# Patient Record
Sex: FEMALE | Race: BLACK OR AFRICAN AMERICAN | Employment: FULL TIME | RURAL
[De-identification: names, ages, dates, MRNs, and addresses within clinical notes are randomized per-mention and may not be internally consistent; named-entity substitution may affect disease eponyms.]

---

## 2017-01-19 ENCOUNTER — OFFICE VISIT (OUTPATIENT)
Dept: INTERNAL MEDICINE CLINIC | Age: 62
End: 2017-01-19

## 2017-01-19 VITALS
OXYGEN SATURATION: 100 % | WEIGHT: 196 LBS | BODY MASS INDEX: 34.73 KG/M2 | SYSTOLIC BLOOD PRESSURE: 141 MMHG | DIASTOLIC BLOOD PRESSURE: 88 MMHG | HEART RATE: 103 BPM | RESPIRATION RATE: 16 BRPM | TEMPERATURE: 96 F | HEIGHT: 63 IN

## 2017-01-19 DIAGNOSIS — M62.838 MUSCLE SPASM: ICD-10-CM

## 2017-01-19 DIAGNOSIS — E11.65 TYPE 2 DIABETES MELLITUS WITH HYPERGLYCEMIA, WITH LONG-TERM CURRENT USE OF INSULIN (HCC): Primary | ICD-10-CM

## 2017-01-19 DIAGNOSIS — Z79.4 TYPE 2 DIABETES MELLITUS WITH HYPERGLYCEMIA, WITH LONG-TERM CURRENT USE OF INSULIN (HCC): Primary | ICD-10-CM

## 2017-01-19 DIAGNOSIS — I10 ESSENTIAL HYPERTENSION: ICD-10-CM

## 2017-01-19 DIAGNOSIS — S78.112S: Chronic | ICD-10-CM

## 2017-01-19 DIAGNOSIS — E78.00 HIGH CHOLESTEROL: ICD-10-CM

## 2017-01-19 RX ORDER — LISINOPRIL AND HYDROCHLOROTHIAZIDE 20; 25 MG/1; MG/1
TABLET ORAL
Qty: 90 TAB | Refills: 3 | Status: SHIPPED | OUTPATIENT
Start: 2017-01-19 | End: 2018-01-23 | Stop reason: SDUPTHER

## 2017-01-19 RX ORDER — NAPROXEN 500 MG/1
500 TABLET ORAL 2 TIMES DAILY WITH MEALS
Qty: 60 TAB | Refills: 2 | Status: SHIPPED | OUTPATIENT
Start: 2017-01-19 | End: 2017-03-23 | Stop reason: ALTCHOICE

## 2017-01-19 RX ORDER — INSULIN ASPART 100 [IU]/ML
42 INJECTION, SUSPENSION SUBCUTANEOUS
Qty: 5 PEN | Refills: 5 | Status: SHIPPED | OUTPATIENT
Start: 2017-01-19 | End: 2017-01-23 | Stop reason: SDUPTHER

## 2017-01-19 RX ORDER — FENOFIBRATE 48 MG/1
48 TABLET, COATED ORAL DAILY
Qty: 90 TAB | Refills: 3 | Status: SHIPPED | OUTPATIENT
Start: 2017-01-19 | End: 2017-03-23 | Stop reason: ALTCHOICE

## 2017-01-19 RX ORDER — CYCLOBENZAPRINE HCL 10 MG
10 TABLET ORAL
Qty: 120 TAB | Refills: 5 | Status: SHIPPED | OUTPATIENT
Start: 2017-01-19 | End: 2018-03-05 | Stop reason: ALTCHOICE

## 2017-01-19 RX ORDER — SIMVASTATIN 40 MG/1
TABLET, FILM COATED ORAL
Qty: 90 TAB | Refills: 3 | Status: SHIPPED | OUTPATIENT
Start: 2017-01-19 | End: 2017-03-23 | Stop reason: SINTOL

## 2017-01-19 NOTE — MR AVS SNAPSHOT
Visit Information Date & Time Provider Department Dept. Phone Encounter #  
 1/19/2017  7:30 AM Trista Edwards MD [address] [facility] [phone] [id number] Follow-up Instructions Return in about 3 months (around 4/19/2017) for routine follow up. Upcoming Health Maintenance Date Due Hepatitis C Screening 1955 MICROALBUMIN Q1 3/31/1966 Pneumococcal 19-64 Medium Risk (1 of 1 - PPSV23) 3/31/1975 ZOSTER VACCINE AGE 60> 3/31/2016 PAP AKA CERVICAL CYTOLOGY 5/31/2018* HEMOGLOBIN A1C Q6M 3/7/2017 EYE EXAM RETINAL OR DILATED Q1 5/24/2017 LIPID PANEL Q1 9/7/2017 FOOT EXAM Q1 1/19/2018 BREAST CANCER SCRN MAMMOGRAM 6/29/2018 COLONOSCOPY 10/12/2022 DTaP/Tdap/Td series (2 - Td) 6/17/2026 *Topic was postponed. The date shown is not the original due date. Allergies as of 1/19/2017  Review Complete On: 1/19/2017 By: Trista Edwards MD  
  
 Severity Noted Reaction Type Reactions Keflex [Cephalexin]  06/29/2016    Itching Lortab [Hydrocodone-acetaminophen]  03/30/2012    Itching Current Immunizations  Never Reviewed Name Date Influenza Vaccine (Quad) PF 9/7/2016 Pneumococcal Conjugate (PCV-13) 9/7/2016 Tdap 6/17/2016 Not reviewed this visit You Were Diagnosed With   
  
 Codes Comments Type 2 diabetes mellitus with hyperglycemia, with long-term current use of insulin (HCC)    -  Primary ICD-10-CM: E11.65, Z79.4 ICD-9-CM: 250.00, 790.29, V58.67 Essential hypertension     ICD-10-CM: I10 
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 9 Vitals BP Pulse Temp Resp Height(growth percentile) Weight(growth percentile)  141/88 (BP 1 Location: Left arm, BP Patient Position: Sitting) (!) 103 96 °F (35.6 °C) (Oral) 16 5' 3\" (1.6 m) 196 lb (88.9 kg) LMP SpO2 BMI OB Status Smoking Status 2003 100% 34.72 kg/m2 Postmenopausal Former Smoker BMI and BSA Data Body Mass Index Body Surface Area 34.72 kg/m 2 1.99 m 2 Preferred Pharmacy Pharmacy Name Phone [facility] [address] [place] - [address] [phone] Your Updated Medication List  
  
   
This list is accurate as of: 17  7:56 AM.  Always use your most recent med list.  
  
  
  
  
 aspirin 81 mg tablet Take 1 Tab by mouth daily. cyclobenzaprine 10 mg tablet Commonly known as:  FLEXERIL Take 1 Tab by mouth three (3) times daily as needed for Muscle Spasm(s). fenofibrate nanocrystallized 48 mg tablet Commonly known as:  [employer] Take 1 Tab by mouth daily. fiber Cap Take  by mouth.  
  
 gabapentin 300 mg capsule Commonly known as:  NEURONTIN Take 3 Caps by mouth every evening. insulin aspart protamine/insulin aspart 100 unit/mL (70-30) Inpn Commonly known as:  NOVOLOG MIX 70/30  
0.42 mL by SubCUTAneous route Before breakfast and dinner. Indications: type 2 diabetes mellitus Insulin Needles (Disposable) 29 gauge x 1/2\" Ndle 1 Each by Does Not Apply route daily. lisinopril-hydroCHLOROthiazide 20-25 mg per tablet Commonly known as:  PRINZIDE, ZESTORETIC  
TAKE ONE TABLET BY MOUTH EVERY DAY  
  
 naproxen 500 mg tablet Commonly known as:  NAPROSYN Take 1 Tab by mouth two (2) times daily (with meals). As needed  
  
 raNITIdine 150 mg tablet Commonly known as:  ZANTAC TAKE ONE TABLET BY MOUTH ONCE DAILY  
  
 simvastatin 40 mg tablet Commonly known as:  ZOCOR  
TAKE 1 TABLET BY MOUTH NIGHTLY Prescriptions Sent to Pharmacy Refills  
 insulin aspart protamine/insulin aspart (NOVOLOG MIX 70/30) 100 unit/mL (70-30) inpn 5 Si.42 mL by SubCUTAneous route Before breakfast and dinner. Indications: type 2 diabetes mellitus Class: Normal  
 Pharmacy: [place] [address] [address] Ph #: [phone] Route: SubCUTAneous  
 simvastatin (ZOCOR) 40 mg tablet 3 Sig: TAKE 1 TABLET BY MOUTH NIGHTLY Class: Normal  
 Pharmacy: [address] [place] [address] [place] [facility] Ph #: [phone]  
 lisinopril-hydroCHLOROthiazide (PRINZIDE, ZESTORETIC) 20-25 mg per tablet 3 Sig: TAKE ONE TABLET BY MOUTH EVERY DAY Class: Normal  
 Pharmacy: [address] [place] [address] [facility] Ph #: [phone]  
 cyclobenzaprine (FLEXERIL) 10 mg tablet 5 Sig: Take 1 Tab by mouth three (3) times daily as needed for Muscle Spasm(s). Class: Normal  
 Pharmacy: [place] [address] [address] Ph #: [phone] Route: Oral  
 fenofibrate nanocrystallized (TRICOR) 48 mg tablet 3 Sig: Take 1 Tab by mouth daily. Class: Normal  
 [address]acy[address] [facility] Ph #: [phone] Route: Oral  
 naproxen (NAPROSYN) 500 mg tablet 2 Sig: Take 1 Tab by mouth two (2) times daily (with meals). As needed Class: Normal  
 Pharmacy: [address] Ph #: [phone] Route: Oral  
  
We Performed the Following HEMOGLOBIN A1C WITH EAG [47299 CPT(R)] METABOLIC PANEL, BASIC [13237 CPT(R)] Follow-up Instructions Return in about 3 months (around 4/19/2017) for routine follow up. Introducing [facility]! Nilson Bettencourt introduces [employer] patient portal. Now you can access parts of your medical record, email your doctor's office, and request medication refills online. 1. In your internet browser, go to [email or web address] 2. Click on the First Time User? Click Here link in the Sign In box. You will see the New Member Sign Up page. 3. Enter your [employer] [facility] Access Code exactly as it appears below. You will not need to use this code after youve completed the sign-up process. If you do not sign up before the expiration date, you must request a new code. · [employer] Access Code: [email or web address] Expires: 4/19/2017  7:44 AM 
 
4. Enter the last four digits of your Social Security Number (xxxx) and Date of Birth (mm/dd/yyyy) as indicated and click Submit. You will be taken to the next sign-up page. 5. Create a [facility] [facility] ID. This will be your [employer] login ID and cannot be changed, so think of one that is secure and easy to remember. 6. Create a [employer] [facility] password. You can change your password at any time. 7. Enter your Password Reset Question and Answer. This can be used at a later time if you forget your password. 8. Enter your e-mail address. You will receive e-mail notification when new information is available in [address]. 9. Click Sign Up. You can now view and download portions of your medical record. 10. Click the Download Summary menu link to download a portable copy of your medical information. If you have questions, please visit the Frequently Asked Questions section of the [employer] website. Remember, [facility] [facility] is NOT to be used for urgent needs. For medical emergencies, dial 911. Now available from your iPhone and Android! Please provide this summary of care documentation to your next provider. If you have any questions after today's visit, please call [phone].

## 2017-01-19 NOTE — PROGRESS NOTES
Subjective:     Tony Ventura is a 62 y.o. female seen for follow-up of diabetes. She has had hypoglycemic attacks. .no  Blood sugar control has been OK as long as she is good  She has diabetes, hypertension and hyperlipidemia. Tony Ventura has the additional concern of joint pains in elbows hands and knees. Taking ibuprofen 2 x per week. Pain since esteban, no trauma. Prosthesis working OK, has a new one    Diabetic Review of Systems: no chest pain, dyspnea or TIA's, no numbness, tingling or pain in extremities, no hypoglycemia, some inc thirst and usu SOB no CP. Current Outpatient Prescriptions   Medication Sig Dispense Refill    raNITIdine (ZANTAC) 150 mg tablet TAKE ONE TABLET BY MOUTH ONCE DAILY 30 Tab 11    Insulin Needles, Disposable, 29 gauge x 1/2\" ndle 1 Each by Does Not Apply route daily. 100 Pen Needle 5    fenofibrate nanocrystallized (TRICOR) 48 mg tablet TAKE ONE TABLET BY MOUTH ONCE DAILY 30 Tab 5    insulin aspart protamine/insulin aspart (NOVOLOG MIX 70/30) 100 unit/mL (70-30) inpn 0.42 mL by SubCUTAneous route Before breakfast and dinner. Indications: TYPE 2 DIABETES MELLITUS 5 Pen 5    cyclobenzaprine (FLEXERIL) 10 mg tablet Take 1 Tab by mouth three (3) times daily as needed for Muscle Spasm(s). 60 Tab 5    gabapentin (NEURONTIN) 300 mg capsule Take 3 Caps by mouth every evening. 90 Cap 5    simvastatin (ZOCOR) 40 mg tablet TAKE 1 TABLET BY MOUTH NIGHTLY 90 Tab 1    lisinopril-hydrochlorothiazide (PRINZIDE, ZESTORETIC) 20-25 mg per tablet TAKE ONE TABLET BY MOUTH EVERY DAY 30 Tab 11    aspirin 81 mg tablet Take 1 Tab by mouth daily.  fiber Cap Take  by mouth. Allergies   Allergen Reactions    Keflex [Cephalexin] Itching    Lortab [Hydrocodone-Acetaminophen] Itching       Diet and Lifestyle: follows a diabetic diet regularly, nonsmoker.     Social History   Substance Use Topics    Smoking status: Former Smoker     Quit date: 3/30/2009    Smokeless tobacco: Not on file    Alcohol use No        Lab Results  Component Value Date/Time   WBC 5.3 04/06/2016 11:44 AM   HGB 13.3 04/06/2016 11:44 AM   HCT 40.1 04/06/2016 11:44 AM   PLATELET 811 34/98/3958 11:44 AM   MCV 77 04/06/2016 11:44 AM       Lab Results  Component Value Date/Time   Hemoglobin A1c 12.8 09/07/2016 11:41 AM   Hemoglobin A1c 13.1 04/06/2016 11:44 AM   Hemoglobin A1c 8.4 08/11/2014 01:27 PM   Glucose 140 09/07/2016 11:41 AM   Glucose  07/20/2016 10:00 AM   LDL, calculated Comment 09/07/2016 11:41 AM   Creatinine 1.23 09/07/2016 11:41 AM      Lab Results  Component Value Date/Time   Cholesterol, total 117 09/07/2016 11:41 AM   Cholesterol, Total 128 10/17/2013 07:45 AM   HDL Cholesterol 14 09/07/2016 11:41 AM   LDL, calculated Comment 09/07/2016 11:41 AM   Triglyceride 481 09/07/2016 11:41 AM       Lab Results  Component Value Date/Time   ALT 16 04/06/2016 11:44 AM   AST 13 04/06/2016 11:44 AM   Alk. phosphatase 66 04/06/2016 11:44 AM   Bilirubin, total <0.2 04/06/2016 11:44 AM       Lab Results  Component Value Date/Time   GFR est AA 55 09/07/2016 11:41 AM   GFR est non-AA 48 09/07/2016 11:41 AM   Creatinine 1.23 09/07/2016 11:41 AM   BUN 21 09/07/2016 11:41 AM   Sodium 141 09/07/2016 11:41 AM   Potassium 4.2 09/07/2016 11:41 AM   Chloride 98 09/07/2016 11:41 AM   CO2 21 09/07/2016 11:41 AM      Lab Results   Component Value Date/Time    Glucose 140 09/07/2016 11:41 AM    Glucose  07/20/2016 10:00 AM         Review of Systems  Pertinent items are noted in HPI. Objective:     Significant for the followingleft leg prosthesis    Visit Vitals    /88 (BP 1 Location: Left arm, BP Patient Position: Sitting)    Pulse (!) 103    Temp 96 °F (35.6 °C) (Oral)    Resp 16    Ht 5' 3\" (1.6 m)    Wt 196 lb (88.9 kg)    LMP 08/23/2003    SpO2 100%    BMI 34.72 kg/m2     Appearance: alert, well appearing, and in no distress.   Exam: heart sounds normal rate, regular rhythm, normal S1, S2, no murmurs, rubs, clicks or gallops, chest clear  Foot exam: Diabetic foot exam was performed. No obvious sores or red lesions. Sensation checked by monofilament exam which was normal.  Dorsalis pedis pulse waspresent. Lab review: orders written for new lab studies as appropriate; see orders. Assessment/Plan:     Follow-up diabetes control uncertain. Diabetic issues reviewed with her: all medications, side effects and compliance discussed carefully, foot care discussed and Podiatry visits discussed and glycohemoglobin and other lab monitoring discussed. ICD-10-CM ICD-9-CM    1. Type 2 diabetes mellitus with hyperglycemia, with long-term current use of insulin ([facility]) K16.12 837.68 METABOLIC PANEL, BASIC    R61.4 790.29 HEMOGLOBIN A1C WITH EAG     V58.67    2. Essential hypertension I10 401.9 lisinopril-hydroCHLOROthiazide (PRINZIDE, ZESTORETIC) 20-25 mg per tablet   3. Muscle spasm M62.838 728.85 cyclobenzaprine (FLEXERIL) 10 mg tablet   4. High cholesterol E78.00 272.0 fenofibrate nanocrystallized (TRICOR) 48 mg tablet   5. 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 Indications: type 2 diabetes mellitus     Dispense:  5 Pen     Refill:  5    simvastatin (ZOCOR) 40 mg tablet     Sig: TAKE 1 TABLET BY MOUTH NIGHTLY     Dispense:  90 Tab     Refill:  3    lisinopril-hydroCHLOROthiazide (PRINZIDE, ZESTORETIC) 20-25 mg per tablet     Sig: TAKE ONE TABLET BY MOUTH EVERY DAY     Dispense:  90 Tab     Refill:  3    cyclobenzaprine (FLEXERIL) 10 mg tablet     Sig: Take 1 Tab by mouth three (3) times daily as needed for Muscle Spasm(s).      Dispense:  120 Tab     Refill:  5    fenofibrate nanocrystallized (TRICOR) 48 mg tablet Sig: Take 1 Tab by mouth daily. Dispense:  90 Tab     Refill:  3    naproxen (NAPROSYN) 500 mg tablet     Sig: Take 1 Tab by mouth two (2) times daily (with meals). As needed     Dispense:  60 Tab     Refill:  2     Follow-up Disposition:  Return in about 3 months (around 4/19/2017) for routine follow up.

## 2017-01-20 LAB
BUN SERPL-MCNC: 29 MG/DL (ref 8–27)
BUN/CREAT SERPL: 22 (ref 11–26)
CALCIUM SERPL-MCNC: 9.5 MG/DL (ref 8.7–10.3)
CHLORIDE SERPL-SCNC: 95 MMOL/L (ref 96–106)
CO2 SERPL-SCNC: 23 MMOL/L (ref 18–29)
CREAT SERPL-MCNC: 1.34 MG/DL (ref 0.57–1)
EST. AVERAGE GLUCOSE BLD GHB EST-MCNC: 252 MG/DL
GLUCOSE SERPL-MCNC: 263 MG/DL (ref 65–99)
HBA1C MFR BLD: 10.4 % (ref 4.8–5.6)
INTERPRETATION: NORMAL
POTASSIUM SERPL-SCNC: 3.8 MMOL/L (ref 3.5–5.2)
SODIUM SERPL-SCNC: 137 MMOL/L (ref 134–144)

## 2017-01-23 ENCOUNTER — TELEPHONE (OUTPATIENT)
Dept: INTERNAL MEDICINE CLINIC | Age: 62
End: 2017-01-23

## 2017-01-23 RX ORDER — INSULIN ASPART 100 [IU]/ML
44 INJECTION, SUSPENSION SUBCUTANEOUS
Qty: 5 PEN | Refills: 5 | Status: SHIPPED | OUTPATIENT
Start: 2017-01-23 | End: 2017-03-23 | Stop reason: SDUPTHER

## 2017-03-23 ENCOUNTER — OFFICE VISIT (OUTPATIENT)
Dept: INTERNAL MEDICINE CLINIC | Age: 62
End: 2017-03-23

## 2017-03-23 VITALS
BODY MASS INDEX: 35.26 KG/M2 | TEMPERATURE: 96.3 F | HEART RATE: 110 BPM | WEIGHT: 199 LBS | DIASTOLIC BLOOD PRESSURE: 74 MMHG | OXYGEN SATURATION: 100 % | HEIGHT: 63 IN | RESPIRATION RATE: 20 BRPM | SYSTOLIC BLOOD PRESSURE: 133 MMHG

## 2017-03-23 DIAGNOSIS — M19.90 ARTHRITIS: ICD-10-CM

## 2017-03-23 DIAGNOSIS — E78.00 ELEVATED CHOLESTEROL: ICD-10-CM

## 2017-03-23 DIAGNOSIS — S78.112D: ICD-10-CM

## 2017-03-23 DIAGNOSIS — I10 ESSENTIAL HYPERTENSION: ICD-10-CM

## 2017-03-23 DIAGNOSIS — E11.65 TYPE 2 DIABETES MELLITUS WITH HYPERGLYCEMIA, WITH LONG-TERM CURRENT USE OF INSULIN (HCC): Primary | ICD-10-CM

## 2017-03-23 DIAGNOSIS — Z79.4 TYPE 2 DIABETES MELLITUS WITH HYPERGLYCEMIA, WITH LONG-TERM CURRENT USE OF INSULIN (HCC): Primary | ICD-10-CM

## 2017-03-23 LAB
BILIRUB UR QL STRIP: NEGATIVE
GLUCOSE UR-MCNC: NORMAL MG/DL
KETONES P FAST UR STRIP-MCNC: NEGATIVE MG/DL
PH UR STRIP: 6.5 [PH] (ref 4.6–8)
PROT UR QL STRIP: NEGATIVE MG/DL
SP GR UR STRIP: 1.01 (ref 1–1.03)
UA UROBILINOGEN AMB POC: NORMAL (ref 0.2–1)
URINALYSIS CLARITY POC: CLEAR
URINALYSIS COLOR POC: YELLOW
URINE BLOOD POC: NORMAL
URINE LEUKOCYTES POC: NORMAL
URINE NITRITES POC: NEGATIVE

## 2017-03-23 RX ORDER — NAPROXEN 500 MG/1
500 TABLET ORAL 2 TIMES DAILY WITH MEALS
Qty: 60 TAB | Refills: 2 | Status: SHIPPED | OUTPATIENT
Start: 2017-03-23 | End: 2017-05-04 | Stop reason: ALTCHOICE

## 2017-03-23 RX ORDER — METFORMIN HYDROCHLORIDE 500 MG/1
500 TABLET ORAL 2 TIMES DAILY WITH MEALS
Qty: 60 TAB | Refills: 5 | Status: SHIPPED | OUTPATIENT
Start: 2017-03-23 | End: 2017-09-01 | Stop reason: SDUPTHER

## 2017-03-23 RX ORDER — DEXTROMETHORPHAN HYDROBROMIDE, GUAIFENESIN 5; 100 MG/5ML; MG/5ML
650 LIQUID ORAL
COMMUNITY
End: 2018-02-18

## 2017-03-23 RX ORDER — INSULIN ASPART 100 [IU]/ML
40 INJECTION, SUSPENSION SUBCUTANEOUS
Qty: 5 PEN | Refills: 5 | Status: SHIPPED | OUTPATIENT
Start: 2017-03-23 | End: 2017-09-01 | Stop reason: SDUPTHER

## 2017-03-23 NOTE — LETTER
3/27/2017 8:31 AM 
 
Ms. Oni Cerda [address] [facility] [id number] Dear Oni Cerda: 
 
Please find your most recent results below. Resulted Orders AMB POC URINALYSIS DIP STICK AUTO W/O MICRO Result Value Ref Range Color (UA POC) Yellow Clarity (UA POC) Clear Glucose (UA POC) 1+ Negative Comment:  
   500 mg/dL Bilirubin (UA POC) Negative Negative Ketones (UA POC) Negative Negative Specific gravity (UA POC) 1.015 1.001 - 1.035 Blood (UA POC) Trace Negative Comment:  
   Intact pH (UA POC) 6.5 4.6 - 8.0 Protein (UA POC) Negative Negative mg/dL Urobilinogen (UA POC) 0.2 mg/dL 0.2 - 1 Nitrites (UA POC) Negative Negative Leukocyte esterase (UA POC) 1+ Negative Comment:  
   Small MICROALBUMIN, UR, RAND W/ MICROALBUMIN/CREA RATIO Result Value Ref Range Creatinine, urine 40.7 Not Estab. mg/dL Microalbumin, urine 26.6 Not Estab. ug/mL Microalb/Creat ratio (ug/mg creat.) 65.4 (H) 0.0 - 30.0 mg/g creat Narrative Performed at:  [address]  [id number] : Maria G Aceves MD, Phone:  [phone] RECOMMENDATIONS: 
Results of urine test to check the kidneys is normal/ stable. Please follow up as scheduled. Please call me if you have any questions: [phone] Sincerely, Blanca Benson MD

## 2017-03-23 NOTE — PROGRESS NOTES
Subjective:     Brian Gordon is a 62 y.o. female seen for follow-up of diabetes. She has had hypoglycemic attacks. .no but was staring out into space feeling weird checked BS once was 101, lowest, gets something to eat and feels better  Blood sugar control has been fair, per diary  She has diabetes, hypertension and hyperlipidemia. Brian Gordon has the additional concern of arthritis, both elbows and hands are affected. Not getting exercise saw celia, did w/u, may eventually need knee replacement      Diabetic Review of Systems: no numbness, tingling or pain in extremities, has noted excessive thirstiness and frequent urination, has SOB no CP. Current Outpatient Prescriptions   Medication Sig Dispense Refill    acetaminophen (TYLENOL ARTHRITIS PAIN) 650 mg CR tablet Take 650 mg by mouth every six (6) hours as needed for Pain.  psyllium (METAMUCIL) powd Take  by mouth.  insulin aspart protamine/insulin aspart (NOVOLOG MIX 70/30) 100 unit/mL (70-30) inpn 0.44 mL by SubCUTAneous route Before breakfast and dinner. Indications: type 2 diabetes mellitus 5 Pen 5    simvastatin (ZOCOR) 40 mg tablet TAKE 1 TABLET BY MOUTH NIGHTLY 90 Tab 3    lisinopril-hydroCHLOROthiazide (PRINZIDE, ZESTORETIC) 20-25 mg per tablet TAKE ONE TABLET BY MOUTH EVERY DAY 90 Tab 3    cyclobenzaprine (FLEXERIL) 10 mg tablet Take 1 Tab by mouth three (3) times daily as needed for Muscle Spasm(s). 120 Tab 5    raNITIdine (ZANTAC) 150 mg tablet TAKE ONE TABLET BY MOUTH ONCE DAILY 30 Tab 11    Insulin Needles, Disposable, 29 gauge x 1/2\" ndle 1 Each by Does Not Apply route daily. 100 Pen Needle 5    gabapentin (NEURONTIN) 300 mg capsule Take 3 Caps by mouth every evening. 90 Cap 5    aspirin 81 mg tablet Take 1 Tab by mouth daily.  fiber Cap Take  by mouth.          Allergies   Allergen Reactions    Keflex [Cephalexin] Itching    Lortab [Hydrocodone-Acetaminophen] Itching       Diet and Lifestyle: follows a diabetic diet regularly, nonsmoker. Social History   Substance Use Topics    Smoking status: Former Smoker     Quit date: 3/30/2009    Smokeless tobacco: Not on file    Alcohol use No        Lab Results  Component Value Date/Time   WBC 5.3 04/06/2016 11:44 AM   HGB 13.3 04/06/2016 11:44 AM   HCT 40.1 04/06/2016 11:44 AM   PLATELET 043 73/79/1313 11:44 AM   MCV 77 04/06/2016 11:44 AM       Lab Results  Component Value Date/Time   Hemoglobin A1c 10.4 01/19/2017 07:50 AM   Hemoglobin A1c 12.8 09/07/2016 11:41 AM   Hemoglobin A1c 13.1 04/06/2016 11:44 AM   Glucose 263 01/19/2017 07:50 AM   Glucose  07/20/2016 10:00 AM   Microalb/Creat ratio (ug/mg creat.) 65.4 03/23/2017 08:03 AM   LDL, calculated Comment 09/07/2016 11:41 AM   Creatinine 1.34 01/19/2017 07:50 AM      Lab Results  Component Value Date/Time   Cholesterol, total 117 09/07/2016 11:41 AM   HDL Cholesterol 14 09/07/2016 11:41 AM   LDL, calculated Comment 09/07/2016 11:41 AM   Triglyceride 481 09/07/2016 11:41 AM       Lab Results  Component Value Date/Time   ALT (SGPT) 16 04/06/2016 11:44 AM   AST (SGOT) 13 04/06/2016 11:44 AM   Alk. phosphatase 66 04/06/2016 11:44 AM   Bilirubin, total <0.2 04/06/2016 11:44 AM       Lab Results  Component Value Date/Time   GFR est AA 50 01/19/2017 07:50 AM   GFR est non-AA 43 01/19/2017 07:50 AM   Creatinine 1.34 01/19/2017 07:50 AM   BUN 29 01/19/2017 07:50 AM   Sodium 137 01/19/2017 07:50 AM   Potassium 3.8 01/19/2017 07:50 AM   Chloride 95 01/19/2017 07:50 AM   CO2 23 01/19/2017 07:50 AM      Lab Results   Component Value Date/Time    Glucose 263 01/19/2017 07:50 AM    Glucose  07/20/2016 10:00 AM         Review of Systems  Pertinent items are noted in HPI.     Objective:     Significant for the following overweight no acute distress    Visit Vitals    /74 (BP 1 Location: Left arm, BP Patient Position: Sitting)    Pulse (!) 110    Temp 96.3 °F (35.7 °C) (Oral)    Resp 20    Ht 5' 3\" (1.6 m)  Wt 199 lb (90.3 kg)    LMP 2003    SpO2 100%    BMI 35.25 kg/m2     Appearance: alert, well appearing, and in no distress. Exam: heart sounds normal rate, regular rhythm, normal S1, S2, no murmurs, rubs, clicks or gallops, chest clear  Foot exam: deferred  Ext no obvious redness or swelling    Lab review: orders written for new lab studies as appropriate; see orders. Assessment/Plan:     Follow-up diabetes stable. Diabetic issues reviewed with her: low cholesterol diet, weight control and daily exercise discussed and all medications, side effects and compliance discussed carefully. ICD-10-CM ICD-9-CM    1. Type 2 diabetes mellitus with hyperglycemia, with long-term current use of insulin ([facility]) E11.65 250.00 AMB POC URINALYSIS DIP STICK AUTO W/O MICRO    Z79.4 790.29 MICROALBUMIN, UR, RAND W/ MICROALBUMIN/CREA RATIO     V58.67    2. Elevated cholesterol E78.00 272.0    3. Arthritis M19.90 716.90 AMB SUPPLY ORDER   4. Essential hypertension I10 401.9    5. [facility] S78.112D V58.89      897.2      Orders Placed This Encounter    AMB SUPPLY ORDER     Arthritis hands elbows knee. [place]  [phone]    MICROALBUMIN, UR, RAND W/ MICROALBUMIN/CREA RATIO    AMB POC URINALYSIS DIP STICK AUTO W/O MICRO    acetaminophen (TYLENOL ARTHRITIS PAIN) 650 mg CR tablet     Sig: Take 650 mg by mouth every six (6) hours as needed for Pain.  psyllium (METAMUCIL) powd     Sig: Take  by mouth.  insulin aspart protamine/insulin aspart (NOVOLOG MIX 70/30) 100 unit/mL (70-30) inpn     Si Units by SubCUTAneous route Before breakfast and dinner. Indications: type 2 diabetes mellitus     Dispense:  5 Pen     Refill:  5    metFORMIN (GLUCOPHAGE) 500 mg tablet     Sig: Take 1 Tab by mouth two (2) times daily (with meals).      Dispense:  60 Tab     Refill:  5    naproxen (NAPROSYN) 500 mg tablet     Sig: Take 1 Tab by mouth two (2) times daily (with meals). As needed for pain     Dispense:  60 Tab     Refill:  2     Exercise to help with her arthritis. No reoccurrence of the wound from her leg prosthesis. Discussed possible side affects, precautions, and drug interactions and possible benefits of the medication(s). Diabetes slowly improving. Follow-up Disposition:  Return in about 6 weeks (around 5/4/2017) for routine follow up.

## 2017-03-23 NOTE — MR AVS SNAPSHOT
Visit Information Date & Time Provider Department Dept. Phone Encounter #  
 3/23/2017  7:30 AM Yung Plascencia  Amendradha Cowan [id number] Follow-up Instructions Return in about 6 weeks (around 5/4/2017) for routine follow up. Upcoming Health Maintenance Date Due Hepatitis C Screening 1955 MICROALBUMIN Q1 3/31/1966 Pneumococcal 19-64 Medium Risk (1 of 1 - PPSV23) 3/31/1975 ZOSTER VACCINE AGE 60> 3/31/2016 PAP AKA CERVICAL CYTOLOGY 5/31/2018* EYE EXAM RETINAL OR DILATED Q1 5/24/2017 HEMOGLOBIN A1C Q6M 7/19/2017 LIPID PANEL Q1 9/7/2017 FOOT EXAM Q1 1/19/2018 BREAST CANCER SCRN MAMMOGRAM 6/29/2018 COLONOSCOPY 10/12/2022 DTaP/Tdap/Td series (2 - Td) 6/17/2026 *Topic was postponed. The date shown is not the original due date. Allergies as of 3/23/2017  Review Complete On: 3/23/2017 By: Patrick Pruitt LPN Severity Noted Reaction Type Reactions Keflex [Cephalexin]  06/29/2016    Itching Lortab [Hydrocodone-acetaminophen]  03/30/2012    Itching Current Immunizations  Never Reviewed Name Date Influenza Vaccine (Quad) PF 9/7/2016 Pneumococcal Conjugate (PCV-13) 9/7/2016 Tdap 6/17/2016 Not reviewed this visit You Were Diagnosed With   
  
 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 
ICD-9-CM: 401.9 Vitals BP Pulse Temp Resp Height(growth percentile) Weight(growth percentile)  133/74 (BP 1 Location: Left arm, BP Patient Position: Sitting) (!) 110 96.3 °F (35.7 °C) (Oral) 20 5' 3\" (1.6 m) 199 lb (90.3 kg) LMP SpO2 BMI OB Status Smoking Status 08/23/2003 100% 35.25 kg/m2 Postmenopausal Former Smoker Vitals History BMI and BSA Data Body Mass Index Body Surface Area  
 35.25 kg/m 2 2 m 2 Preferred Pharmacy Pharmacy Name Phone [facility] [place] [address], [place] [address] [phone] Your Updated Medication List  
  
   
This list is accurate as of: 3/23/17  8:13 AM.  Always use your most recent med list.  
  
  
  
  
 aspirin 81 mg tablet Take 1 Tab by mouth daily. cyclobenzaprine 10 mg tablet Commonly known as:  FLEXERIL Take 1 Tab by mouth three (3) times daily as needed for Muscle Spasm(s). fiber Cap Take  by mouth.  
  
 gabapentin 300 mg capsule Commonly known as:  NEURONTIN Take 3 Caps by mouth every evening. insulin aspart protamine/insulin aspart 100 unit/mL (70-30) Inpn Commonly known as:  NOVOLOG MIX 70/30  
40 Units by SubCUTAneous route Before breakfast and dinner. Indications: type 2 diabetes mellitus Insulin Needles (Disposable) 29 gauge x 1/2\" Ndle 1 Each by Does Not Apply route daily. lisinopril-hydroCHLOROthiazide 20-25 mg per tablet Commonly known as:  PRINZIDE, ZESTORETIC  
TAKE ONE TABLET BY MOUTH EVERY DAY  
  
 metFORMIN 500 mg tablet Commonly known as:  GLUCOPHAGE Take 1 Tab by mouth two (2) times daily (with meals). naproxen 500 mg tablet Commonly known as:  NAPROSYN Take 1 Tab by mouth two (2) times daily (with meals). As needed for pain  
  
 psyllium Powd Commonly known as:  METAMUCIL Take  by mouth. raNITIdine 150 mg tablet Commonly known as:  ZANTAC TAKE ONE TABLET BY MOUTH ONCE DAILY  
  
 simvastatin 40 mg tablet Commonly known as:  ZOCOR  
TAKE 1 TABLET BY MOUTH NIGHTLY  
  
 TYLENOL ARTHRITIS PAIN 650 mg CR tablet Generic drug:  acetaminophen Take 650 mg by mouth every six (6) hours as needed for Pain. Prescriptions Sent to Pharmacy Refills  
 insulin aspart protamine/insulin aspart (NOVOLOG MIX 70/30) 100 unit/mL (70-30) inpn 5 Si Units by SubCUTAneous route Before breakfast and dinner. Indications: type 2 diabetes mellitus Class: Normal  
 Pharmacy: [address] [facility], [address] Ph #: [phone] Route: SubCUTAneous  
 metFORMIN (GLUCOPHAGE) 500 mg tablet 5 Sig: Take 1 Tab by mouth two (2) times daily (with meals). Class: Normal  
 Pharmacy: [address] [facility], [address] Ph #: [phone] Route: Oral  
 naproxen (NAPROSYN) 500 mg tablet 2 Sig: Take 1 Tab by mouth two (2) times daily (with meals). As needed for pain  
 Class: Normal  
 Pharmacy: [address] [facility], [address] Ph #: [phone] Route: Oral  
  
We Performed the Following AMB POC URINALYSIS DIP STICK AUTO W/O MICRO [52380 CPT(R)] AMB SUPPLY ORDER [[id number] Custom] Comments:  
 Arthritis hands elbows knee. [place]  064 [id number] MICROALBUMIN, UR, RAND W/ MICROALBUMIN/CREA RATIO [id number] CPT(R)] Follow-up Instructions Return in about 6 weeks (around 2017) for routine follow up. Introducing [facility] & HEALTH SERVICES! Chiquis Wakefield introduces [employer] patient portal. Now you can access parts of your medical record, email your doctor's office, and request medication refills online. 1. In your internet browser, go to [email or web address] 2. Click on the First Time User? Click Here link in the Sign In box. You will see the New Member Sign Up page. 3. Enter your [employer] Access Code exactly as it appears below. You will not need to use this code after youve completed the sign-up process. If you do not sign up before the expiration date, you must request a new code. · [employer] Access Code: [email or web address] Expires: 4/19/2017  8:44 AM 
 
4. Enter the last four digits of your Social Security Number (xxxx) and Date of Birth (mm/dd/yyyy) as indicated and click Submit. You will be taken to the next sign-up page. 5. Create a [employer] ID. This will be your [employer] login ID and cannot be changed, so think of one that is secure and easy to remember. 6. Create a [employer] password. You can change your password at any time. 7. Enter your Password Reset Question and Answer. This can be used at a later time if you forget your password. 8. Enter your e-mail address. You will receive e-mail notification when new information is available in [address] 9. Click Sign Up. You can now view and download portions of your medical record. 10. Click the Download Summary menu link to download a portable copy of your medical information. If you have questions, please visit the Frequently Asked Questions section of the [employer] website. Remember, [employer] is NOT to be used for urgent needs. For medical emergencies, dial 911. Now available from your iPhone and Android! Please provide this summary of care documentation to your next provider. Your primary care clinician is listed as Surekha Encinas. If you have any questions after today's visit, please call [phone].

## 2017-03-24 LAB
ALBUMIN/CREAT UR: 65.4 MG/G CREAT (ref 0–30)
CREAT UR-MCNC: 40.7 MG/DL
MICROALBUMIN UR-MCNC: 26.6 UG/ML

## 2017-04-26 ENCOUNTER — DOCUMENTATION ONLY (OUTPATIENT)
Dept: INTERNAL MEDICINE CLINIC | Age: 62
End: 2017-04-26

## 2017-05-04 ENCOUNTER — OFFICE VISIT (OUTPATIENT)
Dept: INTERNAL MEDICINE CLINIC | Age: 62
End: 2017-05-04

## 2017-05-04 VITALS
DIASTOLIC BLOOD PRESSURE: 68 MMHG | HEIGHT: 63 IN | WEIGHT: 201 LBS | RESPIRATION RATE: 20 BRPM | TEMPERATURE: 96.6 F | OXYGEN SATURATION: 100 % | SYSTOLIC BLOOD PRESSURE: 112 MMHG | BODY MASS INDEX: 35.61 KG/M2 | HEART RATE: 100 BPM

## 2017-05-04 DIAGNOSIS — Z79.4 TYPE 2 DIABETES MELLITUS WITH HYPERGLYCEMIA, WITH LONG-TERM CURRENT USE OF INSULIN (HCC): Primary | ICD-10-CM

## 2017-05-04 DIAGNOSIS — E08.21 DIABETES MELLITUS DUE TO UNDERLYING CONDITION WITH DIABETIC NEPHROPATHY, WITH LONG-TERM CURRENT USE OF INSULIN (HCC): ICD-10-CM
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
## 2017-05-04 DIAGNOSIS — Z79.4 DIABETES MELLITUS DUE TO UNDERLYING CONDITION WITH DIABETIC NEPHROPATHY, WITH LONG-TERM CURRENT USE OF INSULIN (HCC): ICD-10-CM

## 2017-05-04 DIAGNOSIS — S78.112S: Chronic | ICD-10-CM

## 2017-05-04 DIAGNOSIS — I10 ESSENTIAL HYPERTENSION: ICD-10-CM

## 2017-05-04 DIAGNOSIS — E11.65 TYPE 2 DIABETES MELLITUS WITH HYPERGLYCEMIA, WITH LONG-TERM CURRENT USE OF INSULIN (HCC): Primary | ICD-10-CM

## 2017-05-04 RX ORDER — GABAPENTIN 100 MG/1
100 CAPSULE ORAL 3 TIMES DAILY
Qty: 90 CAP | Refills: 11 | Status: SHIPPED | OUTPATIENT
Start: 2017-05-04 | End: 2017-09-01 | Stop reason: SDUPTHER

## 2017-05-04 RX ORDER — MELOXICAM 15 MG/1
15 TABLET ORAL DAILY
COMMUNITY
End: 2017-09-01 | Stop reason: ALTCHOICE

## 2017-05-04 NOTE — PROGRESS NOTES
Follow up for diabetes - fell at work 3 weeks ago - hurt knee  Travis Celestin LPN  7/0/8449  8:94 AM

## 2017-05-04 NOTE — MR AVS SNAPSHOT
Visit Information Date & Time Provider Department Dept. Phone Encounter #  
 5/4/2017  7:30 AM Orestes Sewell  Amendradha Cowan [id number] Follow-up Instructions Return in about 4 months (around 9/4/2017) for routine follow up. Upcoming Health Maintenance Date Due Hepatitis C Screening 1955 Pneumococcal 19-64 Medium Risk (1 of 1 - PPSV23) 3/31/1975 ZOSTER VACCINE AGE 60> 3/31/2016 EYE EXAM RETINAL OR DILATED Q1 5/24/2017 PAP AKA CERVICAL CYTOLOGY 5/31/2018* HEMOGLOBIN A1C Q6M 7/19/2017 INFLUENZA AGE 9 TO ADULT 8/1/2017 LIPID PANEL Q1 9/7/2017 FOOT EXAM Q1 1/19/2018 MICROALBUMIN Q1 3/23/2018 BREAST CANCER SCRN MAMMOGRAM 6/29/2018 COLONOSCOPY 10/12/2022 DTaP/Tdap/Td series (2 - Td) 6/17/2026 *Topic was postponed. The date shown is not the original due date. Allergies as of 5/4/2017  Review Complete On: 5/4/2017 By: Hope Montaño LPN Severity Noted Reaction Type Reactions Keflex [Cephalexin]  06/29/2016    Itching Lortab [Hydrocodone-acetaminophen]  03/30/2012    Itching Current Immunizations  Never Reviewed Name Date Influenza Vaccine (Quad) PF 9/7/2016 Pneumococcal Conjugate (PCV-13) 9/7/2016 Tdap 6/17/2016 Not reviewed this visit You Were Diagnosed With   
  
 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 Stephanie Passer ICD-9-CM: V58.89, E888.9 Essential hypertension     ICD-10-CM: I10 
ICD-9-CM: 401.9 Diabetes mellitus due to underlying condition with diabetic nephropathy, with long-term current use of insulin (HCC)     ICD-10-CM: E08.21, Z79.4 ICD-9-CM: 249.40, 583.81, V58.67   
  
 Vitals BP Pulse Temp Resp Height(growth percentile) Weight(growth percentile) 112/68 100 96.6 °F (35.9 °C) (Oral) 20 5' 3\" (1.6 m) 201 lb (91.2 kg) LMP SpO2 BMI OB Status Smoking Status 08/23/2003 100% 35.61 kg/m2 Postmenopausal Former Smoker Vitals History BMI and BSA Data Body Mass Index Body Surface Area  
 35.61 kg/m 2 2.01 m 2 Preferred Pharmacy Pharmacy Name Phone [facility] [address], [place] [address] [phone] Your Updated Medication List  
  
   
This list is accurate as of: 5/4/17  8:00 AM.  Always use your most recent med list.  
  
  
  
  
 aspirin 81 mg tablet Take 1 Tab by mouth daily. cyclobenzaprine 10 mg tablet Commonly known as:  FLEXERIL Take 1 Tab by mouth three (3) times daily as needed for Muscle Spasm(s). fiber Cap Take  by mouth.  
  
 gabapentin 100 mg capsule Commonly known as:  NEURONTIN Take 1 Cap by mouth three (3) times daily. insulin aspart protamine/insulin aspart 100 unit/mL (70-30) Inpn Commonly known as:  NOVOLOG MIX 70/30  
40 Units by SubCUTAneous route Before breakfast and dinner. Indications: type 2 diabetes mellitus Insulin Needles (Disposable) 29 gauge x 1/2\" Ndle 1 Each by Does Not Apply route daily. lisinopril-hydroCHLOROthiazide 20-25 mg per tablet Commonly known as:  PRINZIDE, ZESTORETIC  
TAKE ONE TABLET BY MOUTH EVERY DAY  
  
 meloxicam 15 mg tablet Commonly known as:  MOBIC Take 15 mg by mouth daily. metFORMIN 500 mg tablet Commonly known as:  GLUCOPHAGE Take 1 Tab by mouth two (2) times daily (with meals). psyllium Powd Commonly known as:  METAMUCIL Take  by mouth. raNITIdine 150 mg tablet Commonly known as:  ZANTAC TAKE ONE TABLET BY MOUTH ONCE DAILY  
  
 TYLENOL ARTHRITIS PAIN 650 mg CR tablet Generic drug:  acetaminophen Take 650 mg by mouth every six (6) hours as needed for Pain. Prescriptions Sent to Pharmacy Refills  
 gabapentin (NEURONTIN) 100 mg capsule 11 Sig: Take 1 Cap by mouth three (3) times daily. Class: Normal  
 Pharmacy: [facility] [address]  #: [phone] Route: Oral  
  
We Performed the Following HEMOGLOBIN A1C WITH EAG [07144 CPT(R)] METABOLIC PANEL, BASIC [22204 CPT(R)] Follow-up Instructions Return in about 4 months (around 9/4/2017) for routine follow up. Introducing [facility] & HEALTH SERVICES! Shreya Ramsey introduces [employer] patient portal. Now you can access parts of your medical record, email your doctor's office, and request medication refills online. 1. In your internet browser, go to [email or web address] 2. Click on the First Time User? Click Here link in the Sign In box. You will see the New Member Sign Up page. 3. Enter your [employer] Access Code exactly as it appears below. You will not need to use this code after youve completed the sign-up process. If you do not sign up before the expiration date, you must request a new code. · [employer] Access Code: [email or web address] Expires: 8/2/2017  7:33 AM 
 
4. Enter the last four digits of your Social Security Number (xxxx) and Date of Birth (mm/dd/yyyy) as indicated and click Submit. You will be taken to the next sign-up page. 5. Create a [employer] ID. This will be your [employer] login ID and cannot be changed, so think of one that is secure and easy to remember. 6. Create a [employer] password. You can change your password at any time. 7. Enter your Password Reset Question and Answer. This can be used at a later time if you forget your password. 8. Enter your e-mail address. You will receive e-mail notification when new information is available in [address] 9. Click Sign Up. You can now view and download portions of your medical record.  
10. Click the Download Summary menu link to download a portable copy of your medical information. If you have questions, please visit the Frequently Asked Questions section of the [employer] website. Remember, [employer] is NOT to be used for urgent needs. For medical emergencies, dial 911. Now available from your iPhone and Android! Please provide this summary of care documentation to your next provider. Your primary care clinician is listed as Patricia Lopez. If you have any questions after today's visit, please call [phone].

## 2017-05-04 NOTE — PROGRESS NOTES
Subjective:     Faith Ann is a 65 y.o. female seen for follow-up of diabetes. She has had hypoglycemic attacks. .no  Blood sugar control has been OK highesat BS low 200's. She has diabetes and hypertension. Faith Ann has the additional concern of Fall 4/21/2017, trying to kick a wedge under the door when she fell. Had to go to  and then saw Spe[facility]ler who drained her right knee. Feeling better. Occurred at work. Min c/o pain currently. NSAID changed      Diabetic Review of Systems: no polyuria or polydipsia, no unusual visual symptoms, no hypoglycemia, no CP. Some SOB  No lrft leg sores or ulcer uses it OK. Current Outpatient Prescriptions   Medication Sig Dispense Refill    meloxicam (MOBIC) 15 mg tablet Take 15 mg by mouth daily.  acetaminophen (TYLENOL ARTHRITIS PAIN) 650 mg CR tablet Take 650 mg by mouth every six (6) hours as needed for Pain.  psyllium (METAMUCIL) powd Take  by mouth.  insulin aspart protamine/insulin aspart (NOVOLOG MIX 70/30) 100 unit/mL (70-30) inpn 40 Units by SubCUTAneous route Before breakfast and dinner. Indications: type 2 diabetes mellitus 5 Pen 5    metFORMIN (GLUCOPHAGE) 500 mg tablet Take 1 Tab by mouth two (2) times daily (with meals). 60 Tab 5    naproxen (NAPROSYN) 500 mg tablet Take 1 Tab by mouth two (2) times daily (with meals). As needed for pain 60 Tab 2    lisinopril-hydroCHLOROthiazide (PRINZIDE, ZESTORETIC) 20-25 mg per tablet TAKE ONE TABLET BY MOUTH EVERY DAY 90 Tab 3    cyclobenzaprine (FLEXERIL) 10 mg tablet Take 1 Tab by mouth three (3) times daily as needed for Muscle Spasm(s). 120 Tab 5    raNITIdine (ZANTAC) 150 mg tablet TAKE ONE TABLET BY MOUTH ONCE DAILY 30 Tab 11    Insulin Needles, Disposable, 29 gauge x 1/2\" ndle 1 Each by Does Not Apply route daily. 100 Pen Needle 5    gabapentin (NEURONTIN) 300 mg capsule Take 3 Caps by mouth every evening. 90 Cap 5    aspirin 81 mg tablet Take 1 Tab by mouth daily.       fiber Cap Take  by mouth.  NOVOLOG MIX 70-30 FLEXPEN 100 unit/mL (70-30) inpn INJECT 35 UNITS SUBCUTANEOUSLY BEFORE BREAKFAST AND DINNER FOR TYPE 2 DIABETES 5 Adjustable Dose Pre-filled Pen Syringe 5     Allergies   Allergen Reactions    Keflex [Cephalexin] Itching    Lortab [Hydrocodone-Acetaminophen] Itching       Diet and Lifestyle: follows a diabetic diet regularly, nonsmoker. Social History   Substance Use Topics    Smoking status: Former Smoker     Quit date: 3/30/2009    Smokeless tobacco: Not on file    Alcohol use No        Lab Results  Component Value Date/Time   WBC 5.3 04/06/2016 11:44 AM   HGB 13.3 04/06/2016 11:44 AM   HCT 40.1 04/06/2016 11:44 AM   PLATELET 184 82/61/8995 11:44 AM   MCV 77 04/06/2016 11:44 AM       Lab Results  Component Value Date/Time   Hemoglobin A1c 10.4 01/19/2017 07:50 AM   Hemoglobin A1c 12.8 09/07/2016 11:41 AM   Hemoglobin A1c 13.1 04/06/2016 11:44 AM   Glucose 263 01/19/2017 07:50 AM   Glucose  07/20/2016 10:00 AM   Microalb/Creat ratio (ug/mg creat.) 65.4 03/23/2017 08:03 AM   LDL, calculated Comment 09/07/2016 11:41 AM   Creatinine 1.34 01/19/2017 07:50 AM      Lab Results  Component Value Date/Time   Cholesterol, total 117 09/07/2016 11:41 AM   HDL Cholesterol 14 09/07/2016 11:41 AM   LDL, calculated Comment 09/07/2016 11:41 AM   Triglyceride 481 09/07/2016 11:41 AM       Lab Results  Component Value Date/Time   ALT (SGPT) 16 04/06/2016 11:44 AM   AST (SGOT) 13 04/06/2016 11:44 AM   Alk.  phosphatase 66 04/06/2016 11:44 AM   Bilirubin, total <0.2 04/06/2016 11:44 AM       Lab Results  Component Value Date/Time   GFR est AA 50 01/19/2017 07:50 AM   GFR est non-AA 43 01/19/2017 07:50 AM   Creatinine 1.34 01/19/2017 07:50 AM   BUN 29 01/19/2017 07:50 AM   Sodium 137 01/19/2017 07:50 AM   Potassium 3.8 01/19/2017 07:50 AM   Chloride 95 01/19/2017 07:50 AM   CO2 23 01/19/2017 07:50 AM      Lab Results   Component Value Date/Time    Glucose 263 01/19/2017 07:50 AM    Glucose  07/20/2016 10:00 AM       Labs from today were reviewed  no, labs done previously were reviewed  yes, Labs done in ER were reviewed  yes, Additional labs are ordered  yes,      Review of Systems  Pertinent items are noted in HPI. Objective:     Significant for the followingOW NAD    Visit Vitals    /90 (BP 1 Location: Left arm, BP Patient Position: Sitting)    Pulse 100    Temp 96.6 °F (35.9 °C) (Oral)    Resp 20    Ht 5' 3\" (1.6 m)    Wt 201 lb (91.2 kg)    LMP 08/23/2003    SpO2 100%    BMI 35.61 kg/m2     Appearance: alert, well appearing, and in no distress. Exam: heart sounds normal rate, regular rhythm, normal S1, S2, no murmurs, rubs, clicks or gallops, chest clear  Foot exam: deferred    Lab review: orders written for new lab studies as appropriate; see orders. Assessment/Plan:     Follow-up diabetes stable, improved. Diabetic issues reviewed with her: all medications, side effects and compliance discussed carefully and glycohemoglobin and other lab monitoring discussed. ICD-10-CM ICD-9-CM    1. Type 2 diabetes mellitus with hyperglycemia, with long-term current use of insulin ([facility]) S82.95 026.86 METABOLIC PANEL, BASIC    F52.2 790.29 HEMOGLOBIN A1C WITH EAG     V58.67 HI HANDLG&/OR CONVEY OF SPEC FOR TR OFFICE TO LAB      HI COLLECTION VENOUS BLOOD,VENIPUNCTURE   2. 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 Fall, subsequent encounter W19. XXXD V58.89 HI HANDLG&/OR CONVEY OF SPEC FOR TR OFFICE TO LAB     E888.9 HI COLLECTION VENOUS BLOOD,VENIPUNCTURE   4. Essential hypertension I10 401.9 HI HANDLG&/OR CONVEY OF SPEC FOR TR OFFICE TO LAB      HI COLLECTION VENOUS BLOOD,VENIPUNCTURE   5.  Diabetes mellitus due to underlying condition with diabetic nephropathy, with long-term current use of insulin ([facility]) E08.21 249.40 HI HANDLG&/OR CONVEY OF SPEC FOR TR OFFICE TO LAB    Z79.4 583.81 FL COLLECTION VENOUS BLOOD,VENIPUNCTURE     V58.67      Orders Placed This Encounter    METABOLIC PANEL, BASIC    HEMOGLOBIN A1C WITH EAG    FL HANDLG&/OR CONVEY OF SPEC FOR TR OFFICE TO LAB    FL COLLECTION VENOUS BLOOD,VENIPUNCTURE    meloxicam (MOBIC) 15 mg tablet     Sig: Take 15 mg by mouth daily.  gabapentin (NEURONTIN) 100 mg capsule     Sig: Take 1 Cap by mouth three (3) times daily. Dispense:  90 Cap     Refill:  11     Follow-up Disposition:  Return in about 4 months (around 9/4/2017) for routine follow up.

## 2017-05-04 NOTE — LETTER
5/8/2017 8:21 AM 
 
Ms. Zhou Mcdonald [address] [place] [phone] Dear Zhou Mcdonald: 
 
Please find your most recent results below. Resulted Orders METABOLIC PANEL, BASIC Result Value Ref Range Glucose 100 (H) 65 - 99 mg/dL BUN 29 (H) 8 - 27 mg/dL Creatinine 1.06 (H) 0.57 - 1.00 mg/dL GFR est non-AA 57 (L) >59 mL/min/1.73 GFR est AA 65 >59 mL/min/1.73  
 BUN/Creatinine ratio 27 12 - 28 Sodium 140 134 - 144 mmol/L Potassium 4.5 3.5 - 5.2 mmol/L Chloride 101 96 - 106 mmol/L  
 CO2 21 18 - 29 mmol/L Calcium 9.6 8.7 - 10.3 mg/dL Narrative Performed at:  [address]  [id number] : Jyotsna Obrien MD, Phone:  [phone] HEMOGLOBIN A1C WITH EAG Result Value Ref Range Hemoglobin A1c 9.3 (H) 4.8 - 5.6 % Comment:  
            Pre-diabetes: 5.7 - 6.4 Diabetes: >6.4 Glycemic control for adults with diabetes: <7.0 Estimated average glucose 220 mg/dL Narrative Performed at:  [address]  [id number] : Jyotsna Obrien MD, Phone:  [phone] CKD REPORT Result Value Ref Range Interpretation Note Comment:  
   Supplement report is available. Narrative Performed at:  [address] [place] [address]  [id number] : Yoan Tanner PhD, Phone:  [phone] DIABETES PATIENT EDUCATION Result Value Ref Range PDF Image Not applicable Narrative Performed at:  [address] [place] [address]  [id number] : Yoan Tanner PhD, Phone:  [phone] RECOMMENDATIONS: 
Your most recent lab results show that your diabetes is not as good as it should be, but it is better, and it isnt too bad currently. For now no change in your medications. Please follow the diabetic diet carefully. Reduce starchy foods and sweet foods. We will need to recheck these levels in a few months. If your diabetes worsens we will need to increase your medications. Follow up as scheduled. Please call me if you have any questions: [phone] Sincerely, Kelley Mayer MD

## 2017-05-05 LAB
BUN SERPL-MCNC: 29 MG/DL (ref 8–27)
BUN/CREAT SERPL: 27 (ref 12–28)
CALCIUM SERPL-MCNC: 9.6 MG/DL (ref 8.7–10.3)
CHLORIDE SERPL-SCNC: 101 MMOL/L (ref 96–106)
CO2 SERPL-SCNC: 21 MMOL/L (ref 18–29)
CREAT SERPL-MCNC: 1.06 MG/DL (ref 0.57–1)
EST. AVERAGE GLUCOSE BLD GHB EST-MCNC: 220 MG/DL
GLUCOSE SERPL-MCNC: 100 MG/DL (ref 65–99)
HBA1C MFR BLD: 9.3 % (ref 4.8–5.6)
INTERPRETATION: NORMAL
Lab: NORMAL
POTASSIUM SERPL-SCNC: 4.5 MMOL/L (ref 3.5–5.2)
SODIUM SERPL-SCNC: 140 MMOL/L (ref 134–144)

## 2017-05-05 NOTE — PROGRESS NOTES
Diabetes is not as good as it should be, but it's better, and it isn't too bad currently. For now no change in your medications. Please follow the diabetic diet carefully. Reduce starchy foods and sweet foods. Will need to re-check this levels in a few months. If diabetes worsens will need to increase your medications.

## 2017-05-12 ENCOUNTER — TELEPHONE (OUTPATIENT)
Dept: INTERNAL MEDICINE CLINIC | Age: 62
End: 2017-05-12

## 2017-05-12 DIAGNOSIS — Z89.619 HISTORY OF LEG AMPUTATION (HCC): Primary | ICD-10-CM

## 2017-05-12 NOTE — TELEPHONE ENCOUNTER
Saira, from [facility] physical therapy, called in reference to needing new order for patient. She has an appt at 3:45pm today. Please fax orders to [phone].

## 2017-08-18 ENCOUNTER — TELEPHONE (OUTPATIENT)
Dept: INTERNAL MEDICINE CLINIC | Age: 62
End: 2017-08-18

## 2017-08-18 NOTE — TELEPHONE ENCOUNTER
Returned pt's call and since taking metformin, she is jittery, hot and sweaty, not feeling right.   Wants to know what to do

## 2017-08-18 NOTE — TELEPHONE ENCOUNTER
Pt called in reference to her taking 40 units in morning and 40 units in the evening. Her blood sugar for the past 2 weeks has been 70s and 80s  She has been feeling weird since she has been taking it with metformin. Please call her at [phone] ext [phone]. If after 5:30 pm she can be reached at [phone].

## 2017-08-28 RX ORDER — INSULIN ASPART 100 [IU]/ML
INJECTION, SUSPENSION SUBCUTANEOUS
Qty: 5 PEN | Refills: 5 | Status: SHIPPED | OUTPATIENT
Start: 2017-08-28 | End: 2018-03-19 | Stop reason: SDUPTHER

## 2017-08-28 NOTE — TELEPHONE ENCOUNTER
Last office visit:  5/4/17  Last filled:  3/23/17 #5 pens X 5 refills  No changes  followup visit in 4 days with Dr Ochoa Rising

## 2017-09-01 ENCOUNTER — OFFICE VISIT (OUTPATIENT)
Dept: INTERNAL MEDICINE CLINIC | Age: 62
End: 2017-09-01

## 2017-09-01 VITALS
WEIGHT: 198 LBS | RESPIRATION RATE: 18 BRPM | SYSTOLIC BLOOD PRESSURE: 139 MMHG | BODY MASS INDEX: 35.08 KG/M2 | TEMPERATURE: 95.9 F | HEIGHT: 63 IN | HEART RATE: 102 BPM | OXYGEN SATURATION: 99 % | DIASTOLIC BLOOD PRESSURE: 85 MMHG

## 2017-09-01 DIAGNOSIS — K21.9 GASTROESOPHAGEAL REFLUX DISEASE WITHOUT ESOPHAGITIS: ICD-10-CM

## 2017-09-01 DIAGNOSIS — M17.11 ARTHRITIS OF KNEE, RIGHT: ICD-10-CM

## 2017-09-01 DIAGNOSIS — Z13.220 SCREENING CHOLESTEROL LEVEL: ICD-10-CM

## 2017-09-01 DIAGNOSIS — I10 ESSENTIAL HYPERTENSION: ICD-10-CM

## 2017-09-01 DIAGNOSIS — Z79.4 TYPE 2 DIABETES MELLITUS WITH HYPERGLYCEMIA, WITH LONG-TERM CURRENT USE OF INSULIN (HCC): ICD-10-CM

## 2017-09-01 DIAGNOSIS — E08.21 DIABETES MELLITUS DUE TO UNDERLYING CONDITION WITH DIABETIC NEPHROPATHY, WITH LONG-TERM CURRENT USE OF INSULIN (HCC): Primary | ICD-10-CM

## 2017-09-01 DIAGNOSIS — S78.112S: Chronic | ICD-10-CM

## 2017-09-01 DIAGNOSIS — Z23 ENCOUNTER FOR IMMUNIZATION: ICD-10-CM

## 2017-09-01 DIAGNOSIS — E11.65 TYPE 2 DIABETES MELLITUS WITH HYPERGLYCEMIA, WITH LONG-TERM CURRENT USE OF INSULIN (HCC): ICD-10-CM

## 2017-09-01 DIAGNOSIS — Z79.4 DIABETES MELLITUS DUE TO UNDERLYING CONDITION WITH DIABETIC NEPHROPATHY, WITH LONG-TERM CURRENT USE OF INSULIN (HCC): Primary | ICD-10-CM

## 2017-09-01 RX ORDER — GABAPENTIN 100 MG/1
100 CAPSULE ORAL 3 TIMES DAILY
Qty: 270 CAP | Refills: 3 | Status: SHIPPED | OUTPATIENT
Start: 2017-09-01 | End: 2018-07-11 | Stop reason: SDUPTHER

## 2017-09-01 RX ORDER — INSULIN ASPART 100 [IU]/ML
20 INJECTION, SUSPENSION SUBCUTANEOUS
Qty: 5 PEN | Refills: 5 | Status: SHIPPED | OUTPATIENT
Start: 2017-09-01 | End: 2017-11-01

## 2017-09-01 RX ORDER — RANITIDINE 150 MG/1
150 TABLET, FILM COATED ORAL 2 TIMES DAILY
Qty: 180 TAB | Refills: 3 | Status: SHIPPED | OUTPATIENT
Start: 2017-09-01 | End: 2018-10-20 | Stop reason: SDUPTHER

## 2017-09-01 RX ORDER — METFORMIN HYDROCHLORIDE 500 MG/1
500 TABLET ORAL 2 TIMES DAILY WITH MEALS
Qty: 180 TAB | Refills: 3 | Status: SHIPPED | OUTPATIENT
Start: 2017-09-01 | End: 2018-01-23 | Stop reason: SDUPTHER

## 2017-09-01 NOTE — LETTER
9/5/2017 2:58 PM 
 
Ms. Oswaldo Nguyen [address] [facility] [id number] Dear Oswaldo Nguyen: 
 
Please find your most recent results below. Resulted Orders HEMOGLOBIN A1C WITH EAG Result Value Ref Range Hemoglobin A1c 7.6 (H) 4.8 - 5.6 % Comment:  
            Pre-diabetes: 5.7 - 6.4 Diabetes: >6.4 Glycemic control for adults with diabetes: <7.0 Estimated average glucose 171 mg/dL Narrative Performed at:  [address]  [id number] : Kacey Ford MD, Phone:  [phone] LIPID PANEL Result Value Ref Range Cholesterol, total 126 100 - 199 mg/dL Triglyceride 394 (H) 0 - 149 mg/dL HDL Cholesterol 24 (L) >39 mg/dL VLDL, calculated 79 (H) 5 - 40 mg/dL LDL, calculated 23 0 - 99 mg/dL Narrative Performed at:  [address]  [id number] : Kacey Ford MD, Phone:  [phone] METABOLIC PANEL, BASIC Result Value Ref Range Glucose 162 (H) 65 - 99 mg/dL BUN 18 8 - 27 mg/dL Creatinine 0.93 0.57 - 1.00 mg/dL GFR est non-AA 67 >59 mL/min/1.73 GFR est AA 77 >59 mL/min/1.73  
 BUN/Creatinine ratio 19 12 - 28 Sodium 142 134 - 144 mmol/L Potassium 4.0 3.5 - 5.2 mmol/L Chloride 99 96 - 106 mmol/L  
 CO2 25 18 - 29 mmol/L Calcium 9.7 8.7 - 10.3 mg/dL Narrative Performed at:  [address]  [id number] : Kacey Ford MD, Phone:  [phone] CVD REPORT Result Value Ref Range INTERPRETATION Note Comment:  
   Supplement report is available. Narrative Performed at:  [address] [place] [address]  [id number] : Maya Haque PhD, Phone:  [phone] DIABETES PATIENT EDUCATION Result Value Ref Range PDF Image Not applicable Narrative Performed at:  [address] [place] [address]  [id number] : Lon Bansal PhD, Phone:  [phone] RECOMMENDATIONS: 
The results of your most recent labs show j carlos/ stable results. Your diabetes is doing well. Keep up the good work! Call me for any problems especially if the sugars go too low, where you feel weak or shaky. Follow up as scheduled. Please call me if you have any questions: [phone] Sincerely, Todd Pacheco MD

## 2017-09-01 NOTE — PROGRESS NOTES
Chief Complaint   Patient presents with    Diabetes     follow up     I have reviewed the patient's medical history in detail and updated the computerized patient record. Health Maintenance reviewed. 1. Have you been to the ER, urgent care clinic since your last visit? Hospitalized since your last visit?no    2. Have you seen or consulted any other health care providers outside of the [facility] since your last visit? Include any pap smears or colon screening. no    Do you have an [address] in place in the event that you have a healthcare crisis that could impact your decision making as it pertains to your health?no    Would you like information about the [address] [place], [place] [address]way given. no    Charted TDAP on this pt but did not give.

## 2017-09-01 NOTE — MR AVS SNAPSHOT
Visit Information Date & Time Provider Department Dept. Phone Encounter #  
 9/1/2017  8:00 AM Cesar Marie MD [facility] [phone] [id number] Follow-up Instructions Return in about 4 months (around 1/1/2018) for routine follow up. Upcoming Health Maintenance Date Due Hepatitis C Screening 1955 Pneumococcal 19-64 Medium Risk (1 of 1 - PPSV23) 3/31/1975 ZOSTER VACCINE AGE 60> 1/31/2016 EYE EXAM RETINAL OR DILATED Q1 5/24/2017 INFLUENZA AGE 9 TO ADULT 8/1/2017 LIPID PANEL Q1 9/7/2017 PAP AKA CERVICAL CYTOLOGY 5/31/2018* HEMOGLOBIN A1C Q6M 11/4/2017 FOOT EXAM Q1 1/19/2018 MICROALBUMIN Q1 3/23/2018 BREAST CANCER SCRN MAMMOGRAM 6/30/2019 COLONOSCOPY 10/12/2022 DTaP/Tdap/Td series (2 - Td) 6/17/2026 *Topic was postponed. The date shown is not the original due date. Allergies as of 9/1/2017  Review Complete On: 6/30/2017 By: Rosi Moritz Severity Noted Reaction Type Reactions Keflex [Cephalexin]  06/29/2016    Itching Lortab [Hydrocodone-acetaminophen]  03/30/2012    Itching Current Immunizations  Never Reviewed Name Date Influenza Vaccine (Quad) PF  Incomplete, 9/7/2016 Pneumococcal Conjugate (PCV-13) 9/7/2016 Tdap 6/17/2016 Not reviewed this visit You Were Diagnosed With   
  
 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 
ICD-9-CM: 401.9 Gastroesophageal reflux disease without esophagitis     ICD-10-CM: K21.9 ICD-9-CM: 530.81 Gastroesophageal reflux disease, esophagitis presence not specified     ICD-10-CM: K21.9 ICD-9-CM: 530.81 Arthritis of knee, right     ICD-10-CM: M17.11 ICD-9-CM: 716.96 Screening cholesterol level     ICD-10-CM: E32.379 ICD-9-CM: V77.91 Encounter for immunization     ICD-10-CM: I24 ICD-9-CM: V03.89 Vitals BP Pulse Temp Resp Height(growth percentile) Weight(growth percentile) 139/85 (BP 1 Location: Left arm, BP Patient Position: Sitting) (!) 102 95.9 °F (35.5 °C) (Oral) 18 5' 3\" (1.6 m) 198 lb (89.8 kg) LMP SpO2 BMI OB Status Smoking Status 08/23/2003 99% 35.07 kg/m2 Postmenopausal Former Smoker Vitals History BMI and BSA Data Body Mass Index Body Surface Area 35.07 kg/m 2 2 m 2 Preferred Pharmacy Pharmacy Name Phone [facility] [place] [facility] [address] [phone] Your Updated Medication List  
  
   
This list is accurate as of: 9/1/17  8:59 AM.  Always use your most recent med list.  
  
  
  
  
 aspirin 81 mg tablet Take 1 Tab by mouth daily. cyclobenzaprine 10 mg tablet Commonly known as:  FLEXERIL Take 1 Tab by mouth three (3) times daily as needed for Muscle Spasm(s). fiber Cap Take  by mouth.  
  
 gabapentin 100 mg capsule Commonly known as:  NEURONTIN Take 1 Cap by mouth three (3) times daily. Insulin Needles (Disposable) 29 gauge x 1/2\" Ndle 1 Each by Does Not Apply route daily. lisinopril-hydroCHLOROthiazide 20-25 mg per tablet Commonly known as:  PRINZIDE, ZESTORETIC  
TAKE ONE TABLET BY MOUTH EVERY DAY  
  
 metFORMIN 500 mg tablet Commonly known as:  GLUCOPHAGE Take 1 Tab by mouth two (2) times daily (with meals). * NovoLOG Mix 70-30 FlexPen 100 unit/mL (70-30) Inpn Generic drug:  insulin aspart protamine/insulin aspart INJECT 35 UNITS SUBCUTANEOUSLY BEFORE BREAKFAST AND DINNER FOR TYPE 2 DIABETES  
  
 * insulin aspart protamine/insulin aspart 100 unit/mL (70-30) Inpn Commonly known as:  NOVOLOG MIX 70/30  
20 Units by SubCUTAneous route Before breakfast and dinner. Indications: type 2 diabetes mellitus  
  
 psyllium Powd Commonly known as:  METAMUCIL Take  by mouth. raNITIdine 150 mg tablet Commonly known as:  ZANTAC Take 1 Tab by mouth two (2) times a day. TYLENOL ARTHRITIS PAIN 650 mg CR tablet Generic drug:  acetaminophen Take 650 mg by mouth every six (6) hours as needed for Pain. * Notice: This list has 2 medication(s) that are the same as other medications prescribed for you. Read the directions carefully, and ask your doctor or other care provider to review them with you. Prescriptions Sent to Pharmacy Refills  
 metFORMIN (GLUCOPHAGE) 500 mg tablet 3 Sig: Take 1 Tab by mouth two (2) times daily (with meals). Class: Normal  
 Pharmacy: [address] [place] [address]. [address] [facility], [address] [address] [address] Ph #: [phone] Route: Oral  
 raNITIdine (ZANTAC) 150 mg tablet 3 Sig: Take 1 Tab by mouth two (2) times a day. Class: Normal  
 Pharmacy: [address] [place] [address]. [address] [facility], [address] [address] [address] Ph #: [phone] Route: Oral  
 gabapentin (NEURONTIN) 100 mg capsule 3 Sig: Take 1 Cap by mouth three (3) times daily. Class: Normal  
 Pharmacy: [address] [place] [address]. [address] [facility], [address] [address] [address] Ph #: [phone] Route: Oral  
 insulin aspart protamine/insulin aspart (NOVOLOG MIX 70/30) 100 unit/mL (70-30) inpn 5 Si Units by SubCUTAneous route Before breakfast and dinner. Indications: type 2 diabetes mellitus Class: Normal  
 Pharmacy: [address] [place] [address]. [address] [facility], [address] [facility] [address] Ph #: [phone] Route: SubCUTAneous We Performed the Following HEMOGLOBIN A1C WITH EAG [94574 CPT(R)]  INFLUENZA VIRUS VAC QUAD,SPLIT,PRESV FREE SYRINGE 3/> YRS  [id number] CPT(R)] LIPID PANEL [26086 CPT(R)] METABOLIC PANEL, BASIC [14527 CPT(R)] REFERRAL TO OPTOMETRY [id number] Custom] Comments:  
 DM eye REFERRAL TO PODIATRY [REF90 Custom] Comments:  
 Please evaluate patient for DM and overgrown nails. Follow-up Instructions Return in about 4 months (around 1/1/2018) for routine follow up. Referral Information Referral ID Referred By Referred To  
  
 [id number] LEOLACesar, [address] [address]  Phone: [phone] Fax: [phone] Visits Status Start Date End Date 1 New Request 9/1/17 9/1/18 If your referral has a status of pending review or denied, additional information will be sent to support the outcome of this decision. Referral ID Referred By Referred To  
 [id number] Maya Carpio April S [address] [facility], [address]  Phone: [phone] Fax: [phone] Visits Status Start Date End Date 1 New Request 9/1/17 9/1/18 If your referral has a status of pending review or denied, additional information will be sent to support the outcome of this decision. Introducing \[facility]\"" & HEALTH SERVICES! Christi Nair introduces Anyone [facility] patient portal. Now you can access parts of your medical record, email your doctor's office, and request medication refills online. 1. In your internet browser, go to [email or web address] 2. Click on the First Time User? Click Here link in the Sign In box. You will see the New Member Sign Up page. 3. Enter your [employer] [facility] Access Code exactly as it appears below. You will not need to use this code after youve completed the sign-up process. If you do not sign up before the expiration date, you must request a new code. · [facility] Access Code: [email or web address] Expires: 11/30/2017  7:52 AM 
 
4.  Enter the last four digits of your Social Security Number (xxxx) and Date of Birth (mm/dd/yyyy) as indicated and click Submit. You will be taken to the next sign-up page. 5. Create a [employer] ID. This will be your [employer] login ID and cannot be changed, so think of one that is secure and easy to remember. 6. Create a [employer] password. You can change your password at any time. 7. Enter your Password Reset Question and Answer. This can be used at a later time if you forget your password. 8. Enter your e-mail address. You will receive e-mail notification when new information is available in [address] 9. Click Sign Up. You can now view and download portions of your medical record. 10. Click the Download Summary menu link to download a portable copy of your medical information. If you have questions, please visit the Frequently Asked Questions section of the [employer] website. Remember, [employer] is NOT to be used for urgent needs. For medical emergencies, dial 911. Now available from your iPhone and Android! Please provide this summary of care documentation to your next provider. Your primary care clinician is listed as Brian Roman. If you have any questions after today's visit, please call [phone].

## 2017-09-01 NOTE — PROGRESS NOTES
Subjective:     Brenda Elliott is a 65 y.o. female seen for follow-up of diabetes. She has had hypoglycemic attacks. .yes  Blood sugar control has been low 72, felt bad she called and we dec her insulin to 30u bid. BS since low 100's to low 200's no further hypos  She has diabetes, hypertension and hyperlipidemia. Brenda Elliott has the additional concern of sone N/T hands and arms not too severe, sees Gia for right knee arthritis  Says needs knee replacement but she is holding off for now. Having more HB lately  No vomiting blood or tarry black stools. Diabetic Review of Systems: no polyuria or polydipsia, no chest pain, dyspnea or TIA's, no medication side effects noted, has dysesthesias in the feet. Allergies   Allergen Reactions    Keflex [Cephalexin] Itching    Lortab [Hydrocodone-Acetaminophen] Itching       Diet and Lifestyle: generally follows a low fat low cholesterol diet, nonsmoker. Current Outpatient Prescriptions   Medication Sig Dispense Refill    NOVOLOG MIX 70-30 FLEXPEN 100 unit/mL (70-30) inpn INJECT 35 UNITS SUBCUTANEOUSLY BEFORE BREAKFAST AND DINNER FOR TYPE 2 DIABETES 5 Pen 5    gabapentin (NEURONTIN) 100 mg capsule Take 1 Cap by mouth three (3) times daily. 90 Cap 11    acetaminophen (TYLENOL ARTHRITIS PAIN) 650 mg CR tablet Take 650 mg by mouth every six (6) hours as needed for Pain.  psyllium (METAMUCIL) powd Take  by mouth.  insulin aspart protamine/insulin aspart (NOVOLOG MIX 70/30) 100 unit/mL (70-30) inpn 40 Units by SubCUTAneous route Before breakfast and dinner. Indications: type 2 diabetes mellitus 5 Pen 5    metFORMIN (GLUCOPHAGE) 500 mg tablet Take 1 Tab by mouth two (2) times daily (with meals). 60 Tab 5    lisinopril-hydroCHLOROthiazide (PRINZIDE, ZESTORETIC) 20-25 mg per tablet TAKE ONE TABLET BY MOUTH EVERY DAY 90 Tab 3    cyclobenzaprine (FLEXERIL) 10 mg tablet Take 1 Tab by mouth three (3) times daily as needed for Muscle Spasm(s). 120 Tab 5    raNITIdine (ZANTAC) 150 mg tablet TAKE ONE TABLET BY MOUTH ONCE DAILY 30 Tab 11    Insulin Needles, Disposable, 29 gauge x 1/2\" ndle 1 Each by Does Not Apply route daily. 100 Pen Needle 5    aspirin 81 mg tablet Take 1 Tab by mouth daily.  fiber Cap Take  by mouth. Allergies   Allergen Reactions    Keflex [Cephalexin] Itching    Lortab [Hydrocodone-Acetaminophen] Itching     Social History   Substance Use Topics    Smoking status: Former Smoker     Quit date: 3/30/2009    Smokeless tobacco: Never Used    Alcohol use No        Lab Results  Component Value Date/Time   WBC 5.3 04/06/2016 11:44 AM   HGB 13.3 04/06/2016 11:44 AM   HCT 40.1 04/06/2016 11:44 AM   PLATELET 814 83/39/3628 11:44 AM   MCV 77 04/06/2016 11:44 AM     Lab Results  Component Value Date/Time   Hemoglobin A1c 9.3 05/04/2017 07:51 AM   Hemoglobin A1c 10.4 01/19/2017 07:50 AM   Hemoglobin A1c 12.8 09/07/2016 11:41 AM   Glucose 100 05/04/2017 07:51 AM   Glucose  07/20/2016 10:00 AM   Microalb/Creat ratio (ug/mg creat.) 65.4 03/23/2017 08:03 AM   LDL, calculated Comment 09/07/2016 11:41 AM   Creatinine 1.06 05/04/2017 07:51 AM      Lab Results  Component Value Date/Time   Cholesterol, total 117 09/07/2016 11:41 AM   HDL Cholesterol 14 09/07/2016 11:41 AM   LDL, calculated Comment 09/07/2016 11:41 AM   Triglyceride 481 09/07/2016 11:41 AM   Lab Results  Component Value Date/Time   ALT (SGPT) 16 04/06/2016 11:44 AM   AST (SGOT) 13 04/06/2016 11:44 AM   Alk.  phosphatase 66 04/06/2016 11:44 AM   Bilirubin, total <0.2 04/06/2016 11:44 AM   Albumin 4.2 04/06/2016 11:44 AM   Protein, total 6.9 04/06/2016 11:44 AM   PLATELET 794 94/94/4218 11:44 AM       Lab Results  Component Value Date/Time   GFR est non-AA 57 05/04/2017 07:51 AM   GFR est AA 65 05/04/2017 07:51 AM   Creatinine 1.06 05/04/2017 07:51 AM   BUN 29 05/04/2017 07:51 AM   Sodium 140 05/04/2017 07:51 AM   Potassium 4.5 05/04/2017 07:51 AM   Chloride 101 05/04/2017 07:51 AM   CO2 21 05/04/2017 07:51 AM     Lab Results   Component Value Date/Time    Glucose 100 05/04/2017 07:51 AM    Glucose  07/20/2016 10:00 AM                         Review of Systems  Pertinent items are noted in HPI. Objective:     Significant for the following left AKA prosthesis  Visit Vitals    /85 (BP 1 Location: Left arm, BP Patient Position: Sitting)    Pulse (!) 102    Temp 95.9 °F (35.5 °C) (Oral)    Resp 18    Ht 5' 3\" (1.6 m)    Wt 198 lb (89.8 kg)    LMP 08/23/2003    SpO2 99%    BMI 35.07 kg/m2     Appearance: alert, well appearing, and in no distress. Exam: heart sounds normal rate, regular rhythm, normal S1, S2, no murmurs, rubs, clicks or gallops, chest clear  Foot exam: Diabetic foot exam was performed. No obvious sores or red lesions. Sensation checked by monofilament exam which was normal.  Dorsalis pedis pulse waspresent. Left prosthesis    Lab review: orders written for new lab studies as appropriate; see orders. Assessment/Plan:     Follow-up diabetes stable, improved. Diabetic issues reviewed with her: all medications, side effects and compliance discussed carefully, foot care discussed and Podiatry visits discussed, annual eye examinations at Ophthalmology discussed and glycohemoglobin and other lab monitoring discussed. ICD-10-CM ICD-9-CM    1. Diabetes mellitus due to underlying condition with diabetic nephropathy, with long-term current use of insulin ([facility]) E08.21 249.40 REFERRAL TO OPTOMETRY    Z79.4 583.81 REFERRAL TO PODIATRY     V58.67 HEMOGLOBIN A1C WITH EAG      METABOLIC PANEL, BASIC      SC HANDLG&/OR CONVEY OF SPEC FOR TR OFFICE TO LAB      SC COLLECTION VENOUS BLOOD,VENIPUNCTURE      SC IMMUNIZ ADMIN,1 SINGLE/COMB VAC/TOXOID      CANCELED: AMB POC HEMOGLOBIN A1C      CANCELED: TETANUS, DIPHTHERIA TOXOIDS AND ACELLULAR PERTUSSIS VACCINE (TDAP), IN INDIVIDS. >=7, IM   2.  Type 2 diabetes mellitus with hyperglycemia, with long-term current use of insulin ([facility]) E11.65 250.00 IA HANDLG&/OR CONVEY OF SPEC FOR TR OFFICE TO LAB    Z79.4 790.29 IA COLLECTION VENOUS BLOOD,VENIPUNCTURE     V58.67 IA IMMUNIZ ADMIN,1 SINGLE/COMB VAC/TOXOID      CANCELED: TETANUS, DIPHTHERIA TOXOIDS AND ACELLULAR PERTUSSIS VACCINE (TDAP), IN INDIVIDS. >=7, IM   3. 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 >=7, IM   4. Essential hypertension I10 401.9 IA HANDLG&/OR CONVEY OF SPEC FOR TR OFFICE TO LAB      IA COLLECTION VENOUS BLOOD,VENIPUNCTURE      IA IMMUNIZ ADMIN,1 SINGLE/COMB VAC/TOXOID      CANCELED: TETANUS, DIPHTHERIA TOXOIDS AND ACELLULAR PERTUSSIS VACCINE (TDAP), IN INDIVIDS. >=7, IM   5. Gastroesophageal reflux disease without esophagitis K21.9 530.81 raNITIdine (ZANTAC) 150 mg tablet      IA HANDLG&/OR CONVEY OF SPEC FOR TR OFFICE TO LAB      IA COLLECTION VENOUS BLOOD,VENIPUNCTURE      IA IMMUNIZ ADMIN,1 SINGLE/COMB VAC/TOXOID      CANCELED: TETANUS, DIPHTHERIA TOXOIDS AND ACELLULAR PERTUSSIS VACCINE (TDAP), IN INDIVIDS. >=7, IM   6. Arthritis of knee, right M17.11 716.96 IA HANDLG&/OR CONVEY OF SPEC FOR TR OFFICE TO LAB      IA COLLECTION VENOUS BLOOD,VENIPUNCTURE      IA IMMUNIZ ADMIN,1 SINGLE/COMB VAC/TOXOID      CANCELED: TETANUS, DIPHTHERIA TOXOIDS AND ACELLULAR PERTUSSIS VACCINE (TDAP), IN INDIVIDS. >=7, IM   7. Screening cholesterol level Z13.220 V77.91 LIPID PANEL      IA HANDLG&/OR CONVEY OF SPEC FOR TR OFFICE TO LAB      IA COLLECTION VENOUS BLOOD,VENIPUNCTURE      IA IMMUNIZ ADMIN,1 SINGLE/COMB VAC/TOXOID      CANCELED: TETANUS, DIPHTHERIA TOXOIDS AND ACELLULAR PERTUSSIS VACCINE (TDAP), IN INDIVIDS. >=7, IM   8.  Encounter for immunization Z23 V03.89 INFLUENZA VIRUS VAC QUAD,SPLIT,PRESV FREE SYRINGE 3/> YRS IM      DC HANDLG&/OR CONVEY OF SPEC FOR TR OFFICE TO LAB      DC COLLECTION VENOUS BLOOD,VENIPUNCTURE      DC IMMUNIZ ADMIN,1 SINGLE/COMB VAC/TOXOID      CANCELED: PNEUMOCOCCAL POLYSACCHARIDE VACCINE, 23-VALENT, ADULT OR IMMUNOSUPPRESSED PT DOSE,      CANCELED: TETANUS, DIPHTHERIA TOXOIDS AND ACELLULAR PERTUSSIS VACCINE (TDAP), IN INDIVIDS. >=7, IM       Orders Placed This Encounter    Influenza virus vaccine (QUADRIVALENT PRES FREE SYRINGE) IM (08827)    HEMOGLOBIN A1C WITH EAG    LIPID PANEL    METABOLIC PANEL, BASIC    CVD REPORT    DIABETES PATIENT EDUCATION    REFERRAL TO OPTOMETRY     Referral Priority:   Routine     Referral Type:   Consultation     Referral Reason:   Specialty Services Required     Referred to Provider:   Conor Arana OD    REFERRAL TO PODIATRY     Referral Priority:   Routine     Referral Type:   Consultation     Referral Reason:   Specialty Services Required     Referred to Provider:   Christiane Dunn DPM    DC HANDLG&/OR CONVEY OF SPEC FOR TR OFFICE TO LAB    DC COLLECTION VENOUS BLOOD,VENIPUNCTURE    DC IMMUNIZ ADMIN,1 SINGLE/COMB VAC/TOXOID    metFORMIN (GLUCOPHAGE) 500 mg tablet     Sig: Take 1 Tab by mouth two (2) times daily (with meals). Dispense:  180 Tab     Refill:  3    raNITIdine (ZANTAC) 150 mg tablet     Sig: Take 1 Tab by mouth two (2) times a day. Dispense:  180 Tab     Refill:  3    gabapentin (NEURONTIN) 100 mg capsule     Sig: Take 1 Cap by mouth three (3) times daily. Dispense:  270 Cap     Refill:  3    insulin aspart protamine/insulin aspart (NOVOLOG MIX 70/30) 100 unit/mL (70-30) inpn     Si Units by SubCUTAneous route Before breakfast and dinner. Indications: type 2 diabetes mellitus     Dispense:  5 Pen     Refill:  5       Chronic Conditions Addressed Today     1.  Type 2 diabetes mellitus with hyperglycemia (HCC)     Relevant Medications     metFORMIN (GLUCOPHAGE) 500 mg tablet     insulin aspart protamine/insulin aspart (NOVOLOG MIX 70/30) 100 unit/mL (70-30) inpn     Other Relevant Orders     MO HANDLG&/OR CONVEY OF SPEC FOR TR OFFICE TO LAB     MO COLLECTION VENOUS BLOOD,VENIPUNCTURE     MO IMMUNIZ ADMIN,1 SINGLE/COMB VAC/TOXOID    2. 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 Essential hypertension     Relevant Orders     MO HANDLG&/OR CONVEY OF SPEC FOR TR OFFICE TO LAB     MO COLLECTION VENOUS BLOOD,VENIPUNCTURE     MO IMMUNIZ ADMIN,1 SINGLE/COMB VAC/TOXOID    4.  Diabetes mellitus due to underlying condition with diabetic nephropathy, with long-term current use of insulin (HCC) - Primary     Relevant Medications     metFORMIN (GLUCOPHAGE) 500 mg tablet     insulin aspart protamine/insulin aspart (NOVOLOG MIX 70/30) 100 unit/mL (70-30) inpn     Other Relevant Orders     REFERRAL TO OPTOMETRY     REFERRAL TO PODIATRY     HEMOGLOBIN A1C WITH EAG (Completed)     METABOLIC PANEL, BASIC (Completed)     MO HANDLG&/OR CONVEY OF SPEC FOR TR OFFICE TO LAB     MO COLLECTION VENOUS BLOOD,VENIPUNCTURE     MO IMMUNIZ ADMIN,1 SINGLE/COMB VAC/TOXOID      Acute Diagnoses Addressed Today     Gastroesophageal reflux disease without esophagitis            Relevant Medications        raNITIdine (ZANTAC) 150 mg tablet        Other Relevant Orders        MO HANDLG&/OR CONVEY OF SPEC FOR TR OFFICE TO LAB        MO COLLECTION VENOUS BLOOD,VENIPUNCTURE        MO IMMUNIZ ADMIN,1 SINGLE/COMB VAC/TOXOID    Arthritis of knee, right            Relevant Orders        MO HANDLG&/OR CONVEY OF SPEC FOR TR OFFICE TO LAB        MO COLLECTION VENOUS BLOOD,VENIPUNCTURE        MO IMMUNIZ ADMIN,1 SINGLE/COMB VAC/TOXOID    Screening cholesterol level            Relevant Orders        LIPID PANEL (Completed)        MO HANDLG&/OR CONVEY OF SPEC FOR TR OFFICE TO LAB AR COLLECTION VENOUS BLOOD,VENIPUNCTURE        AR IMMUNIZ ADMIN,1 SINGLE/COMB VAC/TOXOID    Encounter for immunization            Relevant Orders        INFLUENZA VIRUS VAC QUAD,SPLIT,PRESV FREE SYRINGE 3/> YRS IM (Completed)        AR HANDLG&/OR CONVEY OF SPEC FOR TR OFFICE TO LAB        AR COLLECTION VENOUS BLOOD,VENIPUNCTURE        AR IMMUNIZ ADMIN,1 SINGLE/COMB VAC/TOXOID          Follow-up Disposition:  Return in about 4 months (around 1/1/2018) for routine follow up.

## 2017-09-02 LAB
BUN SERPL-MCNC: 18 MG/DL (ref 8–27)
BUN/CREAT SERPL: 19 (ref 12–28)
CALCIUM SERPL-MCNC: 9.7 MG/DL (ref 8.7–10.3)
CHLORIDE SERPL-SCNC: 99 MMOL/L (ref 96–106)
CHOLEST SERPL-MCNC: 126 MG/DL (ref 100–199)
CO2 SERPL-SCNC: 25 MMOL/L (ref 18–29)
CREAT SERPL-MCNC: 0.93 MG/DL (ref 0.57–1)
EST. AVERAGE GLUCOSE BLD GHB EST-MCNC: 171 MG/DL
GLUCOSE SERPL-MCNC: 162 MG/DL (ref 65–99)
HBA1C MFR BLD: 7.6 % (ref 4.8–5.6)
HDLC SERPL-MCNC: 24 MG/DL
INTERPRETATION, 910389: NORMAL
LDLC SERPL CALC-MCNC: 23 MG/DL (ref 0–99)
Lab: NORMAL
POTASSIUM SERPL-SCNC: 4 MMOL/L (ref 3.5–5.2)
SODIUM SERPL-SCNC: 142 MMOL/L (ref 134–144)
TRIGL SERPL-MCNC: 394 MG/DL (ref 0–149)
VLDLC SERPL CALC-MCNC: 79 MG/DL (ref 5–40)

## 2017-09-03 NOTE — PROGRESS NOTES
Send normal/stable results letter. Your diabetes is doing well, Keep up the good work! Call me for any problems especially if the sugars go to low, where you feel weak or shaky.

## 2017-11-01 PROBLEM — N95.0 POST-MENOPAUSAL BLEEDING: Status: ACTIVE | Noted: 2017-11-01

## 2017-11-17 ENCOUNTER — TELEPHONE (OUTPATIENT)
Dept: INTERNAL MEDICINE CLINIC | Age: 62
End: 2017-11-17

## 2017-11-17 NOTE — TELEPHONE ENCOUNTER
Returned call and pt bought Turmeric Curcumin from a [employer] for her knee pain. After taking two pills and resting for one hour her knee pain stopped. She wanted to know if this med would mess with her other reg meds.

## 2017-11-17 NOTE — TELEPHONE ENCOUNTER
Pt called stated that she got some medication from the health store to assist with pain and that she wanted to know if it was ok to take with her other medications

## 2017-11-17 NOTE — TELEPHONE ENCOUNTER
Dr. Donald Hawley stated this was a spice and should be alright taking with her reg meds when she has the acute pain with knee

## 2017-11-25 RX ORDER — NAPROXEN 500 MG/1
TABLET ORAL
Qty: 60 TAB | Refills: 2 | Status: SHIPPED | OUTPATIENT
Start: 2017-11-25 | End: 2018-01-23 | Stop reason: ALTCHOICE

## 2018-01-23 ENCOUNTER — OFFICE VISIT (OUTPATIENT)
Dept: INTERNAL MEDICINE CLINIC | Age: 63
End: 2018-01-23

## 2018-01-23 VITALS
SYSTOLIC BLOOD PRESSURE: 133 MMHG | HEIGHT: 63 IN | WEIGHT: 190 LBS | BODY MASS INDEX: 33.66 KG/M2 | TEMPERATURE: 97.6 F | DIASTOLIC BLOOD PRESSURE: 79 MMHG | RESPIRATION RATE: 16 BRPM | OXYGEN SATURATION: 99 % | HEART RATE: 113 BPM

## 2018-01-23 DIAGNOSIS — M17.11 ARTHRITIS OF RIGHT KNEE: ICD-10-CM

## 2018-01-23 DIAGNOSIS — S78.112S TRAUMATIC AMPUTATION OF LEFT LOWER EXTREMITY ABOVE KNEE, SEQUELA: Chronic | ICD-10-CM

## 2018-01-23 DIAGNOSIS — E11.65 TYPE 2 DIABETES MELLITUS WITH HYPERGLYCEMIA, WITHOUT LONG-TERM CURRENT USE OF INSULIN (HCC): ICD-10-CM

## 2018-01-23 DIAGNOSIS — I10 ESSENTIAL HYPERTENSION: ICD-10-CM

## 2018-01-23 DIAGNOSIS — Z23 ENCOUNTER FOR IMMUNIZATION: ICD-10-CM

## 2018-01-23 DIAGNOSIS — Z01.818 PRE-OP EVALUATION: Primary | ICD-10-CM

## 2018-01-23 RX ORDER — SIMVASTATIN 40 MG/1
40 TABLET, FILM COATED ORAL DAILY
Qty: 90 TAB | Refills: 3 | Status: SHIPPED | OUTPATIENT
Start: 2018-01-23 | End: 2018-10-22 | Stop reason: DRUGHIGH

## 2018-01-23 RX ORDER — METFORMIN HYDROCHLORIDE 500 MG/1
500 TABLET ORAL 2 TIMES DAILY WITH MEALS
Qty: 180 TAB | Refills: 3 | Status: SHIPPED | OUTPATIENT
Start: 2018-01-23 | End: 2018-03-05 | Stop reason: SDUPTHER

## 2018-01-23 RX ORDER — LISINOPRIL AND HYDROCHLOROTHIAZIDE 20; 25 MG/1; MG/1
TABLET ORAL
Qty: 90 TAB | Refills: 3 | Status: SHIPPED | OUTPATIENT
Start: 2018-01-23 | End: 2018-03-05 | Stop reason: SDUPTHER

## 2018-01-23 NOTE — PROGRESS NOTES
Chief Complaint   Patient presents with    Pre-op Exam     R/knee replacement in February     I have reviewed the patient's medical history in detail and updated the computerized patient record. Health Maintenance reviewed. 1. Have you been to the ER, urgent care clinic since your last visit? Hospitalized since your last visit? yes    2. Have you seen or consulted any other health care providers outside of the [address] [place] [address] [place] since your last visit? Include any pap smears or colon screening.  So. [facility]    Encouraged pt to discuss pt's wishes with spouse/partner/family and bring them in the next appt to follow thru with the Advanced Directive    Fall Risk Assessment, last 12 mths 1/23/2018   Able to walk? Yes   Fall in past 12 months? No   Fall with injury? -   Number of falls in past 12 months -   Fall Risk Score -       PHQ over the last two weeks 1/23/2018   Little interest or pleasure in doing things Several days   Feeling down, depressed or hopeless Several days   Total Score PHQ 2 2       Abuse Screening Questionnaire 1/23/2018   Do you ever feel afraid of your partner? N   Are you in a relationship with someone who physically or mentally threatens you? N   Is it safe for you to go home?  Y       ADL Assessment 1/23/2018   Feeding yourself No Help Needed   Getting from bed to chair No Help Needed   Getting dressed No Help Needed   Bathing or showering No Help Needed   Walk across the room (includes cane/walker) No Help Needed   Using the telphone No Help Needed   Taking your medications No Help Needed   Preparing meals No Help Needed   Managing money (expenses/bills) No Help Needed   Moderately strenuous housework (laundry) No Help Needed   Shopping for personal items (toiletries/medicines) No Help Needed   Shopping for groceries No Help Needed   Driving No Help Needed   Climbing a flight of stairs Help Needed   Getting to places beyond walking distances No Help Needed

## 2018-01-23 NOTE — LETTER
2/26/2018 1:52 PM 
 
Ms. Citlaly Aguilar [address] [facility] [phone] Dear Citlaly Aguilar: 
 
Diabetes is not as good as it was, but it isn't too bad currently. For now no change in your medications. Please follow the diabetic diet carefully. Reduce starchy foods and sweet foods. Will need to re-check this levels in a few months. If diabetes worsens will need to increase your medications Please find your most recent results below. Resulted Orders CBC W/O DIFF Result Value Ref Range WBC 7.1 3.4 - 10.8 x10E3/uL  
 RBC 5.10 3.77 - 5.28 x10E6/uL HGB 12.7 11.1 - 15.9 g/dL HCT 38.6 34.0 - 46.6 % MCV 76 (L) 79 - 97 fL  
 MCH 24.9 (L) 26.6 - 33.0 pg  
 MCHC 32.9 31.5 - 35.7 g/dL  
 RDW 17.7 (H) 12.3 - 15.4 % PLATELET 621 407 - 117 x10E3/uL Narrative Performed at:  [address]  [id number] : Shannan Jarquin MD, Phone:  [phone] METABOLIC PANEL, COMPREHENSIVE Result Value Ref Range Glucose 200 (H) 65 - 99 mg/dL Comment:  
   Sample is lipemic. This may cause spurious increases in TBili, DBili, 
AST, ALT, and UIBC (if ordered). Clinical correlation indicated. Specimen received in contact with cells. No visible hemolysis 
present. However GLUC may be decreased and K increased. Clinical 
correlation indicated. BUN 23 8 - 27 mg/dL Creatinine 0.90 0.57 - 1.00 mg/dL GFR est non-AA 69 >59 mL/min/1.73 GFR est AA 80 >59 mL/min/1.73  
 BUN/Creatinine ratio 26 12 - 28 Sodium 142 134 - 144 mmol/L Potassium 3.8 3.5 - 5.2 mmol/L Comment:  
   Specimen received in contact with cells. No visible hemolysis 
present. However GLUC may be decreased and K increased. Clinical 
correlation indicated. Sample is lipemic. This may cause spurious increases in TBili, DBili, 
AST, ALT, and UIBC (if ordered). Clinical correlation indicated.  
  
 Chloride 98 96 - 106 mmol/L  
 CO2 23 18 - 29 mmol/L  
 Calcium 9.6 8.7 - 10.3 mg/dL Protein, total 6.5 6.0 - 8.5 g/dL Albumin 4.2 3.6 - 4.8 g/dL GLOBULIN, TOTAL 2.3 1.5 - 4.5 g/dL A-G Ratio 1.8 1.2 - 2.2 Bilirubin, total <0.2 0.0 - 1.2 mg/dL Alk. phosphatase 77 39 - 117 IU/L  
 AST (SGOT) 11 0 - 40 IU/L Comment:  
   The specimen was lipemic. The lipemia was cleared by 
ultracentrifugation before testing. However HDL, direct LDL, 
cholesterol and triglyceride (if ordered) were performed prior to 
ultracentrifugation. ALT (SGPT) 11 0 - 32 IU/L Narrative Performed at:  [address]  [id number] : James Rodriguez MD, Phone:  [phone] HEMOGLOBIN A1C WITH EAG Result Value Ref Range Hemoglobin A1c 8.1 (H) 4.8 - 5.6 % Comment:  
            Pre-diabetes: 5.7 - 6.4 Diabetes: >6.4 Glycemic control for adults with diabetes: <7.0 Estimated average glucose 186 mg/dL Narrative Performed at:  [address]  [id number] : James Rodriguez MD, Phone:  [phone] Please call me if you have any questions: [phone] Sincerely, Layton Reynoso MD

## 2018-01-23 NOTE — MR AVS SNAPSHOT
[address] [place] [address]. [phone] [facility] 
[phone] Patient: Kayla Becker MRN: [id number] VKD:[id number][birth year]44[id number] Visit Information Date & Time Provider Department Dept. Phone Encounter #  
 1/23/2018  3:30 PM Poonam Devi  Judy Cowan [id number] Upcoming Health Maintenance Date Due Hepatitis C Screening 1955 Pneumococcal 19-64 Medium Risk (1 of 1 - PPSV23) 3/31/1975 ZOSTER VACCINE AGE 60> 1/31/2016 PAP AKA CERVICAL CYTOLOGY 5/31/2018* HEMOGLOBIN A1C Q6M 3/1/2018 MICROALBUMIN Q1 3/23/2018 LIPID PANEL Q1 9/1/2018 FOOT EXAM Q1 9/27/2018 EYE EXAM RETINAL OR DILATED Q1 10/17/2018 BREAST CANCER SCRN MAMMOGRAM 6/30/2019 COLONOSCOPY 10/12/2022 DTaP/Tdap/Td series (2 - Td) 6/17/2026 *Topic was postponed. The date shown is not the original due date. Allergies as of 1/23/2018  Review Complete On: 1/23/2018 By: Poonam Devi MD  
  
 Severity Noted Reaction Type Reactions Keflex [Cephalexin]  06/29/2016    Itching Lortab [Hydrocodone-acetaminophen]  03/30/2012    Itching Current Immunizations  Never Reviewed Name Date Influenza Vaccine (Quad) PF 9/1/2017, 9/7/2016 Pneumococcal Conjugate (PCV-13) 9/7/2016 Pneumococcal Polysaccharide (PPSV-23)  Incomplete Tdap 6/17/2016 Not reviewed this visit You Were Diagnosed With   
  
 Codes Comments Pre-op evaluation    -  Primary ICD-10-CM: O22.586 ICD-9-CM: V72.84 Encounter for immunization     ICD-10-CM: H54 ICD-9-CM: V03.89 Essential hypertension     ICD-10-CM: I10 
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 9 Vitals BP Pulse Temp Resp Height(growth percentile) Weight(growth percentile) 133/79 (BP 1 Location: Left arm, BP Patient Position: Sitting) (!) 113 97.6 °F (36.4 °C) (Oral) 16 5' 3\" (1.6 m) 190 lb (86.2 kg) LMP SpO2 BMI OB Status Smoking Status 08/23/2003 99% 33.66 kg/m2 Postmenopausal Former Smoker Vitals History BMI and BSA Data Body Mass Index Body Surface Area  
 33.66 kg/m 2 1.96 m 2 Preferred Pharmacy Pharmacy Name Phone [address] [phone] Your Updated Medication List  
  
   
This list is accurate as of: 1/23/18  3:55 PM.  Always use your most recent med list.  
  
  
  
  
 cyclobenzaprine 10 mg tablet Commonly known as:  FLEXERIL Take 1 Tab by mouth three (3) times daily as needed for Muscle Spasm(s). gabapentin 100 mg capsule Commonly known as:  NEURONTIN Take 1 Cap by mouth three (3) times daily. Insulin Needles (Disposable) 29 gauge x 1/2\" Ndle 1 Each by Does Not Apply route daily. lisinopril-hydroCHLOROthiazide 20-25 mg per tablet Commonly known as:  PRINZIDE, ZESTORETIC  
TAKE ONE TABLET BY MOUTH EVERY DAY  
  
 metFORMIN 500 mg tablet Commonly known as:  GLUCOPHAGE Take 1 Tab by mouth two (2) times daily (with meals). NovoLOG Mix 70-30 FlexPen 100 unit/mL (70-30) Inpn Generic drug:  insulin aspart protamine/insulin aspart INJECT 35 UNITS SUBCUTANEOUSLY BEFORE BREAKFAST AND DINNER FOR TYPE 2 DIABETES  
  
 raNITIdine 150 mg tablet Commonly known as:  ZANTAC Take 1 Tab by mouth two (2) times a day. simvastatin 40 mg tablet Commonly known as:  ZOCOR Take 1 Tab by mouth daily. tobramycin 0.3 % ophthalmic solution Commonly known as:  Gina Cap Administer 0.3 Drops to both eyes daily. TYLENOL ARTHRITIS PAIN 650 mg Krystyna Wynn Generic drug:  acetaminophen Take 650 mg by mouth every six (6) hours as needed for Pain. Prescriptions Sent to Pharmacy Refills  
 lisinopril-hydroCHLOROthiazide (PRINZIDE, ZESTORETIC) 20-25 mg per tablet 3 Sig: TAKE ONE TABLET BY MOUTH EVERY DAY Class: Normal  
 Pharmacy: [address] [facility] Ph #: [phone]  
 metFORMIN (GLUCOPHAGE) 500 mg tablet 3 Sig: Take 1 Tab by mouth two (2) times daily (with meals). Class: Normal  
 Pharmacy: [address], [address] Ph #: [phone] Route: Oral  
 simvastatin (ZOCOR) 40 mg tablet 3 Sig: Take 1 Tab by mouth daily. Class: Normal  
 Pharmacy: [address], [address] Ph #: [phone] Route: Oral  
  
We Performed the Following CBC W/O DIFF [43579 CPT(R)] HEMOGLOBIN A1C WITH EAG [51396 CPT(R)] METABOLIC PANEL, COMPREHENSIVE [36190 CPT(R)] PNEUMOCOCCAL POLYSACCHARIDE VACCINE, 23-VALENT, ADULT OR IMMUNOSUPPRESSED PT DOSE, [94333 CPT(R)] To-Do List   
 01/29/2018 8:00 AM  
  Appointment with [address] [place] [address] EXAM RM 3 at [address] [place] [address] ([phone])  
  
 01/29/2018 9:30 AM  
  Appointment with [address] [place] [address] CLASSROOM at [address] [place] [address] ([phone]) Introducing [facility]! [facility] introduces [employer] patient portal. Now you can access parts of your medical record, email your doctor's office, and request medication refills online. 1. In your internet browser, go to [email or web address] 2. Click on the First Time User? Click Here link in the Sign In box. You will see the New Member Sign Up page. 3. Enter your [employer] Access Code exactly as it appears below. You will not need to use this code after youve completed the sign-up process. If you do not sign up before the expiration date, you must request a new code.  
 
· [employer] Access Code: [email or web address] 
 Expires: 3/15/2018  7:49 AM 
 
4. Enter the last four digits of your Social Security Number (xxxx) and Date of Birth (mm/dd/yyyy) as indicated and click Submit. You will be taken to the next sign-up page. 5. Create a [employer] ID. This will be your [employer] login ID and cannot be changed, so think of one that is secure and easy to remember. 6. Create a [employer] password. You can change your password at any time. 7. Enter your Password Reset Question and Answer. This can be used at a later time if you forget your password. 8. Enter your e-mail address. You will receive e-mail notification when new information is available in [address] 9. Click Sign Up. You can now view and download portions of your medical record. 10. Click the Download Summary menu link to download a portable copy of your medical information. If you have questions, please visit the Frequently Asked Questions section of the [employer] website. Remember, [employer] is NOT to be used for urgent needs. For medical emergencies, dial 911. Now available from your iPhone and Android! Please provide this summary of care documentation to your next provider. Your primary care clinician is listed as Jose Eduardo. If you have any questions after today's visit, please call [phone].
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
## 2018-01-23 NOTE — PATIENT INSTRUCTIONS
Pneumococcal Polysaccharide Vaccine: What You Need to Know  Why get vaccinated? Vaccination can protect older adults (and some children and younger adults) from pneumococcal disease. Pneumococcal disease is caused by bacteria that can spread from person to person through close contact. It can cause ear infections, and it can also lead to more serious infections of the:  · Lungs (pneumonia),  · Blood (bacteremia), and  · Covering of the brain and spinal cord (meningitis). Meningitis can cause deafness and brain damage, and it can be fatal.  Anyone can get pneumococcal disease, but children under 3years of age, people with certain medical conditions, adults over 72years of age, and cigarette smokers are at the highest risk. About 18,000 older adults die each year from pneumococcal disease in the [place]. Treatment of pneumococcal infections with penicillin and other drugs used to be more effective. But some strains of the disease have become resistant to these drugs. This makes prevention of the disease, through vaccination, even more important. Pneumococcal polysaccharide vaccine (PPSV23)  Pneumococcal polysaccharide vaccine (PPSV23) protects against 23 types of pneumococcal bacteria. It will not prevent all pneumococcal disease. PPSV23 is recommended for:  · All adults 72years of age and older,  · Anyone 2 through 59years of age with certain long-term health problems,  · Anyone 2 through 59years of age with a weakened immune system,  · Adults 23 through 59years of age who smoke cigarettes or have asthma. Most people need only one dose of PPSV. A second dose is recommended for certain high-risk groups. People 72 and older should get a dose even if they have gotten one or more doses of the vaccine before they turned 65. Your healthcare provider can give you more information about these recommendations. Most healthy adults develop protection within 2 to 3 weeks of getting the shot.   Some people should not get this vaccine  · Anyone who has had a life-threatening allergic reaction to PPSV should not get another dose. · Anyone who has a severe allergy to any component of PPSV should not receive it. Tell your provider if you have any severe allergies. · Anyone who is moderately or severely ill when the shot is scheduled may be asked to wait until they recover before getting the vaccine. Someone with a mild illness can usually be vaccinated. · Children less than 3years of age should not receive this vaccine. · There is no evidence that PPSV is harmful to either a pregnant woman or to her fetus. However, as a precaution, women who need the vaccine should be vaccinated before becoming pregnant, if possible. Risks of a vaccine reaction  With any medicine, including vaccines, there is a chance of side effects. These are usually mild and go away on their own, but serious reactions are also possible. About half of people who get PPSV have mild side effects, such as redness or pain where the shot is given, which go away within about two days. Less than 1 out of 100 people develop a fever, muscle aches, or more severe local reactions. Problems that could happen after any vaccine:  · People sometimes faint after a medical procedure, including vaccination. Sitting or lying down for about 15 minutes can help prevent fainting, and injuries caused by a fall. Tell your doctor if you feel dizzy, or have vision changes or ringing in the ears. · Some people get severe pain in the shoulder and have difficulty moving the arm where a shot was given. This happens very rarely. · Any medication can cause a severe allergic reaction. Such reactions from a vaccine are very rare, estimated at about 1 in a million doses, and would happen within a few minutes to a few hours after the vaccination. As with any medicine, there is a very remote chance of a vaccine causing a serious injury or death.   The safety of vaccines is always being monitored. For more information, visit: www.cdc.gov/vaccinesafety/  What if there is a serious reaction? What should I look for? Look for anything that concerns you, such as signs of a severe allergic reaction, very high fever, or unusual behavior. Signs of a severe allergic reaction can include hives, swelling of the face and throat, difficulty breathing, a fast heartbeat, dizziness, and weakness. These would usually start a few minutes to a few hours after the vaccination. What should I do? If you think it is a severe allergic reaction or other emergency that can't wait, call 9-1-1 or get to the nearest hospital. Otherwise, call your doctor. Afterward, the reaction should be reported to the Vaccine Adverse Event Reporting System (VAERS). Your doctor might file this report, or you can do it yourself through the VAERS web site at www.vaers. [employer].gov, or by calling 3-635.789.1185. VAERS does not give medical advice. How can I learn more? · Ask your doctor. He or she can give you the vaccine package insert or suggest other sources of information. · Call your local or state health department. · Contact the Centers for Disease Control and Prevention (CDC):  ¨ Call 7-194.306.7378 (1-800-CDC-INFO) or  ¨ Visit CDC's website at www.cdc.gov/vaccines  Vaccine Information Statement  PPSV Vaccine  (04/24/2015)  Department of Health and Human Services  Centers for Disease Control and Prevention  Many Vaccine Information Statements are available in Russian and other languages. See www.immunize.org/vis. Hojas de información Sobre Vacunas están disponibles en español y en muchos otros idiomas. Visite Daysi.si. Care instructions adapted under license by [employer] (which disclaims liability or warranty for this information).  If you have questions about a medical condition or this instruction, always ask your healthcare professional. Rey Isabel any warranty or liability for your use of this information.

## 2018-01-24 LAB
ALBUMIN SERPL-MCNC: 4.2 G/DL (ref 3.6–4.8)
ALBUMIN/GLOB SERPL: 1.8 {RATIO} (ref 1.2–2.2)
ALP SERPL-CCNC: 77 IU/L (ref 39–117)
ALT SERPL-CCNC: 11 IU/L (ref 0–32)
AST SERPL-CCNC: 11 IU/L (ref 0–40)
BILIRUB SERPL-MCNC: <0.2 MG/DL (ref 0–1.2)
BUN SERPL-MCNC: 23 MG/DL (ref 8–27)
BUN/CREAT SERPL: 26 (ref 12–28)
CALCIUM SERPL-MCNC: 9.6 MG/DL (ref 8.7–10.3)
CHLORIDE SERPL-SCNC: 98 MMOL/L (ref 96–106)
CO2 SERPL-SCNC: 23 MMOL/L (ref 18–29)
CREAT SERPL-MCNC: 0.9 MG/DL (ref 0.57–1)
ERYTHROCYTE [DISTWIDTH] IN BLOOD BY AUTOMATED COUNT: 17.7 % (ref 12.3–15.4)
EST. AVERAGE GLUCOSE BLD GHB EST-MCNC: 186 MG/DL
GLOBULIN SER CALC-MCNC: 2.3 G/DL (ref 1.5–4.5)
GLUCOSE SERPL-MCNC: 200 MG/DL (ref 65–99)
HBA1C MFR BLD: 8.1 % (ref 4.8–5.6)
HCT VFR BLD AUTO: 38.6 % (ref 34–46.6)
HGB BLD-MCNC: 12.7 G/DL (ref 11.1–15.9)
MCH RBC QN AUTO: 24.9 PG (ref 26.6–33)
MCHC RBC AUTO-ENTMCNC: 32.9 G/DL (ref 31.5–35.7)
MCV RBC AUTO: 76 FL (ref 79–97)
PLATELET # BLD AUTO: 304 X10E3/UL (ref 150–379)
POTASSIUM SERPL-SCNC: 3.8 MMOL/L (ref 3.5–5.2)
PROT SERPL-MCNC: 6.5 G/DL (ref 6–8.5)
RBC # BLD AUTO: 5.1 X10E6/UL (ref 3.77–5.28)
SODIUM SERPL-SCNC: 142 MMOL/L (ref 134–144)
WBC # BLD AUTO: 7.1 X10E3/UL (ref 3.4–10.8)

## 2018-01-29 ENCOUNTER — HOSPITAL ENCOUNTER (OUTPATIENT)
Dept: PREADMISSION TESTING | Age: 63
Discharge: HOME | End: 2018-01-29
Payer: COMMERCIAL

## 2018-01-29 VITALS
BODY MASS INDEX: 32.25 KG/M2 | WEIGHT: 182 LBS | TEMPERATURE: 98.2 F | SYSTOLIC BLOOD PRESSURE: 118 MMHG | HEART RATE: 114 BPM | DIASTOLIC BLOOD PRESSURE: 81 MMHG | HEIGHT: 63 IN

## 2018-01-29 DIAGNOSIS — Z79.4 DIABETES MELLITUS DUE TO UNDERLYING CONDITION WITH DIABETIC NEPHROPATHY, WITH LONG-TERM CURRENT USE OF INSULIN (HCC): Primary | ICD-10-CM

## 2018-01-29 DIAGNOSIS — E11.65 TYPE 2 DIABETES MELLITUS WITH HYPERGLYCEMIA, WITHOUT LONG-TERM CURRENT USE OF INSULIN (HCC): ICD-10-CM

## 2018-01-29 DIAGNOSIS — E08.21 DIABETES MELLITUS DUE TO UNDERLYING CONDITION WITH DIABETIC NEPHROPATHY, WITH LONG-TERM CURRENT USE OF INSULIN (HCC): Primary | ICD-10-CM

## 2018-01-29 LAB
ABO + RH BLD: NORMAL
APPEARANCE UR: ABNORMAL
ATRIAL RATE: 101 BPM
BACTERIA URNS QL MICRO: NEGATIVE /HPF
BILIRUB UR QL: NEGATIVE
BLOOD GROUP ANTIBODIES SERPL: NORMAL
CALCULATED P AXIS, ECG09: 50 DEGREES
CALCULATED R AXIS, ECG10: -20 DEGREES
CALCULATED T AXIS, ECG11: 21 DEGREES
COLOR UR: ABNORMAL
DIAGNOSIS, 93000: NORMAL
EPITH CASTS URNS QL MICRO: ABNORMAL /LPF
EST. AVERAGE GLUCOSE BLD GHB EST-MCNC: 186 MG/DL
GLUCOSE UR STRIP.AUTO-MCNC: NEGATIVE MG/DL
HBA1C MFR BLD: 8.1 % (ref 4.2–6.3)
HGB UR QL STRIP: NEGATIVE
HYALINE CASTS URNS QL MICRO: ABNORMAL /LPF (ref 0–5)
INR PPP: 1 (ref 0.9–1.1)
KETONES UR QL STRIP.AUTO: NEGATIVE MG/DL
LEUKOCYTE ESTERASE UR QL STRIP.AUTO: ABNORMAL
NITRITE UR QL STRIP.AUTO: NEGATIVE
P-R INTERVAL, ECG05: 156 MS
PH UR STRIP: 6 [PH] (ref 5–8)
PROT UR STRIP-MCNC: NEGATIVE MG/DL
PROTHROMBIN TIME: 10.7 SEC (ref 9–11.1)
Q-T INTERVAL, ECG07: 348 MS
QRS DURATION, ECG06: 96 MS
QTC CALCULATION (BEZET), ECG08: 451 MS
RBC #/AREA URNS HPF: ABNORMAL /HPF (ref 0–5)
SP GR UR REFRACTOMETRY: 1.02 (ref 1–1.03)
SPECIMEN EXP DATE BLD: NORMAL
UA: UC IF INDICATED,UAUC: ABNORMAL
UROBILINOGEN UR QL STRIP.AUTO: 0.2 EU/DL (ref 0.2–1)
VENTRICULAR RATE, ECG03: 101 BPM
WBC URNS QL MICRO: ABNORMAL /HPF (ref 0–4)

## 2018-01-29 PROCEDURE — 86900 BLOOD TYPING SEROLOGIC ABO: CPT | Performed by: ORTHOPAEDIC SURGERY

## 2018-01-29 PROCEDURE — 93005 ELECTROCARDIOGRAM TRACING: CPT

## 2018-01-29 PROCEDURE — 83036 HEMOGLOBIN GLYCOSYLATED A1C: CPT | Performed by: ORTHOPAEDIC SURGERY

## 2018-01-29 PROCEDURE — 85610 PROTHROMBIN TIME: CPT | Performed by: ORTHOPAEDIC SURGERY

## 2018-01-29 PROCEDURE — 81001 URINALYSIS AUTO W/SCOPE: CPT | Performed by: ORTHOPAEDIC SURGERY

## 2018-01-29 PROCEDURE — 87086 URINE CULTURE/COLONY COUNT: CPT | Performed by: ORTHOPAEDIC SURGERY

## 2018-01-29 RX ORDER — NAPROXEN 500 MG/1
TABLET ORAL
COMMUNITY
Start: 2017-11-25 | End: 2018-02-18

## 2018-01-29 NOTE — PROGRESS NOTES
Diabetes is not as good as it was, but it isn't too bad currently. For now no change in your medications. Please follow the diabetic diet carefully. Reduce starchy foods and sweet foods. Will need to re-check this levels in a few months. If diabetes worsens will need to increase your medications.

## 2018-01-30 LAB
BACTERIA SPEC CULT: NORMAL
CC UR VC: NORMAL
SERVICE CMNT-IMP: NORMAL
SERVICE CMNT-IMP: NORMAL

## 2018-01-30 NOTE — ADVANCED PRACTICE NURSE
Results faxed and called to  's office and message left on Gricelda Jordan and  order [facility] for [facility]. Hemoglobin A1C 8.1, and abnormal UA, sent for c/s.

## 2018-02-07 ENCOUNTER — OFFICE VISIT (OUTPATIENT)
Dept: INTERNAL MEDICINE CLINIC | Age: 63
End: 2018-02-07

## 2018-02-07 VITALS
SYSTOLIC BLOOD PRESSURE: 137 MMHG | RESPIRATION RATE: 16 BRPM | BODY MASS INDEX: 33.49 KG/M2 | TEMPERATURE: 96.1 F | HEART RATE: 121 BPM | OXYGEN SATURATION: 98 % | DIASTOLIC BLOOD PRESSURE: 90 MMHG | WEIGHT: 189 LBS | HEIGHT: 63 IN

## 2018-02-07 DIAGNOSIS — E11.65 TYPE 2 DIABETES MELLITUS WITH HYPERGLYCEMIA, WITHOUT LONG-TERM CURRENT USE OF INSULIN (HCC): ICD-10-CM

## 2018-02-07 DIAGNOSIS — M17.11 ARTHRITIS OF KNEE, RIGHT: ICD-10-CM

## 2018-02-07 DIAGNOSIS — M10.00 ACUTE IDIOPATHIC GOUT, UNSPECIFIED SITE: Primary | ICD-10-CM

## 2018-02-07 RX ORDER — OXYCODONE AND ACETAMINOPHEN 5; 325 MG/1; MG/1
1 TABLET ORAL
Qty: 10 TAB | Refills: 0 | Status: SHIPPED | OUTPATIENT
Start: 2018-02-07 | End: 2018-02-18

## 2018-02-07 RX ORDER — COLCHICINE 0.6 MG/1
0.6 TABLET ORAL DAILY
Qty: 30 TAB | Refills: 1 | Status: SHIPPED | OUTPATIENT
Start: 2018-02-07 | End: 2018-05-15 | Stop reason: SDUPTHER

## 2018-02-07 NOTE — PROGRESS NOTES
I have reviewed the patient's medical history in detail and updated the computerized patient record. Health Maintenance reviewed. 1. Have you been to the ER, urgent care clinic since your last visit? Hospitalized since your last visit?no    2. Have you seen or consulted any other health care providers outside of the [address] [place] [address] [place] since your last visit? Include any pap smears or colon screening. No    Encouraged pt to discuss pt's wishes with spouse/partner/family and bring them in the next appt to follow thru with the Advanced Directive    Fall Risk Assessment, last 12 mths 2/7/2018   Able to walk? Yes   Fall in past 12 months? Yes   Fall with injury? No   Number of falls in past 12 months 8 or more   Fall Risk Score 8       PHQ over the last two weeks 2/7/2018   Little interest or pleasure in doing things Several days   Feeling down, depressed or hopeless Several days   Total Score PHQ 2 2       Abuse Screening Questionnaire 2/7/2018   Do you ever feel afraid of your partner? N   Are you in a relationship with someone who physically or mentally threatens you? N   Is it safe for you to go home?  Y       ADL Assessment 2/7/2018   Feeding yourself No Help Needed   Getting from bed to chair No Help Needed   Getting dressed No Help Needed   Bathing or showering No Help Needed   Walk across the room (includes cane/walker) No Help Needed   Using the telphone No Help Needed   Taking your medications No Help Needed   Preparing meals No Help Needed   Managing money (expenses/bills) No Help Needed   Moderately strenuous housework (laundry) No Help Needed   Shopping for personal items (toiletries/medicines) No Help Needed   Shopping for groceries No Help Needed   Driving No Help Needed   Climbing a flight of stairs No Help Needed   Getting to places beyond walking distances No Help Needed     [facility]  OFFICE PROCEDURE PROGRESS NOTE        Chart reviewed for the following:   Celestine Yates MANNIE Oreilly, have reviewed the History, Physical and updated the Allergic reactions for [employer]     TIME OUT performed immediately prior to start of procedure:   Cari Laws LPN, have performed the following reviews on [employer] prior to the start of the procedure:            * Patient was identified by name and date of birth   * Agreement on procedure being performed was verified  * Risks and Benefits explained to the patient by Dr. Lauryn Palmer  * Procedure site verified and marked as necessary  * Patient was positioned for comfort  * Consent was signed and verified     Time: 3:30PM      Date of procedure: 2/7/2018    Procedure performed by:  Josefa Weldon MD    Provider assisted by: Piter Engel LPN    Patient assisted by: Piter Engel LPN    How tolerated by patient: Well    Post Procedural Pain Scale: 5/10    Comments: None  Dr. Lauryn Palmer did the procedure

## 2018-02-07 NOTE — PATIENT INSTRUCTIONS
Purine-Restricted Diet: Care Instructions  Your Care Instructions    Purines are substances that are found in some foods. Your body turns purines into uric acid. High levels of uric acid can cause gout, which is a form of arthritis that causes pain and inflammation in joints. You may be able to help control the amount of uric acid in your body by limiting high-purine foods in your diet. Follow-up care is a key part of your treatment and safety. Be sure to make and go to all appointments, and call your doctor if you are having problems. It's also a good idea to know your test results and keep a list of the medicines you take. How can you care for yourself at home? · Plan your meals and snacks around foods that are low in purines and are safe for you to eat. These foods include:  ¨ Green vegetables and tomatoes. ¨ Fruits. ¨ Whole-grain breads, rice, and cereals. ¨ Eggs, peanut butter, and nuts. ¨ Low-fat milk, cheese, and other milk products. ¨ Popcorn. ¨ Gelatin desserts, chocolate, cocoa, and cakes and sweets, in small amounts. · You can eat certain foods that are medium-high in purines, but eat them only once in a while. These foods include:  ¨ Legumes, such as dried beans and dried peas. You can have 1 cup cooked legumes each day. ¨ Asparagus, cauliflower, spinach, mushrooms, and green peas. ¨ Fish and seafood (other than very high-purine seafood). ¨ Oatmeal, wheat bran, and wheat germ. · Limit very high-purine foods, including:  ¨ Organ meats, such as liver, kidneys, sweetbreads, and brains. ¨ Meats, including gauillon, beef, pork, and lamb. ¨ Game meats and any other meats in large amounts. ¨ Anchovies, sardines, herring, mackerel, and scallops. ¨ Gravy. ¨ Beer. Where can you learn more? Go to http://xena-franky.info/. Enter F448 in the search box to learn more about \"Purine-Restricted Diet: Care Instructions. \"  Current as of:  May 12, 2017  Content Version: 11.4  © 5785-2940 Healthwise[employer] Inc[employer]. Care instructions adapted under license by [employer] (which disclaims liability or warranty for this information). If you have questions about a medical condition or this instruction, always ask your healthcare professional. [place] [address] any warranty or liability for your use of this information.

## 2018-02-07 NOTE — MR AVS SNAPSHOT
Pat Sharma 
 
 
 [address] [place] [address]. .o[address] [place] [address] [place] [address] 
[phone] Patient: Keren Bettencourt MRN: [id number] BRL:[id number] Visit Information Date & Time Provider Department Dept. Phone Encounter #  
 2/7/2018  3:00 PM Cuca Mckeon  Judy Cowan [id number] Follow-up Instructions Return in about 4 weeks (around 3/7/2018) for routine follow up. Upcoming Health Maintenance Date Due Hepatitis C Screening 1955 ZOSTER VACCINE AGE 60> 1/31/2016 PAP AKA CERVICAL CYTOLOGY 5/31/2018* MICROALBUMIN Q1 3/23/2018 HEMOGLOBIN A1C Q6M 7/29/2018 LIPID PANEL Q1 9/1/2018 FOOT EXAM Q1 9/27/2018 EYE EXAM RETINAL OR DILATED Q1 10/17/2018 BREAST CANCER SCRN MAMMOGRAM 6/30/2019 COLONOSCOPY 10/12/2022 DTaP/Tdap/Td series (2 - Td) 6/17/2026 *Topic was postponed. The date shown is not the original due date. Allergies as of 2/7/2018  Review Complete On: 2/7/2018 By: Cuca Mckeon MD  
  
 Severity Noted Reaction Type Reactions Keflex [Cephalexin]  06/29/2016    Itching Lortab [Hydrocodone-acetaminophen]  03/30/2012    Itching Current Immunizations  Reviewed on 2/7/2018 Name Date Influenza Vaccine (Quad) PF 9/1/2017, 9/7/2016 Pneumococcal Conjugate (PCV-13) 9/7/2016 Pneumococcal Polysaccharide (PPSV-23) 1/23/2018 Tdap 6/17/2016 Reviewed by Cuca Mckeon MD on 2/7/2018 at  3:09 PM  
You Were Diagnosed With   
  
 Codes Comments Acute idiopathic gout, unspecified site    -  Primary ICD-10-CM: M10.00 ICD-9-CM: 274.01 Type 2 diabetes mellitus with hyperglycemia, without long-term current use of insulin (HCC)     ICD-10-CM: E11.65 ICD-9-CM: 250.00, 790.29 Arthritis of knee, right     ICD-10-CM: M17.11 ICD-9-CM: 716.96 Vitals BP Pulse Temp Resp Height(growth percentile) Weight(growth percentile) 137/90 (BP 1 Location: Left arm, BP Patient Position: Sitting) (!) 121 96.1 °F (35.6 °C) (Oral) 16 5' 3\" (1.6 m) 189 lb (85.7 kg) LMP SpO2 BMI OB Status Smoking Status 08/23/2003 98% 33.48 kg/m2 Postmenopausal Former Smoker BMI and BSA Data Body Mass Index Body Surface Area  
 33.48 kg/m 2 1.95 m 2 Preferred Pharmacy Pharmacy Name Phone [facility] [phone] Your Updated Medication List  
  
   
This list is accurate as of: 2/7/18  3:31 PM.  Always use your most recent med list.  
  
  
  
  
 colchicine 0.6 mg tablet Commonly known as:  COLCRYS Take 1 Tab by mouth daily. cyclobenzaprine 10 mg tablet Commonly known as:  FLEXERIL Take 1 Tab by mouth three (3) times daily as needed for Muscle Spasm(s). gabapentin 100 mg capsule Commonly known as:  NEURONTIN Take 1 Cap by mouth three (3) times daily. lisinopril-hydroCHLOROthiazide 20-25 mg per tablet Commonly known as:  PRINZIDE, ZESTORETIC  
TAKE ONE TABLET BY MOUTH EVERY DAY  
  
 metFORMIN 500 mg tablet Commonly known as:  GLUCOPHAGE Take 1 Tab by mouth two (2) times daily (with meals). naproxen 500 mg tablet Commonly known as:  NAPROSYN NovoLOG Mix 70-30 FlexPen 100 unit/mL (70-30) Inpn Generic drug:  insulin aspart protamine/insulin aspart INJECT 35 UNITS SUBCUTANEOUSLY BEFORE BREAKFAST AND DINNER FOR TYPE 2 DIABETES  
  
 oxyCODONE-acetaminophen 5-325 mg per tablet Commonly known as:  PERCOCET Take 1 Tab by mouth every six (6) hours as needed for Pain. Max Daily Amount: 4 Tabs. raNITIdine 150 mg tablet Commonly known as:  ZANTAC Take 1 Tab by mouth two (2) times a day. simvastatin 40 mg tablet Commonly known as:  ZOCOR Take 1 Tab by mouth daily. TYLENOL ARTHRITIS PAIN 650 mg [facility] [employer] Generic drug:  acetaminophen Take 650 mg by mouth every six (6) hours as needed for Pain. Prescriptions Printed Refills  
 oxyCODONE-acetaminophen (PERCOCET) 5-325 mg per tablet 0 Sig: Take 1 Tab by mouth every six (6) hours as needed for Pain. Max Daily Amount: 4 Tabs. Class: Print Route: Oral  
  
Prescriptions Sent to Pharmacy Refills  
 colchicine (COLCRYS) 0.6 mg tablet 1 Sig: Take 1 Tab by mouth daily. Class: Normal  
 Pharmacy: [facility] [address] [place] [address] [address] Ph #: [phone] Route: Oral  
  
We Performed the Following DRAIN/INJECT SMALL JOINT/BURSA [id number] CPT(R)] Follow-up Instructions Return in about 4 weeks (around 3/7/2018) for routine follow up. To-Do List   
 02/09/2018 2:00 PM  
  Appointment with Chelle Kohil RD at [address] [place] [address] ([phone]) Patient Instructions Purine-Restricted Diet: Care Instructions Your Care Instructions Purines are substances that are found in some foods. Your body turns purines into uric acid. High levels of uric acid can cause gout, which is a form of arthritis that causes pain and inflammation in joints. You may be able to help control the amount of uric acid in your body by limiting high-purine foods in your diet. Follow-up care is a key part of your treatment and safety. Be sure to make and go to all appointments, and call your doctor if you are having problems. It's also a good idea to know your test results and keep a list of the medicines you take. How can you care for yourself at home? · Plan your meals and snacks around foods that are low in purines and are safe for you to eat. These foods include: ¨ Green vegetables and tomatoes. ¨ Fruits. ¨ Whole-grain breads, rice, and cereals. ¨ Eggs, peanut butter, and nuts. ¨ Low-fat milk, cheese, and other milk products. ¨ Popcorn. ¨ Gelatin desserts, chocolate, cocoa, and cakes and sweets, in small amounts. · You can eat certain foods that are medium-high in purines, but eat them only once in a while. These foods include: ¨ Legumes, such as dried beans and dried peas. You can have 1 cup cooked legumes each day. ¨ Asparagus, cauliflower, spinach, mushrooms, and green peas. ¨ Fish and seafood (other than very high-purine seafood). ¨ Oatmeal, wheat bran, and wheat germ. · Limit very high-purine foods, including: ¨ Organ meats, such as liver, kidneys, sweetbreads, and brains. ¨ Meats, including aguillon, beef, pork, and lamb. ¨ Game meats and any other meats in large amounts. ¨ Anchovies, sardines, herring, mackerel, and scallops. ¨ Gravy. ¨ Beer. Where can you learn more? Go to http://xena-franky.info/. Enter F448 in the search box to learn more about \"Purine-Restricted Diet: Care Instructions. \" Current as of: May 12, 2017 Content Version: 11.4 © 2649-3009 [employer]. Care instructions adapted under license by [employer] (which disclaims liability or warranty for this information). If you have questions about a medical condition or this instruction, always ask your healthcare professional. [address] any warranty or liability for your use of this information. Introducing hospitals & HEALTH SERVICES! [facility] Horton introduces [employer] patient portal. Now you can access parts of your medical record, email your doctor's office, and request medication refills online. 1. In your internet browser, go to [email or web address] 2. Click on the First Time User? Click Here link in the Sign In box. You will see the New Member Sign Up page. 3. Enter your [employer] Access Code exactly as it appears below. You will not need to use this code after youve completed the sign-up process. If you do not sign up before the expiration date, you must request a new code. · [employer] Access Code: [email or web address] Expires: 3/15/2018  7:49 AM 
 
 4. Enter the last four digits of your Social Security Number (xxxx) and Date of Birth (mm/dd/yyyy) as indicated and click Submit. You will be taken to the next sign-up page. 5. Create a Genoom ID. This will be your Genoom login ID and cannot be changed, so think of one that is secure and easy to remember. 6. Create a Genoom password. You can change your password at any time. 7. Enter your Password Reset Question and Answer. This can be used at a later time if you forget your password. 8. Enter your e-mail address. You will receive e-mail notification when new information is available in [address] 9. Click Sign Up. You can now view and download portions of your medical record. 10. Click the Download Summary menu link to download a portable copy of your medical information. If you have questions, please visit the Frequently Asked Questions section of the Genoom website. Remember, Genoom is NOT to be used for urgent needs. For medical emergencies, dial 911. Now available from your iPhone and Android! Please provide this summary of care documentation to your next provider. Your primary care clinician is listed as Earnie Goldberg. If you have any questions after today's visit, please call [phone].

## 2018-02-09 ENCOUNTER — HOSPITAL ENCOUNTER (OUTPATIENT)
Dept: DIABETES SERVICES | Age: 63
Discharge: HOME | End: 2018-02-09
Payer: COMMERCIAL

## 2018-02-09 ENCOUNTER — TELEPHONE (OUTPATIENT)
Dept: INTERNAL MEDICINE CLINIC | Age: 63
End: 2018-02-09

## 2018-02-09 PROCEDURE — G0108 DIAB MANAGE TRN  PER INDIV: HCPCS | Performed by: DIETITIAN, REGISTERED

## 2018-02-09 RX ORDER — ACETAMINOPHEN 500 MG
1000 TABLET ORAL ONCE
Status: CANCELLED | OUTPATIENT
Start: 2018-02-09 | End: 2018-02-09

## 2018-02-09 RX ORDER — PREGABALIN 75 MG/1
75 CAPSULE ORAL ONCE
Status: CANCELLED | OUTPATIENT
Start: 2018-02-09 | End: 2018-02-09

## 2018-02-09 RX ORDER — CELECOXIB 200 MG/1
200 CAPSULE ORAL ONCE
Status: CANCELLED | OUTPATIENT
Start: 2018-02-09 | End: 2018-02-09

## 2018-02-09 RX ORDER — DEXAMETHASONE SODIUM PHOSPHATE 10 MG/ML
4 INJECTION INTRAMUSCULAR; INTRAVENOUS ONCE
Status: CANCELLED | OUTPATIENT
Start: 2018-02-09 | End: 2018-02-10

## 2018-02-09 NOTE — TELEPHONE ENCOUNTER
----- Message from Atul Noriega sent at 2/8/2018  3:05 PM EST -----  Regarding: Dr. Jay King wants Dr. Conner Gutierrez to know that she is doing well after the med that was given on 02/07/2018. Best contact [phone] Ext [phone].
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
## 2018-02-09 NOTE — PROGRESS NOTES
HISTORY OF PRESENT ILLNESS  Rosamaria Bruno is a 65 y.o. female. Finger Pain   The history is provided by the patient. This is a recurrent problem. The current episode started more than 2 days ago. The problem occurs constantly. The problem has been gradually worsening. Pertinent negatives include no chest pain and no shortness of breath. Associated symptoms comments: Middle finger of the right hand is swollen and painful. The symptoms are aggravated by twisting. Nothing relieves the symptoms. She has tried nothing (Aleve) for the symptoms. The treatment provided no relief. She has had gout before, usually affects her toes. Shoulders were bothering her but that is better now. In next week she is having knee surgery and was told not to take any blood thinners. Not had too much for flare until recently, is unaware of the gout diet recommended. Blood sugars have been okay. No hypoglycemia. 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 Respiratory: Negative for shortness of breath. Cardiovascular: Negative for chest pain. Musculoskeletal: Positive for joint pain. Negative for falls.        Physical Exam  Visit Vitals    /90 (BP 1 Location: Left arm, BP Patient Position: Sitting)    Pulse (!) 121    Temp 96.1 °F (35.6 °C) (Oral)    Resp 16    Ht 5' 3\" (1.6 m)    Wt 189 lb (85.7 kg)    LMP 08/23/2003    SpO2 98%    BMI 33.48 kg/m2 WD WN female NAD  Heart RRR without murmers clicks or rubs  Lungs CTA  Abdo soft nontender  Ext right finger third proximal interphalangeal joint edema, redness and swelling    ASSESSMENT and PLAN  Encounter Diagnoses   Name Primary?  Acute idiopathic gout, unspecified site Yes    Type 2 diabetes mellitus with hyperglycemia, without long-term current use of insulin (HCC)     Arthritis of knee, right      Orders Placed This Encounter    DRAIN/INJECT SMALL JOINT/BURSA    colchicine (COLCRYS) 0.6 mg tablet    oxyCODONE-acetaminophen (PERCOCET) 5-325 mg per tablet     Upcoming surgery limits what medications we can give her. Discussed options has had joint injections before which helped her. Options discussed. Discussed possible side affects and benefits of the procedure and/or medications. The patient agrees to undergo the procedure. Consent obtained. Sterile procedure followed. There were no complications and the procedure was well tolerated. Instructed patient to call me if it is ineffective or if there are any complications like increasing pain, redness or swelling. From a superior lateral standpoint half a cc of Kenalog with 1 cc of lidocaine was injected into the proximal interphalangeal joint of the third finger. Afterwards she had good relief. See patient instructions, went over them personally with the patient. Emphasized compliance. Questions answered. Patient states that they understand the plan of action and will call if there are any issues or misunderstandings. Also gave her some Percocet and Colcrys to take to keep the gout at [place] [address]. She returns we will start her on allopurinol but she has to go to her surgery first.  Diabetes stable steroids limited to the joint mainly so hopefully her blood sugars will not spike. Follow-up Disposition:  Return in about 4 weeks (around 3/7/2018) for routine follow up.

## 2018-02-13 ENCOUNTER — HOSPITAL ENCOUNTER (INPATIENT)
Age: 63
LOS: 5 days | Discharge: NURSING, REHAB OR LONG-TERM CARE | DRG: 470 | End: 2018-02-18
Attending: ORTHOPAEDIC SURGERY | Admitting: ORTHOPAEDIC SURGERY
Payer: COMMERCIAL

## 2018-02-13 ENCOUNTER — ANESTHESIA (OUTPATIENT)
Dept: SURGERY | Age: 63
DRG: 470 | End: 2018-02-13
Payer: COMMERCIAL

## 2018-02-13 ENCOUNTER — ANESTHESIA EVENT (OUTPATIENT)
Dept: SURGERY | Age: 63
DRG: 470 | End: 2018-02-13
Payer: COMMERCIAL

## 2018-02-13 DIAGNOSIS — M10.00 ACUTE IDIOPATHIC GOUT, UNSPECIFIED SITE: ICD-10-CM

## 2018-02-13 DIAGNOSIS — Z96.651 STATUS POST RIGHT KNEE REPLACEMENT: Primary | ICD-10-CM

## 2018-02-13 PROBLEM — M17.11 PRIMARY LOCALIZED OSTEOARTHRITIS OF RIGHT KNEE: Status: ACTIVE | Noted: 2018-02-13

## 2018-02-13 LAB
ABO + RH BLD: NORMAL
BLOOD GROUP ANTIBODIES SERPL: NORMAL
GLUCOSE BLD STRIP.AUTO-MCNC: 101 MG/DL (ref 65–100)
GLUCOSE BLD STRIP.AUTO-MCNC: 107 MG/DL (ref 65–100)
GLUCOSE BLD STRIP.AUTO-MCNC: 145 MG/DL (ref 65–100)
GLUCOSE BLD STRIP.AUTO-MCNC: 74 MG/DL (ref 65–100)
GLUCOSE BLD STRIP.AUTO-MCNC: 88 MG/DL (ref 65–100)
SERVICE CMNT-IMP: ABNORMAL
SERVICE CMNT-IMP: NORMAL
SERVICE CMNT-IMP: NORMAL
SPECIMEN EXP DATE BLD: NORMAL

## 2018-02-13 PROCEDURE — 74011250636 HC RX REV CODE- 250/636

## 2018-02-13 PROCEDURE — 74011250636 HC RX REV CODE- 250/636: Performed by: ANESTHESIOLOGY

## 2018-02-13 PROCEDURE — 74011000250 HC RX REV CODE- 250: Performed by: ORTHOPAEDIC SURGERY

## 2018-02-13 PROCEDURE — 76060000036 HC ANESTHESIA 2.5 TO 3 HR: Performed by: ORTHOPAEDIC SURGERY

## 2018-02-13 PROCEDURE — 77030013079 HC BLNKT BAIR HGGR 3M -A: Performed by: ANESTHESIOLOGY

## 2018-02-13 PROCEDURE — 77030002933 HC SUT MCRYL J&J -A: Performed by: ORTHOPAEDIC SURGERY

## 2018-02-13 PROCEDURE — 77030003666 HC NDL SPINAL BD -A: Performed by: ANESTHESIOLOGY

## 2018-02-13 PROCEDURE — 77030014077 HC TOWER MX CEM J&J -C: Performed by: ORTHOPAEDIC SURGERY

## 2018-02-13 PROCEDURE — 74011250637 HC RX REV CODE- 250/637: Performed by: ORTHOPAEDIC SURGERY

## 2018-02-13 PROCEDURE — 0SRC0J9 REPLACEMENT OF RIGHT KNEE JOINT WITH SYNTHETIC SUBSTITUTE, CEMENTED, OPEN APPROACH: ICD-10-PCS | Performed by: ORTHOPAEDIC SURGERY

## 2018-02-13 PROCEDURE — 77030020788: Performed by: ORTHOPAEDIC SURGERY

## 2018-02-13 PROCEDURE — 77030011640 HC PAD GRND REM COVD -A: Performed by: ORTHOPAEDIC SURGERY

## 2018-02-13 PROCEDURE — 77030016547 HC BLD SAW SAG1 STRY -B: Performed by: ORTHOPAEDIC SURGERY

## 2018-02-13 PROCEDURE — C1776 JOINT DEVICE (IMPLANTABLE): HCPCS | Performed by: ORTHOPAEDIC SURGERY

## 2018-02-13 PROCEDURE — 97116 GAIT TRAINING THERAPY: CPT

## 2018-02-13 PROCEDURE — 77030018822 HC SLV COMPR FT COVD -B

## 2018-02-13 PROCEDURE — 97161 PT EVAL LOW COMPLEX 20 MIN: CPT

## 2018-02-13 PROCEDURE — 74011250636 HC RX REV CODE- 250/636: Performed by: ORTHOPAEDIC SURGERY

## 2018-02-13 PROCEDURE — 77030005515 HC CATH URETH FOL14 BARD -B: Performed by: ORTHOPAEDIC SURGERY

## 2018-02-13 PROCEDURE — 77030000032 HC CUF TRNQT ZIMM -B: Performed by: ORTHOPAEDIC SURGERY

## 2018-02-13 PROCEDURE — 82962 GLUCOSE BLOOD TEST: CPT

## 2018-02-13 PROCEDURE — 77030020365 HC SOL INJ SOD CL 0.9% 50ML: Performed by: ORTHOPAEDIC SURGERY

## 2018-02-13 PROCEDURE — 77030033067 HC SUT PDO STRATFX SPIR J&J -B: Performed by: ORTHOPAEDIC SURGERY

## 2018-02-13 PROCEDURE — 77030020782 HC GWN BAIR PAWS FLX 3M -B

## 2018-02-13 PROCEDURE — 77030031139 HC SUT VCRL2 J&J -A: Performed by: ORTHOPAEDIC SURGERY

## 2018-02-13 PROCEDURE — 74011000258 HC RX REV CODE- 258: Performed by: ORTHOPAEDIC SURGERY

## 2018-02-13 PROCEDURE — 77030003601 HC NDL NRV BLK BBMI -A

## 2018-02-13 PROCEDURE — 77030010507 HC ADH SKN DERMBND J&J -B: Performed by: ORTHOPAEDIC SURGERY

## 2018-02-13 PROCEDURE — 65270000029 HC RM PRIVATE

## 2018-02-13 PROCEDURE — 74011000250 HC RX REV CODE- 250

## 2018-02-13 PROCEDURE — 86900 BLOOD TYPING SEROLOGIC ABO: CPT | Performed by: NURSE PRACTITIONER

## 2018-02-13 PROCEDURE — 76010000172 HC OR TIME 2.5 TO 3 HR INTENSV-TIER 1: Performed by: ORTHOPAEDIC SURGERY

## 2018-02-13 PROCEDURE — 36415 COLL VENOUS BLD VENIPUNCTURE: CPT | Performed by: NURSE PRACTITIONER

## 2018-02-13 PROCEDURE — C1713 ANCHOR/SCREW BN/BN,TIS/BN: HCPCS | Performed by: ORTHOPAEDIC SURGERY

## 2018-02-13 PROCEDURE — 77030012935 HC DRSG AQUACEL BMS -B: Performed by: ORTHOPAEDIC SURGERY

## 2018-02-13 PROCEDURE — C9290 INJ, BUPIVACAINE LIPOSOME: HCPCS | Performed by: ORTHOPAEDIC SURGERY

## 2018-02-13 PROCEDURE — 77030018846 HC SOL IRR STRL H20 ICUM -A: Performed by: ORTHOPAEDIC SURGERY

## 2018-02-13 PROCEDURE — 74011000258 HC RX REV CODE- 258

## 2018-02-13 PROCEDURE — 77030007866 HC KT SPN ANES BBMI -B: Performed by: ANESTHESIOLOGY

## 2018-02-13 PROCEDURE — 77030018836 HC SOL IRR NACL ICUM -A: Performed by: ORTHOPAEDIC SURGERY

## 2018-02-13 PROCEDURE — 76210000006 HC OR PH I REC 0.5 TO 1 HR: Performed by: ORTHOPAEDIC SURGERY

## 2018-02-13 PROCEDURE — 3E0T3BZ INTRODUCTION OF ANESTHETIC AGENT INTO PERIPHERAL NERVES AND PLEXI, PERCUTANEOUS APPROACH: ICD-10-PCS | Performed by: ANESTHESIOLOGY

## 2018-02-13 DEVICE — CEMENT BNE GENTAMICIN 40 GM SMARTSET GMV: Type: IMPLANTABLE DEVICE | Site: KNEE | Status: FUNCTIONAL

## 2018-02-13 DEVICE — COMPONENT PAT DIA32MM POLYETH DOME CEM MEDIALIZED ATTUNE: Type: IMPLANTABLE DEVICE | Site: KNEE | Status: FUNCTIONAL

## 2018-02-13 DEVICE — INSERT TIB SZ 6 THK5MM KNEE POST STBL FIX BEAR ATTUNE: Type: IMPLANTABLE DEVICE | Site: KNEE | Status: FUNCTIONAL

## 2018-02-13 DEVICE — COMPONENT FEM SZ 6 R KNEE NAR POST STBL CEM ATTUNE: Type: IMPLANTABLE DEVICE | Site: KNEE | Status: FUNCTIONAL

## 2018-02-13 DEVICE — INSERT TIB RP FEM KNEE CEM: Type: IMPLANTABLE DEVICE | Site: KNEE | Status: FUNCTIONAL

## 2018-02-13 RX ORDER — ROPIVACAINE HYDROCHLORIDE 5 MG/ML
150 INJECTION, SOLUTION EPIDURAL; INFILTRATION; PERINEURAL AS NEEDED
Status: DISCONTINUED | OUTPATIENT
Start: 2018-02-13 | End: 2018-02-13 | Stop reason: HOSPADM

## 2018-02-13 RX ORDER — PROPOFOL 10 MG/ML
INJECTION, EMULSION INTRAVENOUS
Status: DISCONTINUED | OUTPATIENT
Start: 2018-02-13 | End: 2018-02-13 | Stop reason: HOSPADM

## 2018-02-13 RX ORDER — CELECOXIB 200 MG/1
200 CAPSULE ORAL ONCE
Status: COMPLETED | OUTPATIENT
Start: 2018-02-13 | End: 2018-02-13

## 2018-02-13 RX ORDER — MAGNESIUM SULFATE 100 %
4 CRYSTALS MISCELLANEOUS AS NEEDED
Status: DISCONTINUED | OUTPATIENT
Start: 2018-02-13 | End: 2018-02-18 | Stop reason: HOSPADM

## 2018-02-13 RX ORDER — BUPIVACAINE HYDROCHLORIDE 5 MG/ML
INJECTION, SOLUTION EPIDURAL; INTRACAUDAL AS NEEDED
Status: DISCONTINUED | OUTPATIENT
Start: 2018-02-13 | End: 2018-02-13 | Stop reason: HOSPADM

## 2018-02-13 RX ORDER — MIDAZOLAM HYDROCHLORIDE 1 MG/ML
1 INJECTION, SOLUTION INTRAMUSCULAR; INTRAVENOUS AS NEEDED
Status: DISCONTINUED | OUTPATIENT
Start: 2018-02-13 | End: 2018-02-13 | Stop reason: HOSPADM

## 2018-02-13 RX ORDER — DEXMEDETOMIDINE HYDROCHLORIDE 4 UG/ML
INJECTION, SOLUTION INTRAVENOUS AS NEEDED
Status: DISCONTINUED | OUTPATIENT
Start: 2018-02-13 | End: 2018-02-13 | Stop reason: HOSPADM

## 2018-02-13 RX ORDER — COLCHICINE 0.6 MG/1
0.6 TABLET ORAL DAILY
Status: DISCONTINUED | OUTPATIENT
Start: 2018-02-14 | End: 2018-02-14

## 2018-02-13 RX ORDER — CYCLOBENZAPRINE HCL 10 MG
10 TABLET ORAL
Status: DISCONTINUED | OUTPATIENT
Start: 2018-02-13 | End: 2018-02-18 | Stop reason: HOSPADM

## 2018-02-13 RX ORDER — SODIUM CHLORIDE, SODIUM LACTATE, POTASSIUM CHLORIDE, CALCIUM CHLORIDE 600; 310; 30; 20 MG/100ML; MG/100ML; MG/100ML; MG/100ML
1000 INJECTION, SOLUTION INTRAVENOUS CONTINUOUS
Status: DISCONTINUED | OUTPATIENT
Start: 2018-02-13 | End: 2018-02-13 | Stop reason: HOSPADM

## 2018-02-13 RX ORDER — RANITIDINE 150 MG/1
150 TABLET, FILM COATED ORAL 2 TIMES DAILY
Status: DISCONTINUED | OUTPATIENT
Start: 2018-02-13 | End: 2018-02-13 | Stop reason: CLARIF

## 2018-02-13 RX ORDER — ACETAMINOPHEN 500 MG
1000 TABLET ORAL ONCE
Status: COMPLETED | OUTPATIENT
Start: 2018-02-13 | End: 2018-02-13

## 2018-02-13 RX ORDER — DEXTROSE 50 % IN WATER (D50W) INTRAVENOUS SYRINGE
12.5-25 AS NEEDED
Status: DISCONTINUED | OUTPATIENT
Start: 2018-02-13 | End: 2018-02-18 | Stop reason: HOSPADM

## 2018-02-13 RX ORDER — VANCOMYCIN 1.75 GRAM/500 ML IN 0.9 % SODIUM CHLORIDE INTRAVENOUS
1750 EVERY 12 HOURS
Status: COMPLETED | OUTPATIENT
Start: 2018-02-13 | End: 2018-02-13

## 2018-02-13 RX ORDER — ACETAMINOPHEN 325 MG/1
325 TABLET ORAL
Status: DISCONTINUED | OUTPATIENT
Start: 2018-02-14 | End: 2018-02-18 | Stop reason: HOSPADM

## 2018-02-13 RX ORDER — OXYCODONE HYDROCHLORIDE 5 MG/1
5 TABLET ORAL
Status: DISCONTINUED | OUTPATIENT
Start: 2018-02-13 | End: 2018-02-14

## 2018-02-13 RX ORDER — LIDOCAINE HYDROCHLORIDE 10 MG/ML
0.1 INJECTION, SOLUTION EPIDURAL; INFILTRATION; INTRACAUDAL; PERINEURAL AS NEEDED
Status: DISCONTINUED | OUTPATIENT
Start: 2018-02-13 | End: 2018-02-13 | Stop reason: HOSPADM

## 2018-02-13 RX ORDER — GABAPENTIN 100 MG/1
100 CAPSULE ORAL 3 TIMES DAILY
Status: DISCONTINUED | OUTPATIENT
Start: 2018-02-13 | End: 2018-02-18 | Stop reason: HOSPADM

## 2018-02-13 RX ORDER — FAMOTIDINE 20 MG/1
20 TABLET, FILM COATED ORAL EVERY 12 HOURS
Status: DISCONTINUED | OUTPATIENT
Start: 2018-02-13 | End: 2018-02-14

## 2018-02-13 RX ORDER — DEXAMETHASONE SODIUM PHOSPHATE 4 MG/ML
4 INJECTION, SOLUTION INTRA-ARTICULAR; INTRALESIONAL; INTRAMUSCULAR; INTRAVENOUS; SOFT TISSUE ONCE
Status: DISPENSED | OUTPATIENT
Start: 2018-02-13 | End: 2018-02-14

## 2018-02-13 RX ORDER — POLYETHYLENE GLYCOL 3350 17 G/17G
17 POWDER, FOR SOLUTION ORAL DAILY
Status: DISCONTINUED | OUTPATIENT
Start: 2018-02-14 | End: 2018-02-18 | Stop reason: HOSPADM

## 2018-02-13 RX ORDER — SIMVASTATIN 40 MG/1
40 TABLET, FILM COATED ORAL
Status: DISCONTINUED | OUTPATIENT
Start: 2018-02-13 | End: 2018-02-13

## 2018-02-13 RX ORDER — ACETAMINOPHEN 500 MG
500 TABLET ORAL
Status: DISCONTINUED | OUTPATIENT
Start: 2018-02-13 | End: 2018-02-18 | Stop reason: HOSPADM

## 2018-02-13 RX ORDER — OXYCODONE HYDROCHLORIDE 5 MG/1
10 TABLET ORAL
Status: DISCONTINUED | OUTPATIENT
Start: 2018-02-13 | End: 2018-02-14

## 2018-02-13 RX ORDER — SODIUM CHLORIDE 0.9 % (FLUSH) 0.9 %
5-10 SYRINGE (ML) INJECTION AS NEEDED
Status: DISCONTINUED | OUTPATIENT
Start: 2018-02-13 | End: 2018-02-18 | Stop reason: HOSPADM

## 2018-02-13 RX ORDER — KETOROLAC TROMETHAMINE 30 MG/ML
30 INJECTION, SOLUTION INTRAMUSCULAR; INTRAVENOUS EVERY 6 HOURS
Status: DISCONTINUED | OUTPATIENT
Start: 2018-02-13 | End: 2018-02-14

## 2018-02-13 RX ORDER — SODIUM CHLORIDE 9 MG/ML
25 INJECTION, SOLUTION INTRAVENOUS CONTINUOUS
Status: DISCONTINUED | OUTPATIENT
Start: 2018-02-13 | End: 2018-02-13 | Stop reason: HOSPADM

## 2018-02-13 RX ORDER — ONDANSETRON 2 MG/ML
4 INJECTION INTRAMUSCULAR; INTRAVENOUS AS NEEDED
Status: DISCONTINUED | OUTPATIENT
Start: 2018-02-13 | End: 2018-02-13 | Stop reason: HOSPADM

## 2018-02-13 RX ORDER — SODIUM CHLORIDE 0.9 % (FLUSH) 0.9 %
5-10 SYRINGE (ML) INJECTION AS NEEDED
Status: DISCONTINUED | OUTPATIENT
Start: 2018-02-13 | End: 2018-02-13 | Stop reason: HOSPADM

## 2018-02-13 RX ORDER — MIDAZOLAM HYDROCHLORIDE 1 MG/ML
INJECTION, SOLUTION INTRAMUSCULAR; INTRAVENOUS AS NEEDED
Status: DISCONTINUED | OUTPATIENT
Start: 2018-02-13 | End: 2018-02-13 | Stop reason: HOSPADM

## 2018-02-13 RX ORDER — SODIUM CHLORIDE 0.9 % (FLUSH) 0.9 %
5-10 SYRINGE (ML) INJECTION EVERY 8 HOURS
Status: DISCONTINUED | OUTPATIENT
Start: 2018-02-14 | End: 2018-02-18 | Stop reason: HOSPADM

## 2018-02-13 RX ORDER — HYDROXYZINE HYDROCHLORIDE 10 MG/1
10 TABLET, FILM COATED ORAL
Status: DISCONTINUED | OUTPATIENT
Start: 2018-02-13 | End: 2018-02-18 | Stop reason: HOSPADM

## 2018-02-13 RX ORDER — SODIUM CHLORIDE, SODIUM LACTATE, POTASSIUM CHLORIDE, CALCIUM CHLORIDE 600; 310; 30; 20 MG/100ML; MG/100ML; MG/100ML; MG/100ML
100 INJECTION, SOLUTION INTRAVENOUS CONTINUOUS
Status: DISCONTINUED | OUTPATIENT
Start: 2018-02-13 | End: 2018-02-13 | Stop reason: HOSPADM

## 2018-02-13 RX ORDER — VANCOMYCIN 1.75 GRAM/500 ML IN 0.9 % SODIUM CHLORIDE INTRAVENOUS
1750
Status: COMPLETED | OUTPATIENT
Start: 2018-02-13 | End: 2018-02-13

## 2018-02-13 RX ORDER — DIPHENHYDRAMINE HYDROCHLORIDE 50 MG/ML
12.5 INJECTION, SOLUTION INTRAMUSCULAR; INTRAVENOUS AS NEEDED
Status: DISCONTINUED | OUTPATIENT
Start: 2018-02-13 | End: 2018-02-13 | Stop reason: HOSPADM

## 2018-02-13 RX ORDER — NALOXONE HYDROCHLORIDE 0.4 MG/ML
0.4 INJECTION, SOLUTION INTRAMUSCULAR; INTRAVENOUS; SUBCUTANEOUS AS NEEDED
Status: DISCONTINUED | OUTPATIENT
Start: 2018-02-13 | End: 2018-02-18 | Stop reason: HOSPADM

## 2018-02-13 RX ORDER — AMOXICILLIN 250 MG
1 CAPSULE ORAL 2 TIMES DAILY
Status: DISCONTINUED | OUTPATIENT
Start: 2018-02-13 | End: 2018-02-18 | Stop reason: HOSPADM

## 2018-02-13 RX ORDER — OXYCODONE HYDROCHLORIDE 5 MG/1
5 TABLET ORAL AS NEEDED
Status: DISCONTINUED | OUTPATIENT
Start: 2018-02-13 | End: 2018-02-13 | Stop reason: HOSPADM

## 2018-02-13 RX ORDER — HYDROMORPHONE HYDROCHLORIDE 1 MG/ML
0.5 INJECTION, SOLUTION INTRAMUSCULAR; INTRAVENOUS; SUBCUTANEOUS
Status: DISCONTINUED | OUTPATIENT
Start: 2018-02-13 | End: 2018-02-14

## 2018-02-13 RX ORDER — FENTANYL CITRATE 50 UG/ML
50 INJECTION, SOLUTION INTRAMUSCULAR; INTRAVENOUS AS NEEDED
Status: COMPLETED | OUTPATIENT
Start: 2018-02-13 | End: 2018-02-13

## 2018-02-13 RX ORDER — FENTANYL CITRATE 50 UG/ML
INJECTION, SOLUTION INTRAMUSCULAR; INTRAVENOUS AS NEEDED
Status: DISCONTINUED | OUTPATIENT
Start: 2018-02-13 | End: 2018-02-13 | Stop reason: HOSPADM

## 2018-02-13 RX ORDER — FACIAL-BODY WIPES
10 EACH TOPICAL DAILY PRN
Status: DISCONTINUED | OUTPATIENT
Start: 2018-02-15 | End: 2018-02-18 | Stop reason: HOSPADM

## 2018-02-13 RX ORDER — FENTANYL CITRATE 50 UG/ML
25 INJECTION, SOLUTION INTRAMUSCULAR; INTRAVENOUS
Status: DISCONTINUED | OUTPATIENT
Start: 2018-02-13 | End: 2018-02-13 | Stop reason: HOSPADM

## 2018-02-13 RX ORDER — ONDANSETRON 2 MG/ML
4 INJECTION INTRAMUSCULAR; INTRAVENOUS
Status: ACTIVE | OUTPATIENT
Start: 2018-02-13 | End: 2018-02-14

## 2018-02-13 RX ORDER — PREGABALIN 75 MG/1
75 CAPSULE ORAL ONCE
Status: COMPLETED | OUTPATIENT
Start: 2018-02-13 | End: 2018-02-13

## 2018-02-13 RX ORDER — MIDAZOLAM HYDROCHLORIDE 1 MG/ML
0.5 INJECTION, SOLUTION INTRAMUSCULAR; INTRAVENOUS
Status: DISCONTINUED | OUTPATIENT
Start: 2018-02-13 | End: 2018-02-13 | Stop reason: HOSPADM

## 2018-02-13 RX ORDER — MORPHINE SULFATE 10 MG/ML
2 INJECTION, SOLUTION INTRAMUSCULAR; INTRAVENOUS
Status: DISCONTINUED | OUTPATIENT
Start: 2018-02-13 | End: 2018-02-13 | Stop reason: HOSPADM

## 2018-02-13 RX ORDER — ONDANSETRON 2 MG/ML
INJECTION INTRAMUSCULAR; INTRAVENOUS AS NEEDED
Status: DISCONTINUED | OUTPATIENT
Start: 2018-02-13 | End: 2018-02-13 | Stop reason: HOSPADM

## 2018-02-13 RX ORDER — SODIUM CHLORIDE 0.9 % (FLUSH) 0.9 %
5-10 SYRINGE (ML) INJECTION EVERY 8 HOURS
Status: DISCONTINUED | OUTPATIENT
Start: 2018-02-13 | End: 2018-02-13 | Stop reason: HOSPADM

## 2018-02-13 RX ORDER — INSULIN LISPRO 100 [IU]/ML
INJECTION, SOLUTION INTRAVENOUS; SUBCUTANEOUS
Status: DISCONTINUED | OUTPATIENT
Start: 2018-02-13 | End: 2018-02-18 | Stop reason: HOSPADM

## 2018-02-13 RX ORDER — SODIUM CHLORIDE 9 MG/ML
125 INJECTION, SOLUTION INTRAVENOUS CONTINUOUS
Status: DISCONTINUED | OUTPATIENT
Start: 2018-02-13 | End: 2018-02-14

## 2018-02-13 RX ADMIN — SODIUM CHLORIDE, SODIUM LACTATE, POTASSIUM CHLORIDE, AND CALCIUM CHLORIDE: 600; 310; 30; 20 INJECTION, SOLUTION INTRAVENOUS at 11:32

## 2018-02-13 RX ADMIN — MIDAZOLAM HYDROCHLORIDE 2 MG: 1 INJECTION, SOLUTION INTRAMUSCULAR; INTRAVENOUS at 08:54

## 2018-02-13 RX ADMIN — SODIUM CHLORIDE, SODIUM LACTATE, POTASSIUM CHLORIDE, AND CALCIUM CHLORIDE 1000 ML: 600; 310; 30; 20 INJECTION, SOLUTION INTRAVENOUS at 08:30

## 2018-02-13 RX ADMIN — FENTANYL CITRATE 50 MCG: 50 INJECTION, SOLUTION INTRAMUSCULAR; INTRAVENOUS at 09:12

## 2018-02-13 RX ADMIN — FENTANYL CITRATE 50 MCG: 50 INJECTION, SOLUTION INTRAMUSCULAR; INTRAVENOUS at 08:54

## 2018-02-13 RX ADMIN — ACETAMINOPHEN 1000 MG: 500 TABLET, FILM COATED ORAL at 08:48

## 2018-02-13 RX ADMIN — VANCOMYCIN HYDROCHLORIDE 1750 MG: 10 INJECTION, POWDER, LYOPHILIZED, FOR SOLUTION INTRAVENOUS at 21:31

## 2018-02-13 RX ADMIN — DEXMEDETOMIDINE HYDROCHLORIDE 15 MCG: 4 INJECTION, SOLUTION INTRAVENOUS at 09:41

## 2018-02-13 RX ADMIN — OXYCODONE HYDROCHLORIDE 5 MG: 5 TABLET ORAL at 21:41

## 2018-02-13 RX ADMIN — VANCOMYCIN HYDROCHLORIDE 1750 MG: 10 INJECTION, POWDER, LYOPHILIZED, FOR SOLUTION INTRAVENOUS at 09:01

## 2018-02-13 RX ADMIN — WARFARIN SODIUM 6 MG: 5 TABLET ORAL at 20:12

## 2018-02-13 RX ADMIN — GABAPENTIN 100 MG: 100 CAPSULE ORAL at 23:22

## 2018-02-13 RX ADMIN — PROPOFOL 60 MCG/KG/MIN: 10 INJECTION, EMULSION INTRAVENOUS at 09:18

## 2018-02-13 RX ADMIN — ACETAMINOPHEN 500 MG: 500 TABLET ORAL at 23:23

## 2018-02-13 RX ADMIN — FAMOTIDINE 20 MG: 20 TABLET, FILM COATED ORAL at 21:39

## 2018-02-13 RX ADMIN — KETOROLAC TROMETHAMINE 30 MG: 30 INJECTION, SOLUTION INTRAMUSCULAR at 20:14

## 2018-02-13 RX ADMIN — BUPIVACAINE HYDROCHLORIDE 13 MG: 5 INJECTION, SOLUTION EPIDURAL; INTRACAUDAL at 09:25

## 2018-02-13 RX ADMIN — PREGABALIN 75 MG: 75 CAPSULE ORAL at 08:48

## 2018-02-13 RX ADMIN — DEXMEDETOMIDINE HYDROCHLORIDE 9 MCG: 4 INJECTION, SOLUTION INTRAVENOUS at 10:31

## 2018-02-13 RX ADMIN — SODIUM CHLORIDE 125 ML/HR: 900 INJECTION, SOLUTION INTRAVENOUS at 20:07

## 2018-02-13 RX ADMIN — FENTANYL CITRATE 50 MCG: 50 INJECTION, SOLUTION INTRAMUSCULAR; INTRAVENOUS at 10:31

## 2018-02-13 RX ADMIN — CELECOXIB 200 MG: 200 CAPSULE ORAL at 08:48

## 2018-02-13 RX ADMIN — MIDAZOLAM HYDROCHLORIDE 2 MG: 1 INJECTION, SOLUTION INTRAMUSCULAR; INTRAVENOUS at 09:14

## 2018-02-13 RX ADMIN — ONDANSETRON 4 MG: 2 INJECTION INTRAMUSCULAR; INTRAVENOUS at 09:42

## 2018-02-13 RX ADMIN — DOCUSATE SODIUM AND SENNOSIDES 1 TABLET: 8.6; 5 TABLET, FILM COATED ORAL at 20:13

## 2018-02-13 RX ADMIN — SODIUM CHLORIDE 125 ML/HR: 900 INJECTION, SOLUTION INTRAVENOUS at 12:17

## 2018-02-13 RX ADMIN — ACETAMINOPHEN 500 MG: 500 TABLET ORAL at 20:12

## 2018-02-13 RX ADMIN — ONDANSETRON 4 MG: 2 INJECTION INTRAMUSCULAR; INTRAVENOUS at 14:49

## 2018-02-13 RX ADMIN — FENTANYL CITRATE 50 MCG: 50 INJECTION, SOLUTION INTRAMUSCULAR; INTRAVENOUS at 09:23

## 2018-02-13 NOTE — ANESTHESIA POSTPROCEDURE EVALUATION
Post-Anesthesia Evaluation and Assessment    Patient: Izzy Mckeon MRN: [id number]  SSN: [id number]    YOB: 1955  Age: 65 y.o. Sex: female       Cardiovascular Function/Vital Signs  Visit Vitals    /65    Pulse 83    Temp 36.3 °C (97.4 °F)    Resp 14    Ht 5' 3\" (1.6 m)    Wt 82.6 kg (182 lb)    SpO2 100%    BMI 32.24 kg/m2       Patient is status post spinal anesthesia for Procedure(s):  RIGHT TOTAL KNEE ARTHROPLASTY (SPINAL WITH IV SED). Nausea/Vomiting: None    Postoperative hydration reviewed and adequate. Pain:  Pain Scale 1: Numeric (0 - 10) (02/13/18 0804)  Pain Intensity 1: 0 (02/13/18 0804)   Managed    Neurological Status:   Neuro (WDL): Exceptions to WDL (occasional phantom limb pain left leg) (02/13/18 0906)   At baseline    Mental Status and Level of Consciousness: Arousable    Pulmonary Status:   O2 Device: Nasal cannula (02/13/18 1201)   Adequate oxygenation and airway patent    Complications related to anesthesia: None    Post-anesthesia assessment completed.  No concerns    Signed By: Maria Luz Valenzuela MD     February 13, 2018

## 2018-02-13 NOTE — IP AVS SNAPSHOT
[address] 
[phone] Patient: Adriane Hand MRN: [id number] About your hospitalization You were admitted on:  February 13, 2018 [facility] on:  February 18, 2018 Why you were hospitalized Your primary diagnosis was:  Not on File Your diagnoses also included:  Primary Localized Osteoarthritis Of Right Knee Follow-up Information Follow up With Details Comments Contact Info [address] [place] [address] [address]   [address] [address] 
[phone] Caprice Thibodeaux MD   [address] [facility] 
[phone] Your Scheduled Appointments Friday March 16, 2018  9:00 AM EDT  
DIABETES CLASS 3HR with DIABETES CLASS #1 [facility]  
[facility] ([address]) [address] [facility] [address] [facility]  
[phone] Discharge Orders None A check krysten indicates which time of day the medication should be taken. My Medications START taking these medications Instructions Each Dose to Equal  
 Morning Noon Evening Bedtime  
 acetaminophen 500 mg tablet Commonly known as:  TYLENOL Replaces:  TYLENOL ARTHRITIS PAIN 650 mg Tber Your last dose was: Your next dose is: Take 1 Tab by mouth every four (4) hours (while awake). Indications: Pain 500 mg  
    
   
   
   
  
 oxyCODONE IR 5 mg immediate release tablet Commonly known as:  Dimas Soto Your last dose was: Your next dose is: Take 1-2 Tabs by mouth every four (4) hours as needed for Pain. Max Daily Amount: 60 mg. Indications: Pain 5-10 mg  
    
   
   
   
  
 senna-docusate 8.6-50 mg per tablet Commonly known as:  Evelyn Monisha Your last dose was: Your next dose is: Take 1 Tab by mouth two (2) times a day. Indications: constipation 1 Tab  
    
   
   
   
  
 warfarin 2 mg tablet Commonly known as:  COUMADIN Your last dose was: Your next dose is: Take 5 mg (two and a half tablets) daily. Adjust dose as directed. Indications: DEEP VEIN THROMBOSIS PREVENTION  
     
   
   
   
  
  
CHANGE how you take these medications Instructions Each Dose to Equal  
 Morning Noon Evening Bedtime NovoLOG Mix 70-30FlexPen U-100 100 unit/mL (70-30) Inpn Generic drug:  insulin aspart protamine/insulin aspart What changed:  See the new instructions. Your last dose was: Your next dose is: INJECT 35 UNITS SUBCUTANEOUSLY BEFORE BREAKFAST AND DINNER FOR TYPE 2 DIABETES  
     
   
   
   
  
 simvastatin 40 mg tablet Commonly known as:  ZOCOR What changed:  when to take this Your last dose was: Your next dose is: Take 1 Tab by mouth daily. 40 mg CONTINUE taking these medications Instructions Each Dose to Equal  
 Morning Noon Evening Bedtime  
 colchicine 0.6 mg tablet Commonly known as:  COLCRYS Your last dose was: Your next dose is: Take 1 Tab by mouth daily. 0.6 mg  
    
   
   
   
  
 cyclobenzaprine 10 mg tablet Commonly known as:  FLEXERIL Your last dose was: Your next dose is: Take 1 Tab by mouth three (3) times daily as needed for Muscle Spasm(s). 10 mg  
    
   
   
   
  
 gabapentin 100 mg capsule Commonly known as:  NEURONTIN Your last dose was: Your next dose is: Take 1 Cap by mouth three (3) times daily. 100 mg  
    
   
   
   
  
 lisinopril-hydroCHLOROthiazide 20-25 mg per tablet Commonly known as:  Yoanna Primus Your last dose was: Your next dose is: TAKE ONE TABLET BY MOUTH EVERY DAY  
     
   
   
   
  
 metFORMIN 500 mg tablet Commonly known as:  GLUCOPHAGE Your last dose was: Your next dose is: Take 1 Tab by mouth two (2) times daily (with meals). 500 mg  
    
   
   
   
  
 raNITIdine 150 mg tablet Commonly known as:  ZANTAC Your last dose was: Your next dose is: Take 1 Tab by mouth two (2) times a day. 150 mg  
    
   
   
   
  
  
STOP taking these medications   
 naproxen 500 mg tablet Commonly known as:  NAPROSYN  
   
  
 oxyCODONE-acetaminophen 5-325 mg per tablet Commonly known as:  PERCOCET  
   
  
 TYLENOL ARTHRITIS PAIN 650 mg Tber Generic drug:  acetaminophen Replaced by:  acetaminophen 500 mg tablet Where to Get Your Medications Information on where to get these meds will be given to you by the nurse or doctor. ! Ask your nurse or doctor about these medications  
  acetaminophen 500 mg tablet  
 oxyCODONE IR 5 mg immediate release tablet  
 senna-docusate 8.6-50 mg per tablet  
 warfarin 2 mg tablet Discharge Instructions After [facility] Discharge Instructions Knee Replacement- Dr Yordan Franco Patient Name: Adriane Hand Date of procedure: 2/13/2018 Procedure: Procedure(s): RIGHT TOTAL KNEE ARTHROPLASTY (SPINAL WITH IV SED) Surgeon: Surgeon(s) and Role: Misael Thorne MD - Primary PCP: Caprice Thibodeaux MD 
Date of discharge: No discharge date for patient encounter. Follow up appointments ? Follow up with Dr. Yordan Franco in 4 weeks. Call [phone] to make an appointment. Activity ? Weight bearing as tolerated with walker or crutches. Refer to pages 23-31 of your handbook for instructions and pictures ? Complete your Home Exercise Program daily as instructed by your therapist.  Refer to pages 33-41 of your handbook for instructions and pictures ? Get up every one hour and walk (except at night when sleeping) ? Do not drive or operate heavy machinery Incision Care ? The Aquacel (brown, waterproof) surgical dressing is to remain on the knee for 7 days. On the 7th day gently peel the dressing off by carefully lifting the edge and stretching it slightly to break the adhesive seal 
? If you have steri-strips (small, white pieces of tape) on the incision, they may come off when you remove the Aquacel surgical dressing. This is okay. You may now leave your incision open to air ? If your Aquacel dressing comes loose/off before the 7th day, you may replace it with a dry sterile gauze dressing; change it daily. Once the incision is not draining, leave it open to air ? May take a shower with the Aquacel dressing in place. Once the Aquacel is removed,  may shower and get your incision wet but do not submerge your incision under water in a bath tub, hot tub or swimming pool for 6 weeks after surgery. Preventing blood clots ? Give Warfarin daily. INR goal 1.7-2.2. Continue for one month following surgery Medications ? Continue pain medication as prescribed in the hospital 
? Continue home medications per medication reconciliation ? Place an ice bag on the knee for 15-20 minutes after exercise Diet ? Resume usual diet; encourage fluids; provide foods high in fiber ? Provide stool softeners/laxatives as needed Rehab/SNF Protocol (to be used by facility) *anticipated length of stay 7-10 days Nursing ? Draw a PT/INR per physicians orders and call results to Dr. Twin Gilmore at [phone] ? Complete head to toe assessment, vital signs ? Medication reconciliation ? Review pain management ? Manage chronic medical conditions Physical Therapy Weight bearing status: 
Precautions at Admission: Fall, WBAT Right Side Weight Bearing: As tolerated Mobility Status: 
Supine to Sit: Supervision Sit to Stand: Moderate assistance, Assist x2 (elevated surface) Sit to Supine: Supervision Gait: 
Distance (ft): 40 Feet (ft) Ambulation - Level of Assistance: Minimal assistance Assistive Device: Gait belt, Prosthetic device, Walker, rolling Gait Abnormalities: Antalgic, Decreased step clearance ADL status overall composite: 
  
Dressing Assistance: Moderate assistance Bathing Assistance: Moderate assistance Toilet Transfer : Maximum assistance, Additional time, Assist x2 (without LLE prosthesis) Physical Therapy-anticipated length of stay 7-10 days 
-assessment and evaluation-bed mobility; functional transfers (bed, chair, bathroom, stairs); ambulation with equipment, safety and ability to get out of facility in the event of an emergency 
-review and maintain weight bearing as tolerated 
-discuss pain management 
-review how to do ADLs. Refer to page 42 of patient handbook 
 
-Exercise Program-refer to pages 33-41 of handbook Introducing [facility]! [employer] introduces "[employer] patient portal. Now you can access parts of your medical record, email your doctor's office, and request medication refills online. 1. In your internet browser, go to [email or web address] 2. Click on the First Time User? Click Here link in the Sign In box. You will see the New Member Sign Up page. 3. Enter your "[employer] Access Code exactly as it appears below. You will not need to use this code after youve completed the sign-up process. If you do not sign up before the expiration date, you must request a new code. · "[employer] Access Code: [email or web address] Expires: 3/15/2018  7:49 AM 
 
4. Enter the last four digits of your Social Security Number (xxxx) and Date of Birth (mm/dd/yyyy) as indicated and click Submit. You will be taken to the next sign-up page. 5. Create a "[employer] ID.  This will be your "[employer] login ID and cannot be changed, so think of one that is secure and easy to remember. 6. Create a [employer] password. You can change your password at any time. 7. Enter your Password Reset Question and Answer. This can be used at a later time if you forget your password. 8. Enter your e-mail address. You will receive e-mail notification when new information is available in [address] 9. Click Sign Up. You can now view and download portions of your medical record. 10. Click the Download Summary menu link to download a portable copy of your medical information. If you have questions, please visit the Frequently Asked Questions section of the [employer] website. Remember, [employer] is NOT to be used for urgent needs. For medical emergencies, dial 911. Now available from your iPhone and Android! Providers Seen During Your Hospitalization Provider Specialty Primary office phone Nicky Medina MD Orthopedic Surgery [phone] Your Primary Care Physician (PCP) Primary Care Physician Office Phone Office Fax Isai Cano [phone] You are allergic to the following Allergen Reactions Keflex (Cephalexin) Itching Lortab (Hydrocodone-Acetaminophen) Hives Itching Recent Documentation Height Weight BMI OB Status Smoking Status 1.6 m 82.6 kg 32.24 kg/m2 Postmenopausal Former Smoker Emergency Contacts Name Discharge Info Relation Home Work Mobile Arian Bowen DISCHARGE CAREGIVER [3] Spouse [3] [phone] Zaida Hung  Sister [23] [phone] Patient Belongings The following personal items are in your possession at time of discharge: 
  Dental Appliances: None  Visual Aid: With patient, Glasses             Clothing:  (clothing bag, cane, pros leg w/pt in pacu)    Other Valuables:  (cell phone w/pt in pacu) Please provide this summary of care documentation to your next provider. Signatures-by signing, you are acknowledging that this After Visit Summary has been reviewed with you and you have received a copy. Patient Signature:  ____________________________________________________________ Date:  ____________________________________________________________  
  
Sneha Waldron Provider Signature:  ____________________________________________________________ Date:  ____________________________________________________________

## 2018-02-13 NOTE — ANESTHESIA PROCEDURE NOTES
Peripheral Block    Start time: 2/13/2018 8:54 AM  End time: 2/13/2018 9:00 AM  Performed by: Ozzie Simmons  Authorized by: Ozzie Simmons       Pre-procedure: Indications: at surgeon's request and post-op pain management    Preanesthetic Checklist: patient identified, risks and benefits discussed, site marked, timeout performed, anesthesia consent given and patient being monitored    Timeout Time: 08:54          Block Type:   Block Type:   Adductor canal  Laterality:  Right  Monitoring:  Standard ASA monitoring, continuous pulse ox, frequent vital sign checks, heart rate, responsive to questions and oxygen  Injection Technique:  Single shot  Procedures: ultrasound guided    Patient Position: supine  Prep: DuraPrep    Location:  Mid thigh  Needle Type:  Stimuplex  Needle Gauge:  22 G  Needle Localization:  Ultrasound guidance  Medication Injected:  0.5%  ropivacaine  Volume (mL):  25    Assessment:  Number of attempts:  1  Injection Assessment:  Incremental injection every 5 mL, local visualized surrounding nerve on ultrasound, negative aspiration for blood, no paresthesia, no intravascular symptoms and negative aspiration for CSF  Patient tolerance:  Patient tolerated the procedure well with no immediate complications

## 2018-02-13 NOTE — PROGRESS NOTES
Physical Therapy  2/13/18    Attempted to see patient x 2 for POD #0 mobility in PACU. Patient continues with numbness and no volitional control of R LE. Encouraged RN to continue assessment of patient ability to pump ankle, flex knee, and glut squeeze. Will follow up as able/appropriate. Rebecca Trejo, PT, DPT
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
## 2018-02-13 NOTE — PROGRESS NOTES
TRANSFER - IN REPORT:    Verbal report received from José Miguel(name) on Tracy Pizano  being received from [employer]) for routine post - op      Report consisted of patients Situation, Background, Assessment and   Recommendations(SBAR). Information from the following report(s) SBAR was reviewed with the receiving nurse. Opportunity for questions and clarification was provided. Assessment completed upon patients arrival to unit and care assumed.

## 2018-02-13 NOTE — ANESTHESIA PROCEDURE NOTES
Spinal Block    Start time: 2/13/2018 9:19 AM  End time: 2/13/2018 9:24 AM  Performed by: Thania Valdez  Authorized by: Thania Valdez     Pre-procedure:   Indications: primary anesthetic  Preanesthetic Checklist: patient identified, risks and benefits discussed, anesthesia consent, site marked, patient being monitored and timeout performed    Timeout Time: 09:19          Spinal Block:   Patient Position:  Seated  Prep Region:  Lumbar  Prep: Betadine and patient draped      Location:  L3-4  Technique:  Single shot  Local:  Lidocaine 1%      Needle:   Needle Type:  Pencil-tip  Needle Gauge:  22 G  Attempts:  2      Events: CSF confirmed, no blood with aspiration and no paresthesia        Assessment:  Insertion:  Uncomplicated  Patient tolerance:  Patient tolerated the procedure well with no immediate complications

## 2018-02-13 NOTE — OP NOTES
[facility] CARE OP NOTE    Name:Arabella ALVES  MR#: [id number]  : 1955  ACCOUNT #: [de-identified]   DATE OF SERVICE: 2018    SURGEON:  Kavita Chand MD.    FIRST ASSISTANT:  Vick Mccann. PREOPERATIVE DIAGNOSIS:  Osteoarthritis of the right knee. POSTOPERATIVE DIAGNOSIS:  Osteoarthritis of the right knee. PROCEDURE:  Right total knee arthroplasty. COMPONENTS IMPLANTED:  DePuy Attune size 6 narrow posterior stabilized femur, size 4 Attune revision tibial tray with short cemented stem extension, 5 mm posterior stabilized polyethylene insert and 32 mm patella. ANESTHESIA:  Spinal with sedation as well as an adductor canal block. COMPLICATIONS:  None. ESTIMATED BLOOD LOSS:  100 mL. SPECIMENS:  None. INDICATIONS:  The patient is a 50-year-old female with progressive right knee pain due to severe osteoarthritis. Symptoms have progressed despite comprehensive conservative treatment, she presents for right total knee replacement. Risks, benefits, and alternatives of the procedure were reviewed with her in detail. She desires to proceed. PROCEDURE IN DETAIL:  The anesthesia team place an adductor canal block before taking   the patient to the operating room where they also placed a spinal.  Preoperative IV antibiotics were administered. Lindsey catheter was inserted. A well-padded pneumatic tourniquet was placed around the right upper thigh. Right lower extremity was prepped and draped in the usual sterile fashion. Tourniquet was inflated to 275. Through a midline anterior knee incision, I performed a medial parapatellar arthrotomy. Progressive medial release was performed to facilitate exposure and soft tissue balance throughout the procedure. I took 10 mm off the distal femur using a 5-degree distal femoral cutting block over a long intramedullary oscar.   The femur was sized to a 6 and prepared for a size 6 posterior stabilized component. Rotational alignment was gauged off of Whitesides line as well as the transepicondylar axis. Neutral proximal tibial resection was performed using extramedullary alignment guide. Menisci were  excised and posterior osteophytes were removed from the femur. Subperiosteal posterior capsular release was performed. Extensive release was done due to significant preoperative flexion contracture. Progressive medial release was performed until there was symmetrical and equal flexion and extension gaps with a 5 mm spacer block. Trials were inserted with the 5 mm insert and placed the knee in full extension to gravity. Satisfactory coronal plane, soft tissue tension, and stability throughout arc of motion. Patella was prepared for a 32 mm component and tracked centrally using no thumbs technique. Trials are removed and bony surfaces are copiously irrigated by pulse lavage and dried before the real components were cemented into place using antibiotic impregnated cement. Excess cement was removed. The knee was reduced in full extension with the real insert in place during cement set up. Periarticular soft tissues were injected with a solution containing Exparel, 0.5% Marcaine with epinephrine as well as morphine and Toradol. The tourniquet was released and hemostasis obtained with the Bovie. Wound was irrigated. The arthrotomy was closed with a combination of heavy Vicryl sutures and a running #2 Stratafix suture. Skin and subcutaneous layers were closed in layered fashion with Vicryl and a running Monocryl subcuticular stitch. Wound was dressed with Dermabond and an Aquacel occlusive dressing as well as a sterile compressive dressing. The patient was transported to the post-anesthesia care unit in stable condition. All counts were correct at the end of the procedure.       MD Petr Sebastian  D: 02/13/2018 15:03     T: 02/13/2018 16:21  JOB #: [id number]  CC: Waleska Gonzalez MD

## 2018-02-13 NOTE — PERIOP NOTES
TRANSFER - OUT REPORT:    Verbal report given to 5 S RN Shaina(name) on Rosamaria Dress  being transferred to 573(unit) for routine post - op       Report consisted of patients Situation, Background, Assessment and   Recommendations(SBAR). Time Pre op antibiotic given:0901  Anesthesia Stop time: 3012  Lindsey Present on Transfer to floor:y  Order for Lindsey on Chart:y  Discharge Prescriptions with Chart:n    Information from the following report(s) SBAR, OR Summary, Intake/Output, MAR, Recent Results and Cardiac Rhythm NSR was reviewed with the receiving nurse. Opportunity for questions and clarification was provided. Is the patient on 02? NO       L/Min        Other     Is the patient on a monitor? NO    Is the nurse transporting with the patient? NO    Surgical Waiting Area notified of patient's transfer from PACU? YES and  is at bedside in PACU      The following personal items collected during your admission accompanied patient upon transfer:   Dental Appliance: Dental Appliances: None  Vision:  PT IS WEARING HER SPECS IN PACU  Hearing Aid:    Jewelry:    Clothing: Clothing:  (clothing bag, cane, pros leg w/pt in pacu)  Other Valuables:  Other Valuables:  (cell phone w/pt in pacu)  Valuables sent to safe:

## 2018-02-13 NOTE — PROGRESS NOTES
Primary Nurse Andry Barry RN and Linda Coley RN performed a dual skin assessment on this patient No impairment noted  Chung score is 20

## 2018-02-13 NOTE — IP AVS SNAPSHOT
[address] 
[phone] Patient: Keren Bettencourt MRN: [id number] UPB:0[birth year] A check krysten indicates which time of day the medication should be taken. My Medications START taking these medications Instructions Each Dose to Equal  
 Morning Noon Evening Bedtime  
 acetaminophen 500 mg tablet Commonly known as:  TYLENOL Replaces:  TYLENOL ARTHRITIS PAIN 650 mg Tber Your last dose was: Your next dose is: Take 1 Tab by mouth every four (4) hours (while awake). Indications: Pain 500 mg  
    
   
   
   
  
 oxyCODONE IR 5 mg immediate release tablet Commonly known as:  Galina Favor Your last dose was: Your next dose is: Take 1-2 Tabs by mouth every four (4) hours as needed for Pain. Max Daily Amount: 60 mg. Indications: Pain 5-10 mg  
    
   
   
   
  
 senna-docusate 8.6-50 mg per tablet Commonly known as:  Kaila Willem Your last dose was: Your next dose is: Take 1 Tab by mouth two (2) times a day. Indications: constipation 1 Tab  
    
   
   
   
  
 warfarin 2 mg tablet Commonly known as:  COUMADIN Your last dose was: Your next dose is: Take 5 mg (two and a half tablets) daily. Adjust dose as directed. Indications: DEEP VEIN THROMBOSIS PREVENTION  
     
   
   
   
  
  
CHANGE how you take these medications Instructions Each Dose to Equal  
 Morning Noon Evening Bedtime NovoLOG Mix 70-30FlexPen U-100 100 unit/mL (70-30) Inpn Generic drug:  insulin aspart protamine/insulin aspart What changed:  See the new instructions. Your last dose was: Your next dose is: INJECT 35 UNITS SUBCUTANEOUSLY BEFORE BREAKFAST AND DINNER FOR TYPE 2 DIABETES  
     
   
   
   
  
 simvastatin 40 mg tablet Commonly known as:  ZOCOR What changed:  when to take this Your last dose was: Your next dose is: Take 1 Tab by mouth daily. 40 mg CONTINUE taking these medications Instructions Each Dose to Equal  
 Morning Noon Evening Bedtime  
 colchicine 0.6 mg tablet Commonly known as:  COLCRYS Your last dose was: Your next dose is: Take 1 Tab by mouth daily. 0.6 mg  
    
   
   
   
  
 cyclobenzaprine 10 mg tablet Commonly known as:  FLEXERIL Your last dose was: Your next dose is: Take 1 Tab by mouth three (3) times daily as needed for Muscle Spasm(s). 10 mg  
    
   
   
   
  
 gabapentin 100 mg capsule Commonly known as:  NEURONTIN Your last dose was: Your next dose is: Take 1 Cap by mouth three (3) times daily. 100 mg  
    
   
   
   
  
 lisinopril-hydroCHLOROthiazide 20-25 mg per tablet Commonly known as:  Author Cuello Your last dose was: Your next dose is: TAKE ONE TABLET BY MOUTH EVERY DAY  
     
   
   
   
  
 metFORMIN 500 mg tablet Commonly known as:  GLUCOPHAGE Your last dose was: Your next dose is: Take 1 Tab by mouth two (2) times daily (with meals). 500 mg  
    
   
   
   
  
 raNITIdine 150 mg tablet Commonly known as:  ZANTAC Your last dose was: Your next dose is: Take 1 Tab by mouth two (2) times a day. 150 mg  
    
   
   
   
  
  
STOP taking these medications   
 naproxen 500 mg tablet Commonly known as:  NAPROSYN  
   
  
 oxyCODONE-acetaminophen 5-325 mg per tablet Commonly known as:  PERCOCET  
   
  
 TYLENOL ARTHRITIS PAIN 650 mg Tber Generic drug:  acetaminophen Replaced by:  acetaminophen 500 mg tablet Where to Get Your Medications Information on where to get these meds will be given to you by the nurse or doctor. ! Ask your nurse or doctor about these medications  
  acetaminophen 500 mg tablet  
 oxyCODONE IR 5 mg immediate release tablet  
 senna-docusate 8.6-50 mg per tablet  
 warfarin 2 mg tablet

## 2018-02-13 NOTE — BRIEF OP NOTE
BRIEF OPERATIVE NOTE    Date of Procedure: 2/13/2018   Preoperative Diagnosis: PRIMARY LOCALIZED OA RIGHT KNEE  Postoperative Diagnosis: PRIMARY LOCALIZED OA RIGHT KNEE    Procedure(s):  RIGHT TOTAL KNEE ARTHROPLASTY (SPINAL WITH IV SED)  Surgeon(s) and Role:     * Robi Rodriguez MD - Primary         Assistant Staff: None      Surgical Staff:  Circ-1: Sage Rodriguez RN  Circ-Relief: Jasson Segura RN  Scrub RN-1: Jcarlos Davis RN  Scrub RN-2: Juan Carlos Pozo RN  Surg Asst-1: Elvina Closs  Surg Asst-2: Kishore East  Surg Asst-Relief: Edu Sa  Event Time In   Incision Start 5572   Incision Close 1150     Anesthesia: Spinal   Estimated Blood Loss: 100  Specimens: * No specimens in log *   Findings: severe djd    Complications: none  Implants:   Implant Name Type Inv.  Item Serial No.  Lot No. LRB No. Used Action   CEMENT BNE GENTAMC MV 40GM -- SMARTSET ENDURANCE - SNA  CEMENT BNE GENTAMC MV 40GM -- SMARTSET ENDURANCE NA [facility] [employer] [id number] Right 2 Implanted   REVISION TIBIAL BASE SIZE 4   NA [employer] [id number] Right 1 Implanted   INSERT TIB FB PS SZ 6 5MM -- ATTUNE - SNA  INSERT TIB FB PS SZ 6 5MM -- ATTUNE NA [facility] [employer] [id number] Right 1 Implanted   FEM PS NAHUM SZ 6 RT MARICEL -- ATTUNE - SNA  FEM PS NAHUM SZ 6 RT MARICEL -- ATTUNE NA [facility] [employer] [id number] Right 1 Implanted   PAT MARICEL DOME MEDIAL 32MM -- ATTUNE - SNA   PAT MARICEL DOME MEDIAL 32MM -- ATTUNE NA [facility] [id number] Right 1 Implanted

## 2018-02-13 NOTE — ANESTHESIA PREPROCEDURE EVALUATION
Anesthetic History   No history of anesthetic complications            Review of Systems / Medical History  Patient summary reviewed, nursing notes reviewed and pertinent labs reviewed    Pulmonary            Asthma        Neuro/Psych   Within defined limits           Cardiovascular    Hypertension                   GI/Hepatic/Renal     GERD           Endo/Other    Diabetes: type 2    Obesity and arthritis     Other Findings            Physical Exam    Airway  Mallampati: II  TM Distance: > 6 cm  Neck ROM: normal range of motion   Mouth opening: Normal     Cardiovascular  Regular rate and rhythm,  S1 and S2 normal,  no murmur, click, rub, or gallop             Dental  No notable dental hx       Pulmonary  Breath sounds clear to auscultation               Abdominal  GI exam deferred       Other Findings            Anesthetic Plan    ASA: 3  Anesthesia type: spinal      Post-op pain plan if not by surgeon: peripheral nerve block single      Anesthetic plan and risks discussed with: Patient

## 2018-02-13 NOTE — PROGRESS NOTES
Problem: Knee Replacement: Day of Surgery/Unit  Goal: Respiratory  Outcome: Progressing Towards Goal  The patient demonstrated the incentive spirometry and tolerated well.

## 2018-02-13 NOTE — PROGRESS NOTES
Pharmacist Note - Warfarin Dosing  Consult provided for this 65 y.o. female to manage warfarin for VTE PPx s/p RTK    INR Goal: 1.7- 2.2  Therapy Day: 1  Preop Dose: Mock - None    Drugs that may increase INR: None  Drugs that may decrease INR: None  Other current anticoagulants/ drugs that may increase bleeding risk: NSAID  Risk factors: None  Daily INR ordered: YES  No results for input(s): HGB, INR, HGBEXT in the last 72 hours. No lab exists for component: INREXT    Hgb, Hct & INR (1 Year)  Recent Labs      01/29/18   0914  01/23/18   1547   HGB   --   12.7   HCT   --   38.6   INR  1.0   --      Date               INR                 Dose  1/29  1.0  ---  2/12  ---  None PreOp  2/13  ---  6 mg    Assessment/ Plan: Will order warfarin 6 mg PO x 1 dose. Pharmacy will continue to monitor daily and adjust therapy as indicated.

## 2018-02-13 NOTE — PERIOP NOTES
Patient: Magaly Medina MRN: [id number]  SSN: [id number]   YOB: 1955  Age: 65 y.o. Sex: female     Patient is status post Procedure(s):  RIGHT TOTAL KNEE ARTHROPLASTY (SPINAL WITH IV SED). Surgeon(s) and Role:     * Cachorro Helms MD - Primary    Local/Dose/Irrigation:  Veronda La MIXTURE                  Peripheral IV 02/13/18 Left Forearm (Active)                           Dressing/Packing:  Wound Knee Right-DRESSING TYPE: Cast padding;Elastic bandage;Silver products; Topical skin adhesive/glue (02/13/18 0900)  Splint/Cast:  ]

## 2018-02-14 LAB
ANION GAP SERPL CALC-SCNC: 7 MMOL/L (ref 5–15)
BUN SERPL-MCNC: 29 MG/DL (ref 6–20)
BUN/CREAT SERPL: 21 (ref 12–20)
CALCIUM SERPL-MCNC: 7.9 MG/DL (ref 8.5–10.1)
CHLORIDE SERPL-SCNC: 111 MMOL/L (ref 97–108)
CO2 SERPL-SCNC: 25 MMOL/L (ref 21–32)
CREAT SERPL-MCNC: 1.35 MG/DL (ref 0.55–1.02)
EST. AVERAGE GLUCOSE BLD GHB EST-MCNC: 192 MG/DL
GLUCOSE BLD STRIP.AUTO-MCNC: 120 MG/DL (ref 65–100)
GLUCOSE BLD STRIP.AUTO-MCNC: 166 MG/DL (ref 65–100)
GLUCOSE BLD STRIP.AUTO-MCNC: 181 MG/DL (ref 65–100)
GLUCOSE BLD STRIP.AUTO-MCNC: 208 MG/DL (ref 65–100)
GLUCOSE SERPL-MCNC: 126 MG/DL (ref 65–100)
HBA1C MFR BLD: 8.3 % (ref 4.2–6.3)
HGB BLD-MCNC: 10.5 G/DL (ref 11.5–16)
INR PPP: 1.1 (ref 0.9–1.1)
POTASSIUM SERPL-SCNC: 4.5 MMOL/L (ref 3.5–5.1)
PROTHROMBIN TIME: 10.7 SEC (ref 9–11.1)
SERVICE CMNT-IMP: ABNORMAL
SODIUM SERPL-SCNC: 143 MMOL/L (ref 136–145)

## 2018-02-14 PROCEDURE — 65270000029 HC RM PRIVATE

## 2018-02-14 PROCEDURE — 85610 PROTHROMBIN TIME: CPT | Performed by: ORTHOPAEDIC SURGERY

## 2018-02-14 PROCEDURE — 80048 BASIC METABOLIC PNL TOTAL CA: CPT | Performed by: ORTHOPAEDIC SURGERY

## 2018-02-14 PROCEDURE — 97165 OT EVAL LOW COMPLEX 30 MIN: CPT | Performed by: OCCUPATIONAL THERAPIST

## 2018-02-14 PROCEDURE — 74011250636 HC RX REV CODE- 250/636: Performed by: ORTHOPAEDIC SURGERY

## 2018-02-14 PROCEDURE — 36415 COLL VENOUS BLD VENIPUNCTURE: CPT | Performed by: ORTHOPAEDIC SURGERY

## 2018-02-14 PROCEDURE — 77030027138 HC INCENT SPIROMETER -A

## 2018-02-14 PROCEDURE — 74011636637 HC RX REV CODE- 636/637: Performed by: ORTHOPAEDIC SURGERY

## 2018-02-14 PROCEDURE — 83036 HEMOGLOBIN GLYCOSYLATED A1C: CPT | Performed by: NURSE PRACTITIONER

## 2018-02-14 PROCEDURE — 74011636637 HC RX REV CODE- 636/637: Performed by: NURSE PRACTITIONER

## 2018-02-14 PROCEDURE — 85018 HEMOGLOBIN: CPT | Performed by: ORTHOPAEDIC SURGERY

## 2018-02-14 PROCEDURE — 74011250636 HC RX REV CODE- 250/636: Performed by: NURSE PRACTITIONER

## 2018-02-14 PROCEDURE — 97530 THERAPEUTIC ACTIVITIES: CPT

## 2018-02-14 PROCEDURE — 82962 GLUCOSE BLOOD TEST: CPT

## 2018-02-14 PROCEDURE — 97116 GAIT TRAINING THERAPY: CPT

## 2018-02-14 PROCEDURE — 97530 THERAPEUTIC ACTIVITIES: CPT | Performed by: OCCUPATIONAL THERAPIST

## 2018-02-14 PROCEDURE — 74011250637 HC RX REV CODE- 250/637: Performed by: ORTHOPAEDIC SURGERY

## 2018-02-14 PROCEDURE — 74011250637 HC RX REV CODE- 250/637: Performed by: NURSE PRACTITIONER

## 2018-02-14 RX ORDER — WARFARIN SODIUM 5 MG/1
5 TABLET ORAL ONCE
Status: COMPLETED | OUTPATIENT
Start: 2018-02-14 | End: 2018-02-14

## 2018-02-14 RX ORDER — OXYCODONE HYDROCHLORIDE 5 MG/1
7.5-1 TABLET ORAL
Status: DISCONTINUED | OUTPATIENT
Start: 2018-02-14 | End: 2018-02-18 | Stop reason: HOSPADM

## 2018-02-14 RX ORDER — SODIUM CHLORIDE 9 MG/ML
125 INJECTION, SOLUTION INTRAVENOUS CONTINUOUS
Status: DISPENSED | OUTPATIENT
Start: 2018-02-14 | End: 2018-02-14

## 2018-02-14 RX ORDER — OXYCODONE HYDROCHLORIDE 5 MG/1
2.5-5 TABLET ORAL
Status: DISCONTINUED | OUTPATIENT
Start: 2018-02-14 | End: 2018-02-18 | Stop reason: HOSPADM

## 2018-02-14 RX ORDER — INSULIN GLARGINE 100 [IU]/ML
5 INJECTION, SOLUTION SUBCUTANEOUS
Status: DISCONTINUED | OUTPATIENT
Start: 2018-02-14 | End: 2018-02-18 | Stop reason: HOSPADM

## 2018-02-14 RX ADMIN — ACETAMINOPHEN 500 MG: 500 TABLET ORAL at 06:54

## 2018-02-14 RX ADMIN — ACETAMINOPHEN 500 MG: 500 TABLET ORAL at 22:33

## 2018-02-14 RX ADMIN — KETOROLAC TROMETHAMINE 30 MG: 30 INJECTION, SOLUTION INTRAMUSCULAR at 01:15

## 2018-02-14 RX ADMIN — ACETAMINOPHEN 500 MG: 500 TABLET ORAL at 10:12

## 2018-02-14 RX ADMIN — DOCUSATE SODIUM AND SENNOSIDES 1 TABLET: 8.6; 5 TABLET, FILM COATED ORAL at 08:11

## 2018-02-14 RX ADMIN — DOCUSATE SODIUM AND SENNOSIDES 1 TABLET: 8.6; 5 TABLET, FILM COATED ORAL at 18:02

## 2018-02-14 RX ADMIN — SODIUM CHLORIDE 125 ML/HR: 900 INJECTION, SOLUTION INTRAVENOUS at 10:10

## 2018-02-14 RX ADMIN — ACETAMINOPHEN 500 MG: 500 TABLET ORAL at 18:03

## 2018-02-14 RX ADMIN — INSULIN LISPRO 2 UNITS: 100 INJECTION, SOLUTION INTRAVENOUS; SUBCUTANEOUS at 22:12

## 2018-02-14 RX ADMIN — GABAPENTIN 100 MG: 100 CAPSULE ORAL at 22:34

## 2018-02-14 RX ADMIN — ACETAMINOPHEN 500 MG: 500 TABLET ORAL at 13:11

## 2018-02-14 RX ADMIN — GABAPENTIN 100 MG: 100 CAPSULE ORAL at 15:09

## 2018-02-14 RX ADMIN — INSULIN LISPRO 2 UNITS: 100 INJECTION, SOLUTION INTRAVENOUS; SUBCUTANEOUS at 06:54

## 2018-02-14 RX ADMIN — GABAPENTIN 100 MG: 100 CAPSULE ORAL at 08:11

## 2018-02-14 RX ADMIN — POLYETHYLENE GLYCOL 3350 17 G: 17 POWDER, FOR SOLUTION ORAL at 08:11

## 2018-02-14 RX ADMIN — INSULIN LISPRO 2 UNITS: 100 INJECTION, SOLUTION INTRAVENOUS; SUBCUTANEOUS at 17:19

## 2018-02-14 RX ADMIN — OXYCODONE HYDROCHLORIDE 5 MG: 5 TABLET ORAL at 15:09

## 2018-02-14 RX ADMIN — OXYCODONE HYDROCHLORIDE 10 MG: 5 TABLET ORAL at 18:02

## 2018-02-14 RX ADMIN — INSULIN GLARGINE 5 UNITS: 100 INJECTION, SOLUTION SUBCUTANEOUS at 22:32

## 2018-02-14 RX ADMIN — Medication 10 ML: at 06:54

## 2018-02-14 RX ADMIN — KETOROLAC TROMETHAMINE 30 MG: 30 INJECTION, SOLUTION INTRAMUSCULAR at 06:54

## 2018-02-14 RX ADMIN — WARFARIN SODIUM 5 MG: 5 TABLET ORAL at 12:14
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
## 2018-02-14 NOTE — PROGRESS NOTES
Problem: Mobility Impaired (Adult and Pediatric)  Goal: *Acute Goals and Plan of Care (Insert Text)  Physical Therapy Goals  Initiated 2/13/2018    1. Patient will move from supine to sit and sit to supine , scoot up and down and roll side to side in bed with independence within 4 days. 2. Patient will perform sit to stand with modified independence within 4 days. 3. Patient will ambulate with modified independence for 100 feet with the least restrictive device within 4 days. 4. Patient will ascend/descend 5 stairs with 1 handrail(s) with modified independence within 4 days. 5. Patient will perform home exercise program per protocol with independence within 4 days. 6. Patient will demonstrate AROM 0-90 degrees in operative joint within 4 days. physical Therapy TREATMENT  Patient: Lilo Castaneda (69 y.o. female)  Date: 2/14/2018  Diagnosis: PRIMARY LOCALIZED OA RIGHT KNEE  Primary localized osteoarthritis of right knee <principal problem not specified>  Procedure(s) (LRB):  RIGHT TOTAL KNEE ARTHROPLASTY (SPINAL WITH IV SED) (Right) 1 Day Post-Op  Precautions: Fall, WBAT  Chart, physical therapy assessment, plan of care and goals were reviewed. ASSESSMENT:  Patient cleared for OOB activity and alerted by OT that patient having much more difficulty with mobility than she had been yesterday when block was still in effect. Patient received supine, SUP to come to EOB. She was able to position her L prosthetic leg to don it but required MAX A x 2 to stand with RW stabilization as well (heavy lean onto front of it by patient) in order to engage the prosthetic to ensure it was properly secure. Patient the required MIN A for first 8-10 feet with cues to narrow stance and for sequencing with RW. After that, the last 20-30 feet required CGA.   Orders were received to trial patient with loftstrand crutches but patient not steady enough at this time for trial.  Returned to bedside chair where PT reviewed seated therex with patient (LAQs, knee flex stretch and heel raises). Based upon today and patient' extra challenges with L prosthetic leg and now onset of post-op pain and weakness, would recommend IP rehab at d/c to allow patient to work to fully regain independence as she did not required any assist PTA. She is an excellent candidate and would tolerate the 3 hours of therapy well. Progression toward goals:  []      Improving appropriately and progressing toward goals  [x]      Improving slowly and progressing toward goals  []      Not making progress toward goals and plan of care will be adjusted     PLAN:  Patient continues to benefit from skilled intervention to address the above impairments. Continue treatment per established plan of care. Discharge Recommendations:  Inpatient Rehab  Further Equipment Recommendations for Discharge:  TBD at rehab     SUBJECTIVE:   Patient stated It just wasn't hurting at all yesterday but now it's bad.     OBJECTIVE DATA SUMMARY:   Critical Behavior:  Neurologic State: Alert, Appropriate for age  Orientation Level: Oriented X4  Cognition: Appropriate decision making, Appropriate for age attention/concentration, Appropriate safety awareness, Follows commands  Safety/Judgement: Awareness of environment, Driving appropriateness, Fall prevention, Good awareness of safety precautions, Home safety, Insight into deficits  Range of Motion:  AROM: Generally decreased, functional  PROM: Generally decreased, functional                    Functional Mobility Training:  Bed Mobility:     Supine to Sit: Supervision  Sit to Supine: Supervision (using gait belt as leg )  Scooting: Supervision        Transfers:  Sit to Stand: Maximum assistance; Additional time;Assist x2 (pt unable to don prosthesis prior to getting in chair)  Stand to Sit: Moderate assistance; Additional time;Assist x2 (A to control sit)                             Balance:  Sitting: Intact  Standing: Impaired (RW and prosthetic limb)  Standing - Static: Fair;Constant support  Standing - Dynamic : Fair  Ambulation/Gait Training:  Distance (ft): 40 Feet (ft)  Assistive Device: Prosthetic device;Gait belt;Walker, rolling  Ambulation - Level of Assistance: Contact guard assistance;Minimal assistance (MIN initially, CGA after first 20 feet)        Gait Abnormalities: Ataxic;Decreased step clearance; Step to gait  Right Side Weight Bearing: As tolerated     Base of Support: Widened     Speed/Bea: Slow  Step Length: Right shortened;Left shortened  Swing Pattern: Left asymmetrical (prosthetic leg)                 Stairs:            Therapeutic Exercises:     EXERCISE   Sets   Reps   Active Active Assist   Passive Self ROM   Comments   Ankle Pumps   []                                        []                                        []                                        []                                           Quad Sets   []                                        []                                        []                                        []                                           Hamstring Sets   []                                        []                                        []                                        []                                           Short Arc Quads   []                                        []                                        []                                        []                                           Knee Extension Stretch     []                                          []                                          []                                          []                                           Heel Raises 1 10 []                                        []                                        []                                        []                                           Long Arc Quads 1 10 [x]                                        [] []                                        []                                           Knee Flexion Stretch 1 10 [x]                                        []                                        []                                        []                                           Straight Leg Raises   []                                        []                                        []                                        []                                             Pain:  Pain Scale 1: Numeric (0 - 10)  Pain Intensity 1: 81   Pain Location 1: Knee  Pain Orientation 1: Right  Pain Description 1: Aching  Pain Intervention(s) 1: Ice;Repositioned  Activity Tolerance:   Improved to 40' ambulation but 8/10 pain and MAX A x 2 to stand    Please refer to the flowsheet for vital signs taken during this treatment.   After treatment:   [x] Patient left in no apparent distress sitting up in chair  [] Patient left in no apparent distress in bed  [x] Call bell left within reach  [x] Nursing notified  [] Caregiver present  [] Bed alarm activated    COMMUNICATION/COLLABORATION:   The patients plan of care was discussed with: Registered Nurse    Cruzito Pugh, PT   Time Calculation: 24 mins

## 2018-02-14 NOTE — PROGRESS NOTES
Pain controlled. No cp/sob.     Visit Vitals    /76    Pulse 94    Temp 98.1 °F (36.7 °C)    Resp 15    Ht 5' 3\" (1.6 m)    Wt 82.6 kg (182 lb)    LMP 08/23/2003    SpO2 97%    BMI 32.24 kg/m2       R knee dressing dry  Calves soft NT  Motor 5/5   Pulses symmetrical    Recent Results (from the past 12 hour(s))   GLUCOSE, POC    Collection Time: 02/13/18  9:15 PM   Result Value Ref Range    Glucose (POC) 145 (H) 65 - 100 mg/dL    Performed by Alejandra ARAGON    METABOLIC PANEL, BASIC    Collection Time: 02/14/18  3:58 AM   Result Value Ref Range    Sodium 143 136 - 145 mmol/L    Potassium 4.5 3.5 - 5.1 mmol/L    Chloride 111 (H) 97 - 108 mmol/L    CO2 25 21 - 32 mmol/L    Anion gap 7 5 - 15 mmol/L    Glucose 126 (H) 65 - 100 mg/dL    BUN 29 (H) 6 - 20 MG/DL    Creatinine 1.35 (H) 0.55 - 1.02 MG/DL    BUN/Creatinine ratio 21 (H) 12 - 20      GFR est AA 48 (L) >60 ml/min/1.73m2    GFR est non-AA 40 (L) >60 ml/min/1.73m2    Calcium 7.9 (L) 8.5 - 10.1 MG/DL   HEMOGLOBIN    Collection Time: 02/14/18  3:58 AM   Result Value Ref Range    HGB 10.5 (L) 11.5 - 16.0 g/dL   PROTHROMBIN TIME + INR    Collection Time: 02/14/18  3:58 AM   Result Value Ref Range    INR 1.1 0.9 - 1.1      Prothrombin time 10.7 9.0 - 11.1 sec   HEMOGLOBIN A1C WITH EAG    Collection Time: 02/14/18  3:58 AM   Result Value Ref Range    Hemoglobin A1c 8.3 (H) 4.2 - 6.3 %    Est. average glucose 192 mg/dL   GLUCOSE, POC    Collection Time: 02/14/18  6:24 AM   Result Value Ref Range    Glucose (POC) 166 (H) 65 - 100 mg/dL    Performed by ALISON DUMONT        POD 1 R TKA; acute postop blood loss anemia (expected); prior L above knee amputation  -keep IV fluids running until this evening; recheck BMP tomorrow AM  -PT  -pain control  -coumadin  -acute rehab ([facility]) vs home with home PT, pending progress    Glo Aldana MD

## 2018-02-14 NOTE — PROGRESS NOTES
KIRAN spoke with Janelle Ibarra from [facility]. Insurance authorization has been started and they are hoping for a bed on Sat. CRM will follow.  Sergey

## 2018-02-14 NOTE — PROGRESS NOTES
Problem: Discharge Planning  Goal: *Discharge to safe environment  Outcome: Progressing Towards Goal  rehab

## 2018-02-14 NOTE — DIABETES MGMT
DTC Post Surgical Education Note    Chart reviewed and initial evaluation complete on Odalys Bowen. If appropriate, consider:  - Adding Lantus 15 units HS    Current hospital DM medication: Humalog for correction, normal sensitivity scale     Assessed and instructed patient on the following interpretation of lab results, blood sugar goals, hypoglycemia prevention and treatment, nutrition and referred to Diabetes Educator, risk of infection/ delayed healing. Provided patient with the following: Pt was scheduled for DSMT class #1 at [facility] on 3/16/18 at 9am.                         [x]        Outpatient DTC contact number                  Patient is a 65 y.o. female with hx Type 2 Diabetes on Novolog 70/30 30 units before breakfast and dinner and Metformin 500 mg BID at home. A1c:   POC Glucose last 24hrs: Lab Results   Component Value Date/Time    Glucose (POC) 181 (H) 02/14/2018 04:08 PM    Glucose (POC) 120 (H) 02/14/2018 11:36 AM    Glucose (POC) 166 (H) 02/14/2018 06:24 AM    Glucose (POC) 145 (H) 02/13/2018 09:15 PM    Glucose (POC) 74 02/13/2018 04:14 PM       Recent Glucose Results: Lab Results   Component Value Date/Time     (H) 02/14/2018 03:58 AM    GLUCPOC 181 (H) 02/14/2018 04:08 PM    GLUCPOC 120 (H) 02/14/2018 11:36 AM    GLUCPOC 166 (H) 02/14/2018 06:24 AM        Lab Results   Component Value Date/Time    Creatinine 1.35 (H) 02/14/2018 03:58 AM     Estimated Creatinine Clearance: 44.6 mL/min (based on Cr of 1.35). Active Orders   Diet    DIET DIABETIC WITH OPTIONS Consistent Carb 1800kcal; Regular; No Conc. Sweets        PO intake: Patient Vitals for the past 72 hrs:   % Diet Eaten   02/13/18 1812 100 %   02/13/18 1633 100 %       Will continue to follow as needed. Thank you.     Romana Devon, [address] [place] [address]  [facility]  Pager: [phone]

## 2018-02-14 NOTE — PROGRESS NOTES
Problem: High Risk or History of VLADISLAV  Goal: Maintenance Care of VLADISLAV  Outcome: Progressing Towards Goal  o2 sats WDL at night. Patient does not where a CPAP.

## 2018-02-14 NOTE — PROGRESS NOTES
Bedside shift change report given to Chantel Rinaldi (oncoming nurse) by Haritha Kennedy (offgoing nurse). Report included the following information SBAR.

## 2018-02-14 NOTE — PROGRESS NOTES
Problem: Mobility Impaired (Adult and Pediatric)  Goal: *Acute Goals and Plan of Care (Insert Text)  Physical Therapy Goals  Initiated 2/13/2018    1. Patient will move from supine to sit and sit to supine , scoot up and down and roll side to side in bed with independence within 4 days. 2. Patient will perform sit to stand with modified independence within 4 days. 3. Patient will ambulate with modified independence for 100 feet with the least restrictive device within 4 days. 4. Patient will ascend/descend 5 stairs with 1 handrail(s) with modified independence within 4 days. 5. Patient will perform home exercise program per protocol with independence within 4 days. 6. Patient will demonstrate AROM 0-90 degrees in operative joint within 4 days. physical Therapy TREATMENT  Patient: Adriane Hand (69 y.o. female)  Date: 2/14/2018  Diagnosis: PRIMARY LOCALIZED OA RIGHT KNEE  Primary localized osteoarthritis of right knee <principal problem not specified>  Procedure(s) (LRB):  RIGHT TOTAL KNEE ARTHROPLASTY (SPINAL WITH IV SED) (Right) 1 Day Post-Op  Precautions: Fall, WBAT  Chart, physical therapy assessment, plan of care and goals were reviewed. ASSESSMENT:  Patient up in chair upon arrival, eager to get back to bed after but agreeable to getting up to the bathroom first.  Patient tried sit to stand first attempt with MAX A x 2 with prosthetic partially donned but patient unable to engage the secure fit and had to sit back down due to pain and fatigue in RLE. Patient's  present and able to assist with prosthetic fitting on second attempt while PT and tech provided MAX A x 2 to stand and maintain. Once prosthetic engaged, patient able to ambulate to and from bathroom with wide stance and RW, mostly CGA, but cues to decrease level of walker advancement. Patient able to take care of toileting hygiene seated without assist.  Returned to the supine with Supervision.   Patient demonstrated proper therex for supine and agreeable to work on in the next few hours and get up with nursing to chair later. Also recommended BSC for patient to be able to use with nursing later in evening as donning the prosthetic so difficulty and exhausting for her. Progression toward goals:  [x]      Improving appropriately and progressing toward goals  []      Improving slowly and progressing toward goals  []      Not making progress toward goals and plan of care will be adjusted     PLAN:  Patient continues to benefit from skilled intervention to address the above impairments. Continue treatment per established plan of care. Discharge Recommendations:  Inpatient Rehab  Further Equipment Recommendations for Discharge:  TBD in rehab     SUBJECTIVE:   I am just pooped today    OBJECTIVE DATA SUMMARY:   Range of Motion:  AROM: Generally decreased, functional  PROM: Generally decreased, functional                    Functional Mobility Training:  Bed Mobility:     Supine to Sit: Supervision  Sit to Supine: Supervision (using gait belt as leg )  Scooting: Supervision        Transfers:  Sit to Stand: Maximum assistance;Assist x2 (plus another person to assist with donning prosthetic)  Stand to Sit: Moderate assistance; Additional time;Assist x2 (A to control sit)                             Ambulation/Gait Training:  Distance (ft): 30 Feet (ft)  Assistive Device: Prosthetic device;Gait belt;Walker, rolling  Ambulation - Level of Assistance: Contact guard assistance;Minimal assistance (MIN initially, CGA after first 20 feet)        Gait Abnormalities: Ataxic;Decreased step clearance; Step to gait  Right Side Weight Bearing: As tolerated     Base of Support: Widened     Speed/Bea: Slow  Step Length: Right shortened;Left shortened  Swing Pattern: Left asymmetrical (prosthetic leg)                 Stairs: Therapeutic Exercises:   Exercises performed per protocol. See morning treatment note for description.   Pain: Activity Tolerance:   8/10 during ambulation    Please refer to the flowsheet for vital signs taken during this treatment.   After treatment:   [] Patient left in no apparent distress sitting up in chair  [x] Patient left in no apparent distress in bed  [x] Call bell left within reach  [x] Nursing notified  [x] Caregiver present  [] Bed alarm activated    COMMUNICATION/COLLABORATION:   The patients plan of care was discussed with: Registered Nurse    Trey Pickering, PT   Time Calculation: 26 mins
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
## 2018-02-14 NOTE — PROGRESS NOTES
Problem: Knee Replacement: Post-Op Day 1  Goal: Activity/Safety  Outcome: Progressing Towards Goal  Patient demonstrates bed mobility with minimal assistance. Pain is well controlled and she was able to sleep most of the night.

## 2018-02-14 NOTE — PROGRESS NOTES
Pharmacist Note - Warfarin Dosing  Consult provided for this 65 y.o. female to manage warfarin for VTE PPx s/p RTK    INR Goal: 1.7- 2.2  Therapy Day: 2  Preop Dose: Mock - None    Drugs that may increase INR: None  Drugs that may decrease INR: None  Other current anticoagulants/ drugs that may increase bleeding risk: NSAID  Risk factors: None  Daily INR ordered: YES  Recent Labs      02/14/18   0358   HGB  10.5*   INR  1.1       Date               INR                 Dose  1/29  1.0  ---  2/12  ---  None PreOp  2/13  ---  6 mg  2/14  1.1  5 mg    Assessment/ Plan: Will order warfarin 5 mg PO x 1 dose. Pharmacy will continue to monitor daily and adjust therapy as indicated.

## 2018-02-14 NOTE — PROGRESS NOTES
Care Management Interventions  PCP Verified by CM: Yes (Dr. Eddi Betancur)  Palliative Care Criteria Met (RRAT>21 & CHF Dx)?: No  Mode of Transport at Discharge: Other (see comment) (TBD/Car)  Transition of Care Consult (CM Consult): Other, Discharge Planning (Referral to [address] [place] [address] [address])  [address] [place] [place]: No  Discharge Durable Medical Equipment: No (owns RW)  Physical Therapy Consult: Yes  Occupational Therapy Consult: Yes  Speech Therapy Consult: No  Current Support Network: Lives with Spouse  Confirm Follow Up Transport: Family  Plan discussed with Pt/Family/Caregiver: Yes  Freedom of Choice Offered: Yes  Discharge Location  Discharge Placement: Rehab hospital/unit acute    Home care orders and physiatrist order noted. KIRAN spoke with Dr. Ryan Martinez and this patient is a candidate for acute rehab. CRM met with the patient to offer choice. The patient chose [address] [place] [address] [employer]. CRM sent referral via [employer]. The patient will need insurance authorization for admission. Will follow.  Sergey

## 2018-02-15 LAB
ANION GAP SERPL CALC-SCNC: 9 MMOL/L (ref 5–15)
BUN SERPL-MCNC: 23 MG/DL (ref 6–20)
BUN/CREAT SERPL: 20 (ref 12–20)
CALCIUM SERPL-MCNC: 8.1 MG/DL (ref 8.5–10.1)
CHLORIDE SERPL-SCNC: 109 MMOL/L (ref 97–108)
CO2 SERPL-SCNC: 22 MMOL/L (ref 21–32)
CREAT SERPL-MCNC: 1.17 MG/DL (ref 0.55–1.02)
GLUCOSE BLD STRIP.AUTO-MCNC: 128 MG/DL (ref 65–100)
GLUCOSE BLD STRIP.AUTO-MCNC: 129 MG/DL (ref 65–100)
GLUCOSE BLD STRIP.AUTO-MCNC: 150 MG/DL (ref 65–100)
GLUCOSE BLD STRIP.AUTO-MCNC: 203 MG/DL (ref 65–100)
GLUCOSE SERPL-MCNC: 134 MG/DL (ref 65–100)
HGB BLD-MCNC: 9.3 G/DL (ref 11.5–16)
INR PPP: 1.2 (ref 0.9–1.1)
POTASSIUM SERPL-SCNC: 4.2 MMOL/L (ref 3.5–5.1)
PROTHROMBIN TIME: 12.3 SEC (ref 9–11.1)
SERVICE CMNT-IMP: ABNORMAL
SODIUM SERPL-SCNC: 140 MMOL/L (ref 136–145)

## 2018-02-15 PROCEDURE — 97530 THERAPEUTIC ACTIVITIES: CPT

## 2018-02-15 PROCEDURE — 74011636637 HC RX REV CODE- 636/637: Performed by: ORTHOPAEDIC SURGERY

## 2018-02-15 PROCEDURE — 65270000029 HC RM PRIVATE

## 2018-02-15 PROCEDURE — 74011250637 HC RX REV CODE- 250/637: Performed by: NURSE PRACTITIONER

## 2018-02-15 PROCEDURE — 97535 SELF CARE MNGMENT TRAINING: CPT

## 2018-02-15 PROCEDURE — 74011250637 HC RX REV CODE- 250/637: Performed by: ORTHOPAEDIC SURGERY

## 2018-02-15 PROCEDURE — 74011636637 HC RX REV CODE- 636/637: Performed by: NURSE PRACTITIONER

## 2018-02-15 PROCEDURE — 36415 COLL VENOUS BLD VENIPUNCTURE: CPT | Performed by: ORTHOPAEDIC SURGERY

## 2018-02-15 PROCEDURE — 85610 PROTHROMBIN TIME: CPT | Performed by: ORTHOPAEDIC SURGERY

## 2018-02-15 PROCEDURE — 97110 THERAPEUTIC EXERCISES: CPT

## 2018-02-15 PROCEDURE — 97116 GAIT TRAINING THERAPY: CPT

## 2018-02-15 PROCEDURE — 82962 GLUCOSE BLOOD TEST: CPT

## 2018-02-15 PROCEDURE — 85018 HEMOGLOBIN: CPT | Performed by: ORTHOPAEDIC SURGERY

## 2018-02-15 PROCEDURE — 80048 BASIC METABOLIC PNL TOTAL CA: CPT | Performed by: ORTHOPAEDIC SURGERY

## 2018-02-15 RX ORDER — METFORMIN HYDROCHLORIDE 500 MG/1
500 TABLET, EXTENDED RELEASE ORAL 2 TIMES DAILY WITH MEALS
Status: DISCONTINUED | OUTPATIENT
Start: 2018-02-15 | End: 2018-02-17

## 2018-02-15 RX ORDER — WARFARIN SODIUM 5 MG/1
5 TABLET ORAL ONCE
Status: COMPLETED | OUTPATIENT
Start: 2018-02-15 | End: 2018-02-15

## 2018-02-15 RX ORDER — LISINOPRIL 20 MG/1
20 TABLET ORAL DAILY
Status: DISCONTINUED | OUTPATIENT
Start: 2018-02-15 | End: 2018-02-18 | Stop reason: HOSPADM

## 2018-02-15 RX ADMIN — GABAPENTIN 100 MG: 100 CAPSULE ORAL at 08:12

## 2018-02-15 RX ADMIN — GABAPENTIN 100 MG: 100 CAPSULE ORAL at 21:56

## 2018-02-15 RX ADMIN — Medication 10 ML: at 21:57

## 2018-02-15 RX ADMIN — DOCUSATE SODIUM AND SENNOSIDES 1 TABLET: 8.6; 5 TABLET, FILM COATED ORAL at 08:12

## 2018-02-15 RX ADMIN — METFORMIN HYDROCHLORIDE 500 MG: 500 TABLET, EXTENDED RELEASE ORAL at 16:52

## 2018-02-15 RX ADMIN — OXYCODONE HYDROCHLORIDE 10 MG: 5 TABLET ORAL at 11:08

## 2018-02-15 RX ADMIN — WARFARIN SODIUM 5 MG: 5 TABLET ORAL at 11:07

## 2018-02-15 RX ADMIN — INSULIN LISPRO 2 UNITS: 100 INJECTION, SOLUTION INTRAVENOUS; SUBCUTANEOUS at 11:45

## 2018-02-15 RX ADMIN — INSULIN GLARGINE 5 UNITS: 100 INJECTION, SOLUTION SUBCUTANEOUS at 21:56

## 2018-02-15 RX ADMIN — LISINOPRIL 20 MG: 20 TABLET ORAL at 11:06

## 2018-02-15 RX ADMIN — DOCUSATE SODIUM AND SENNOSIDES 1 TABLET: 8.6; 5 TABLET, FILM COATED ORAL at 17:17

## 2018-02-15 RX ADMIN — Medication 10 ML: at 13:05

## 2018-02-15 RX ADMIN — GABAPENTIN 100 MG: 100 CAPSULE ORAL at 15:16

## 2018-02-15 RX ADMIN — ACETAMINOPHEN 500 MG: 500 TABLET ORAL at 23:53

## 2018-02-15 RX ADMIN — POLYETHYLENE GLYCOL 3350 17 G: 17 POWDER, FOR SOLUTION ORAL at 08:12

## 2018-02-15 RX ADMIN — OXYCODONE HYDROCHLORIDE 5 MG: 5 TABLET ORAL at 03:36

## 2018-02-15 RX ADMIN — ACETAMINOPHEN 500 MG: 500 TABLET ORAL at 15:16

## 2018-02-15 RX ADMIN — OXYCODONE HYDROCHLORIDE 7.5 MG: 5 TABLET ORAL at 08:13

## 2018-02-15 RX ADMIN — ACETAMINOPHEN 500 MG: 500 TABLET ORAL at 19:29

## 2018-02-15 RX ADMIN — OXYCODONE HYDROCHLORIDE 10 MG: 5 TABLET ORAL at 23:53

## 2018-02-15 RX ADMIN — ACETAMINOPHEN 500 MG: 500 TABLET ORAL at 11:10

## 2018-02-15 RX ADMIN — OXYCODONE HYDROCHLORIDE 10 MG: 5 TABLET ORAL at 15:16

## 2018-02-15 RX ADMIN — INSULIN LISPRO 2 UNITS: 100 INJECTION, SOLUTION INTRAVENOUS; SUBCUTANEOUS at 21:56

## 2018-02-15 RX ADMIN — ACETAMINOPHEN 500 MG: 500 TABLET ORAL at 08:16

## 2018-02-15 NOTE — PROGRESS NOTES
NP ORTHO PROGRESS NOTE  Post Op day: 2 Days Post-Op    Admit date: 2/13/2018  Date of Surgery: 2/13/2018   Procedures: Procedure(s):  RIGHT TOTAL KNEE ARTHROPLASTY (SPINAL WITH IV SED)  Admitting Physician: Joselyn España MD   Surgeon: Jordana Johnston) and Role:     * Joselyn España MD - Primary    Chart/Meds/Labs Reviewed  Current Facility-Administered Medications   Medication Dose Route Frequency    lisinopril (PRINIVIL, ZESTRIL) tablet 20 mg  20 mg Oral DAILY    metFORMIN ER (GLUCOPHAGE XR) tablet 500 mg  500 mg Oral BID WITH MEALS    oxyCODONE IR (ROXICODONE) tablet 7.5-10 mg  7.5-10 mg Oral Q3H PRN    oxyCODONE IR (ROXICODONE) tablet 2.5-5 mg  2.5-5 mg Oral Q3H PRN    insulin glargine (LANTUS) injection 5 Units  5 Units SubCUTAneous QHS    cyclobenzaprine (FLEXERIL) tablet 10 mg  10 mg Oral TID PRN    gabapentin (NEURONTIN) capsule 100 mg  100 mg Oral TID    insulin lispro (HUMALOG) injection   SubCUTAneous AC&HS    glucose chewable tablet 16 g  4 Tab Oral PRN    dextrose (D50W) injection syrg 12.5-25 g  12.5-25 g IntraVENous PRN    glucagon (GLUCAGEN) injection 1 mg  1 mg IntraMUSCular PRN    sodium chloride 0.9 % bolus infusion 500 mL  500 mL IntraVENous ONCE PRN    sodium chloride (NS) flush 5-10 mL  5-10 mL IntraVENous Q8H    sodium chloride (NS) flush 5-10 mL  5-10 mL IntraVENous PRN    acetaminophen (TYLENOL) tablet 500 mg  500 mg Oral Q4HWA    naloxone (NARCAN) injection 0.4 mg  0.4 mg IntraVENous PRN    hydrOXYzine HCl (ATARAX) tablet 10 mg  10 mg Oral Q8H PRN    senna-docusate (PERICOLACE) 8.6-50 mg per tablet 1 Tab  1 Tab Oral BID    polyethylene glycol (MIRALAX) packet 17 g  17 g Oral DAILY    bisacodyl (DULCOLAX) suppository 10 mg  10 mg Rectal DAILY PRN    Warfarin - Pharmacy to Dose   Other Rx Dosing/Monitoring    acetaminophen (TYLENOL) tablet 325 mg  325 mg Oral Q4H PRN       Subjective:    Complaints: Incisional knee hurts more today, has trouble fully extending knee.   Denies Dizziness, CP, SOB, N/V, Abdominal pain, numbness or tingling of extremities. Able to perform ankle pumps easily. Progressing with mobility. Taking fluids well & tolerating diabetic diet well. Voiding clear urine qs  Incisional pain control: Painful today but within expected range. Pain Control:   Pain Assessment  Pain Scale 1: Numeric (0 - 10)  Pain Intensity 1: 8  Pain Location 1: Knee  Pain Orientation 1: Right  Pain Description 1: Aching  Pain Intervention(s) 1: Medication (see MAR), Ice        Objective:  General: Alert,Ox4, cooperative, NAD  HEENT: Atraumatic, PERRL, anicteric sclerae  Lungs: Bilateral expansion. Equal excursion. No accessory muscle use. Gastrointestinal:  Soft, non-tender, non-distended  Extremities:  Neurovasc exam WDL. + DP pulses. Left BKA--with prosthesis          Sensation intact to light touch. Motor: + DF/PF 5/5  Right Knee ~-10 to 30 degrees ROM in bed          Calves non-tender upon palpation or with passive stretch. No significant erythema or swelling. Dressing: clean, dry and intact.     Vital Signs:   Visit Vitals    BP (!) 158/94 (BP 1 Location: Left arm, BP Patient Position: At rest)    Pulse (!) 105    Temp 98.4 °F (36.9 °C)    Resp 16    Ht 5' 3\" (1.6 m)    Wt 82.6 kg (182 lb)    LMP 2003    SpO2 98%    BMI 32.24 kg/m2    O2 Flow Rate (L/min): 2 l/min O2 Device: Room air     Temp (24hrs), Av.4 °F (36.9 °C), Min:98.1 °F (36.7 °C), Max:98.6 °F (37 °C)      LAB:   Recent Results (from the past 24 hour(s))   GLUCOSE, POC    Collection Time: 18  4:08 PM   Result Value Ref Range    Glucose (POC) 181 (H) 65 - 100 mg/dL    Performed by [address] [place] [address], POC    Collection Time: 18  8:58 PM   Result Value Ref Range    Glucose (POC) 208 (H) 65 - 100 mg/dL    Performed by Josie Rodgers    HEMOGLOBIN    Collection Time: 02/15/18  4:04 AM   Result Value Ref Range    HGB 9.3 (L) 11.5 - 16.0 g/dL   PROTHROMBIN TIME + INR Collection Time: 02/15/18  4:04 AM   Result Value Ref Range    INR 1.2 (H) 0.9 - 1.1      Prothrombin time 12.3 (H) 9.0 - 42.1 sec   METABOLIC PANEL, BASIC    Collection Time: 02/15/18  4:04 AM   Result Value Ref Range    Sodium 140 136 - 145 mmol/L    Potassium 4.2 3.5 - 5.1 mmol/L    Chloride 109 (H) 97 - 108 mmol/L    CO2 22 21 - 32 mmol/L    Anion gap 9 5 - 15 mmol/L    Glucose 134 (H) 65 - 100 mg/dL    BUN 23 (H) 6 - 20 MG/DL    Creatinine 1.17 (H) 0.55 - 1.02 MG/DL    BUN/Creatinine ratio 20 12 - 20      GFR est AA 57 (L) >60 ml/min/1.73m2    GFR est non-AA 47 (L) >60 ml/min/1.73m2    Calcium 8.1 (L) 8.5 - 10.1 MG/DL   GLUCOSE, POC    Collection Time: 02/15/18  6:13 AM   Result Value Ref Range    Glucose (POC) 129 (H) 65 - 100 mg/dL    Performed by Trinh Verdin    GLUCOSE, POC    Collection Time: 02/15/18 11:28 AM   Result Value Ref Range    Glucose (POC) 150 (H) 65 - 100 mg/dL    Performed by [facility]       Lab Results   Component Value Date/Time    INR 1.2 (H) 02/15/2018 04:04 AM    INR 1.1 02/14/2018 03:58 AM    INR 1.0 01/29/2018 09:14 AM     Lab Results   Component Value Date/Time    HGB 9.3 (L) 02/15/2018 04:04 AM    HGB 10.5 (L) 02/14/2018 03:58 AM    HGB 12.7 01/23/2018 03:47 PM    HGB 13.3 04/06/2016 11:44 AM     Recent Labs      02/15/18   0404  02/14/18   0358   NA  140  143   K  4.2  4.5   CL  109*  111*   BUN  23*  29*   CREA  1.17*  1.35*   GLU  134*  126*   CA  8.1*  7.9*       PT/OT:   Gait:  Gait  Base of Support: Widened  Speed/Bea: Slow  Step Length: Left shortened, Right shortened  Swing Pattern: Left asymmetrical  Gait Abnormalities: Circumduction, Path deviations, Step to gait  Ambulation - Level of Assistance: Contact guard assistance  Distance (ft): 55 Feet (ft)  Assistive Device: Prosthetic device, Gait belt, Walker, rolling                 PATIENT MOBILITY  Bed Mobility Training  Rolling: Supervision  Supine to Sit: Supervision  Sit to Supine: Supervision  Scooting: Supervision  Transfer Training  Sit to Stand: Moderate assistance, Assist x2  Stand to Sit: Moderate assistance, Minimum assistance, Assist x2      Gait Training  Assistive Device: Prosthetic device, Gait belt, Walker, rolling  Ambulation - Level of Assistance: Contact guard assistance  Distance (ft): 55 Feet (ft)   Weight Bearing Status  Right Side Weight Bearing: As tolerated        Assessment and Plan    Active Problems:    Primary localized osteoarthritis of right knee (2/13/2018)          POD#2 Procedure(s):  RIGHT TOTAL KNEE ARTHROPLASTY (SPINAL WITH IV SED)  Expected acute blood loss anemia related to surgery. BMP trending back to her normal baseline. No indications of bleeding, VTE or other postop complication. Labs trending as expected. Chronic problems stable: Obesity, DM--better control, GI stable & ready to start metformin this evening , HTN--BP elevated & tachycardia most likely related to pain & did resume ACE-I today. See Rehab impression from yesterday, as will be followed at [facility]. VTE prophylaxis: ASA, Mobilization, Ankle pumping exercises, SCDs in bed. Weight bearing:  WBAT  Pain management:  Multi-modal pain plan, see above for medication,  Ice packs & elevation of extremity per orders, active gentle ROM & mobilize frequently for short periods of time. Bowel regimen  PT & OT  Steady progress  Wound benign. Neurovascularly intact. Progressing well. No indications of concerns. Continue present plans per orthopedic attending surgeon & interdisciplinary team for joint replacement. Discharge Planning: [facility] is goal.  Postop plans per Dr. Tommie Franks.     Signed By: Juana Cole NP    RN, MSN, MA, Adult NP-BC

## 2018-02-15 NOTE — PROGRESS NOTES
Problem: Self Care Deficits Care Plan (Adult)  Goal: *Acute Goals and Plan of Care (Insert Text)  Occupational Therapy Goals  Initiated 2/14/2018    1. Patient will perform lower body dressing at minimal assistance/contact guard assist within 7 days. 2. Patient will perform toilet transfers at minimal assistance/contact guard assist using Walkers, Type: Rolling Walker and LLE prosthesis within 7 days. 3. Patient will perform all aspects of toileting at minimal assistance/contact guard assist within 7 days. 4. Patient will tolerate greater than 4 minutes of standing without a rest break at minimal assistance/contact guard assist using Walkers, Type: Rolling Walker and LLE prosthesis during ADL's within 7 days. Occupational Therapy TREATMENT  Patient: Izzy Mckeon (89 y.o. female)  Date: 2/15/2018  Diagnosis: PRIMARY LOCALIZED OA RIGHT KNEE  Primary localized osteoarthritis of right knee <principal problem not specified>  Procedure(s) (LRB):  RIGHT TOTAL KNEE ARTHROPLASTY (SPINAL WITH IV SED) (Right) 2 Days Post-Op  Precautions: Fall, WBAT  Chart, occupational therapy assessment, plan of care, and goals were reviewed. ASSESSMENT:  Pt was able to complete lower body dressing from chair level. She needed mod A for tasks. Pt was able to complete chair push ups from sitting in chair level. Pt did well with tasks and demonstrates good upper body endurance and strength overall. Pt will benefit form rehab at discharge due to increased pain and impaired mobility following RTKR with L AKA. Progression toward goals:  [x]       Improving appropriately and progressing toward goals  []       Improving slowly and progressing toward goals  []       Not making progress toward goals and plan of care will be adjusted     PLAN:  Patient continues to benefit from skilled intervention to address the above impairments. Continue treatment per established plan of care.   Discharge Recommendations:  Inpatient Rehab  Further Equipment Recommendations for Discharge:  TBD     SUBJECTIVE:   Patient stated I am feeling good overall I just hurt.     OBJECTIVE DATA SUMMARY:   Cognitive/Behavioral Status:  Neurologic State: Alert  Orientation Level: Oriented X4  Cognition: Appropriate for age attention/concentration  Perception: Appears intact  Perseveration: No perseveration noted  Safety/Judgement: Good awareness of safety precautions    Functional Mobility and Transfers for ADLs:  Bed Mobility:  Rolling: Supervision  Supine to Sit:  (recieved in chair)  Sit to Supine: Supervision  Scooting: Supervision    Transfers:  Sit to Stand: Moderate assistance;Assist x2       Balance:  Sitting: Intact  Standing: Intact; With support  Standing - Static: Fair  Standing - Dynamic : Fair    ADL Intervention:     Lower body dressing will need AE to complete tasks as she is still unable to access foot or complete using cross leg tehcnique. Lower Body Dressing Assistance  Dressing Assistance: Moderate assistance  Underpants: Moderate assistance  Pants With Elastic Waist: Moderate assistance  Socks: Moderate assistance         Cognitive Retraining  Safety/Judgement: Good awareness of safety precautions    Neuro Re-Education:           Therapeutic Exercises:   Chair push ups from sitting in chair. Recommend 2-3 per hour while in chair. Pain:  Pain Scale 1: Numeric (0 - 10)  Pain Intensity 1: 8  Pain Location 1: Knee     Pain Description 1: Aching  Pain Intervention(s) 1: Medication (see MAR); Ice  Activity Tolerance:   VSS throughout session. Please refer to the flowsheet for vital signs taken during this treatment.   After treatment:   [x] Patient left in no apparent distress sitting up in chair  [] Patient left in no apparent distress in bed  [x] Call bell left within reach  [x] Nursing notified  [] Caregiver present  [] Bed alarm activated    COMMUNICATION/COLLABORATION:   The patients plan of care was discussed with: Physical Therapist and Registered Nurse    Meghan Mock OT  Time Calculation: 24 mins

## 2018-02-15 NOTE — PROGRESS NOTES
Problem: Mobility Impaired (Adult and Pediatric)  Goal: *Acute Goals and Plan of Care (Insert Text)  Physical Therapy Goals  Initiated 2/13/2018    1. Patient will move from supine to sit and sit to supine , scoot up and down and roll side to side in bed with independence within 4 days. 2. Patient will perform sit to stand with modified independence within 4 days. 3. Patient will ambulate with modified independence for 100 feet with the least restrictive device within 4 days. 4. Patient will ascend/descend 5 stairs with 1 handrail(s) with modified independence within 4 days. 5. Patient will perform home exercise program per protocol with independence within 4 days. 6. Patient will demonstrate AROM 0-90 degrees in operative joint within 4 days. physical Therapy TREATMENT  Patient: Marion Blancas (16 y.o. female)  Date: 2/15/2018  Diagnosis: PRIMARY LOCALIZED OA RIGHT KNEE  Primary localized osteoarthritis of right knee <principal problem not specified>  Procedure(s) (LRB):  RIGHT TOTAL KNEE ARTHROPLASTY (SPINAL WITH IV SED) (Right) 2 Days Post-Op  Precautions: Fall, WBAT  Chart, physical therapy assessment, plan of care and goals were reviewed. ASSESSMENT:  Patient wore her stump  last night but continues to have difficulty donning her prosthesis d/t thigh swelling and not knowing the position of her air valve to achieve suction on her prosthetic. Mild progression of gait today but considerable time spent standing and attepting to don her prosthtetic d/t pistoning vs complete loss of limb. Gait training finally completed with manual assistance to approximate her left leg which exacerbated circumduction at the foot. Highly recommend discharge to IP rehab when medically stable due to high fall risk and her  unable to provide adequate assistance at home.   Progression toward goals:  []      Improving appropriately and progressing toward goals  [x]      Improving slowly and progressing toward goals  []      Not making progress toward goals and plan of care will be adjusted     PLAN:  Patient continues to benefit from skilled intervention to address the above impairments. Continue treatment per established plan of care. Discharge Recommendations:  Inpatient Rehab  Further Equipment Recommendations for Discharge:  to be determined       SUBJECTIVE:   Patient stated I can't believe I don't know how to work this leg.     OBJECTIVE DATA SUMMARY:   Critical Behavior:  Neurologic State: Alert, Appropriate for age  Orientation Level: Oriented X4  Cognition: Appropriate decision making, Appropriate for age attention/concentration, Appropriate safety awareness, Follows commands  Safety/Judgement: Awareness of environment, Driving appropriateness, Fall prevention, Good awareness of safety precautions, Home safety, Insight into deficits  Range of Motion:                          Functional Mobility Training:  Bed Mobility:  Rolling: Supervision  Supine to Sit: Supervision  Sit to Supine: Supervision  Scooting: Supervision        Transfers:  Sit to Stand: Maximum assistance;Assist x2 (with 3rd person to guard and facilitate fit of prosthetic)  Stand to Sit: Moderate assistance;Assist x2                             Balance:  Sitting: Intact  Standing: Impaired  Standing - Static: Fair;Constant support  Standing - Dynamic : Fair  Ambulation/Gait Training:  Distance (ft): 40 Feet (ft)  Assistive Device: Prosthetic device;Gait belt;Walker, rolling  Ambulation - Level of Assistance: Contact guard assistance        Gait Abnormalities: Circumduction;Path deviations; Step to gait  Right Side Weight Bearing: As tolerated     Base of Support: Widened     Speed/Bea: Slow  Step Length: Left shortened;Right shortened  Swing Pattern: Left asymmetrical                 Stairs:            Therapeutic Exercises:     EXERCISE   Sets   Reps   Active Active Assist   Passive Self ROM   Comments   Ankle Pumps 1 10 [x] []                                        []                                        []                                           Quad Sets 1 10 [x]                                        []                                        []                                        []                                           Hamstring Sets 1 10 [x]                                        []                                        []                                        []                                           Short Arc Quads 1 10 [x]                                        []                                        []                                        []                                           Knee Extension Stretch 1 120 sec   []                                          []                                          [x]                                          []                                           Heel Slides   []                                        []                                        []                                        []                                           Long Arc Quads   []                                        []                                        []                                        []                                           Knee Flexion Stretch   []                                        []                                        []                                        []                                           Straight Leg Raises   []                                        []                                        []                                        []                                             Pain:  Pain Scale 1: Numeric (0 - 10)  Pain Intensity 1: 8  Pain Location 1: Knee     Pain Description 1: Aching  Pain Intervention(s) 1: Medication (see MAR); Ice  Activity Tolerance:       After treatment:   [] Patient left in no apparent distress sitting up in chair  [x] Patient left in no apparent distress in bed  [x] Call bell left within reach  [x] Nursing notified  [] Caregiver present  [] Bed alarm activated    COMMUNICATION/COLLABORATION:   The patients plan of care was discussed with: Registered Nurse    Jah Alonso, PT, DPT   Time Calculation: 31 mins

## 2018-02-15 NOTE — PROGRESS NOTES
Spiritual Care Partner Volunteer visited patient in [address] [facility] on 2.15.18. Documented by:  Rev. Chris Quinones M.Div, [facility]

## 2018-02-15 NOTE — PROGRESS NOTES
Problem: Falls - Risk of  Goal: *Absence of Falls  Document Christine Fall Risk and appropriate interventions in the flowsheet.    Outcome: Progressing Towards Goal  Fall Risk Interventions:  Mobility Interventions: Patient to call before getting OOB         Medication Interventions: Evaluate medications/consider consulting pharmacy, Patient to call before getting OOB         History of Falls Interventions: Evaluate medications/consider consulting pharmacy

## 2018-02-15 NOTE — PROGRESS NOTES
Bedside and Verbal shift change report given to Myah Jeong RN (oncoming nurse) by Oriana Resendiz RN (offgoing nurse). Report included the following information SBAR.

## 2018-02-15 NOTE — PROGRESS NOTES
Pharmacist Note - Warfarin Dosing  Consult provided for this 65 y.o. female to manage warfarin for VTE PPx s/p RTK    INR Goal: 1.7- 2.2  Therapy Day: 3  Preop Dose: Mock - None    Drugs that may increase INR: None  Drugs that may decrease INR: None  Other current anticoagulants/ drugs that may increase bleeding risk: NSAID  Risk factors: None  Daily INR ordered: YES  Recent Labs      02/15/18   0404  02/14/18   0358   HGB  9.3*  10.5*   INR  1.2*  1.1       Date               INR                 Dose  1/29  1.0  ---  2/12  ---  None PreOp  2/13  ---  6 mg  2/14  1.1  5 mg  2/15  1.2  5 mg    Assessment/ Plan: Will order warfarin 5 mg PO x 1 dose. Pharmacy will continue to monitor daily and adjust therapy as indicated.

## 2018-02-15 NOTE — PROGRESS NOTES
Bedside shift change report given to Delbert Merchant (oncoming nurse) by Dhiraj Desir (offgoing nurse). Report included the following information SBAR, Kardex, Intake/Output, MAR and Recent Results.

## 2018-02-15 NOTE — PROGRESS NOTES
Problem: Mobility Impaired (Adult and Pediatric)  Goal: *Acute Goals and Plan of Care (Insert Text)  Physical Therapy Goals  Initiated 2/13/2018    1. Patient will move from supine to sit and sit to supine , scoot up and down and roll side to side in bed with independence within 4 days. 2. Patient will perform sit to stand with modified independence within 4 days. 3. Patient will ambulate with modified independence for 100 feet with the least restrictive device within 4 days. 4. Patient will ascend/descend 5 stairs with 1 handrail(s) with modified independence within 4 days. 5. Patient will perform home exercise program per protocol with independence within 4 days. 6. Patient will demonstrate AROM 0-90 degrees in operative joint within 4 days. physical Therapy TREATMENT  Patient: Wiliam Drew (88 y.o. female)  Date: 2/15/2018  Diagnosis: PRIMARY LOCALIZED OA RIGHT KNEE  Primary localized osteoarthritis of right knee <principal problem not specified>  Procedure(s) (LRB):  RIGHT TOTAL KNEE ARTHROPLASTY (SPINAL WITH IV SED) (Right) 2 Days Post-Op  Precautions: Fall, WBAT  Chart, physical therapy assessment, plan of care and goals were reviewed. ASSESSMENT:  Patient received in chair for pm session and agreeable to activity. Noted improvement in transfers for the afternoon and able to progress gait distance when her prosthetic was already donned. She reported moderate fatigue and pain upon return to her room. She remains below her baseline for functional mobility with difficulty with transfers and limited endurance. Highly recommend discharge to an IP rehab setting when medically stable. Progression toward goals:  [x]      Improving appropriately and progressing toward goals  []      Improving slowly and progressing toward goals  []      Not making progress toward goals and plan of care will be adjusted     PLAN:  Patient continues to benefit from skilled intervention to address the above impairments. Continue treatment per established plan of care. Discharge Recommendations:  Inpatient Rehab  Further Equipment Recommendations for Discharge:  to be determined       SUBJECTIVE:   Patient stated I have been up since this morning.     OBJECTIVE DATA SUMMARY:   Critical Behavior:  Neurologic State: Alert  Orientation Level: Oriented X4  Cognition: Appropriate for age attention/concentration  Safety/Judgement: Good awareness of safety precautions  Range of Motion:        RLE Assessment (WDL): Exceptions to WDL     RLE PROM  R Knee Flexion: 85  R Knee Extension: -15           Functional Mobility Training:  Bed Mobility:  Rolling: Supervision  Supine to Sit:  (recieved in chair)  Sit to Supine: Supervision  Scooting: Supervision        Transfers:  Sit to Stand: Moderate assistance;Assist x2  Stand to Sit: Moderate assistance;Minimum assistance;Assist x2                             Balance:  Sitting: Intact  Standing: Intact; With support  Standing - Static: Fair  Standing - Dynamic : Fair  Ambulation/Gait Training:  Distance (ft): 55 Feet (ft)  Assistive Device: Prosthetic device;Gait belt;Walker, rolling  Ambulation - Level of Assistance: Contact guard assistance        Gait Abnormalities: Circumduction;Path deviations; Step to gait  Right Side Weight Bearing: As tolerated     Base of Support: Widened     Speed/Bea: Slow  Step Length: Left shortened;Right shortened  Swing Pattern: Left asymmetrical     Therapeutic Exercises:     EXERCISE   Sets   Reps   Active Active Assist   Passive Self ROM   Comments   Ankle Pumps 1 10 [x]                                        []                                        []                                        []                                           Quad Sets 1 10 [x]                                        []                                        []                                        []                                           Hamstring Sets 1 10 [x] []                                        []                                        []                                           Short Arc Quads 1 10 [x]                                        []                                        []                                        []                                        Challenging for patient   Knee Extension Stretch 1 120 sec   []                                          []                                          [x]                                          []                                        Also manual stretch 3x30 sec   Heel Slides   []                                        []                                        []                                        []                                           Long Arc Quads   []                                        []                                        []                                        []                                           Knee Flexion Stretch 1 10 [x]                                        []                                        []                                        []                                           Straight Leg Raises   []                                        []                                        []                                        []                                             Pain:  Pain Scale 1: Numeric (0 - 10)  Pain Intensity 1: 4  Pain Location 1: Knee  Pain Orientation 1: Right  Pain Description 1: Aching  Pain Intervention(s) 1: Ice;Repositioned  Activity Tolerance:       After treatment:   [] Patient left in no apparent distress sitting up in chair  [x] Patient left in no apparent distress in bed  [x] Call bell left within reach  [x] Nursing notified  [] Caregiver present  [] Bed alarm activated    COMMUNICATION/COLLABORATION:   The patients plan of care was discussed with: Registered Nurse    Katherine Vega, PT, DPT   Time Calculation: 26 mins

## 2018-02-16 LAB
GLUCOSE BLD STRIP.AUTO-MCNC: 126 MG/DL (ref 65–100)
GLUCOSE BLD STRIP.AUTO-MCNC: 133 MG/DL (ref 65–100)
GLUCOSE BLD STRIP.AUTO-MCNC: 151 MG/DL (ref 65–100)
GLUCOSE BLD STRIP.AUTO-MCNC: 152 MG/DL (ref 65–100)
INR PPP: 1.5 (ref 0.9–1.1)
PROTHROMBIN TIME: 14.9 SEC (ref 9–11.1)
SERVICE CMNT-IMP: ABNORMAL

## 2018-02-16 PROCEDURE — 65270000029 HC RM PRIVATE

## 2018-02-16 PROCEDURE — 97530 THERAPEUTIC ACTIVITIES: CPT

## 2018-02-16 PROCEDURE — 97116 GAIT TRAINING THERAPY: CPT

## 2018-02-16 PROCEDURE — 74011636637 HC RX REV CODE- 636/637: Performed by: NURSE PRACTITIONER

## 2018-02-16 PROCEDURE — 36415 COLL VENOUS BLD VENIPUNCTURE: CPT | Performed by: ORTHOPAEDIC SURGERY

## 2018-02-16 PROCEDURE — 97110 THERAPEUTIC EXERCISES: CPT

## 2018-02-16 PROCEDURE — 74011636637 HC RX REV CODE- 636/637: Performed by: ORTHOPAEDIC SURGERY

## 2018-02-16 PROCEDURE — 82962 GLUCOSE BLOOD TEST: CPT

## 2018-02-16 PROCEDURE — 74011250637 HC RX REV CODE- 250/637: Performed by: NURSE PRACTITIONER

## 2018-02-16 PROCEDURE — 74011250637 HC RX REV CODE- 250/637: Performed by: ORTHOPAEDIC SURGERY

## 2018-02-16 PROCEDURE — 97535 SELF CARE MNGMENT TRAINING: CPT | Performed by: OCCUPATIONAL THERAPIST

## 2018-02-16 PROCEDURE — 85610 PROTHROMBIN TIME: CPT | Performed by: ORTHOPAEDIC SURGERY

## 2018-02-16 RX ORDER — HYDROCHLOROTHIAZIDE 25 MG/1
25 TABLET ORAL DAILY
Status: DISCONTINUED | OUTPATIENT
Start: 2018-02-17 | End: 2018-02-18 | Stop reason: HOSPADM

## 2018-02-16 RX ORDER — WARFARIN SODIUM 5 MG/1
5 TABLET ORAL ONCE
Status: COMPLETED | OUTPATIENT
Start: 2018-02-16 | End: 2018-02-16

## 2018-02-16 RX ADMIN — GABAPENTIN 100 MG: 100 CAPSULE ORAL at 23:49

## 2018-02-16 RX ADMIN — OXYCODONE HYDROCHLORIDE 10 MG: 5 TABLET ORAL at 23:49

## 2018-02-16 RX ADMIN — DOCUSATE SODIUM AND SENNOSIDES 1 TABLET: 8.6; 5 TABLET, FILM COATED ORAL at 08:57

## 2018-02-16 RX ADMIN — WARFARIN SODIUM 5 MG: 5 TABLET ORAL at 12:17

## 2018-02-16 RX ADMIN — OXYCODONE HYDROCHLORIDE 10 MG: 5 TABLET ORAL at 12:17

## 2018-02-16 RX ADMIN — Medication 10 ML: at 16:13

## 2018-02-16 RX ADMIN — ACETAMINOPHEN 500 MG: 500 TABLET ORAL at 07:00

## 2018-02-16 RX ADMIN — Medication 10 ML: at 23:49

## 2018-02-16 RX ADMIN — ACETAMINOPHEN 500 MG: 500 TABLET ORAL at 23:49

## 2018-02-16 RX ADMIN — DOCUSATE SODIUM AND SENNOSIDES 1 TABLET: 8.6; 5 TABLET, FILM COATED ORAL at 19:28

## 2018-02-16 RX ADMIN — METFORMIN HYDROCHLORIDE 500 MG: 500 TABLET, EXTENDED RELEASE ORAL at 09:12

## 2018-02-16 RX ADMIN — INSULIN LISPRO 2 UNITS: 100 INJECTION, SOLUTION INTRAVENOUS; SUBCUTANEOUS at 19:29

## 2018-02-16 RX ADMIN — ACETAMINOPHEN 500 MG: 500 TABLET ORAL at 12:17

## 2018-02-16 RX ADMIN — HYDROXYZINE HYDROCHLORIDE 10 MG: 10 TABLET, FILM COATED ORAL at 14:46

## 2018-02-16 RX ADMIN — ACETAMINOPHEN 500 MG: 500 TABLET ORAL at 16:13

## 2018-02-16 RX ADMIN — GABAPENTIN 100 MG: 100 CAPSULE ORAL at 08:57

## 2018-02-16 RX ADMIN — ACETAMINOPHEN 500 MG: 500 TABLET ORAL at 20:00

## 2018-02-16 RX ADMIN — INSULIN GLARGINE 5 UNITS: 100 INJECTION, SOLUTION SUBCUTANEOUS at 23:49

## 2018-02-16 RX ADMIN — GABAPENTIN 100 MG: 100 CAPSULE ORAL at 16:13

## 2018-02-16 RX ADMIN — OXYCODONE HYDROCHLORIDE 5 MG: 5 TABLET ORAL at 08:58

## 2018-02-16 RX ADMIN — METFORMIN HYDROCHLORIDE 500 MG: 500 TABLET, EXTENDED RELEASE ORAL at 19:28

## 2018-02-16 RX ADMIN — LISINOPRIL 20 MG: 20 TABLET ORAL at 14:46

## 2018-02-16 RX ADMIN — POLYETHYLENE GLYCOL 3350 17 G: 17 POWDER, FOR SOLUTION ORAL at 08:58

## 2018-02-16 NOTE — PROGRESS NOTES
Bedside shift change report given to Lazarus Plan (oncoming nurse) by Nora Duran (offgoing nurse). Report included the following information SBAR, Kardex, Intake/Output and MAR.

## 2018-02-16 NOTE — PROGRESS NOTES
Problem: Mobility Impaired (Adult and Pediatric)  Goal: *Acute Goals and Plan of Care (Insert Text)  Physical Therapy Goals  Initiated 2/13/2018    1. Patient will move from supine to sit and sit to supine , scoot up and down and roll side to side in bed with independence within 4 days. 2. Patient will perform sit to stand with modified independence within 4 days. 3. Patient will ambulate with modified independence for 100 feet with the least restrictive device within 4 days. 4. Patient will ascend/descend 5 stairs with 1 handrail(s) with modified independence within 4 days. 5. Patient will perform home exercise program per protocol with independence within 4 days. 6. Patient will demonstrate AROM 0-90 degrees in operative joint within 4 days. physical Therapy TREATMENT  Patient: Sherry Cho (25 y.o. female)  Date: 2/16/2018  Diagnosis: PRIMARY LOCALIZED OA RIGHT KNEE  Primary localized osteoarthritis of right knee <principal problem not specified>  Procedure(s) (LRB):  RIGHT TOTAL KNEE ARTHROPLASTY (SPINAL WITH IV SED) (Right) 3 Days Post-Op  Precautions: Fall, WBAT  Chart, physical therapy assessment, plan of care and goals were reviewed. ASSESSMENT:  Pt is able to maintain gait tolerance. Pt limited by pain increasing difficulties with sit to stand and gait. Pt is recurring the assistance of two to peform sit<>stand and to ambulate. This is below pt's baseline. Pt may benefit from inpt rehab to progress    Progression toward goals:  [x]      Improving appropriately and progressing toward goals  []      Improving slowly and progressing toward goals  []      Not making progress toward goals and plan of care will be adjusted     PLAN:  Patient continues to benefit from skilled intervention to address the above impairments. Continue treatment per established plan of care.   Discharge Recommendations:  Inpatient Rehab  Further Equipment Recommendations for Discharge:  TBD     SUBJECTIVE:   Patient stated I just laid down.     OBJECTIVE DATA SUMMARY:   Critical Behavior:  Neurologic State: Alert, Appropriate for age  Orientation Level: Oriented X4  Cognition: Appropriate decision making, Appropriate safety awareness, Appropriate for age attention/concentration, Follows commands  Safety/Judgement: Awareness of environment, Driving appropriateness, Fall prevention, Good awareness of safety precautions, Home safety, Insight into deficits  Functional Mobility Training:  Bed Mobility:     Supine to Sit: Supervision  Sit to Supine: Supervision            Transfers:  Sit to Stand: Moderate assistance;Assist x2 (elevated surface)  Stand to Sit: Minimum assistance;Assist x2               ambulated to the bathroom Pt was able to utilize grab bar to assist with stand              Balance:  Sitting: Intact  Standing: Impaired  Standing - Static: Fair  Standing - Dynamic : Fair  Ambulation/Gait Training:  Distance (ft): 40 Feet (ft)  Assistive Device: Gait belt;Prosthetic device; Walker, rolling  Ambulation - Level of Assistance: Minimal assistance        Gait Abnormalities: Antalgic;Decreased step clearance              Speed/Bea: Slow  Step Length: Left shortened;Right shortened                    Stairs:             Neuro Re-Education:    Therapeutic Exercises:     Pain:  Pain Scale 1: Numeric (0 - 10)  Pain Intensity 1: 9  Pain Location 1: Knee  Pain Orientation 1: Right  Pain Description 1: Aching  Pain Intervention(s) 1: Medication (see MAR)  Activity Tolerance:   Elevated BP  Please refer to the flowsheet for vital signs taken during this treatment.   After treatment:   [] Patient left in no apparent distress sitting up in chair  [x] Patient left in no apparent distress in bed  [x] Call bell left within reach  [x] Nursing notified  [x] Caregiver present  [] Bed alarm activated    COMMUNICATION/COLLABORATION:   The patients plan of care was discussed with: Registered Nurse    Neftaly Oliver PTA   Time Calculation: 27 mins

## 2018-02-16 NOTE — PROGRESS NOTES
Pharmacist Note - Warfarin Dosing  Consult provided for this 65 y.o. female to manage warfarin for VTE PPx s/p RTK    INR Goal: 1.7- 2.2  Therapy Day: 4  Preop Dose: Mock - None    Drugs that may increase INR: None  Drugs that may decrease INR: None  Other current anticoagulants/ drugs that may increase bleeding risk: NSAID  Risk factors: None  Daily INR ordered: YES  Recent Labs      02/16/18   0550  02/15/18   0404  02/14/18   0358   HGB   --   9.3*  10.5*   INR  1.5*  1.2*  1.1       Date               INR                 Dose  1/29  1.0  ---  2/12  ---  None PreOp  2/13  ---  6 mg  2/14  1.1  5 mg  2/15  1.2  5 mg  2/16  1.5  5 mg    Assessment/ Plan: Will order warfarin 5 mg PO x 1 dose. Noted acute postop anemia; dressing CDI    Pharmacy will continue to monitor daily and adjust therapy as indicated.

## 2018-02-16 NOTE — PROGRESS NOTES
Bedside shift change report given to [address] [place] [address] (oncoming nurse) by Darby Mckeon (offgoing nurse). Report included the following information SBAR, Kardex, Intake/Output, MAR and Recent Results.

## 2018-02-16 NOTE — PROGRESS NOTES
KIRAN spoke with Gigi Chaevs with [facility]. The insurance [employer] is still in process. They will have a bed on Sat pending auth. The patient will need ambulance transport. SERGEY BECK spoke with Gigi Chaves 3:11pm. The patient's insurance has authorized acute rehab stay. [facility] will have a bed for this patient tomorrow after 2pm.  CRM requested a 2:30pm [facility] time. Will follow. Sergey    2:30 ambulance confirmed. The patient is aware and the discharge folder is located on the chart.  SERGEY

## 2018-02-16 NOTE — PROGRESS NOTES
Pain controlled. No cp/sob.     Visit Vitals    /77    Pulse (!) 114    Temp 98.1 °F (36.7 °C)    Resp 16    Ht 5' 3\" (1.6 m)    Wt 82.6 kg (182 lb)    LMP 08/23/2003    SpO2 100%    BMI 32.24 kg/m2       R knee dressing dry  Calves soft NT  Motor 5/5  Pulses symmetrical    Recent Results (from the past 12 hour(s))   GLUCOSE, POC    Collection Time: 02/15/18  9:24 PM   Result Value Ref Range    Glucose (POC) 203 (H) 65 - 100 mg/dL    Performed by [id number] [facility] + INR    Collection Time: 02/16/18  5:50 AM   Result Value Ref Range    INR 1.5 (H) 0.9 - 1.1      Prothrombin time 14.9 (H) 9.0 - 11.1 sec   GLUCOSE, POC    Collection Time: 02/16/18  7:07 AM   Result Value Ref Range    Glucose (POC) 133 (H) 65 - 100 mg/dL    Performed by Isai Spaulding        POD 3 R TKA; acute postop blood loss anemia (expected)  Asymptomatic tachycardia (tachy in 110's at pre-admission testing)  -PT  -pain control  -coumadin  -anticipate disch to Em Harrell MD

## 2018-02-16 NOTE — PROGRESS NOTES
02/16/18 0818   Vital Signs   Temp 98.1 °F (36.7 °C)   Temp Source Oral   Pulse (Heart Rate) (!) 114   Heart Rate Source Monitor   Resp Rate 16   O2 Sat (%) 100 %   Level of Consciousness Alert   /77   MAP (Calculated) 85   MEWS Score 4   Pain 1   Pain Scale 1 Numeric (0 - 10)   Pain Intensity 1 6   Patient Stated Pain Goal 0   Pain Onset 1 post op   Pain Location 1 Knee   Pain Orientation 1 Right   Pain Description 1 Aching   Pain Intervention(s) 1 Ice;Repositioned   POSS Scale   Opioid Sedation Scale 1   Oxygen Therapy   O2 Device Room air     Informed Dr. Katiana Navarro of pt's MEWS score of 4. No new orders at this time.

## 2018-02-16 NOTE — PROGRESS NOTES
Occupational Therapy Goals  Initiated 2/14/2018    1. Patient will perform lower body dressing at minimal assistance/contact guard assist within 7 days. 2. Patient will perform toilet transfers at minimal assistance/contact guard assist using Walkers, Type: Rolling Walker and LLE prosthesis within 7 days. 3. Patient will perform all aspects of toileting at minimal assistance/contact guard assist within 7 days. 4. Patient will tolerate greater than 4 minutes of standing without a rest break at minimal assistance/contact guard assist using Walkers, Type: Rolling Walker and LLE prosthesis during ADLs within 7 days. Occupational Therapy TREATMENT  Patient: Kayla Becker (89 y.o. female)  Date: 2/16/2018  Diagnosis: PRIMARY LOCALIZED OA RIGHT KNEE  Primary localized osteoarthritis of right knee <principal problem not specified>  Procedure(s) (LRB):  RIGHT TOTAL KNEE ARTHROPLASTY (SPINAL WITH IV SED) (Right) 3 Days Post-Op  Precautions: Fall, WBAT  Chart, occupational therapy assessment, plan of care, and goals were reviewed. ASSESSMENT:  Pt is making steady progress towards goals. She remains limited by RLE pain, decreased mobility, endurance, balance and safety. Pt participated in sponge bath and dressing tasks and required an overall mod A for LE tasks. Continue to recommend IP rehab at discharge. Pt is very motivated for increased independence and can tolerate 3 hours of intense therapy a day. Progression toward goals:  [x]          Improving appropriately and progressing toward goals  []          Improving slowly and progressing toward goals  []          Not making progress toward goals and plan of care will be adjusted     PLAN:  Patient continues to benefit from skilled intervention to address the above impairments. Continue treatment per established plan of care.   Discharge Recommendations:  Inpatient Rehab  Further Equipment Recommendations for Discharge:  TBD     SUBJECTIVE:   Patient stated I am better today.     OBJECTIVE DATA SUMMARY:   Cognitive/Behavioral Status:  Neurologic State: Alert, Appropriate for age  Orientation Level: Oriented X4  Cognition: Appropriate decision making, Appropriate safety awareness, Appropriate for age attention/concentration, Follows commands  Safety/Judgement: Awareness of environment, Driving appropriateness, Fall prevention, Good awareness of safety precautions, Home safety, Insight into deficits    Functional Mobility and Transfers for ADLs: Pt seated up in chair upon arrival    ADL Intervention and Instruction:  Grooming  Grooming Assistance: Supervision/set up (seated)  Washing Face: Supervision/set-up  Washing Hands: Supervision/set-up  Brushing Teeth: Supervision/set-up    Upper Body Bathing  Bathing Assistance: Supervision/set-up  Position Performed: Seated in chair  Cues: Verbal cues provided    Lower Body Bathing  Bathing Assistance: Moderate assistance  Perineal  : Moderate assistance (A to effectively clean buttocks)  Position Performed: Seated in chair  Cues: Tactile cues provided;Verbal cues provided;Visual cues provided  Lower Body : Minimum assistance (A to reach toes on RLE)  Position Performed: Bending forward method;Seated in chair  Cues: Physical assistance;Verbal cues provided;Visual cues provided    Upper Body [address]: Supervision/ set-up    Lower Body Dressing Assistance  Dressing Assistance: Moderate assistance  Underpants: Moderate assistance (A to thread over R foot and pull over hips seated)  Socks: Moderate assistance (A to thread over toes)  Shoes with Cloth Laces: Minimum assistance (A to tie laces)  Leg Crossed Method Used: No  Position Performed: Bending forward method;Seated in chair  Cues: Don;Physical assistance; Tactile cues provided;Verbal cues provided;Visual cues provided      Cognitive Retraining  Safety/Judgement: Awareness of environment;Driving appropriateness; Fall prevention;Good awareness of safety precautions; Home safety; Insight into deficits     Bathing: Patient instructed when bathing to not submerge wound in water, stand to shower or sponge bathe, cover wound with plastic and tape to ensure no water reaches bandage/wound without cues. Patient indicated understanding. Dressing joint: Patient instructed and demonstrated to don/doff RLE first/last verbal cues. Patient instructed and demonstrated to don all clothing while sitting prior to standing, doff all clothing to knees while standing, then sit to doff clothing off from knees to feet in order to facilitate fall prevention, pain management, and energy conservation with Supervision. Dressing joint reach exercise: To increase independence with lower body dressing, patient instructed and demonstrated to reach down RLE in a seated position slowly to prevent tearing/shearing until slight pull is felt, hold at end range for 10 seconds, then return to starting upright position with Supervision. Patient instructed to complete three sets of three repetitions each daily. Home safety: Patient instructed on home modifications and safety (raise height of ADL objects, appropriate height of chair surfaces, recliner safety, change of floor surfaces, clear pathways) to increase independence and fall prevention. Patient indicated understanding. Standing: Patient instructed and demonstrated during ADLs to walk up to sink/counter top/surfaces, step into walker to increase safety of joint and fall prevention with Supervision. Patient educated about knee anatomy verbally and educated to avoid rotation of RLE. Instructed to apply concept to ADLs within the home (no twisting of knee during reaching across body, square off while using objects, slide objects along surfaces).   Patient instructed to increase amount of time standing, observe standing position during ADLs in order to increase even weight bearing through bilateral LEs in order to increase independence with ADLs.  Goal to be reached 30 days post - op, per orthopedic surgeon or per PT. Patient indicated understanding. Tub transfer: Patient instructed regarding when it is safe to begin transfer into tub (complete stairs with PT, advance exercises with PT high enough to clear tub height). Patient instructed to use the same technique as used with stairs when entering and exiting tub (\"up with the non-surgical, down with the surgical leg\"). Patient indicated understanding. Pain:  Pain Scale 1: Numeric (0 - 10)  Pain Intensity 1: 6  Pain Location 1: Knee  Pain Orientation 1: Right  Pain Description 1: Aching  Pain Intervention(s) 1: Medication (see MAR)    Activity Tolerance:   Fair  Please refer to the flowsheet for vital signs taken during this treatment.   After treatment:   [x] Patient left in no apparent distress sitting up in chair  [] Patient left in no apparent distress in bed  [x] Call bell left within reach  [x] Nursing notified  [x] Caregiver present -   [] Bed alarm activated    COMMUNICATION/COLLABORATION:   The patients plan of care was discussed with: Physical Therapy Assistant and Registered Nurse    Miri Packer OT  Time Calculation: 15 mins

## 2018-02-16 NOTE — PROGRESS NOTES
Problem: Mobility Impaired (Adult and Pediatric)  Goal: *Acute Goals and Plan of Care (Insert Text)  Physical Therapy Goals  Initiated 2/13/2018    1. Patient will move from supine to sit and sit to supine , scoot up and down and roll side to side in bed with independence within 4 days. 2. Patient will perform sit to stand with modified independence within 4 days. 3. Patient will ambulate with modified independence for 100 feet with the least restrictive device within 4 days. 4. Patient will ascend/descend 5 stairs with 1 handrail(s) with modified independence within 4 days. 5. Patient will perform home exercise program per protocol with independence within 4 days. 6. Patient will demonstrate AROM 0-90 degrees in operative joint within 4 days. physical Therapy TREATMENT  Patient: Yasemin Reid (73 y.o. female)  Date: 2/16/2018  Diagnosis: PRIMARY LOCALIZED OA RIGHT KNEE  Primary localized osteoarthritis of right knee <principal problem not specified>  Procedure(s) (LRB):  RIGHT TOTAL KNEE ARTHROPLASTY (SPINAL WITH IV SED) (Right) 3 Days Post-Op  Precautions: Fall, WBAT  Chart, physical therapy assessment, plan of care and goals were reviewed. ASSESSMENT:  Pt eager and motivated to improve mobility. Pt continues to have difficulties to мария prothesis due to ability to place full weight through right LE. Pt required two to assist with standing and another person to assist with assist donning and air relief valve for a suction fit. PTA pt mod I with cane and working. Pt is significantly below baseline and could benefit from inpt rehab to progress    Progression toward goals:  [x]      Improving appropriately and progressing toward goals  []      Improving slowly and progressing toward goals  []      Not making progress toward goals and plan of care will be adjusted     PLAN:  Patient continues to benefit from skilled intervention to address the above impairments.   Continue treatment per established plan of care.  Discharge Recommendations:  Inpatient Rehab  Further Equipment Recommendations for Discharge:  TBD     SUBJECTIVE:   Patient stated I need help with that leg. Gloria Quezada    OBJECTIVE DATA SUMMARY:   Critical Behavior:  Neurologic State: Alert, Appropriate for age  Orientation Level: Oriented X4  Cognition: Appropriate decision making, Appropriate safety awareness, Appropriate for age attention/concentration, Follows commands  Safety/Judgement: Awareness of environment, Driving appropriateness, Fall prevention, Good awareness of safety precautions, Home safety, Insight into deficits  Range of Motion:                          Functional Mobility Training:  Bed Mobility:     Supine to Sit: Supervision               Transfers:  Sit to Stand: Moderate assistance;Assist x2 (elevated surface)  Stand to Sit: Minimum assistance;Assist x2                             Balance:  Sitting: Intact  Standing: Impaired  Standing - Static: Fair  Standing - Dynamic : Fair  Ambulation/Gait Training:  Distance (ft): 40 Feet (ft)  Assistive Device: Gait belt;Prosthetic device; Walker, rolling  Ambulation - Level of Assistance: Minimal assistance        Gait Abnormalities: Antalgic;Decreased step clearance              Speed/Bea: Slow  Step Length: Left shortened;Right shortened                    Stairs:            Therapeutic Exercises:     EXERCISE   Sets   Reps   Active Active Assist   Passive Self ROM   Comments   Ankle Pumps  10 [x]                                []                                []                                []                                   Quad Sets  10 [x]                                []                                []                                []                                   Hamstring Sets   []                                []                                []                                []                                   Short Arc Quads   []                                [] []                                []                                   Knee Extension Stretch  3min   [x]                                  []                                  []                                  []                                   Heel Slides  10 []                                [x]                                []                                []                                   Long Arc Quads   []                                []                                []                                []                                   Knee Flexion Stretch   []                                []                                []                                []                                   Straight Leg Raises   []                                []                                []                                []                                       Pain:  Pain Scale 1: Numeric (0 - 10)  Pain Intensity 1: 6  Pain Location 1: Knee  Pain Orientation 1: Right  Pain Description 1: Aching  Pain Intervention(s) 1: Medication (see MAR)  Activity Tolerance:   Limited   Please refer to the flowsheet for vital signs taken during this treatment.   After treatment:   [x] Patient left in no apparent distress sitting up in chair  [] Patient left in no apparent distress in bed  [x] Call bell left within reach  [x] Nursing notified  [x] Caregiver present  [] Bed alarm activated    COMMUNICATION/COLLABORATION:   The patients plan of care was discussed with: Registered Nurse    Magalis Barnhart PTA   Time Calculation: 26 mins

## 2018-02-17 ENCOUNTER — APPOINTMENT (OUTPATIENT)
Dept: CT IMAGING | Age: 63
DRG: 470 | End: 2018-02-17
Attending: INTERNAL MEDICINE
Payer: COMMERCIAL

## 2018-02-17 LAB
ATRIAL RATE: 127 BPM
CALCULATED P AXIS, ECG09: 65 DEGREES
CALCULATED R AXIS, ECG10: -3 DEGREES
CALCULATED T AXIS, ECG11: 47 DEGREES
DIAGNOSIS, 93000: NORMAL
GLUCOSE BLD STRIP.AUTO-MCNC: 125 MG/DL (ref 65–100)
GLUCOSE BLD STRIP.AUTO-MCNC: 125 MG/DL (ref 65–100)
GLUCOSE BLD STRIP.AUTO-MCNC: 129 MG/DL (ref 65–100)
GLUCOSE BLD STRIP.AUTO-MCNC: 174 MG/DL (ref 65–100)
INR BLD: 1.6 (ref 0.9–1.2)
P-R INTERVAL, ECG05: 152 MS
Q-T INTERVAL, ECG07: 300 MS
QRS DURATION, ECG06: 82 MS
QTC CALCULATION (BEZET), ECG08: 436 MS
SERVICE CMNT-IMP: ABNORMAL
VENTRICULAR RATE, ECG03: 127 BPM

## 2018-02-17 PROCEDURE — 93005 ELECTROCARDIOGRAM TRACING: CPT

## 2018-02-17 PROCEDURE — 74011636637 HC RX REV CODE- 636/637: Performed by: NURSE PRACTITIONER

## 2018-02-17 PROCEDURE — 71275 CT ANGIOGRAPHY CHEST: CPT

## 2018-02-17 PROCEDURE — 74011250637 HC RX REV CODE- 250/637: Performed by: ORTHOPAEDIC SURGERY

## 2018-02-17 PROCEDURE — 74011000258 HC RX REV CODE- 258: Performed by: ORTHOPAEDIC SURGERY

## 2018-02-17 PROCEDURE — 85610 PROTHROMBIN TIME: CPT

## 2018-02-17 PROCEDURE — 74011250637 HC RX REV CODE- 250/637: Performed by: INTERNAL MEDICINE

## 2018-02-17 PROCEDURE — 65270000029 HC RM PRIVATE

## 2018-02-17 PROCEDURE — 74011250636 HC RX REV CODE- 250/636: Performed by: INTERNAL MEDICINE

## 2018-02-17 PROCEDURE — 97530 THERAPEUTIC ACTIVITIES: CPT

## 2018-02-17 PROCEDURE — 74011250637 HC RX REV CODE- 250/637: Performed by: NURSE PRACTITIONER

## 2018-02-17 PROCEDURE — 82962 GLUCOSE BLOOD TEST: CPT

## 2018-02-17 PROCEDURE — 74011636320 HC RX REV CODE- 636/320: Performed by: ORTHOPAEDIC SURGERY

## 2018-02-17 RX ORDER — SODIUM CHLORIDE 9 MG/ML
1000 INJECTION, SOLUTION INTRAVENOUS ONCE
Status: COMPLETED | OUTPATIENT
Start: 2018-02-17 | End: 2018-02-17

## 2018-02-17 RX ORDER — AMOXICILLIN 250 MG
1 CAPSULE ORAL 2 TIMES DAILY
Qty: 100 TAB | Refills: 0 | Status: SHIPPED
Start: 2018-02-17 | End: 2018-03-05 | Stop reason: ALTCHOICE

## 2018-02-17 RX ORDER — SODIUM CHLORIDE 0.9 % (FLUSH) 0.9 %
10 SYRINGE (ML) INJECTION
Status: COMPLETED | OUTPATIENT
Start: 2018-02-17 | End: 2018-02-17

## 2018-02-17 RX ORDER — WARFARIN 2 MG/1
4 TABLET ORAL DAILY
Qty: 90 TAB | Refills: 1 | Status: SHIPPED | OUTPATIENT
Start: 2018-02-17 | End: 2018-02-17

## 2018-02-17 RX ORDER — OXYCODONE HYDROCHLORIDE 5 MG/1
5-10 TABLET ORAL
Qty: 80 TAB | Refills: 0 | Status: SHIPPED | OUTPATIENT
Start: 2018-02-17 | End: 2018-03-05 | Stop reason: ALTCHOICE

## 2018-02-17 RX ORDER — ACETAMINOPHEN 500 MG
500 TABLET ORAL
Qty: 100 TAB | Refills: 0 | Status: SHIPPED
Start: 2018-02-17

## 2018-02-17 RX ORDER — WARFARIN 2 MG/1
TABLET ORAL
Qty: 90 TAB | Refills: 1 | Status: SHIPPED | OUTPATIENT
Start: 2018-02-17 | End: 2018-03-19 | Stop reason: ALTCHOICE

## 2018-02-17 RX ORDER — MAGNESIUM CITRATE
296 SOLUTION, ORAL ORAL
Status: COMPLETED | OUTPATIENT
Start: 2018-02-17 | End: 2018-02-17

## 2018-02-17 RX ORDER — WARFARIN SODIUM 5 MG/1
5 TABLET ORAL ONCE
Status: COMPLETED | OUTPATIENT
Start: 2018-02-17 | End: 2018-02-17

## 2018-02-17 RX ADMIN — ACETAMINOPHEN 500 MG: 500 TABLET ORAL at 07:56

## 2018-02-17 RX ADMIN — MAGESIUM CITRATE 296 ML: 1.75 LIQUID ORAL at 17:49

## 2018-02-17 RX ADMIN — SODIUM CHLORIDE 100 ML: 900 INJECTION, SOLUTION INTRAVENOUS at 17:15

## 2018-02-17 RX ADMIN — GABAPENTIN 100 MG: 100 CAPSULE ORAL at 21:01

## 2018-02-17 RX ADMIN — GABAPENTIN 100 MG: 100 CAPSULE ORAL at 17:49

## 2018-02-17 RX ADMIN — INSULIN GLARGINE 5 UNITS: 100 INJECTION, SOLUTION SUBCUTANEOUS at 21:32

## 2018-02-17 RX ADMIN — ACETAMINOPHEN 500 MG: 500 TABLET ORAL at 17:49

## 2018-02-17 RX ADMIN — METFORMIN HYDROCHLORIDE 500 MG: 500 TABLET, EXTENDED RELEASE ORAL at 07:57

## 2018-02-17 RX ADMIN — ACETAMINOPHEN 500 MG: 500 TABLET ORAL at 14:13

## 2018-02-17 RX ADMIN — IOPAMIDOL 80 ML: 755 INJECTION, SOLUTION INTRAVENOUS at 17:14

## 2018-02-17 RX ADMIN — LISINOPRIL 20 MG: 20 TABLET ORAL at 10:10

## 2018-02-17 RX ADMIN — OXYCODONE HYDROCHLORIDE 10 MG: 5 TABLET ORAL at 21:01

## 2018-02-17 RX ADMIN — SODIUM CHLORIDE 1000 ML/HR: 900 INJECTION, SOLUTION INTRAVENOUS at 14:02

## 2018-02-17 RX ADMIN — OXYCODONE HYDROCHLORIDE 10 MG: 5 TABLET ORAL at 06:17

## 2018-02-17 RX ADMIN — Medication 10 ML: at 21:01

## 2018-02-17 RX ADMIN — DOCUSATE SODIUM AND SENNOSIDES 1 TABLET: 8.6; 5 TABLET, FILM COATED ORAL at 17:49

## 2018-02-17 RX ADMIN — GABAPENTIN 100 MG: 100 CAPSULE ORAL at 10:10

## 2018-02-17 RX ADMIN — Medication 10 ML: at 06:20

## 2018-02-17 RX ADMIN — WARFARIN SODIUM 5 MG: 5 TABLET ORAL at 14:13

## 2018-02-17 RX ADMIN — ACETAMINOPHEN 500 MG: 500 TABLET ORAL at 21:01

## 2018-02-17 RX ADMIN — DOCUSATE SODIUM AND SENNOSIDES 1 TABLET: 8.6; 5 TABLET, FILM COATED ORAL at 10:10

## 2018-02-17 RX ADMIN — Medication 10 ML: at 17:14

## 2018-02-17 RX ADMIN — POLYETHYLENE GLYCOL 3350 17 G: 17 POWDER, FOR SOLUTION ORAL at 10:10

## 2018-02-17 RX ADMIN — HYDROCHLOROTHIAZIDE 25 MG: 25 TABLET ORAL at 10:10

## 2018-02-17 RX ADMIN — OXYCODONE HYDROCHLORIDE 10 MG: 5 TABLET ORAL at 17:49

## 2018-02-17 NOTE — PROGRESS NOTES
Problem: Mobility Impaired (Adult and Pediatric)  Goal: *Acute Goals and Plan of Care (Insert Text)  Physical Therapy Goals  Initiated 2/13/2018    1. Patient will move from supine to sit and sit to supine , scoot up and down and roll side to side in bed with independence within 4 days. 2. Patient will perform sit to stand with modified independence within 4 days. 3. Patient will ambulate with modified independence for 100 feet with the least restrictive device within 4 days. 4. Patient will ascend/descend 5 stairs with 1 handrail(s) with modified independence within 4 days. 5. Patient will perform home exercise program per protocol with independence within 4 days. 6. Patient will demonstrate AROM 0-90 degrees in operative joint within 4 days. physical Therapy TREATMENT  Patient: Yasemin Reid (89 y.o. female)  Date: 2/17/2018  Diagnosis: PRIMARY LOCALIZED OA RIGHT KNEE  Primary localized osteoarthritis of right knee <principal problem not specified>  Procedure(s) (LRB):  RIGHT TOTAL KNEE ARTHROPLASTY (SPINAL WITH IV SED) (Right) 4 Days Post-Op  Precautions: Fall, WBAT  Chart, physical therapy assessment, plan of care and goals were reviewed. ASSESSMENT:  Patient received in bed and agreeable to therapy. Moving well with bed mobility and min assist to don prosthetic sleeve. Continues to require significant assist to don  Prosthesis due to pain in right upon standing and needing assist to secure prosthesis. Ambulated short distance with rolling walker with contact guard but shortened distance due to nurse stating she was to go to [facility] as scheduled. Returned to sit in chair having pain in right knee as flexes to sit. She is having significant tachycardia but no symptoms. Left up in chair to prepare for discharge.   Progression toward goals:  []      Improving appropriately and progressing toward goals  [x]      Improving slowly and progressing toward goals  []      Not making progress toward goals and plan of care will be adjusted     PLAN:  Patient continues to benefit from skilled intervention to address the above impairments. Continue treatment per established plan of care. Discharge Recommendations:  Inpatient Rehab  Further Equipment Recommendations for Discharge:  none     SUBJECTIVE:   Patient agreeable to therapy.     OBJECTIVE DATA SUMMARY:   Critical Behavior:  Neurologic State: Alert, Appropriate for age  Orientation Level: Oriented X4  Cognition: Follows commands, Appropriate for age attention/concentration  Safety/Judgement: Awareness of environment, Driving appropriateness, Fall prevention, Good awareness of safety precautions, Home safety, Insight into deficits  Range of Motion:                          Functional Mobility Training:  Bed Mobility:     Supine to Sit: Modified independent     Scooting: Modified independent        Transfers:  Sit to Stand: Moderate assistance;Assist x2  Stand to Sit: Minimum assistance                             Balance:  Sitting: Intact  Standing: Impaired; With support  Standing - Static: Fair  Standing - Dynamic : Fair  Ambulation/Gait Training:  Distance (ft): 30 Feet (ft)  Assistive Device: Gait belt;Prosthetic device; Walker, rolling  Ambulation - Level of Assistance: Contact guard assistance        Gait Abnormalities: Antalgic;Decreased step clearance              Speed/Bea: Pace decreased (<100 feet/min)  Step Length: Right shortened;Left shortened                      Pain:  Pain Scale 1: Numeric (0 - 10)  Pain Intensity 1: 0  Pain Location 1: Knee  Pain Orientation 1: Right  Pain Description 1: Burning  Pain Intervention(s) 1: Medication (see MAR)  Activity Tolerance: At rest SEB, 128-130  Please refer to the flowsheet for vital signs taken during this treatment.   After treatment:   [x] Patient left in no apparent distress sitting up in chair  [] Patient left in no apparent distress in bed  [x] Call bell left within reach  [x] Nursing notified  [] Caregiver present  [] Bed alarm activated    COMMUNICATION/COLLABORATION:   The patients plan of care was discussed with: Registered Nurse    Darcie Broussard, PT   Time Calculation: 19 mins

## 2018-02-17 NOTE — PROGRESS NOTES
Bedside and Verbal shift change report given to Hunter Orozco (oncoming nurse) by Juani Bob (offgoing nurse). Report included the following information SBAR, Kardex, Intake/Output, MAR and Recent Results.

## 2018-02-17 NOTE — PROGRESS NOTES
Bedside shift change report given to [facility] (oncoming nurse) by Jacque Monae (offgoing nurse). Report included the following information SBAR, Kardex, Intake/Output, MAR and Recent Results.

## 2018-02-17 NOTE — PROGRESS NOTES
POD 4 Days Post-Op    Procedure:  Procedure(s):  RIGHT TOTAL KNEE ARTHROPLASTY (SPINAL WITH IV SED)    Subjective:     Patient doing well, complaining of appropriate post-op pain    Objective:     Blood pressure 125/79, pulse 99, temperature 98 °F (36.7 °C), resp. rate 16, height 5' 3\" (1.6 m), weight 82.6 kg (182 lb), last menstrual period 2003, SpO2 98 %. Temp (24hrs), Av.2 °F (36.8 °C), Min:98 °F (36.7 °C), Max:98.4 °F (36.9 °C)      Physical Exam:  Examination of the right knee reveals that the incision is clean, dry and intact. Sensation is intact to light touch.  mild swelling. No calf pain.   NVSI    Labs:   Lab Results   Component Value Date/Time    HGB 9.3 (L) 02/15/2018 04:04 AM         Assessment:     Active Problems:    Primary localized osteoarthritis of right knee (2018)        Procedure(s):  RIGHT TOTAL KNEE ARTHROPLASTY (SPINAL WITH IV SED)    Plan/Recommendations/Medical Decision Making:     - pain control - oxy  - ice   - pt/ot  - dvt prophylaxis - coumadin  - d/c planning - rehab today        Venus Rodrigues DO

## 2018-02-17 NOTE — CONSULTS
Hospitalist Consult Note  Abdoul Torres MD  [phone]    Primary Care Provider: Tina Landis MD    Subjective:     Sofiya Ortiz is a 65 y.o. female with a history of diabetes, HTN, sleep apnea (does not wear CPAP), and L leg prosthesis who was admitted 2/13/18 for a R knee arthroplasty, post-op day 4. She is currently on the Orthopedic service. The Hospitalist service was consulted for tachycardia. On encounter patient stating she feels her heart is racing and she is SOB with exertion. She denies CP, N/V, F/C, dizziness, lightheadedness, or swelling. She states she does not have any known lung or cardiac disease at baseline. Her PCP once gave her an albuterol inhaler PRN, but she never needed to use it. She also notes some occasional diaphoresis. EKG showing sinus tachycardia. Most recent vitals with BP of 149/95, P of 130, and normal sats on RA. Review of Systems:  Further 10 point review of systems is benign. A comprehensive review of systems was negative except for that written in the History of Present Illness. Past Medical History:   Diagnosis Date    Asthma     PATIENT DENIES    Bone spur of ankle 3/30/12    right ankle    Chronic pain     DM [facility]diabetes mellitus) [facility] [address]     GERD (gastroesophageal reflux disease)     Hypertension     OA (osteoarthritis) of knee 3/30/12    right knee    Sleep apnea     Unilateral AKA ([facility] [address] 1993    left      Past Surgical History:   Procedure Laterality Date    HX ABOVE KNEE AMPUTATION  1994    HX CHOLECYSTECTOMY  1988    HX CYST INCISION AND DRAINAGE Right     HX DILATION AND CURETTAGE  01/02/2018    HX PELVIC FRACTURE [facility]    [address]     Prior to Admission medications    Medication Sig Start Date End Date Taking? Authorizing Provider   acetaminophen (TYLENOL) 500 mg tablet Take 1 Tab by mouth every four (4) hours (while awake).  Indications: Pain 2/17/18  Yes Stalin Herrmann MD   oxyCODONE IR (ROXICODONE) 5 mg immediate release tablet Take 1-2 Tabs by mouth every four (4) hours as needed for Pain. Max Daily Amount: 60 mg. Indications: Pain 2/17/18  Yes Demetria Dorsey MD   senna-docusate (PERICOLACE) 8.6-50 mg per tablet Take 1 Tab by mouth two (2) times a day. Indications: constipation 2/17/18  Yes Demetria Dorsey MD   warfarin (COUMADIN) 2 mg tablet Take 5 mg (two and a half tablets) daily. Adjust dose as directed. Indications: DEEP VEIN THROMBOSIS PREVENTION 2/17/18  Yes Demetria Dorsey MD   colchicine (COLCRYS) 0.6 mg tablet Take 1 Tab by mouth daily. 2/7/18  Yes Angeline Peters MD   naproxen (NAPROSYN) 500 mg tablet  11/25/17  Yes Historical Provider   lisinopril-hydroCHLOROthiazide (PRINZIDE, ZESTORETIC) 20-25 mg per tablet TAKE ONE TABLET BY MOUTH EVERY DAY 1/23/18  Yes Angeline Peters MD   metFORMIN (GLUCOPHAGE) 500 mg tablet Take 1 Tab by mouth two (2) times daily (with meals). 1/23/18  Yes Angeline Peters MD   simvastatin (ZOCOR) 40 mg tablet Take 1 Tab by mouth daily. Patient taking differently: Take 40 mg by mouth nightly. 1/23/18  Yes Angeline Peters MD   raNITIdine (ZANTAC) 150 mg tablet Take 1 Tab by mouth two (2) times a day. 9/1/17  Yes Angeline Peters MD   gabapentin (NEURONTIN) 100 mg capsule Take 1 Cap by mouth three (3) times daily. 9/1/17  Yes Angeline Peters MD   NOVOLOG MIX 70-30 FLEXPEN 100 unit/mL (70-30) inpn INJECT 35 UNITS SUBCUTANEOUSLY BEFORE BREAKFAST AND DINNER FOR TYPE 2 DIABETES  Patient taking differently: 30 units subcutaneous before breakfast and dinner 8/28/17  Yes Angeline Peters MD   acetaminophen (TYLENOL ARTHRITIS PAIN) 650 mg CR tablet Take 650 mg by mouth every six (6) hours as needed for Pain. Yes Historical Provider   cyclobenzaprine (FLEXERIL) 10 mg tablet Take 1 Tab by mouth three (3) times daily as needed for Muscle Spasm(s). 1/19/17  Yes Angeline Peters MD   oxyCODONE-acetaminophen (PERCOCET) 5-325 mg per tablet Take 1 Tab by mouth every six (6) hours as needed for Pain. Max Daily Amount: 4 Tabs.  2/7/18   Angeline Peters MD Allergies   Allergen Reactions    Keflex [Cephalexin] Itching    Lortab [Hydrocodone-Acetaminophen] Hives and Itching      Family History   Problem Relation Age of Onset    Breast Cancer Mother     Diabetes Father     Alzheimer Maternal Aunt     Dementia Maternal Aunt     Breast Cancer Maternal Aunt     Dementia Maternal Aunt     Alzheimer Maternal Aunt     Breast Cancer Maternal Aunt     Alzheimer Maternal Aunt     Dementia Maternal Aunt     Breast Cancer Maternal Aunt     Dementia Maternal Aunt     Alzheimer Maternal Aunt     Breast Cancer Maternal Aunt     Breast Cancer Sister     Breast Cancer Maternal Grandmother     Other Brother      JOB ACCIDENT    Anesth Problems Neg Hx         SOCIAL HISTORY:  Patient resides at Home. Works as caterer. Smoking history: Quit 20 years ago. Alcohol history: Denies heavy use. Objective:       Physical Exam:   Visit Vitals    BP (!) 149/95 (BP 1 Location: Left arm, BP Patient Position: At rest)    Pulse (!) 130    Temp 97.9 °F (36.6 °C)    Resp 16    Ht 5' 3\" (1.6 m)    Wt 82.6 kg (182 lb)    LMP 08/23/2003    SpO2 98%    BMI 32.24 kg/m2     General:  Alert, cooperative, no distress, appears stated age. Head:  Normocephalic, without obvious abnormality   Eyes:  EOMs intact. Neck: Supple, symmetrical, trachea midline, no adenopathy   Lungs:   Decreased sounds bilaterally. Heart:  Regular rhythm, increased rate. S1, S2 normal, no murmur appreciated    Abdomen:   Soft, non-tender. Bowel sounds normal. N   Extremities:  L leg prosthesis. R leg without swelling   Skin: Skin color, texture, turgor normal.    Lymph nodes: Cervical, supraclavicular nodes normal.   Neurologic: No focal deficit     ECG:  sinus tachycardia     Data Review: All diagnostic labs and studies have been reviewed. CTA Chest pending.     Assessment:     Active Problems:    Primary localized osteoarthritis of right knee (2/13/2018)        Plan:     Post-Op Tachycardia: Ms. Luz Arias is post-op day 4 from a R knee arthroplasty now with SOB and tachycardia concerning for possible PE.  -1 L bolus to see if patient responds, but doubt she is dry as she is clinically euvolemic on exam  -Patient without hypotension or fever concerning for any possible infection; no need to culture at this time  -EKG with sinus tachycardia  -Patient without chest pain concerning for MI; will hold off on troponin  -SOB in conjunction with tachycardia concerning, will order CTA of the Chest to assess for PE  -Hold metformin after contrast dye is administered    Thank you for this consult. Please call with questions.     Signed By: Jairo Gomez MD     February 17, 2018

## 2018-02-17 NOTE — PROGRESS NOTES
Pharmacist Note - Warfarin Dosing  Consult provided for this 65 y.o. female to manage warfarin for VTE PPx s/p RTK    INR Goal: 1.7- 2.2  Therapy Day: 5  Preop Dose: Mock - None    Drugs that may increase INR: None  Drugs that may decrease INR: None  Other current anticoagulants/ drugs that may increase bleeding risk: NSAID  Risk factors: None  Daily INR ordered: YES  Recent Labs      02/17/18   1018  02/16/18   0550  02/15/18   0404   HGB   --    --   9.3*   INR  1.6*  1.5*  1.2*       Date               INR                 Dose  1/29  1.0  ---  2/12  ---  None PreOp  2/13  ---  6 mg  2/14  1.1  5 mg  2/15  1.2  5 mg  2/16  1.5  5 mg  2/17                 1.6                  5 mg    Assessment/ Plan: Will order warfarin 5 mg PO x 1 dose. Pharmacy will continue to monitor daily and adjust therapy as indicated.

## 2018-02-17 NOTE — PROGRESS NOTES
CM continues to follow. Discussions today with Karime Scott, [address] and pt is accepted and bed available at [facility]. However, pt experienced tachycardia and hospitalist service was consulted. Noted testing ordered. If pt medically stable for discharge on Sunday, [facility] can accept. Discharge folder on chart, [facility] on chart as well. [facility] will need to be notified to confirm discharge time for Sunday, if ready.     LATONYA Álvarez

## 2018-02-17 NOTE — H&P
ATTENDING PHYSICIAN: Dr. Merle Avitia:  Brenda Pacheco  REFERRING PHYSICIAN: Dr. Suleiman Eli  Consultants: Darius Aden: Claudia Mckeon 130 AND PRIMARY DIAGNOSES:       Patient is a 65 y.o., right handed, , female,admitted for knee surgery. Patient with a h/o traumatic right AKA, phantom pain, DM and HTN, and after a fall 2 years ago, has had chronic right knee pain, that steadily worsened. Imaging studies revealed end-stage DJD. She was admitted to [facility] and on 2/13/18, underwent elective right TKR. Patient cleared for WBAT and is on Coumadin for DVT prophylaxis. Knee pain currently 6-7/10, increased with standing to 10/10 and partially decreased with meds/ice. Post-op course LOUIS, tachycardia anemia, and uncontrolled blood sugars     She also chronic phantom pain, not controlled, 9/10, sharp and burning in character. Pain causes sleep interruption and she is on low dose Gabapentin. Left AKA prosthesis is about 3year old, no ply socks. Using old liner, because new one is too tight. States she missed 2 appointments with prothetist due to right knee problems.      She is transferred to [facility] for intensive inpatient rehab and continue medical management          Past Medical History:   Diagnosis Date    Asthma       PATIENT DENIES    Bone spur of ankle 3/30/12     right ankle    Chronic pain      DM (diabetes mellitus) ([address]      GERD (gastroesophageal reflux disease)      Hypertension      OA (osteoarthritis) of knee 3/30/12     right knee    Sleep apnea      Unilateral AKA ([address] 1993     left             Past Surgical History:   Procedure Laterality Date    HX ABOVE KNEE AMPUTATION   1994    HX CHOLECYSTECTOMY   1988    HX CYST INCISION AND DRAINAGE Right      HX DILATION AND CURETTAGE   01/02/2018    HX PELVIC FRACTURE [address]     [address] History   Problem Relation Age of Onset    Breast Cancer Mother      Diabetes Father      Alzheimer Maternal Aunt      Dementia Maternal Aunt      Breast Cancer Maternal Aunt      Dementia Maternal Aunt      Alzheimer Maternal Aunt      Breast Cancer Maternal Aunt      Alzheimer Maternal Aunt      Dementia Maternal Aunt      Breast Cancer Maternal Aunt      Dementia Maternal Aunt      Alzheimer Maternal Aunt      Breast Cancer Maternal Aunt      Breast Cancer Sister      Breast Cancer Maternal Grandmother      Other Brother         JOB ACCIDENT    Anesth Problems Neg Hx              Social History: Lives with spouse, 1 SH, 4 CAMILLE. Job -  community health worker. Substance Use Topics    Smoking status: Former Smoker       Packs/day: 0.50       Years: 15.00       Quit date: 3/30/2009    Smokeless tobacco: Never Used    Alcohol use No         Functional History:   Premorbid -modified iIndependent in ADLs and ambulation with prosthesis,  Current level of function -  ADLs - pending OT eval  MOBILITY  Bed Mobility Training  Rolling: Supervision  Supine to Sit: Supervision  Sit to Supine: Supervision  Scooting: Supervision,  Transfer Training  Sit to Stand: Contact guard assistance  Stand to Sit: Contact guard assistance,  ,  Gait Training  Assistive Device: Prosthetic device, Gait belt, Walker, rolling  Ambulation - Level of Assistance: Contact guard assistance  Distance (ft): 20 Feet (ft), Weight Bearing Status  Right Side Weight Bearing: As tolerated ,           Allergies   Allergen Reactions    Keflex [Cephalexin] Itching    Lortab [Hydrocodone-Acetaminophen] Hives and Itching              Prior to Admission medications    Medication Sig Start Date End Date Taking? Authorizing Provider   colchicine (COLCRYS) 0.6 mg tablet Take 1 Tab by mouth daily.  2/7/18   Yes Long Small MD   naproxen (NAPROSYN) 500 mg tablet   11/25/17   Yes Historical Provider   lisinopril-hydroCHLOROthiazide Xochitl Dunbar ZESTORETIC) 20-25 mg per tablet TAKE ONE TABLET BY MOUTH EVERY DAY 1/23/18   Yes Josefa Weldon MD   metFORMIN (GLUCOPHAGE) 500 mg tablet Take 1 Tab by mouth two (2) times daily (with meals). 1/23/18   Yes Josefa Weldon MD   simvastatin (ZOCOR) 40 mg tablet Take 1 Tab by mouth daily. Patient taking differently: Take 40 mg by mouth nightly. 1/23/18   Yes Josefa Weldon MD   raNITIdine (ZANTAC) 150 mg tablet Take 1 Tab by mouth two (2) times a day. 9/1/17   Yes Josefa Weldon MD   gabapentin (NEURONTIN) 100 mg capsule Take 1 Cap by mouth three (3) times daily. 9/1/17   Yes Josefa Weldon MD   NOVOLOG MIX 70-30 FLEXPEN 100 unit/mL (70-30) inpn INJECT 35 UNITS SUBCUTANEOUSLY BEFORE BREAKFAST AND DINNER FOR TYPE 2 DIABETES  Patient taking differently: 30 units subcutaneous before breakfast and dinner 8/28/17   Yes Josefa Weldon MD   acetaminophen (TYLENOL ARTHRITIS PAIN) 650 mg CR tablet Take 650 mg by mouth every six (6) hours as needed for Pain. Yes Historical Provider   cyclobenzaprine (FLEXERIL) 10 mg tablet Take 1 Tab by mouth three (3) times daily as needed for Muscle Spasm(s). 1/19/17   Yes Josefa Weldon MD   oxyCODONE-acetaminophen (PERCOCET) 5-325 mg per tablet Take 1 Tab by mouth every six (6) hours as needed for Pain. Max Daily Amount: 4 Tabs.  2/7/18     Josefa Weldon MD                   Medications on transfer to [facility]     Medication Dose Route Frequency    0.9% sodium chloride infusion  125 mL/hr IntraVENous CONTINUOUS    oxyCODONE IR (ROXICODONE) tablet 7.5-10 mg  7.5-10 mg Oral Q3H PRN    oxyCODONE IR (ROXICODONE) tablet 2.5-5 mg  2.5-5 mg Oral Q3H PRN    warfarin (COUMADIN) tablet 5 mg  5 mg Oral ONCE    cyclobenzaprine (FLEXERIL) tablet 10 mg  10 mg Oral TID PRN    gabapentin (NEURONTIN) capsule 100 mg  100 mg Oral TID    insulin lispro (HUMALOG) injection    SubCUTAneous AC&HS    glucose chewable tablet 16 g  4 Tab Oral PRN    dextrose (D50W) injection syrg 12.5-25 g  12.5-25 g IntraVENous PRN    glucagon (GLUCAGEN) injection 1 mg  1 mg IntraMUSCular PRN    sodium chloride 0.9 % bolus infusion 500 mL  500 mL IntraVENous ONCE PRN    sodium chloride (NS) flush 5-10 mL  5-10 mL IntraVENous Q8H    sodium chloride (NS) flush 5-10 mL  5-10 mL IntraVENous PRN    acetaminophen (TYLENOL) tablet 500 mg  500 mg Oral Q4HWA    naloxone (NARCAN) injection 0.4 mg  0.4 mg IntraVENous PRN    ondansetron (ZOFRAN) injection 4 mg  4 mg IntraVENous Q4H PRN    hydrOXYzine HCl (ATARAX) tablet 10 mg  10 mg Oral Q8H PRN    senna-docusate (PERICOLACE) 8.6-50 mg per tablet 1 Tab  1 Tab Oral BID    polyethylene glycol (MIRALAX) packet 17 g  17 g Oral DAILY    [START ON 2/15/2018] bisacodyl (DULCOLAX) suppository 10 mg  10 mg Rectal DAILY PRN    Warfarin - Pharmacy to Dose    Other Rx Dosing/Monitoring    acetaminophen (TYLENOL) tablet 325 mg  325 mg Oral Q4H PRN         Review of Systems:  Knee pain     Physical Exam:  General:  Vital Signs:        Skin:      HEENT:  Neck: Alert, not in distress. Blood pressure 123/76, pulse 80 temperature 98.1   resp. rate 17      Right knee incision - with dressing, left AKA - skin mobile, no signs of dehiscence   oropharynx clear  Supple,     Lungs:   Clear to auscultation bilaterally. Heart:  Regular rate and rhythm, S1, S2 stable, no murmur, click, rub or gallop. Abdomen:   Soft, non-tender. Hypoactive bowel sounds present. Genitourinary:  Musculoskeletal: No villagomez  Right calf firm, nontender. Right  lower extremity edema. ROM within functional limits for all limbs with mild LOM right knee due to pain. Left AKA residual limb - nontender, tapered, negative Tinel's. .   Neuro:                 Psych:    Speech and language clear and fluent, Oriented x4, memory and cognition intact, follows all 1- and 2-step verbal directives; Cranial nerves II-XII: intact. Sensory: intact to light touch in all limbs, decreased light touch left buttock to back of residual limb;   Motor: 5/5 in all 4 extremities, except right HF and quads pain inhibited, though at least 3+/5;    Pleasant, cooperative         Labs/Studies:      Recent Labs       02/14/18 0358   HGB  10.5*          Recent Labs       02/14/18 0358   NA  143   K  4.5   CL  111*   CO2  25   GLU  126*   BUN  29*   CREA  1.35*   CA  7.9*   INR  1.1            Lab Results   Component Value Date/Time     Hemoglobin A1c 8.3 (H) 02/14/2018 03:58 AM      No results for input(s): TROIQ, CPK, CKMB in the last 72 hours. Lab Results   Component Value Date/Time     Color YELLOW/STRAW 01/29/2018 09:14 AM     Appearance CLOUDY (A) 01/29/2018 09:14 AM     Specific gravity 1.016 01/29/2018 09:14 AM     pH (UA) 6.0 01/29/2018 09:14 AM     Protein NEGATIVE  01/29/2018 09:14 AM     Glucose NEGATIVE  01/29/2018 09:14 AM     Ketone NEGATIVE  01/29/2018 09:14 AM     Bilirubin NEGATIVE  01/29/2018 09:14 AM     Urobilinogen 0.2 01/29/2018 09:14 AM     Nitrites NEGATIVE  01/29/2018 09:14 AM     Leukocyte Esterase MODERATE (A) 01/29/2018 09:14 AM     Epithelial cells FEW 01/29/2018 09:14 AM     Bacteria NEGATIVE  01/29/2018 09:14 AM     WBC 20-50 01/29/2018 09:14 AM     RBC 0-5 01/29/2018 09:14 AM             Lab Results   Component Value Date/Time     Culture result: MIXED UROGENITAL FALLON ISOLATED 01/29/2018 09:14 AM     Culture result: MRSA NOT PRESENT 01/29/2018 08:45 AM     Culture result:   01/29/2018 08:45 AM           Screening of patient nares for MRSA is for surveillance purposes and, if positive, to facilitate isolation considerations in high risk settings. It is not intended for automatic decolonization interventions per se as regimens are not sufficiently effective to warrant routine use. ASSESSMENT AND PLAN: Impairment/Disability due to: Right total knee replacement 2/13/2018 with history of left AKA amputation    Associated Impairments: As above    Functional Deficits Summary: See Current Function above.     CO-MORBID CONDITIONS/PLAN: Co-morbid conditions which require 24-hour rehab nursing and ongoing physician oversight and management:  1. Right TKR secondary to end-stage DJD,2/13/18 WBAT  2. Acute blood loss anemia  3. LOUIS, likely pre-renal secondary to volume depletion  4. Acute post-op pain on chronic knee pain  5. Hypocalcemia  6. DM type II, controlled with no complications  7. Leftt transfemoral amputation (AKA)  secondary to trauma  8. Phantom pain, uncontrolled  9. Essential Hypertension  10. GERD  VLADISLAV    POST-ADMISSION PHYSICIAN EVALUATION:  The following plan is developed in consideration of current medical and rehabilitation status and review of the preadmission assessment. Compared to the preadmission screening, the patient's current function and medical condition is the same  EXPECTED LEVEL OF IMPROVEMENT:  Rehabilitation potential is good to make improvements to overall functional goal of supervision to modified independent for ADLs, Mobility, and Cognition/Speech. ESTIMATED LENGTH OF stay: 10 days  ANTICIPATED DISCHARGE destination: Discharge to home  PRECAUTIONS:   None     POST DISCHARGE REHABILITATION treatments: Home health physical therapy occupational therapy  POST DISCHARGE MEDICAL follow-up:   primary care, physical medicine rehabilitation  INDIVIDUALIZED REHABILITATION PLAN:   1. PT, 1 to 2 hours a day, 5 to 6 days a week for gait, strengthening, range of motion, balance and endurance. 2. OT, 1 to 2 hours a day, 5 to 6 days a week for basic ADLs, strengthening, endurance, coordination and adaptive equipment. 3. Nursing, 24-hour care by specialized nurse trained in rehabilitation for vital signs monitoring, medication administration and education, pain control, nutrition, bowel and bladder management   4. Case management, 1 hour a day, 3 days a week for discharge planning and team coordination.      SUMMARY STATEMENT FOR MEDICAL NECESSITY:   This patient has complex nursing, medical, and rehabilitation needs that require inpatient interdisciplinary rehabilitation. This patient requires multiple therapy disciplines, at least one of which is PT or OT, using an interdisciplinary approach. Interdisciplinary Team meetings will be held at least once a week. The patient requires intensive therapies 3 hours per day, 5 days per week, or in special instances, at least 15 hours within 7 consecutive days. This patient can actively participate in, and can be reasonably expected to benefit significantly from, the intensive rehabilitation program.  The patient requires close medical supervision by a rehabilitation physician. Signature:_________________________________ 2/17/2018    Rafy Mascorro.  Rob St M.D.

## 2018-02-17 NOTE — DISCHARGE INSTRUCTIONS
After [facility] for SNF/Rehab    Discharge Instructions Knee Replacement- Dr Rachid ardon    Patient Name: Chelle Leahy  Date of procedure: 2/13/2018   Procedure: Procedure(s):  RIGHT TOTAL KNEE ARTHROPLASTY (SPINAL WITH IV SED)  Surgeon: Sdi Lyman) and Role:     * Terrance Toribio MD - Primary  PCP: Lotus Perez MD  Date of discharge: No discharge date for patient encounter. Follow up appointments   Follow up with Dr. Rachid radon in 4 weeks. Call [phone] to make an appointment. Activity   Weight bearing as tolerated with walker or crutches. Refer to pages 23-31 of your handbook for instructions and pictures   Complete your Home Exercise Program daily as instructed by your therapist.  Refer to pages 33-41 of your handbook for instructions and pictures   Get up every one hour and walk (except at night when sleeping)   Do not drive or operate heavy machinery    Incision Care    The Aquacel (brown, waterproof) surgical dressing is to remain on the knee for 7 days. On the 7th day gently peel the dressing off by carefully lifting the edge and stretching it slightly to break the adhesive seal   If you have steri-strips (small, white pieces of tape) on the incision, they may come off when you remove the Aquacel surgical dressing. This is okay. You may now leave your incision open to air   If your Aquacel dressing comes loose/off before the 7th day, you may replace it with a dry sterile gauze dressing; change it daily. Once the incision is not draining, leave it open to air   May take a shower with the Aquacel dressing in place. Once the Aquacel is removed,  may shower and get your incision wet but do not submerge your incision under water in a bath tub, hot tub or swimming pool for 6 weeks after surgery. Preventing blood clots   Give Warfarin daily. INR goal 1.7-2.2.  Continue for one month following surgery    Medications   Continue pain medication as prescribed in the hospital   Continue home medications per medication reconciliation   Place an ice bag on the knee for 15-20 minutes after exercise    Diet   Resume usual diet; encourage fluids; provide foods high in fiber   Provide stool softeners/laxatives as needed          Rehab/SNF Protocol (to be used by facility)    *anticipated length of stay 7-10 days      Nursing   Draw a PT/INR per physicians orders and call results to Dr. Tiffanie Klein at [phone]  [facility] Complete head to toe assessment, vital signs   Medication reconciliation   Review pain management   Manage chronic medical conditions    Physical Therapy  Weight bearing status:  Precautions at Admission: Fall, WBAT     Right Side Weight Bearing: As tolerated    Mobility Status:  Supine to Sit: Supervision  Sit to Stand: Moderate assistance, Assist x2 (elevated surface)  Sit to Supine: Supervision       Gait:  Distance (ft): 40 Feet (ft)  Ambulation - Level of Assistance: Minimal assistance  Assistive Device: Gait belt, Prosthetic device, Walker, rolling  Gait Abnormalities: Antalgic, Decreased step clearance    ADL status overall composite:     Dressing Assistance: Moderate assistance  Bathing Assistance: Moderate assistance  Toilet Transfer : Maximum assistance, Additional time, Assist x2 (without LLE prosthesis)    Physical Therapy-anticipated length of stay 7-10 days  -assessment and evaluation-bed mobility; functional transfers (bed, chair, bathroom, stairs); ambulation with equipment, safety and ability to get out of facility in the event of an emergency  -review and maintain weight bearing as tolerated  -discuss pain management  -review how to do ADLs.  Refer to page 42 of patient handbook    -Exercise Program-refer to pages 33-41 of handbook

## 2018-02-18 ENCOUNTER — HOSPITAL ENCOUNTER (OUTPATIENT)
Age: 63
Discharge: HOME | End: 2018-02-27
Attending: PHYSICAL MEDICINE & REHABILITATION | Admitting: PHYSICAL MEDICINE & REHABILITATION

## 2018-02-18 VITALS
RESPIRATION RATE: 16 BRPM | DIASTOLIC BLOOD PRESSURE: 82 MMHG | BODY MASS INDEX: 32.25 KG/M2 | OXYGEN SATURATION: 97 % | HEIGHT: 63 IN | WEIGHT: 182 LBS | HEART RATE: 106 BPM | SYSTOLIC BLOOD PRESSURE: 125 MMHG | TEMPERATURE: 98.2 F

## 2018-02-18 LAB
APPEARANCE UR: CLEAR
BACTERIA URNS QL MICRO: ABNORMAL /HPF
BILIRUB UR QL: NEGATIVE
COLOR UR: ABNORMAL
EPITH CASTS URNS QL MICRO: ABNORMAL /LPF
GLUCOSE BLD STRIP.AUTO-MCNC: 134 MG/DL (ref 65–100)
GLUCOSE BLD STRIP.AUTO-MCNC: 146 MG/DL (ref 65–100)
GLUCOSE UR STRIP.AUTO-MCNC: NEGATIVE MG/DL
HGB UR QL STRIP: ABNORMAL
INR PPP: 1.7 (ref 0.9–1.1)
KETONES UR QL STRIP.AUTO: NEGATIVE MG/DL
LEUKOCYTE ESTERASE UR QL STRIP.AUTO: ABNORMAL
NITRITE UR QL STRIP.AUTO: NEGATIVE
OTHER,OTHU: ABNORMAL
PH UR STRIP: 6.5 [PH] (ref 5–8)
PROT UR STRIP-MCNC: NEGATIVE MG/DL
PROTHROMBIN TIME: 17 SEC (ref 9–11.1)
RBC #/AREA URNS HPF: ABNORMAL /HPF (ref 0–5)
SERVICE CMNT-IMP: ABNORMAL
SERVICE CMNT-IMP: ABNORMAL
SP GR UR REFRACTOMETRY: 1.02 (ref 1–1.03)
UROBILINOGEN UR QL STRIP.AUTO: 0.2 EU/DL (ref 0.2–1)
WBC URNS QL MICRO: ABNORMAL /HPF (ref 0–4)

## 2018-02-18 PROCEDURE — 36415 COLL VENOUS BLD VENIPUNCTURE: CPT | Performed by: ORTHOPAEDIC SURGERY

## 2018-02-18 PROCEDURE — 81001 URINALYSIS AUTO W/SCOPE: CPT | Performed by: PHYSICAL MEDICINE & REHABILITATION

## 2018-02-18 PROCEDURE — 85610 PROTHROMBIN TIME: CPT | Performed by: ORTHOPAEDIC SURGERY

## 2018-02-18 PROCEDURE — 97530 THERAPEUTIC ACTIVITIES: CPT | Performed by: PHYSICAL THERAPIST

## 2018-02-18 PROCEDURE — 82962 GLUCOSE BLOOD TEST: CPT

## 2018-02-18 PROCEDURE — 74011636637 HC RX REV CODE- 636/637: Performed by: PHYSICAL MEDICINE & REHABILITATION

## 2018-02-18 PROCEDURE — 74011250637 HC RX REV CODE- 250/637: Performed by: PHYSICAL MEDICINE & REHABILITATION

## 2018-02-18 PROCEDURE — 74011636637 HC RX REV CODE- 636/637: Performed by: ORTHOPAEDIC SURGERY

## 2018-02-18 PROCEDURE — 74011250637 HC RX REV CODE- 250/637: Performed by: NURSE PRACTITIONER

## 2018-02-18 PROCEDURE — 74011250637 HC RX REV CODE- 250/637: Performed by: ORTHOPAEDIC SURGERY

## 2018-02-18 PROCEDURE — 87086 URINE CULTURE/COLONY COUNT: CPT | Performed by: PHYSICAL MEDICINE & REHABILITATION

## 2018-02-18 PROCEDURE — 97116 GAIT TRAINING THERAPY: CPT | Performed by: PHYSICAL THERAPIST

## 2018-02-18 RX ORDER — LISINOPRIL 20 MG/1
20 TABLET ORAL DAILY
Status: DISCONTINUED | OUTPATIENT
Start: 2018-02-19 | End: 2018-02-27 | Stop reason: HOSPADM

## 2018-02-18 RX ORDER — INSULIN GLARGINE 100 [IU]/ML
5 INJECTION, SOLUTION SUBCUTANEOUS
Status: DISCONTINUED | OUTPATIENT
Start: 2018-02-18 | End: 2018-02-27 | Stop reason: HOSPADM

## 2018-02-18 RX ORDER — HYDROCHLOROTHIAZIDE 25 MG/1
25 TABLET ORAL DAILY
Status: DISCONTINUED | OUTPATIENT
Start: 2018-02-19 | End: 2018-02-27 | Stop reason: HOSPADM

## 2018-02-18 RX ORDER — WARFARIN SODIUM 5 MG/1
5 TABLET ORAL ONCE
Status: COMPLETED | OUTPATIENT
Start: 2018-02-18 | End: 2018-02-18

## 2018-02-18 RX ORDER — POLYETHYLENE GLYCOL 3350 17 G/17G
17 POWDER, FOR SOLUTION ORAL DAILY
Status: DISCONTINUED | OUTPATIENT
Start: 2018-02-19 | End: 2018-02-27 | Stop reason: HOSPADM

## 2018-02-18 RX ORDER — FACIAL-BODY WIPES
10 EACH TOPICAL DAILY PRN
Status: DISCONTINUED | OUTPATIENT
Start: 2018-02-18 | End: 2018-02-27 | Stop reason: HOSPADM

## 2018-02-18 RX ORDER — ACETAMINOPHEN 325 MG/1
650 TABLET ORAL
Status: DISCONTINUED | OUTPATIENT
Start: 2018-02-18 | End: 2018-02-27 | Stop reason: HOSPADM

## 2018-02-18 RX ORDER — METFORMIN HYDROCHLORIDE 500 MG/1
500 TABLET, EXTENDED RELEASE ORAL 2 TIMES DAILY WITH MEALS
Status: DISCONTINUED | OUTPATIENT
Start: 2018-02-18 | End: 2018-02-27 | Stop reason: HOSPADM

## 2018-02-18 RX ORDER — OXYCODONE HYDROCHLORIDE 5 MG/1
10 TABLET ORAL
Status: DISCONTINUED | OUTPATIENT
Start: 2018-02-18 | End: 2018-02-21 | Stop reason: ALTCHOICE

## 2018-02-18 RX ORDER — ADHESIVE BANDAGE
30 BANDAGE TOPICAL DAILY PRN
Status: DISCONTINUED | OUTPATIENT
Start: 2018-02-18 | End: 2018-02-27 | Stop reason: HOSPADM

## 2018-02-18 RX ORDER — ACETAMINOPHEN 500 MG
500 TABLET ORAL
Status: DISCONTINUED | OUTPATIENT
Start: 2018-02-18 | End: 2018-02-22 | Stop reason: ALTCHOICE

## 2018-02-18 RX ORDER — ZOLPIDEM TARTRATE 5 MG/1
5 TABLET ORAL
Status: DISCONTINUED | OUTPATIENT
Start: 2018-02-18 | End: 2018-02-27 | Stop reason: HOSPADM

## 2018-02-18 RX ORDER — INSULIN LISPRO 100 [IU]/ML
INJECTION, SOLUTION INTRAVENOUS; SUBCUTANEOUS
Status: DISCONTINUED | OUTPATIENT
Start: 2018-02-18 | End: 2018-02-20

## 2018-02-18 RX ORDER — PANTOPRAZOLE SODIUM 40 MG/1
40 TABLET, DELAYED RELEASE ORAL DAILY
Status: DISCONTINUED | OUTPATIENT
Start: 2018-02-19 | End: 2018-02-18 | Stop reason: CLARIF

## 2018-02-18 RX ORDER — ONDANSETRON 4 MG/1
4 TABLET, ORALLY DISINTEGRATING ORAL
Status: DISCONTINUED | OUTPATIENT
Start: 2018-02-18 | End: 2018-02-27 | Stop reason: HOSPADM

## 2018-02-18 RX ORDER — MAGNESIUM SULFATE 100 %
16 CRYSTALS MISCELLANEOUS AS NEEDED
Status: DISCONTINUED | OUTPATIENT
Start: 2018-02-18 | End: 2018-02-27 | Stop reason: HOSPADM

## 2018-02-18 RX ORDER — GABAPENTIN 100 MG/1
100 CAPSULE ORAL 3 TIMES DAILY
Status: DISCONTINUED | OUTPATIENT
Start: 2018-02-18 | End: 2018-02-27 | Stop reason: HOSPADM

## 2018-02-18 RX ORDER — DEXTROSE 50 % IN WATER (D50W) INTRAVENOUS SYRINGE
25 AS NEEDED
Status: DISCONTINUED | OUTPATIENT
Start: 2018-02-18 | End: 2018-02-27 | Stop reason: HOSPADM

## 2018-02-18 RX ORDER — OXYCODONE HYDROCHLORIDE 5 MG/1
5 TABLET ORAL
Status: DISCONTINUED | OUTPATIENT
Start: 2018-02-18 | End: 2018-02-21 | Stop reason: ALTCHOICE

## 2018-02-18 RX ADMIN — ACETAMINOPHEN 500 MG: 500 TABLET ORAL at 09:38

## 2018-02-18 RX ADMIN — OXYCODONE HYDROCHLORIDE 5 MG: 5 TABLET ORAL at 14:37

## 2018-02-18 RX ADMIN — Medication 10 ML: at 05:09

## 2018-02-18 RX ADMIN — INSULIN GLARGINE 5 UNITS: 100 INJECTION, SOLUTION SUBCUTANEOUS at 22:32

## 2018-02-18 RX ADMIN — INSULIN LISPRO 2 UNITS: 100 INJECTION, SOLUTION INTRAVENOUS; SUBCUTANEOUS at 11:45

## 2018-02-18 RX ADMIN — LISINOPRIL 20 MG: 20 TABLET ORAL at 08:23

## 2018-02-18 RX ADMIN — ACETAMINOPHEN 500 MG: 500 TABLET ORAL at 14:37

## 2018-02-18 RX ADMIN — GABAPENTIN 100 MG: 100 CAPSULE ORAL at 08:23

## 2018-02-18 RX ADMIN — POLYETHYLENE GLYCOL 3350 17 G: 17 POWDER, FOR SOLUTION ORAL at 08:24

## 2018-02-18 RX ADMIN — WARFARIN SODIUM 5 MG: 5 TABLET ORAL at 11:45

## 2018-02-18 RX ADMIN — OXYCODONE HYDROCHLORIDE 10 MG: 5 TABLET ORAL at 20:06

## 2018-02-18 RX ADMIN — ACETAMINOPHEN 500 MG: 500 TABLET ORAL at 20:06

## 2018-02-18 RX ADMIN — DOCUSATE SODIUM AND SENNOSIDES 1 TABLET: 8.6; 5 TABLET, FILM COATED ORAL at 08:24

## 2018-02-18 RX ADMIN — INSULIN LISPRO 4 UNITS: 100 INJECTION, SOLUTION INTRAVENOUS; SUBCUTANEOUS at 22:32

## 2018-02-18 RX ADMIN — ACETAMINOPHEN 500 MG: 500 TABLET ORAL at 22:31

## 2018-02-18 RX ADMIN — GABAPENTIN 100 MG: 100 CAPSULE ORAL at 22:32

## 2018-02-18 RX ADMIN — OXYCODONE HYDROCHLORIDE 10 MG: 5 TABLET ORAL at 05:09

## 2018-02-18 RX ADMIN — HYDROCHLOROTHIAZIDE 25 MG: 25 TABLET ORAL at 08:23

## 2018-02-18 RX ADMIN — OXYCODONE HYDROCHLORIDE 10 MG: 5 TABLET ORAL at 09:39

## 2018-02-18 RX ADMIN — ACETAMINOPHEN 325 MG: 325 TABLET, FILM COATED ORAL at 02:47

## 2018-02-18 NOTE — PROGRESS NOTES
Transport confirmed for [facility] confirmed available bed for pt, going to room 126, [facility].     LATONYA Torres

## 2018-02-18 NOTE — PROGRESS NOTES
Vitals w/ MEWS Score (last day)     Date/Time MEWS Score Pulse Resp Temp BP Level of Consciousness SpO2    02/17/18 1422 3 (!)  121 16 98.1 °F (36.7 °C) 143/89 Alert 97 %    02/17/18 1240 -- (!)  130 -- -- -- -- --    02/17/18 1229 -- (!)  127 -- -- -- -- 98 %    02/17/18 1002 1 100 16 97.9 °F (36.6 °C) (!)  149/95 Alert 99 %    02/17/18 0358 1 99 16 98 °F (36.7 °C) 125/79 Alert 98 %    02/16/18 2011 2 (!)  108 18 98.4 °F (36.9 °C) 149/81 Alert 100 %    02/16/18 1455 2 (!)  110 18 98.3 °F (36.8 °C) 143/85 Alert 95 %    02/16/18 1234 -- (!)  125 -- -- (!)  185/95 -- --    02/16/18 1229 -- (!)  119 -- -- (!)  161/93 -- --    02/16/18 1120 -- (!)  125 -- -- (!)  206/102 -- --    02/16/18 0818 4 (!)  114 16 98.1 °F (36.7 °C) 100/77 Alert 100 %    02/16/18 0351 2 (!)  102 15 98.1 °F (36.7 °C) 141/88 Alert 98 %          patient has a MEWS of 3 at 1422; patients pulse is 121; patient is not complaining of any chest pain, tightness; only SOB with exertion; Dr. Shaina Hernandez is aware; will continue to monitor

## 2018-02-18 NOTE — PROGRESS NOTES
Verbal shift change report given to [employer], RN (oncoming nurse) by [facility], RN(offgoing nurse). Report included the following information SBAR, Kardex and MAR.

## 2018-02-18 NOTE — PROGRESS NOTES
POD 5 Days Post-Op    Procedure:  Procedure(s):  RIGHT TOTAL KNEE ARTHROPLASTY (SPINAL WITH IV SED)    Subjective:     Patient doing well, complaining of appropriate post-op pain. Workup for tachycardia negative. Objective:     Blood pressure 133/80, pulse (!) 106, temperature 98.3 °F (36.8 °C), resp. rate 18, height 5' 3\" (1.6 m), weight 82.6 kg (182 lb), last menstrual period 2003, SpO2 99 %. Temp (24hrs), Av.3 °F (36.8 °C), Min:97.9 °F (36.6 °C), Max:98.7 °F (37.1 °C)      Physical Exam:  Examination of the right knee reveals that the incision is clean, dry and intact. Sensation is intact to light touch.  mild swelling. No calf pain.   NVSI    Labs:   Lab Results   Component Value Date/Time    HGB 9.3 (L) 02/15/2018 04:04 AM         Assessment:     Active Problems:    Primary localized osteoarthritis of right knee (2018)        Procedure(s):  RIGHT TOTAL KNEE ARTHROPLASTY (SPINAL WITH IV SED)    Plan/Recommendations/Medical Decision Making:     - pain control - oxy  - ice   - pt/ot  - dvt prophylaxis - coumadin  - d/c planning - rehab today        Jj Caban DO

## 2018-02-18 NOTE — PROGRESS NOTES
Problem: Mobility Impaired (Adult and Pediatric)  Goal: *Acute Goals and Plan of Care (Insert Text)  Physical Therapy Goals  Initiated 2/13/2018    1. Patient will move from supine to sit and sit to supine , scoot up and down and roll side to side in bed with independence within 4 days. 2. Patient will perform sit to stand with modified independence within 4 days. 3. Patient will ambulate with modified independence for 100 feet with the least restrictive device within 4 days. 4. Patient will ascend/descend 5 stairs with 1 handrail(s) with modified independence within 4 days. 5. Patient will perform home exercise program per protocol with independence within 4 days. 6. Patient will demonstrate AROM 0-90 degrees in operative joint within 4 days. physical Therapy TREATMENT  Patient: Sofiya Ortiz (17 y.o. female)  Date: 2/18/2018  Diagnosis: PRIMARY LOCALIZED OA RIGHT KNEE  Primary localized osteoarthritis of right knee <principal problem not specified>  Procedure(s) (LRB):  RIGHT TOTAL KNEE ARTHROPLASTY (SPINAL WITH IV SED) (Right) 5 Days Post-Op  Precautions: Fall, WBAT  Chart, physical therapy assessment, plan of care and goals were reviewed. ASSESSMENT:  Ms Chitra Prince is progressing well with skilled PT services. She is most challenged by sit to stand and stand to sit at this point due to pain with right knee flexion and LLE prosthesis influencing weight shifting during transfers. Today she ambulated 60 feet with rolling walker and min A, WBAT RLE, prosthesis on LLE. -120 bpm.  Patient is highly motivated and an excellent rehab candidate  Progression toward goals:  [x]      Improving appropriately and progressing toward goals  []      Improving slowly and progressing toward goals  []      Not making progress toward goals and plan of care will be adjusted     PLAN:  Patient continues to benefit from skilled intervention to address the above impairments.   Continue treatment per established plan of care.  Discharge Recommendations:  Inpatient Rehab  Further Equipment Recommendations for Discharge:  none     SUBJECTIVE:   Patient stated I just do what I gotta do.     OBJECTIVE DATA SUMMARY:   Critical Behavior:  Neurologic State: Alert  Orientation Level: Oriented X4  Cognition: Follows commands  Safety/Judgement: Awareness of environment, Driving appropriateness, Fall prevention, Good awareness of safety precautions, Home safety, Insight into deficits  Range of Motion:              RLE PROM  R Knee Flexion: 70           Functional Mobility Training:  Bed Mobility:     Supine to Sit: Additional time; Moderate assistance              Transfers:  Sit to Stand: Moderate assistance;Assist x2; Additional time  Stand to Sit: Additional time;Minimum assistance                             Balance:  Sitting: Intact  Standing: Intact; With support  Ambulation/Gait Training:  Distance (ft): 60 Feet (ft)  Assistive Device: Walker, rolling;Gait belt;Prosthetic device  Ambulation - Level of Assistance: Contact guard assistance                 Therapeutic Exercises:     EXERCISE   Sets   Reps   Active Active Assist   Passive Self ROM   Comments   Ankle Pumps 1 3 [x]                                        []                                        []                                        []                                           Quad Sets 1 3 [x]                                        []                                        []                                        []                                           Hamstring Sets   []                                        []                                        []                                        []                                           Short Arc Quads   []                                        []                                        []                                        []                                           Knee Extension Stretch     [] []                                          []                                          []                                           Heel Slides   []                                        []                                        []                                        []                                           Long Arc Quads   []                                        []                                        []                                        []                                           Knee Flexion Stretch 1 3 [x]                                        []                                        []                                        []                                           Straight Leg Raises   []                                        []                                        []                                        []                                             Pain:  Pain Scale 1: Visual (b4 PT)  Pain Intensity 1: 4  Pain Location 1: Knee  Pain Orientation 1: Right  Pain Description 1: Aching;Dull  Pain Intervention(s) 1: Medication (see MAR)  Activity Tolerance:   -120bpm   Please refer to the flowsheet for vital signs taken during this treatment.   After treatment:   [x] Patient left in no apparent distress sitting up in chair  [] Patient left in no apparent distress in bed  [x] Call bell left within reach  [x] Nursing notified  [] Caregiver present  [] Bed alarm activated    COMMUNICATION/COLLABORATION:   The patients plan of care was discussed with: Registered Nurse    Ludmila Sheets, PT, DPT   Time Calculation: 25 mins

## 2018-02-18 NOTE — PROGRESS NOTES
Bedside shift change report given to Jennifer Solis RN (oncoming nurse) by DANIKA Rizo RN (offgoing nurse). Report included the following information SBAR, Kardex and Recent Results.

## 2018-02-18 NOTE — PROGRESS NOTES
Patient leaving to go to [facility] today by ambulance at approx 1400; patient leaving with copy of discharge instructions, MAR, Kardex, Emtala form, 2 prescriptions, extra ice packs, personal belongings; patient signed electronically    1300: called report to [facility]; nurse unavailable to take report at this time    [phone]: Isiah Hurd from [place] [address] called back; gave her report per SBAR, MAR, Kardex; she verbalized understanding

## 2018-02-18 NOTE — PROGRESS NOTES
Hospitalist Progress Note  Brice Porter MD  Answering service: [phone] from in house phone  Cell: [phone]      Date of Service:  2018  NAME:  Chetna Navarro  :  1955  MRN:  [id number]      Admission Summary:   Chetna Navarro is a 65 y.o. female with a history of diabetes, HTN, sleep apnea (does not wear CPAP), and L leg prosthesis who was admitted 18 for a R knee arthroplasty, post-op day 4. She is currently on the Orthopedic service. The Hospitalist service was consulted for tachycardia. Interval history / Subjective:   No chest pain, shortness of breath resolved     Assessment & Plan:     SOB and tachycardia   -shortness of breath resolved, tachycardia improving, HR  since this morning, no left side chest pain  -CTA negative study  -EKG sinus tachycardia vent rate 127 non specific st t wave, no significant change comparing to 18  -improved, SaO2 97-99% on RA and comfortable    Prerenal azotemia   -received noral saline bolus on   -creatinine improving    S/p right knee arthroplasty  -management per orthopedist     HTN  -continue hydrochlorothiazide, lisinopril and monitor BP    DM-II  -A1c 8.3  -finger stick glucose 125-174/ 2hrs  -continue sliding scale and monitor finger stick glucose  -once stable, metformin is held    Hx of VLADISLAV  -not uses her CPAP  -advised to use her CPAP q hs    Left AKA,  leg prosthesis   -stable    Code status: Full Code  DVT prophylaxis: on coumadin    Care Plan discussed with: Patient/Family and Nurse       Hospital Problems  Date Reviewed: 2017          Codes Class Noted POA    Primary localized osteoarthritis of right knee ICD-10-CM: M17.11  ICD-9-CM: 715.16  2018 Unknown              Vital Signs:    Last 24hrs VS reviewed since prior progress note.  Most recent are:  Visit Vitals    /80    Pulse (!) 106    Temp 98.3 °F (36.8 °C)    Resp 18  Ht 5' 3\" (1.6 m)    Wt 82.6 kg (182 lb)    SpO2 99%    BMI 32.24 kg/m2         Intake/Output Summary (Last 24 hours) at 02/18/18 0824  Last data filed at 02/18/18 0403   Gross per 24 hour   Intake              963 ml   Output                0 ml   Net              963 ml        Physical Examination:             Constitutional:  No acute distress, cooperative, pleasant    ENT:  Oral mucous moist, oropharynx benign. Neck supple,    Resp:  CTA bilaterally. No wheezing/rhonchi/rales. No accessory muscle use   CV:  Regular rhythm, normal rate, no murmurs, gallops, rubs    GI:  Soft, non distended, non tender. normoactive bowel sounds, no hepatosplenomegaly     Musculoskeletal:  Right knee incision dressed and clean, left AKA     Neurologic:  Moves all extremities. AAOx3, CN II-XII reviewed     Skin:  Good turgor, no rashes or ulcers       Data Review:    Review and/or order of clinical lab test      Labs:   No results for input(s): WBC, HGB, HCT, PLT, HGBEXT, HCTEXT, PLTEXT in the last 72 hours. No results for input(s): NA, K, CL, CO2, BUN, CREA, GLU, CA, MG, PHOS, URICA in the last 72 hours. No results for input(s): SGOT, GPT, ALT, AP, TBIL, TBILI, TP, ALB, GLOB, GGT, AML, LPSE in the last 72 hours. No lab exists for component: AMYP, HLPSE  Recent Labs      02/18/18   0246  02/17/18   1018  02/16/18   0550   INR  1.7*  1.6*  1.5*   PTP  17.0*   --   14.9*      No results for input(s): FE, TIBC, PSAT, FERR in the last 72 hours. No results found for: FOL, RBCF   No results for input(s): PH, PCO2, PO2 in the last 72 hours. No results for input(s): CPK, CKNDX, TROIQ in the last 72 hours.     No lab exists for component: CPKMB  Lab Results   Component Value Date/Time    Cholesterol, total 126 09/01/2017 09:03 AM    HDL Cholesterol 24 (L) 09/01/2017 09:03 AM    LDL, calculated 23 09/01/2017 09:03 AM    Triglyceride 394 (H) 09/01/2017 09:03 AM     Lab Results   Component Value Date/Time    Glucose (POC) 134 (H) 02/18/2018 06:24 AM    Glucose (POC) 174 (H) 02/17/2018 09:26 PM    Glucose (POC) 129 (H) 02/17/2018 04:07 PM    Glucose (POC) 125 (H) 02/17/2018 11:18 AM    Glucose (POC) 125 (H) 02/17/2018 06:23 AM     Lab Results   Component Value Date/Time    Color YELLOW/STRAW 01/29/2018 09:14 AM    Appearance CLOUDY (A) 01/29/2018 09:14 AM    Specific gravity 1.016 01/29/2018 09:14 AM    pH (UA) 6.0 01/29/2018 09:14 AM    Protein NEGATIVE  01/29/2018 09:14 AM    Glucose NEGATIVE  01/29/2018 09:14 AM    Ketone NEGATIVE  01/29/2018 09:14 AM    Bilirubin NEGATIVE  01/29/2018 09:14 AM    Urobilinogen 0.2 01/29/2018 09:14 AM    Nitrites NEGATIVE  01/29/2018 09:14 AM    Leukocyte Esterase MODERATE (A) 01/29/2018 09:14 AM    Epithelial cells FEW 01/29/2018 09:14 AM    Bacteria NEGATIVE  01/29/2018 09:14 AM    WBC 20-50 01/29/2018 09:14 AM    RBC 0-5 01/29/2018 09:14 AM         Medications Reviewed:     Current Facility-Administered Medications   Medication Dose Route Frequency    warfarin (COUMADIN) tablet 5 mg  5 mg Oral ONCE    hydroCHLOROthiazide (HYDRODIURIL) tablet 25 mg  25 mg Oral DAILY    lisinopril (PRINIVIL, ZESTRIL) tablet 20 mg  20 mg Oral DAILY    oxyCODONE IR (ROXICODONE) tablet 7.5-10 mg  7.5-10 mg Oral Q3H PRN    oxyCODONE IR (ROXICODONE) tablet 2.5-5 mg  2.5-5 mg Oral Q3H PRN    insulin glargine (LANTUS) injection 5 Units  5 Units SubCUTAneous QHS    cyclobenzaprine (FLEXERIL) tablet 10 mg  10 mg Oral TID PRN    gabapentin (NEURONTIN) capsule 100 mg  100 mg Oral TID    insulin lispro (HUMALOG) injection   SubCUTAneous AC&HS    glucose chewable tablet 16 g  4 Tab Oral PRN    dextrose (D50W) injection syrg 12.5-25 g  12.5-25 g IntraVENous PRN    glucagon (GLUCAGEN) injection 1 mg  1 mg IntraMUSCular PRN    sodium chloride 0.9 % bolus infusion 500 mL  500 mL IntraVENous ONCE PRN    sodium chloride (NS) flush 5-10 mL  5-10 mL IntraVENous Q8H    sodium chloride (NS) flush 5-10 mL  5-10 mL IntraVENous PRN  acetaminophen (TYLENOL) tablet 500 mg  500 mg Oral Q4HWA    naloxone (NARCAN) injection 0.4 mg  0.4 mg IntraVENous PRN    hydrOXYzine HCl (ATARAX) tablet 10 mg  10 mg Oral Q8H PRN    senna-docusate (PERICOLACE) 8.6-50 mg per tablet 1 Tab  1 Tab Oral BID    polyethylene glycol (MIRALAX) packet 17 g  17 g Oral DAILY    bisacodyl (DULCOLAX) suppository 10 mg  10 mg Rectal DAILY PRN    Warfarin - Pharmacy to Dose   Other Rx Dosing/Monitoring    acetaminophen (TYLENOL) tablet 325 mg  325 mg Oral Q4H PRN     ______________________________________________________________________  EXPECTED LENGTH OF STAY: 2d 9h  ACTUAL LENGTH OF STAY:          5                 Regulo Cool MD

## 2018-02-18 NOTE — PROGRESS NOTES
Pharmacist Note - Warfarin Dosing  Consult provided for this 65 y.o. female to manage warfarin for VTE PPx s/p RTK    INR Goal: 1.7- 2.2  Therapy Day: 6  Preop Dose: Mock - None    Drugs that may increase INR: None  Drugs that may decrease INR: None  Other current anticoagulants/ drugs that may increase bleeding risk: NSAID  Risk factors: None  Daily INR ordered: YES  Recent Labs      02/18/18   0246  02/17/18   1018  02/16/18   0550   INR  1.7*  1.6*  1.5*       Date               INR                 Dose  1/29  1.0  ---  2/12  ---  None PreOp  2/13  ---  6 mg  2/14  1.1  5 mg  2/15  1.2  5 mg  2/16  1.5  5 mg  2/17                 1.6                  5 mg  2/18                 1.7                  5 mg    Assessment/ Plan: Will order warfarin 5 mg PO x 1 dose. Pharmacy will continue to monitor daily and adjust therapy as indicated.

## 2018-02-19 LAB
ANION GAP SERPL CALC-SCNC: 7 MMOL/L (ref 5–15)
BUN SERPL-MCNC: 22 MG/DL (ref 6–20)
BUN/CREAT SERPL: 18 (ref 12–20)
CALCIUM SERPL-MCNC: 9.5 MG/DL (ref 8.5–10.1)
CHLORIDE SERPL-SCNC: 100 MMOL/L (ref 97–108)
CO2 SERPL-SCNC: 28 MMOL/L (ref 21–32)
CREAT SERPL-MCNC: 1.21 MG/DL (ref 0.55–1.02)
ERYTHROCYTE [DISTWIDTH] IN BLOOD BY AUTOMATED COUNT: 17.1 % (ref 11.5–14.5)
GLUCOSE SERPL-MCNC: 132 MG/DL (ref 65–100)
HCT VFR BLD AUTO: 33.6 % (ref 35–47)
HGB BLD-MCNC: 10.4 G/DL (ref 11.5–16)
MCH RBC QN AUTO: 24.4 PG (ref 26–34)
MCHC RBC AUTO-ENTMCNC: 31 G/DL (ref 30–36.5)
MCV RBC AUTO: 78.7 FL (ref 80–99)
NRBC # BLD: 0 K/UL (ref 0–0.01)
NRBC BLD-RTO: 0 PER 100 WBC
PLATELET # BLD AUTO: 353 K/UL (ref 150–400)
PMV BLD AUTO: 10 FL (ref 8.9–12.9)
POTASSIUM SERPL-SCNC: 4.5 MMOL/L (ref 3.5–5.1)
RBC # BLD AUTO: 4.27 M/UL (ref 3.8–5.2)
SODIUM SERPL-SCNC: 135 MMOL/L (ref 136–145)
WBC # BLD AUTO: 7.8 K/UL (ref 3.6–11)

## 2018-02-19 PROCEDURE — 74011636637 HC RX REV CODE- 636/637: Performed by: PHYSICAL MEDICINE & REHABILITATION

## 2018-02-19 PROCEDURE — 85027 COMPLETE CBC AUTOMATED: CPT | Performed by: PHYSICAL MEDICINE & REHABILITATION

## 2018-02-19 PROCEDURE — 74011250637 HC RX REV CODE- 250/637: Performed by: PHYSICAL MEDICINE & REHABILITATION

## 2018-02-19 PROCEDURE — 80048 BASIC METABOLIC PNL TOTAL CA: CPT | Performed by: PHYSICAL MEDICINE & REHABILITATION

## 2018-02-19 PROCEDURE — 36415 COLL VENOUS BLD VENIPUNCTURE: CPT | Performed by: PHYSICAL MEDICINE & REHABILITATION

## 2018-02-19 RX ORDER — WARFARIN SODIUM 5 MG/1
5 TABLET ORAL ONCE
Status: COMPLETED | OUTPATIENT
Start: 2018-02-19 | End: 2018-02-19

## 2018-02-19 RX ADMIN — GABAPENTIN 100 MG: 100 CAPSULE ORAL at 22:41

## 2018-02-19 RX ADMIN — OXYCODONE HYDROCHLORIDE 5 MG: 5 TABLET ORAL at 08:36

## 2018-02-19 RX ADMIN — INSULIN LISPRO 2 UNITS: 100 INJECTION, SOLUTION INTRAVENOUS; SUBCUTANEOUS at 16:58

## 2018-02-19 RX ADMIN — OXYCODONE HYDROCHLORIDE 10 MG: 5 TABLET ORAL at 01:46

## 2018-02-19 RX ADMIN — LISINOPRIL 20 MG: 20 TABLET ORAL at 08:36

## 2018-02-19 RX ADMIN — ACETAMINOPHEN 500 MG: 500 TABLET ORAL at 16:51

## 2018-02-19 RX ADMIN — HYDROCHLOROTHIAZIDE 25 MG: 25 TABLET ORAL at 08:36

## 2018-02-19 RX ADMIN — POLYETHYLENE GLYCOL 3350 17 G: 17 POWDER, FOR SOLUTION ORAL at 08:36

## 2018-02-19 RX ADMIN — GABAPENTIN 100 MG: 100 CAPSULE ORAL at 13:42

## 2018-02-19 RX ADMIN — GABAPENTIN 100 MG: 100 CAPSULE ORAL at 05:45

## 2018-02-19 RX ADMIN — ACETAMINOPHEN 500 MG: 500 TABLET ORAL at 05:45

## 2018-02-19 RX ADMIN — WARFARIN SODIUM 5 MG: 5 TABLET ORAL at 17:53

## 2018-02-19 RX ADMIN — INSULIN GLARGINE 5 UNITS: 100 INJECTION, SOLUTION SUBCUTANEOUS at 22:43

## 2018-02-19 RX ADMIN — METFORMIN HYDROCHLORIDE 500 MG: 500 TABLET, EXTENDED RELEASE ORAL at 16:55

## 2018-02-19 RX ADMIN — OXYCODONE HYDROCHLORIDE 10 MG: 5 TABLET ORAL at 22:42

## 2018-02-19 RX ADMIN — ACETAMINOPHEN 500 MG: 500 TABLET ORAL at 22:42

## 2018-02-19 RX ADMIN — ACETAMINOPHEN 500 MG: 500 TABLET ORAL at 08:36

## 2018-02-19 RX ADMIN — ACETAMINOPHEN 500 MG: 500 TABLET ORAL at 13:42

## 2018-02-20 ENCOUNTER — PATIENT OUTREACH (OUTPATIENT)
Dept: INTERNAL MEDICINE CLINIC | Age: 63
End: 2018-02-20

## 2018-02-20 LAB
BACTERIA SPEC CULT: ABNORMAL
CC UR VC: ABNORMAL
SERVICE CMNT-IMP: ABNORMAL

## 2018-02-20 PROCEDURE — 74011636637 HC RX REV CODE- 636/637: Performed by: PHYSICAL MEDICINE & REHABILITATION

## 2018-02-20 PROCEDURE — 74011250637 HC RX REV CODE- 250/637: Performed by: PHYSICAL MEDICINE & REHABILITATION

## 2018-02-20 RX ORDER — WARFARIN SODIUM 5 MG/1
5 TABLET ORAL EVERY EVENING
Status: DISCONTINUED | OUTPATIENT
Start: 2018-02-20 | End: 2018-02-26

## 2018-02-20 RX ORDER — INSULIN LISPRO 100 [IU]/ML
INJECTION, SOLUTION INTRAVENOUS; SUBCUTANEOUS
Status: DISCONTINUED | OUTPATIENT
Start: 2018-02-20 | End: 2018-02-27 | Stop reason: HOSPADM

## 2018-02-20 RX ADMIN — OXYCODONE HYDROCHLORIDE 10 MG: 5 TABLET ORAL at 05:40

## 2018-02-20 RX ADMIN — POLYETHYLENE GLYCOL 3350 17 G: 17 POWDER, FOR SOLUTION ORAL at 08:38

## 2018-02-20 RX ADMIN — OXYCODONE HYDROCHLORIDE 5 MG: 5 TABLET ORAL at 21:10

## 2018-02-20 RX ADMIN — METFORMIN HYDROCHLORIDE 500 MG: 500 TABLET, EXTENDED RELEASE ORAL at 08:37

## 2018-02-20 RX ADMIN — ACETAMINOPHEN 500 MG: 500 TABLET ORAL at 17:04

## 2018-02-20 RX ADMIN — WARFARIN SODIUM 5 MG: 5 TABLET ORAL at 17:03

## 2018-02-20 RX ADMIN — LISINOPRIL 20 MG: 20 TABLET ORAL at 08:37

## 2018-02-20 RX ADMIN — GABAPENTIN 100 MG: 100 CAPSULE ORAL at 05:39

## 2018-02-20 RX ADMIN — INSULIN GLARGINE 5 UNITS: 100 INJECTION, SOLUTION SUBCUTANEOUS at 21:07

## 2018-02-20 RX ADMIN — METFORMIN HYDROCHLORIDE 500 MG: 500 TABLET, EXTENDED RELEASE ORAL at 17:03

## 2018-02-20 RX ADMIN — ZOLPIDEM TARTRATE 5 MG: 5 TABLET ORAL at 23:46

## 2018-02-20 RX ADMIN — ACETAMINOPHEN 500 MG: 500 TABLET ORAL at 21:10

## 2018-02-20 RX ADMIN — HYDROCHLOROTHIAZIDE 25 MG: 25 TABLET ORAL at 08:36

## 2018-02-20 RX ADMIN — ACETAMINOPHEN 500 MG: 500 TABLET ORAL at 13:12

## 2018-02-20 RX ADMIN — ACETAMINOPHEN 500 MG: 500 TABLET ORAL at 08:37

## 2018-02-20 RX ADMIN — OXYCODONE HYDROCHLORIDE 5 MG: 5 TABLET ORAL at 08:37

## 2018-02-20 RX ADMIN — GABAPENTIN 100 MG: 100 CAPSULE ORAL at 13:13

## 2018-02-20 RX ADMIN — ACETAMINOPHEN 500 MG: 500 TABLET ORAL at 05:40

## 2018-02-20 RX ADMIN — GABAPENTIN 100 MG: 100 CAPSULE ORAL at 21:07

## 2018-02-20 NOTE — PROGRESS NOTES
[address]    RRAT SCORE: 12    Referral Source: Jimi Report Admit [facility] -, transfer- [facility].      Diagnosis:  RIGHT TOTAL KNEE ARTHROPLASTY (SPINAL WITH IV SED) with Jordana Wright MD    -[facility] w/ PROSTHESIS     Spoke with patient . Pt ID verified with 2 identifiers, name and . NN introduction. Reason for call stated. - Pt reported she is recovering but slowly, she reports she is working hard with PT, reports plan is possible d/c on 18. Medications START taking:  -Acetaminophen (Tylenol) 500 mg take 1 tab. by mouth Q 4 hrs. (while awake) for pain  -Oxycodone IR (Roxicodone) 5 mg take 1-2 tabs by mouth Q 4 hrs. as needed for pain  -Senna-docusate 8.6-50 mg take 1 tab by mouth 2 times a day, prevent constipation  -Warfarin 2 mg take 5 mg (two and a half tablets) daily. Adjust dose as directed, DVT prevention    Change how you take these Medications:  -Novolog Mix 7-30 FlexPen 100 Units/ml inject 35 Units SQ before breakfast and dinner for type 2 Diabetes  -Simvastatin 40 mg tablet take 1 tab by mouth daily    Medications STOP taking:  -Naproxen 500 mg   -Oxycodone -acetaminophen 5-325 mg (Percocet)  -Tylenol Arthritis pain 650mg    Discharge Plan/Instructions  -Follow up with Dr. Twin Gilmore in 4 weeks. Call [phone] to make an appointment. -INR goal 1.7-2.2. Continue for one month following surgery(Labs per orders call to Dr. Twin Gilmore)    -Follow up with Josefa Weldon MD (Family Practice)  -Activity (per handbook instructions) Physical Therapy-anticipated length of stay 7-10 days   -[facility]- refer to discharge instructions    Goals:   Goals Addressed      Coordinate Pain Management Plan for Patient.   Transfer [address]),  Pt.will take pain medications as prescribed, Place an ice bag on the knee for 15-20 minutes after exercise. -          Environmental Safety Management, R TKR, Lilibeth. WALI                    [facility] (2/18-). ,NN will reminded pt. safety concerns,encourage  pt to wear proper  fitting shoes, be careful walking across rugs, use proper lighting at night prevent tripping over things may cause falls. -[facility] PCP relationships and regularly scheduled appointments. 2/20 Follow up with Dr. Fajardo Rater in 4 weeks. Call [phone] to make an appointment. Follow up with Poonam Devi MD [facility]). -[address]  Friday March 16, 2018  9:00 AM EDT   DIABETES CLASS 3HR with DIABETES CLASS #1 [facility]   [facility] [address]) [id number] [place] [address]   [phone]                Knowledge and adherence to medication plan. 2/20   Patient will be familiar with prescribed medication, monitor for s/e and take as directed. -        Prevention complication of wound healing, Dx. R TKR                  2/20 The Aquacel (brown, waterproof) surgical dressing is to remain on the knee for 7 days, Once the incision is not draining, leave it open to air; may shower and get your incision wet but do not submerge your incision under water in a bath tub for 6 weeks after surgery per instructions on discharged summary. NN encourage monitor incision for s/s infection and non-healing;notify nursing with questions or concerns. [facility]                  Patient reminded that there are physicians on call 24 hours a day / 7 days a week (M-F 5pm to 8am and from Friday 5pm until Monday 8a for the weekend) should the patient have questions or concerns. Patient reminded to call 911 if situation is emergency. Pt verbalizes understanding.

## 2018-02-21 PROCEDURE — 74011250637 HC RX REV CODE- 250/637: Performed by: PHYSICAL MEDICINE & REHABILITATION

## 2018-02-21 PROCEDURE — 74011636637 HC RX REV CODE- 636/637: Performed by: PHYSICAL MEDICINE & REHABILITATION

## 2018-02-21 RX ORDER — ACETAMINOPHEN AND CODEINE PHOSPHATE 300; 30 MG/1; MG/1
1 TABLET ORAL
Status: DISCONTINUED | OUTPATIENT
Start: 2018-02-21 | End: 2018-02-27 | Stop reason: HOSPADM

## 2018-02-21 RX ORDER — ACETAMINOPHEN AND CODEINE PHOSPHATE 300; 30 MG/1; MG/1
2 TABLET ORAL
Status: DISCONTINUED | OUTPATIENT
Start: 2018-02-21 | End: 2018-02-27 | Stop reason: HOSPADM

## 2018-02-21 RX ADMIN — GABAPENTIN 100 MG: 100 CAPSULE ORAL at 15:00

## 2018-02-21 RX ADMIN — METFORMIN HYDROCHLORIDE 500 MG: 500 TABLET, EXTENDED RELEASE ORAL at 09:16

## 2018-02-21 RX ADMIN — ACETAMINOPHEN 500 MG: 500 TABLET ORAL at 10:30

## 2018-02-21 RX ADMIN — ACETAMINOPHEN 500 MG: 500 TABLET ORAL at 06:10

## 2018-02-21 RX ADMIN — ACETAMINOPHEN 500 MG: 500 TABLET ORAL at 17:40

## 2018-02-21 RX ADMIN — OXYCODONE HYDROCHLORIDE 10 MG: 5 TABLET ORAL at 03:00

## 2018-02-21 RX ADMIN — INSULIN GLARGINE 5 UNITS: 100 INJECTION, SOLUTION SUBCUTANEOUS at 22:05

## 2018-02-21 RX ADMIN — LISINOPRIL 20 MG: 20 TABLET ORAL at 09:16

## 2018-02-21 RX ADMIN — GABAPENTIN 100 MG: 100 CAPSULE ORAL at 22:05

## 2018-02-21 RX ADMIN — ACETAMINOPHEN 500 MG: 500 TABLET ORAL at 15:00

## 2018-02-21 RX ADMIN — WARFARIN SODIUM 5 MG: 5 TABLET ORAL at 17:40

## 2018-02-21 RX ADMIN — HYDROCHLOROTHIAZIDE 25 MG: 25 TABLET ORAL at 09:16

## 2018-02-21 RX ADMIN — GABAPENTIN 100 MG: 100 CAPSULE ORAL at 06:09

## 2018-02-21 RX ADMIN — METFORMIN HYDROCHLORIDE 500 MG: 500 TABLET, EXTENDED RELEASE ORAL at 17:40

## 2018-02-21 RX ADMIN — OXYCODONE HYDROCHLORIDE 10 MG: 5 TABLET ORAL at 09:16

## 2018-02-21 RX ADMIN — OXYCODONE HYDROCHLORIDE 10 MG: 5 TABLET ORAL at 06:10

## 2018-02-21 RX ADMIN — ACETAMINOPHEN 500 MG: 500 TABLET ORAL at 22:05

## 2018-02-21 NOTE — DISCHARGE SUMMARY
Total Joint Discharge Summary      Patient ID:  Kevyn Mejía  [id number]  77 y.o.  1955    Allergies: Keflex [cephalexin] and Lortab [hydrocodone-acetaminophen]    Admit date: 2/13/2018    Discharge date and time: 2/18/2018  3:10 PM     Admitting Physician: Deshaun Becker MD     Discharge Physician: Ria Gonzalez    * Admission Diagnoses: PRIMARY LOCALIZED OA RIGHT KNEE  Primary localized osteoarthritis of right knee    * Discharge Diagnoses:   Hospital Problems as of 2/18/2018  Date Reviewed: 1/19/2017          Codes Class Noted - Resolved POA    Primary localized osteoarthritis of right knee ICD-10-CM: M17.11  ICD-9-CM: 715.16  2/13/2018 - Present Unknown              Surgeon: Tania Mancini    Preoperative Medical Clearance: obtained    * Procedure: Procedure(s):  RIGHT TOTAL KNEE ARTHROPLASTY (SPINAL WITH IV SED)               Post Op complications: none      * Discharge Condition: good  Wound appears to be healing without any evidence of infection. Physical Therapy started on the day following surgery and progressed to independent ambulation with the aid of a walker. At the time of discharge, able to go up and down stairs and had understanding of precautions needed following surgery.       PT at time of DC: ___degrees    * Discharged to: [facility] ([facility])    * Follow-up Care/Discharge instructions:  - Anticoagulate with:   - Resume pre hospital diet            - Resume home medications per medical continuation form     - Ambulate with walker, appropriate total joint protocol  - Follow up in office as scheduled       Signed:  Alayna Byers DO  2/21/2018  9:11 AM

## 2018-02-22 VITALS
BODY MASS INDEX: 32.96 KG/M2 | HEIGHT: 63 IN | HEART RATE: 93 BPM | WEIGHT: 186 LBS | SYSTOLIC BLOOD PRESSURE: 136 MMHG | DIASTOLIC BLOOD PRESSURE: 83 MMHG

## 2018-02-22 PROCEDURE — 74011250637 HC RX REV CODE- 250/637: Performed by: PHYSICAL MEDICINE & REHABILITATION

## 2018-02-22 PROCEDURE — 74011636637 HC RX REV CODE- 636/637: Performed by: PHYSICAL MEDICINE & REHABILITATION

## 2018-02-22 RX ADMIN — ACETAMINOPHEN 500 MG: 500 TABLET ORAL at 05:25

## 2018-02-22 RX ADMIN — LISINOPRIL 20 MG: 20 TABLET ORAL at 08:27

## 2018-02-22 RX ADMIN — GABAPENTIN 100 MG: 100 CAPSULE ORAL at 13:20

## 2018-02-22 RX ADMIN — GABAPENTIN 100 MG: 100 CAPSULE ORAL at 06:00

## 2018-02-22 RX ADMIN — INSULIN GLARGINE 5 UNITS: 100 INJECTION, SOLUTION SUBCUTANEOUS at 21:42

## 2018-02-22 RX ADMIN — METFORMIN HYDROCHLORIDE 500 MG: 500 TABLET, EXTENDED RELEASE ORAL at 17:00

## 2018-02-22 RX ADMIN — ACETAMINOPHEN AND CODEINE PHOSPHATE 2 TABLET: 300; 30 TABLET ORAL at 10:43

## 2018-02-22 RX ADMIN — HYDROCHLOROTHIAZIDE 25 MG: 25 TABLET ORAL at 08:26

## 2018-02-22 RX ADMIN — POLYETHYLENE GLYCOL 3350 17 G: 17 POWDER, FOR SOLUTION ORAL at 08:27

## 2018-02-22 RX ADMIN — GABAPENTIN 100 MG: 100 CAPSULE ORAL at 21:41

## 2018-02-22 RX ADMIN — METFORMIN HYDROCHLORIDE 500 MG: 500 TABLET, EXTENDED RELEASE ORAL at 08:27

## 2018-02-22 RX ADMIN — ACETAMINOPHEN AND CODEINE PHOSPHATE 2 TABLET: 300; 30 TABLET ORAL at 19:46

## 2018-02-22 RX ADMIN — ACETAMINOPHEN 500 MG: 500 TABLET ORAL at 08:26

## 2018-02-22 RX ADMIN — WARFARIN SODIUM 5 MG: 5 TABLET ORAL at 17:00

## 2018-02-23 PROCEDURE — 74011636637 HC RX REV CODE- 636/637: Performed by: PHYSICAL MEDICINE & REHABILITATION

## 2018-02-23 PROCEDURE — 74011250637 HC RX REV CODE- 250/637: Performed by: PHYSICAL MEDICINE & REHABILITATION

## 2018-02-23 RX ORDER — LIDOCAINE 50 MG/G
1 PATCH TOPICAL DAILY
Status: DISCONTINUED | OUTPATIENT
Start: 2018-02-23 | End: 2018-02-27 | Stop reason: HOSPADM

## 2018-02-23 RX ADMIN — GABAPENTIN 100 MG: 100 CAPSULE ORAL at 21:19

## 2018-02-23 RX ADMIN — GABAPENTIN 100 MG: 100 CAPSULE ORAL at 13:30

## 2018-02-23 RX ADMIN — METFORMIN HYDROCHLORIDE 500 MG: 500 TABLET, EXTENDED RELEASE ORAL at 10:30

## 2018-02-23 RX ADMIN — INSULIN LISPRO 2 UNITS: 100 INJECTION, SOLUTION INTRAVENOUS; SUBCUTANEOUS at 17:35

## 2018-02-23 RX ADMIN — HYDROCHLOROTHIAZIDE 25 MG: 25 TABLET ORAL at 10:28

## 2018-02-23 RX ADMIN — ACETAMINOPHEN AND CODEINE PHOSPHATE 2 TABLET: 300; 30 TABLET ORAL at 03:40

## 2018-02-23 RX ADMIN — ACETAMINOPHEN AND CODEINE PHOSPHATE 2 TABLET: 300; 30 TABLET ORAL at 10:28

## 2018-02-23 RX ADMIN — INSULIN GLARGINE 5 UNITS: 100 INJECTION, SOLUTION SUBCUTANEOUS at 21:19

## 2018-02-23 RX ADMIN — POLYETHYLENE GLYCOL 3350 17 G: 17 POWDER, FOR SOLUTION ORAL at 10:27

## 2018-02-23 RX ADMIN — GABAPENTIN 100 MG: 100 CAPSULE ORAL at 05:49

## 2018-02-23 RX ADMIN — WARFARIN SODIUM 5 MG: 5 TABLET ORAL at 17:05

## 2018-02-23 RX ADMIN — ACETAMINOPHEN AND CODEINE PHOSPHATE 2 TABLET: 300; 30 TABLET ORAL at 13:30

## 2018-02-23 RX ADMIN — METFORMIN HYDROCHLORIDE 500 MG: 500 TABLET, EXTENDED RELEASE ORAL at 17:05

## 2018-02-23 RX ADMIN — LISINOPRIL 20 MG: 20 TABLET ORAL at 10:28

## 2018-02-23 RX ADMIN — ONDANSETRON 4 MG: 4 TABLET, ORALLY DISINTEGRATING ORAL at 17:05

## 2018-02-24 PROCEDURE — 74011250637 HC RX REV CODE- 250/637: Performed by: PHYSICAL MEDICINE & REHABILITATION

## 2018-02-24 PROCEDURE — 74011636637 HC RX REV CODE- 636/637: Performed by: PHYSICAL MEDICINE & REHABILITATION

## 2018-02-24 RX ORDER — MAGNESIUM CITRATE
296 SOLUTION, ORAL ORAL ONCE
Status: COMPLETED | OUTPATIENT
Start: 2018-02-24 | End: 2018-02-24

## 2018-02-24 RX ORDER — BISACODYL 5 MG
10 TABLET, DELAYED RELEASE (ENTERIC COATED) ORAL ONCE
Status: COMPLETED | OUTPATIENT
Start: 2018-02-24 | End: 2018-02-24

## 2018-02-24 RX ADMIN — ACETAMINOPHEN AND CODEINE PHOSPHATE 2 TABLET: 300; 30 TABLET ORAL at 09:06

## 2018-02-24 RX ADMIN — METFORMIN HYDROCHLORIDE 500 MG: 500 TABLET, EXTENDED RELEASE ORAL at 17:24

## 2018-02-24 RX ADMIN — MAGESIUM CITRATE 296 ML: 1.75 LIQUID ORAL at 12:34

## 2018-02-24 RX ADMIN — GABAPENTIN 100 MG: 100 CAPSULE ORAL at 21:29

## 2018-02-24 RX ADMIN — POLYETHYLENE GLYCOL 3350 17 G: 17 POWDER, FOR SOLUTION ORAL at 09:05

## 2018-02-24 RX ADMIN — BISACODYL 10 MG: 5 TABLET, COATED ORAL at 17:24

## 2018-02-24 RX ADMIN — INSULIN GLARGINE 5 UNITS: 100 INJECTION, SOLUTION SUBCUTANEOUS at 21:29

## 2018-02-24 RX ADMIN — ACETAMINOPHEN AND CODEINE PHOSPHATE 2 TABLET: 300; 30 TABLET ORAL at 01:40

## 2018-02-24 RX ADMIN — METFORMIN HYDROCHLORIDE 500 MG: 500 TABLET, EXTENDED RELEASE ORAL at 09:06

## 2018-02-24 RX ADMIN — HYDROCHLOROTHIAZIDE 25 MG: 25 TABLET ORAL at 09:06

## 2018-02-24 RX ADMIN — GABAPENTIN 100 MG: 100 CAPSULE ORAL at 13:29

## 2018-02-24 RX ADMIN — ZOLPIDEM TARTRATE 5 MG: 5 TABLET ORAL at 21:34

## 2018-02-24 RX ADMIN — WARFARIN SODIUM 5 MG: 5 TABLET ORAL at 17:24

## 2018-02-24 RX ADMIN — GABAPENTIN 100 MG: 100 CAPSULE ORAL at 06:16

## 2018-02-25 PROCEDURE — 74011636637 HC RX REV CODE- 636/637: Performed by: PHYSICAL MEDICINE & REHABILITATION

## 2018-02-25 PROCEDURE — 74011250637 HC RX REV CODE- 250/637: Performed by: PHYSICAL MEDICINE & REHABILITATION

## 2018-02-25 RX ADMIN — ZOLPIDEM TARTRATE 5 MG: 5 TABLET ORAL at 21:21

## 2018-02-25 RX ADMIN — GABAPENTIN 100 MG: 100 CAPSULE ORAL at 21:21

## 2018-02-25 RX ADMIN — GABAPENTIN 100 MG: 100 CAPSULE ORAL at 12:28

## 2018-02-25 RX ADMIN — METFORMIN HYDROCHLORIDE 500 MG: 500 TABLET, EXTENDED RELEASE ORAL at 09:22

## 2018-02-25 RX ADMIN — METFORMIN HYDROCHLORIDE 500 MG: 500 TABLET, EXTENDED RELEASE ORAL at 17:14

## 2018-02-25 RX ADMIN — GABAPENTIN 100 MG: 100 CAPSULE ORAL at 06:01

## 2018-02-25 RX ADMIN — ACETAMINOPHEN AND CODEINE PHOSPHATE 1 TABLET: 300; 30 TABLET ORAL at 12:28

## 2018-02-25 RX ADMIN — INSULIN GLARGINE 5 UNITS: 100 INJECTION, SOLUTION SUBCUTANEOUS at 21:21

## 2018-02-25 RX ADMIN — POLYETHYLENE GLYCOL 3350 17 G: 17 POWDER, FOR SOLUTION ORAL at 09:22

## 2018-02-25 RX ADMIN — WARFARIN SODIUM 5 MG: 5 TABLET ORAL at 17:14

## 2018-02-25 RX ADMIN — HYDROCHLOROTHIAZIDE 25 MG: 25 TABLET ORAL at 09:22

## 2018-02-25 RX ADMIN — INSULIN LISPRO 2 UNITS: 100 INJECTION, SOLUTION INTRAVENOUS; SUBCUTANEOUS at 17:14

## 2018-02-25 RX ADMIN — LISINOPRIL 20 MG: 20 TABLET ORAL at 09:22

## 2018-02-26 PROCEDURE — 74011636637 HC RX REV CODE- 636/637: Performed by: PHYSICAL MEDICINE & REHABILITATION

## 2018-02-26 PROCEDURE — 74011250637 HC RX REV CODE- 250/637: Performed by: PHYSICAL MEDICINE & REHABILITATION

## 2018-02-26 RX ORDER — WARFARIN 2 MG/1
4 TABLET ORAL EVERY EVENING
Status: DISCONTINUED | OUTPATIENT
Start: 2018-02-26 | End: 2018-02-27 | Stop reason: HOSPADM

## 2018-02-26 RX ADMIN — INSULIN GLARGINE 5 UNITS: 100 INJECTION, SOLUTION SUBCUTANEOUS at 21:32

## 2018-02-26 RX ADMIN — GABAPENTIN 100 MG: 100 CAPSULE ORAL at 14:13

## 2018-02-26 RX ADMIN — METFORMIN HYDROCHLORIDE 500 MG: 500 TABLET, EXTENDED RELEASE ORAL at 09:03

## 2018-02-26 RX ADMIN — WARFARIN SODIUM 4 MG: 2 TABLET ORAL at 17:15

## 2018-02-26 RX ADMIN — ACETAMINOPHEN AND CODEINE PHOSPHATE 2 TABLET: 300; 30 TABLET ORAL at 09:05

## 2018-02-26 RX ADMIN — METFORMIN HYDROCHLORIDE 500 MG: 500 TABLET, EXTENDED RELEASE ORAL at 17:15

## 2018-02-26 RX ADMIN — GABAPENTIN 100 MG: 100 CAPSULE ORAL at 21:32

## 2018-02-26 RX ADMIN — GABAPENTIN 100 MG: 100 CAPSULE ORAL at 06:22

## 2018-02-26 RX ADMIN — POLYETHYLENE GLYCOL 3350 17 G: 17 POWDER, FOR SOLUTION ORAL at 09:03

## 2018-02-26 RX ADMIN — ACETAMINOPHEN AND CODEINE PHOSPHATE 2 TABLET: 300; 30 TABLET ORAL at 17:15

## 2018-02-26 RX ADMIN — HYDROCHLOROTHIAZIDE 25 MG: 25 TABLET ORAL at 09:03

## 2018-02-26 RX ADMIN — LISINOPRIL 20 MG: 20 TABLET ORAL at 09:03

## 2018-02-27 PROCEDURE — 74011250637 HC RX REV CODE- 250/637: Performed by: PHYSICAL MEDICINE & REHABILITATION

## 2018-02-27 RX ADMIN — POLYETHYLENE GLYCOL 3350 17 G: 17 POWDER, FOR SOLUTION ORAL at 08:43

## 2018-02-27 RX ADMIN — ACETAMINOPHEN AND CODEINE PHOSPHATE 2 TABLET: 300; 30 TABLET ORAL at 00:03

## 2018-02-27 RX ADMIN — ACETAMINOPHEN AND CODEINE PHOSPHATE 1 TABLET: 300; 30 TABLET ORAL at 09:05

## 2018-02-27 RX ADMIN — GABAPENTIN 100 MG: 100 CAPSULE ORAL at 06:47

## 2018-02-27 RX ADMIN — HYDROCHLOROTHIAZIDE 25 MG: 25 TABLET ORAL at 08:45

## 2018-02-27 RX ADMIN — METFORMIN HYDROCHLORIDE 500 MG: 500 TABLET, EXTENDED RELEASE ORAL at 08:43

## 2018-02-28 ENCOUNTER — TELEPHONE (OUTPATIENT)
Dept: INTERNAL MEDICINE CLINIC | Age: 63
End: 2018-02-28

## 2018-02-28 ENCOUNTER — PATIENT OUTREACH (OUTPATIENT)
Dept: INTERNAL MEDICINE CLINIC | Age: 63
End: 2018-02-28

## 2018-02-28 NOTE — PROGRESS NOTES
[address]    RRAT SCORE:  12    Referral Source: Admit [facility]  -     Diagnosis:RIGHT TOTAL KNEE ARTHROPLASTY (SPINAL WITH IV SED) with Radhames Tsai MD       Medical History: [id number] w/ PROSTHESIS     Spoke with patient . Pt ID verified with 2 identifiers, name and . NN introduction. Reason for call stated. - Pt reported she is doing fine. Discharge Plan/Instructions:   -FOLLOW up Dr. Gayathri Butterfield in 2 weeks ([phone])  -FOLLOW up Dr. Radhames Tsai (Ortho) in 2 weeks [phone])  -[address] as scheduled  ([phone])  -[facility], [facility], PT eval for PT 3x week for 4-6 weeks, OT eval 3x week for 4-6 weeks  Labs work POI. PT/INR (Goal 1.7-2.2) call results to Dr. Radhames Tsai (Ortho.) [phone] *Clarify Lab days? Mon/Thurs or Mon/Wed/Fri. .? ? ??  -wound care-clean and place dry dsg 3/week, keep dry    Goals:  Goals Addressed      Coordinate Pain Management Plan for Patient.   Transfer to [address]),  Pt.will take pain medications as prescribed, Place an ice bag on the knee for 15-20 minutes after exercise. - [address]     Discharged home from [place] [address]. - [address]      [facility], [facility], PT eval for PT 3x week for 4-6 weeks, OT eval 3x week for 4-6 weeks. -       Environmental Safety Management, R TKR, Hx. LAKA                    [address] for [facility] (-). ,NN will reminded pt. safety concerns,encourage  pt to wear proper  fitting shoes, be careful walking across rugs, use proper lighting at night prevent tripping over things may cause falls. - [address]      Discharged home from [place] [address] - [address]     [facility], SNF, PT eval for PT 3x week for 4-6 weeks, OT eval 3x week for 4-6 weeks. NN will reminded pt.  safety concerns,encourage  pt to wear proper  fitting shoes, be careful walking across rugs, use proper lighting at night prevent tripping over things may cause falls. -[facility] PCP relationships and regularly scheduled appointments. 2/20 Follow up with Dr. Luz Sanchez in 4 weeks. Call [phone] to make an appointment. Follow up with Ian Franklin MD [facility]). -[address]  Friday March 16, 2018  9:00 AM EDT   DIABETES CLASS 3HR with DIABETES CLASS #1 [facility]   [facility])     [address]  [address] [phone]         2/27 Discharged home from [place] [address]. -    2/28 -FOLLOW up Dr. Diana Ramos in 2 weeks ([phone]) NN scheduled appointment for pt.3/5/18 @ 11:30am.-  -FOLLOW up Dr. Robi Rodriguez (Ortho) in 2 weeks ([phone])  NN reminded pt.,follow-up care is a key part of your treatment and safety. Be sure to make and go to all appointments, and call your doctor if you are having problems. It's also a good idea to know your test results and keep a list of the medicines you take-Patient verbalizes understanding. -[address]           Knowledge and adherence to medication plan. 2/20   Patient will be familiar with prescribed medication, monitor for s/e and take as directed. -[address]     2/27 Discharged home from [place] [address]. -[address]    2/28  Patient will be familiar with prescribed medication, monitor for s/e and take as directed. -            Prevention complication of wound healing, Dx. R TKR                  2/20 The Aquacel (brown, waterproof) surgical dressing is to remain on the knee for 7 days, Once the incision is not draining, leave it open to air; may shower and get your incision wet but do not submerge your incision under water in a bath tub for 6 weeks after surgery per instructions on discharged summary. NN encourage monitor incision for s/s infection and non-healing;notify nursing with questions or concerns. -[address]    2/27 Discharged home from [place] [address]. -[address]    2/28 Patient will monitor R knee incision site for increased pain, swelling,warmth or redness, red streaks leading from the area, pus draining from the area, and monitor for fever, keep area clean and apply dry dsg 3x week as directed. Notify MD office or NN with any questions or concerns. NN provided pt.with contact information. [facility]                           Patient reminded that there are physicians on call 24 hours a day / 7 days a week (M-F 5pm to 8am and from Friday 5pm until Monday 8a for the weekend) should the patient have questions or concerns. Patient reminded to call 911 if situation is emergency. Pt verbalizes understanding. -[address]

## 2018-03-05 ENCOUNTER — PATIENT OUTREACH (OUTPATIENT)
Dept: INTERNAL MEDICINE CLINIC | Age: 63
End: 2018-03-05

## 2018-03-05 ENCOUNTER — OFFICE VISIT (OUTPATIENT)
Dept: INTERNAL MEDICINE CLINIC | Age: 63
End: 2018-03-05

## 2018-03-05 VITALS
WEIGHT: 185 LBS | HEART RATE: 104 BPM | TEMPERATURE: 98.1 F | HEIGHT: 63 IN | BODY MASS INDEX: 32.78 KG/M2 | RESPIRATION RATE: 20 BRPM | SYSTOLIC BLOOD PRESSURE: 119 MMHG | DIASTOLIC BLOOD PRESSURE: 75 MMHG | OXYGEN SATURATION: 97 %

## 2018-03-05 DIAGNOSIS — Z79.01 ANTICOAGULATED: Primary | ICD-10-CM

## 2018-03-05 DIAGNOSIS — R00.0 TACHYCARDIA: ICD-10-CM

## 2018-03-05 DIAGNOSIS — M17.11 PRIMARY LOCALIZED OSTEOARTHRITIS OF RIGHT KNEE: ICD-10-CM

## 2018-03-05 DIAGNOSIS — I10 ESSENTIAL HYPERTENSION: ICD-10-CM

## 2018-03-05 DIAGNOSIS — Z96.651 STATUS POST RIGHT KNEE REPLACEMENT: ICD-10-CM

## 2018-03-05 PROBLEM — E11.40 TYPE 2 DIABETES MELLITUS WITH DIABETIC NEUROPATHY (HCC): Status: ACTIVE | Noted: 2018-03-05

## 2018-03-05 LAB
INR BLD: 1.3
PT POC: 15.8 SECONDS
VALID INTERNAL CONTROL?: YES

## 2018-03-05 RX ORDER — LISINOPRIL AND HYDROCHLOROTHIAZIDE 20; 25 MG/1; MG/1
TABLET ORAL
Qty: 90 TAB | Refills: 1 | Status: SHIPPED | OUTPATIENT
Start: 2018-03-05 | End: 2018-03-14 | Stop reason: ALTCHOICE

## 2018-03-05 RX ORDER — WARFARIN 2 MG/1
TABLET ORAL
Qty: 60 TAB | Refills: 0
Start: 2018-03-05 | End: 2018-03-19 | Stop reason: ALTCHOICE

## 2018-03-05 RX ORDER — NAPROXEN SODIUM 220 MG
220 TABLET ORAL 2 TIMES DAILY WITH MEALS
COMMUNITY
End: 2019-04-30 | Stop reason: ALTCHOICE

## 2018-03-05 RX ORDER — METFORMIN HYDROCHLORIDE 500 MG/1
500 TABLET ORAL 2 TIMES DAILY WITH MEALS
Qty: 180 TAB | Refills: 1 | Status: SHIPPED | OUTPATIENT
Start: 2018-03-05 | End: 2019-05-21 | Stop reason: SDUPTHER

## 2018-03-05 NOTE — MR AVS SNAPSHOT
[address] 3744 [facility] 
[phone] Patient: Chelle Leahy MRN: [id number] Visit Information Date & Time Provider Department Dept. Phone Encounter #  
 3/5/2018 11:30 AM [place] [address] [phone] [id number] Follow-up Instructions Return in about 2 weeks (around 3/19/2018) for routine follow up. Upcoming Health Maintenance Date Due Hepatitis C Screening 1955 ZOSTER VACCINE AGE 60> 1/31/2016 MICROALBUMIN Q1 3/23/2018 PAP AKA CERVICAL CYTOLOGY 5/31/2018* HEMOGLOBIN A1C Q6M 8/14/2018 LIPID PANEL Q1 9/1/2018 FOOT EXAM Q1 9/27/2018 EYE EXAM RETINAL OR DILATED Q1 10/17/2018 BREAST CANCER SCRN MAMMOGRAM 6/30/2019 COLONOSCOPY 10/12/2022 DTaP/Tdap/Td series (2 - Td) 6/17/2026 *Topic was postponed. The date shown is not the original due date. Allergies as of 3/5/2018  Review Complete On: 3/5/2018 By: Faye House LPN Severity Noted Reaction Type Reactions Keflex [Cephalexin]  06/29/2016    Itching Lortab [Hydrocodone-acetaminophen]  03/30/2012    Hives, Itching Patient states she can take tylenol #3 without problem. Current Immunizations  Reviewed on 2/7/2018 Name Date Influenza Vaccine (Quad) PF 9/1/2017, 9/7/2016 Pneumococcal Conjugate (PCV-13) 9/7/2016 Pneumococcal Polysaccharide (PPSV-23) 1/23/2018 Tdap 6/17/2016 Not reviewed this visit You Were Diagnosed With   
  
 Codes Comments Anticoagulated    -  Primary ICD-10-CM: Z79.01 
ICD-9-CM: V58.61 Primary localized osteoarthritis of right knee     ICD-10-CM: M17.11 ICD-9-CM: 715.16 Status post right knee replacement     ICD-10-CM: T58.809 ICD-9-CM: V43.65 Essential hypertension     ICD-10-CM: I10 
ICD-9-CM: 401.9 Tachycardia     ICD-10-CM: R00.0 ICD-9-CM: 756. 0 Vitals BP Pulse Temp Resp Height(growth percentile) Weight(growth percentile) 119/75 (BP 1 Location: Left arm, BP Patient Position: Sitting) (!) 104 98.1 °F (36.7 °C) (Oral) 20 5' 3\" (1.6 m) 185 lb (83.9 kg) LMP SpO2 BMI OB Status Smoking Status 08/23/2003 97% 32.77 kg/m2 Postmenopausal Former Smoker Vitals History BMI and BSA Data Body Mass Index Body Surface Area 32.77 kg/m 2 1.93 m 2 Preferred Pharmacy Pharmacy Name Phone [facility] [phone] Your Updated Medication List  
  
   
This list is accurate as of 3/5/18 12:20 PM.  Always use your most recent med list.  
  
  
  
  
 acetaminophen 500 mg tablet Commonly known as:  TYLENOL Take 1 Tab by mouth every four (4) hours (while awake). Indications: Pain ALEVE 220 mg tablet Generic drug:  naproxen sodium Take 220 mg by mouth two (2) times daily (with meals). colchicine 0.6 mg tablet Commonly known as:  COLCRYS Take 1 Tab by mouth daily. gabapentin 100 mg capsule Commonly known as:  NEURONTIN Take 1 Cap by mouth three (3) times daily. lisinopril-hydroCHLOROthiazide 20-25 mg per tablet Commonly known as:  PRINZIDE, ZESTORETIC  
TAKE ONE TABLET BY MOUTH EVERY DAY  
  
 metFORMIN 500 mg tablet Commonly known as:  GLUCOPHAGE Take 1 Tab by mouth two (2) times daily (with meals). NovoLOG Mix 70-30FlexPen U-100 100 unit/mL (70-30) Inpn Generic drug:  insulin aspart protamine/insulin aspart INJECT 35 UNITS SUBCUTANEOUSLY BEFORE BREAKFAST AND DINNER FOR TYPE 2 DIABETES  
  
 raNITIdine 150 mg tablet Commonly known as:  ZANTAC Take 1 Tab by mouth two (2) times a day. simvastatin 40 mg tablet Commonly known as:  ZOCOR Take 1 Tab by mouth daily. * warfarin 2 mg tablet Commonly known as:  COUMADIN Take 5 mg (two and a half tablets) daily. Adjust dose as directed. Indications: DEEP VEIN THROMBOSIS PREVENTION  
  
 * warfarin 2 mg tablet Commonly known as:  COUMADIN  
2 tabs Mon and Fri, 1 on the other day * Notice: This list has 2 medication(s) that are the same as other medications prescribed for you. Read the directions carefully, and ask your doctor or other care provider to review them with you. Prescriptions Sent to Pharmacy Refills  
 metFORMIN (GLUCOPHAGE) 500 mg tablet 1 Sig: Take 1 Tab by mouth two (2) times daily (with meals). Class: Normal  
 Pharmacy: [facility] [address] Ph #: [phone] Route: Oral  
 lisinopril-hydroCHLOROthiazide (PRINZIDE, ZESTORETIC) 20-25 mg per tablet 1 Sig: TAKE ONE TABLET BY MOUTH EVERY DAY Class: Normal  
 Pharmacy: [facility] [address] Ph #: [phone] We Performed the Following AMB POC PT/INR [89534 CPT(R)] Follow-up Instructions Return in about 2 weeks (around 3/19/2018) for routine follow up. To-Do List   
 03/16/2018 9:00 AM  
  Appointment with DIABETES CLASS #1 MRM at [facility] ([phone]) Introducing [facility]! [facility] introduces [employer] patient portal. Now you can access parts of your medical record, email your doctor's office, and request medication refills online. 1. In your internet browser, go to [email or web address]://[email or web address]. "[employer] 2. Click on the First Time User? Click Here link in the Sign In box. You will see the New Member Sign Up page. 3. Enter your [employer] Access Code exactly as it appears below. You will not need to use this code after youve completed the sign-up process. If you do not sign up before the expiration date, you must request a new code. · [employer] Access Code: [email or web address] Expires: 3/15/2018  7:49 AM 
 
4.  Enter the last four digits of your Social Security Number (xxxx) and Date of Birth (mm/dd/yyyy) as indicated and click Submit. You will be taken to the next sign-up page. 5. Create a Autotask ID. This will be your [employer]ot[employer] login ID and cannot be changed, so think of one that is secure and easy to remember. 6. Create a [employer]ot[employer] password. You can change your password at any time. 7. Enter your Password Reset Question and Answer. This can be used at a later time if you forget your password. 8. Enter your e-mail address. You will receive e-mail notification when new information is available in [address] 9. Click Sign Up. You can now view and download portions of your medical record. 10. Click the Download Summary menu link to download a portable copy of your medical information. If you have questions, please visit the Frequently Asked Questions section of the [employer] website. Remember, [employer] is NOT to be used for urgent needs. For medical emergencies, dial 911. Now available from your iPhone and Android! Please provide this summary of care documentation to your next provider. Your primary care clinician is listed as Pat Zapata. If you have any questions after today's visit, please call [phone].

## 2018-03-05 NOTE — DISCHARGE SUMMARY
Date of Admission: 2/18/2018    Date of Discharge:   2/27/2018    Attending Physician: Dr. Cesar Barnett    Referring Physician:  PRIMARY CARE PHYSICIAN:  Imelda Herrera  REFERRING PHYSICIAN: Dr. Romelia Pineda  Consultants: Barbi Ohms       Consultants at [address] [place] [address]:     None        Discharge Diagnosis and Reason for Inpatient Rehabilitation:    Right total knee replacement 2/13/2018  Secondary Diagnoses:              Bone spur of ankle 3/30/12     right ankle    Chronic pain      DM (diabetes mellitus) ([facility] type II with hyperglycemia      GERD (gastroesophageal reflux disease)      Hypertension      OA (osteoarthritis) of knee 3/30/12     right knee    Sleep apnea      Unilateral AKA ([address] 1993     Left  Morbid obesity with diabetes and hypertension  Pain  Hypertension  LOUIS,  Tachycardia  Anemia,  Uncontrolled blood sugars               Procedures: None             History of Present Illness:   Patient is a 65 y.o., right handed, , female,admitted for knee surgery to [address] [place] [address] 2/13/2018. Patient with a h/o traumatic right AKA, phantom pain, DM and HTN, and after a fall 2 years ago, has had chronic right knee pain, that steadily worsened. Imaging studies revealed end-stage DJD. She was admitted to [facility] and on 2/13/18, underwent elective right TKR. Patient cleared for WBAT and is on Coumadin for DVT prophylaxis. Knee pain currently 6-7/10, increased with standing to 10/10 and partially decreased with meds/ice. Post-op course LOUIS, tachycardia anemia, and uncontrolled blood sugars     She also chronic phantom pain, not controlled, 9/10, sharp and burning in character. Pain causes sleep interruption and she is on low dose Gabapentin. Left AKA prosthesis is about 3year old, no ply socks. Using old liner, because new one is too tight. States she missed 2 appointments with prothetist due to right knee problems.       She is transferred to [facility] for intensive inpatient rehab and continue medical management     Rehabilitation Hospital Course:        Patient was admitted to [facility] where he began participating in an intensive rehabilitation program.      Functional status on Admission: Mod max assist ADLs and mobility          Functional status at Discharge:   Modified independent ADLs and mobility    Physical Exam:  See written progress note. Labs/Imaging Summary:   Results for Mariano Hollis (MRN [id number]) as of 3/5/2018 09:46   Ref.  Range 2/19/2018 06:10   WBC Latest Ref Range: 3.6 - 11.0 K/uL 7.8   NRBC Latest Ref Range: 0  WBC 0.0   RBC Latest Ref Range: 3.80 - 5.20 M/uL 4.27   HGB Latest Ref Range: 11.5 - 16.0 g/dL 10.4 (L)   HCT Latest Ref Range: 35.0 - 47.0 % 33.6 (L)   MCV Latest Ref Range: 80.0 - 99.0 FL 78.7 (L)   MCH Latest Ref Range: 26.0 - 34.0 PG 24.4 (L)   MCHC Latest Ref Range: 30.0 - 36.5 g/dL 31.0   RDW Latest Ref Range: 11.5 - 14.5 % 17.1 (H)   PLATELET Latest Ref Range: 150 - 400 K/uL 353   MPV Latest Ref Range: 8.9 - 12.9 FL 10.0   ABSOLUTE NRBC Latest Ref Range: 0.00 - 0.01 K/uL 0.00   Sodium Latest Ref Range: 136 - 145 mmol/L 135 (L)   Potassium Latest Ref Range: 3.5 - 5.1 mmol/L 4.5   Chloride Latest Ref Range: 97 - 108 mmol/L 100   CO2 Latest Ref Range: 21 - 32 mmol/L 28   Anion gap Latest Ref Range: 5 - 15 mmol/L 7   Glucose Latest Ref Range: 65 - 100 mg/dL 132 (H)   BUN Latest Ref Range: 6 - 20 MG/DL 22 (H)   Creatinine Latest Ref Range: 0.55 - 1.02 MG/DL 1.21 (H)   BUN/Creatinine ratio Latest Ref Range: 12 - 20   18   Calcium Latest Ref Range: 8.5 - 10.1 MG/DL 9.5   GFR est non-AA Latest Ref Range: >60 ml/min/1.73m2 45 (L)   GFR est AA Latest Ref Range: >60 ml/min/1.73m2 55 (L)     Discharge Disposition:      Home    Discharge Diet:    Diabetic    Discharge Precautions/Restrictions:    None  Discharge Rehab Plan: At home care physical therapy occupational therapy and nursing for wound care and INR monitoring  Special Instructions:   None    Follow Up Appointments:  Dr. Anna Felipe 3/5/2018  Primary CARE 2 weeks  Amputee clinic 2 weeks      Discharge Medications:    Colchicine 0.6 daily  Naprosyn as needed  Prinzide 20/25 daily  Glucophage 500 twice daily  Zocor 40 nightly  Zantac 150 twice daily  Neurontin 100 3 times daily  7030 insulin 35 units twice daily  As needed Percocet as needed  Oxycodone as needed #80 dispensed  Coumadin 2 mg daily          Signature:_______________________________ 3/5/2018 9:48 AM    Alie Mendez M.D.    [address] [place] [address]:                      Greater than 30 min were spent in counseling, coordinating care, review of meds, physical examination and written note, in preparation and as part of discharge planning.
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
## 2018-03-05 NOTE — PROGRESS NOTES
3/5 Seen in office today, pt.reprots she doing fine, receiving PT,  at her side. Pt reporst she would like to receive her medications via mail, pt.will call her insurance for ordering information and will provide NN will information. -[address]

## 2018-03-05 NOTE — PROGRESS NOTES
Chief Complaint   Patient presents with    Diabetes     insulin evaluation    Heart Problem     fast heart rate      I have reviewed the patient's medical history in detail and updated the computerized patient record. Health Maintenance reviewed. 1. Have you been to the ER, urgent care clinic since your last visit? Hospitalized since your last visit?no    2. Have you seen or consulted any other health care providers outside of the [address] [place] [address] [place] since your last visit? Include any pap smears or colon screening. No    Encouraged pt to discuss pt's wishes with spouse/partner/family and bring them in the next appt to follow thru with the Advanced Directive    Fall Risk Assessment, last 12 mths 2/7/2018   Able to walk? Yes   Fall in past 12 months? Yes   Fall with injury? No   Number of falls in past 12 months 8 or more   Fall Risk Score 8       PHQ over the last two weeks 3/5/2018   Little interest or pleasure in doing things Several days   Feeling down, depressed or hopeless Several days   Total Score PHQ 2 2       Abuse Screening Questionnaire 2/7/2018   Do you ever feel afraid of your partner? N   Are you in a relationship with someone who physically or mentally threatens you? N   Is it safe for you to go home?  Y       ADL Assessment 2/7/2018   Feeding yourself No Help Needed   Getting from bed to chair No Help Needed   Getting dressed No Help Needed   Bathing or showering No Help Needed   Walk across the room (includes cane/walker) No Help Needed   Using the telphone No Help Needed   Taking your medications No Help Needed   Preparing meals No Help Needed   Managing money (expenses/bills) No Help Needed   Moderately strenuous housework (laundry) No Help Needed   Shopping for personal items (toiletries/medicines) No Help Needed   Shopping for groceries No Help Needed   Driving No Help Needed   Climbing a flight of stairs No Help Needed   Getting to places beyond walking distances No Help Needed

## 2018-03-14 ENCOUNTER — TELEPHONE (OUTPATIENT)
Dept: INTERNAL MEDICINE CLINIC | Age: 63
End: 2018-03-14

## 2018-03-14 ENCOUNTER — PATIENT OUTREACH (OUTPATIENT)
Dept: INTERNAL MEDICINE CLINIC | Age: 63
End: 2018-03-14

## 2018-03-14 RX ORDER — LOSARTAN POTASSIUM 100 MG/1
100 TABLET ORAL DAILY
Qty: 90 TAB | Refills: 1 | Status: SHIPPED | OUTPATIENT
Start: 2018-03-14 | End: 2018-06-06 | Stop reason: ALTCHOICE

## 2018-03-14 NOTE — TELEPHONE ENCOUNTER
Returned call and pt wants to take lasartin instead of lisinopril/hydrochlorizide please escript in to [facility] thank you

## 2018-03-14 NOTE — TELEPHONE ENCOUNTER
----- Message from Sierra Barron sent at 3/14/2018  1:32 PM EDT -----  Regarding: /Telephone  Pt is requesting a call back in reference to changing her medication to [facility] [phone]. Best contact number is [phone].

## 2018-03-19 ENCOUNTER — OFFICE VISIT (OUTPATIENT)
Dept: INTERNAL MEDICINE CLINIC | Age: 63
End: 2018-03-19

## 2018-03-19 VITALS
DIASTOLIC BLOOD PRESSURE: 89 MMHG | HEIGHT: 63 IN | SYSTOLIC BLOOD PRESSURE: 143 MMHG | TEMPERATURE: 97.4 F | RESPIRATION RATE: 14 BRPM | HEART RATE: 88 BPM | OXYGEN SATURATION: 100 % | WEIGHT: 185 LBS | BODY MASS INDEX: 32.78 KG/M2

## 2018-03-19 DIAGNOSIS — I10 ESSENTIAL HYPERTENSION: ICD-10-CM

## 2018-03-19 DIAGNOSIS — S78.112D: Chronic | ICD-10-CM

## 2018-03-19 DIAGNOSIS — Z96.651 STATUS POST RIGHT KNEE REPLACEMENT: Primary | ICD-10-CM

## 2018-03-19 DIAGNOSIS — Z79.4 DIABETES MELLITUS DUE TO UNDERLYING CONDITION WITH DIABETIC NEPHROPATHY, WITH LONG-TERM CURRENT USE OF INSULIN (HCC): ICD-10-CM

## 2018-03-19 DIAGNOSIS — E08.21 DIABETES MELLITUS DUE TO UNDERLYING CONDITION WITH DIABETIC NEPHROPATHY, WITH LONG-TERM CURRENT USE OF INSULIN (HCC): ICD-10-CM

## 2018-03-19 RX ORDER — INSULIN ASPART 100 [IU]/ML
15 INJECTION, SUSPENSION SUBCUTANEOUS
Qty: 5 PEN | Refills: 5 | Status: SHIPPED | OUTPATIENT
Start: 2018-03-19 | End: 2018-10-17

## 2018-03-19 RX ORDER — ACETAMINOPHEN AND CODEINE PHOSPHATE 300; 30 MG/1; MG/1
1 TABLET ORAL
Qty: 20 TAB | Refills: 0 | Status: SHIPPED | OUTPATIENT
Start: 2018-03-19 | End: 2018-06-06 | Stop reason: ALTCHOICE

## 2018-03-19 NOTE — PROGRESS NOTES
Chief Complaint   Patient presents with    Hypertension     med evaluation     I have reviewed the patient's medical history in detail and updated the computerized patient record. Health Maintenance reviewed. 1. Have you been to the ER, urgent care clinic since your last visit? Hospitalized since your last visit?no    2. Have you seen or consulted any other health care providers outside of the [address] [place] [address] [place] since your last visit? Include any pap smears or colon screening. No    Encouraged pt to discuss pt's wishes with spouse/partner/family and bring them in the next appt to follow thru with the Advanced Directive    Fall Risk Assessment, last 12 mths 2/7/2018   Able to walk? Yes   Fall in past 12 months? Yes   Fall with injury? No   Number of falls in past 12 months 8 or more   Fall Risk Score 8       PHQ over the last two weeks 3/19/2018   Little interest or pleasure in doing things Several days   Feeling down, depressed or hopeless Several days   Total Score PHQ 2 2       Abuse Screening Questionnaire 2/7/2018   Do you ever feel afraid of your partner? N   Are you in a relationship with someone who physically or mentally threatens you? N   Is it safe for you to go home?  Y       ADL Assessment 2/7/2018   Feeding yourself No Help Needed   Getting from bed to chair No Help Needed   Getting dressed No Help Needed   Bathing or showering No Help Needed   Walk across the room (includes cane/walker) No Help Needed   Using the telphone No Help Needed   Taking your medications No Help Needed   Preparing meals No Help Needed   Managing money (expenses/bills) No Help Needed   Moderately strenuous housework (laundry) No Help Needed   Shopping for personal items (toiletries/medicines) No Help Needed   Shopping for groceries No Help Needed   Driving No Help Needed   Climbing a flight of stairs No Help Needed   Getting to places beyond walking distances No Help Needed

## 2018-03-19 NOTE — MR AVS SNAPSHOT
Shan Reed 
 
 
 [address] [place] [address]. [address] [place] [address] 
[phone] Patient: Chelle Leahy MRN: [id number] [id number] Visit Information Date & Time Provider Department Dept. Phone Encounter #  
 3/19/2018 10:15 AM Lotus Perez  Judy Cowan [id number] Upcoming Health Maintenance Date Due Hepatitis C Screening 1955 ZOSTER VACCINE AGE 60> 1/31/2016 MICROALBUMIN Q1 3/23/2018 HEMOGLOBIN A1C Q6M 8/14/2018 LIPID PANEL Q1 9/1/2018 FOOT EXAM Q1 9/27/2018 EYE EXAM RETINAL OR DILATED Q1 10/17/2018 BREAST CANCER SCRN MAMMOGRAM 6/30/2019 PAP AKA CERVICAL CYTOLOGY 9/28/2020 COLONOSCOPY 10/12/2022 DTaP/Tdap/Td series (2 - Td) 6/17/2026 Allergies as of 3/19/2018  Review Complete On: 3/19/2018 By: Lotus Perez MD  
  
 Severity Noted Reaction Type Reactions Keflex [Cephalexin]  06/29/2016    Itching Lortab [Hydrocodone-acetaminophen]  03/30/2012    Hives, Itching Patient states she can take tylenol #3 without problem. Current Immunizations  Reviewed on 2/7/2018 Name Date Influenza Vaccine (Quad) PF 9/1/2017, 9/7/2016 Pneumococcal Conjugate (PCV-13) 9/7/2016 Pneumococcal Polysaccharide (PPSV-23) 1/23/2018 Tdap 6/17/2016 Not reviewed this visit You Were Diagnosed With   
  
 Codes Comments Status post right knee replacement    -  Primary ICD-10-CM: D53.020 ICD-9-CM: V43.65 Diabetes mellitus due to underlying condition with diabetic nephropathy, with long-term current use of insulin (HCC)     ICD-10-CM: E08.21, Z79.4 ICD-9-CM: 249.40, 583.81, V58.67 Essential hypertension     ICD-10-CM: I10 
ICD-9-CM: 401.9 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, subsequent encounter [facility])     ICD-10-CM: E38.801D ICD-9-CM: V58.89, 897.2 Vitals BP Pulse Temp Resp Height(growth percentile) Weight(growth percentile) 143/89 (BP 1 Location: Left arm, BP Patient Position: Sitting) 88 97.4 °F (36.3 °C) (Oral) 14 5' 3\" (1.6 m) 185 lb (83.9 kg) LMP SpO2 BMI OB Status Smoking Status 08/23/2003 100% 32.77 kg/m2 Postmenopausal Former Smoker Vitals History BMI and BSA Data Body Mass Index Body Surface Area 32.77 kg/m 2 1.93 m 2 Preferred Pharmacy Pharmacy Name Phone [address] [place] [phone] Your Updated Medication List  
  
   
This list is accurate as of 3/19/18 10:38 AM.  Always use your most recent med list.  
  
  
  
  
 acetaminophen 500 mg tablet Commonly known as:  TYLENOL Take 1 Tab by mouth every four (4) hours (while awake). Indications: Pain  
  
 acetaminophen-codeine 300-30 mg per tablet Commonly known as:  TYLENOL-CODEINE #3 Take 1 Tab by mouth every six (6) hours as needed for Pain. Max Daily Amount: 4 Tabs. Before and after PT  Indications: Pain ALEVE 220 mg tablet Generic drug:  naproxen sodium Take 220 mg by mouth two (2) times daily (with meals). colchicine 0.6 mg tablet Commonly known as:  COLCRYS Take 1 Tab by mouth daily. gabapentin 100 mg capsule Commonly known as:  NEURONTIN Take 1 Cap by mouth three (3) times daily. insulin aspart protamine/insulin aspart 100 unit/mL (70-30) Inpn Commonly known as:  NovoLOG Mix 70-30FlexPen U-100  
15 Units by SubCUTAneous route Before breakfast and dinner. losartan 100 mg tablet Commonly known as:  COZAAR Take 1 Tab by mouth daily. metFORMIN 500 mg tablet Commonly known as:  GLUCOPHAGE Take 1 Tab by mouth two (2) times daily (with meals). raNITIdine 150 mg tablet Commonly known as:  ZANTAC Take 1 Tab by mouth two (2) times a day. simvastatin 40 mg tablet Commonly known as:  ZOCOR Take 1 Tab by mouth daily. warfarin 2 mg tablet Commonly known as:  COUMADIN  
 Take 5 mg (two and a half tablets) daily. Adjust dose as directed. Indications: DEEP VEIN THROMBOSIS PREVENTION Prescriptions Printed Refills  
 acetaminophen-codeine (TYLENOL-CODEINE #3) 300-30 mg per tablet 0 Sig: Take 1 Tab by mouth every six (6) hours as needed for Pain. Max Daily Amount: 4 Tabs. Before and after PT  Indications: Pain Class: Print Route: Oral  
  
Prescriptions Sent to Pharmacy Refills  
 insulin aspart protamine/insulin aspart (NOVOLOG MIX 70-30FLEXPEN U-100) 100 unit/mL (70-30) inpn 5 Sig: 15 Units by SubCUTAneous route Before breakfast and dinner. Class: Normal  
 Pharmacy: [facility] [address] [place] Ph #: [phone] Route: SubCUTAneous To-Do List   
 03/28/2018 1:00 PM  
  Appointment with DIABETES CLASS #1 MRM at [facility] DIABETIC TREATMENT ([phone]) Introducing [facility] & HEALTH SERVICES! [employer] introduces [employer] patient portal. Now you can access parts of your medical record, email your doctor's office, and request medication refills online. 1. In your internet browser, go to [email or web address] 2. Click on the First Time User? Click Here link in the Sign In box. You will see the New Member Sign Up page. 3. Enter your [employer] Access Code exactly as it appears below. You will not need to use this code after youve completed the sign-up process. If you do not sign up before the expiration date, you must request a new code. · [employer] Access Code: [email or web address] Expires: 6/17/2018  9:56 AM 
 
4. Enter the last four digits of your Social Security Number (xxxx) and Date of Birth (mm/dd/yyyy) as indicated and click Submit. You will be taken to the next sign-up page. 5. Create a [employer] ID. This will be your [employer] login ID and cannot be changed, so think of one that is secure and easy to remember. 6. Create a [employer] password. You can change your password at any time. 7. Enter your Password Reset Question and Answer. This can be used at a later time if you forget your password. 8. Enter your e-mail address. You will receive e-mail notification when new information is available in [address] 9. Click Sign Up. You can now view and download portions of your medical record. 10. Click the Download Summary menu link to download a portable copy of your medical information. If you have questions, please visit the Frequently Asked Questions section of the [employer] website. Remember, [employer] is NOT to be used for urgent needs. For medical emergencies, dial 911. Now available from your iPhone and Android! Please provide this summary of care documentation to your next provider. Your primary care clinician is listed as Pratima No. If you have any questions after today's visit, please call [phone].

## 2018-03-19 NOTE — PROGRESS NOTES
Subjective:     Natalee Rose is a 65 y.o. female seen for follow-up of diabetes. She has had hypoglycemic attacks. .no  Blood sugar control has been good on lower dosage of insulin, cont after DC from hospital.  While hospitalized was put on the diet and required only small amounts of insulin. She has been doing better with her diet since discharge. She has diabetes, hypertension and hyperlipidemia. Natalee Rose has the additional concern of would like some T#3 for her PT, knee replaced right 1 Feb, doing out pt PT, off coumadin, ortho said doing well. PT is painful. Diabetic Review of Systems: no polyuria or polydipsia, no chest pain, dyspnea or TIA's, no hypoglycemia, no medication side effects noted. Allergies   Allergen Reactions    Keflex [Cephalexin] Itching    Lortab [Hydrocodone-Acetaminophen] Hives and Itching     Patient states she can take tylenol #3 without problem. Diet and Lifestyle: follows a diabetic diet regularly, nonsmoker. Social History   Substance Use Topics    Smoking status: Former Smoker     Packs/day: 0.50     Years: 15.00     Quit date: 3/30/2009    Smokeless tobacco: Never Used    Alcohol use No        Lab Results  Component Value Date/Time   WBC 7.8 02/19/2018 06:10 AM   HGB 10.4 (L) 02/19/2018 06:10 AM   HCT 33.6 (L) 02/19/2018 06:10 AM   PLATELET 889 30/25/0646 06:10 AM   MCV 78.7 (L) 02/19/2018 06:10 AM     Lab Results  Component Value Date/Time   Hemoglobin A1c 8.3 (H) 02/14/2018 03:58 AM   Hemoglobin A1c 8.1 (H) 01/29/2018 09:14 AM   Hemoglobin A1c 8.1 (H) 01/23/2018 03:47 PM   Glucose 132 (H) 02/19/2018 06:10 AM   Glucose (POC) 146 (H) 02/18/2018 11:22 AM   Microalb/Creat ratio (ug/mg creat.) 65.4 (H) 03/23/2017 08:03 AM   LDL, calculated 23 09/01/2017 09:03 AM   Creatinine 1.21 (H) 02/19/2018 06:10 AM      Lab Results  Component Value Date/Time   ALT (SGPT) 11 01/23/2018 03:47 PM   AST (SGOT) 11 01/23/2018 03:47 PM   Alk.  phosphatase 77 01/23/2018 03:47 PM   Bilirubin, total <0.2 01/23/2018 03:47 PM   Albumin 4.2 01/23/2018 03:47 PM   Protein, total 6.5 01/23/2018 03:47 PM   INR 1.7 (H) 02/18/2018 02:46 AM   INR POC 1.3 03/05/2018 12:09 PM   Prothrombin time 17.0 (H) 02/18/2018 02:46 AM   PLATELET 186 65/85/8257 06:10 AM       Lab Results  Component Value Date/Time   GFR est non-AA 45 (L) 02/19/2018 06:10 AM   GFR est AA 55 (L) 02/19/2018 06:10 AM   Creatinine 1.21 (H) 02/19/2018 06:10 AM   BUN 22 (H) 02/19/2018 06:10 AM   Sodium 135 (L) 02/19/2018 06:10 AM   Potassium 4.5 02/19/2018 06:10 AM   Chloride 100 02/19/2018 06:10 AM   CO2 28 02/19/2018 06:10 AM     Lab Results   Component Value Date/Time    Glucose 132 (H) 02/19/2018 06:10 AM    Glucose (POC) 146 (H) 02/18/2018 11:22 AM         Review of Systems  Pertinent items are noted in HPI. Objective:     Significant for the following:     Visit Vitals    /89 (BP 1 Location: Left arm, BP Patient Position: Sitting)    Pulse 88    Temp 97.4 °F (36.3 °C) (Oral)    Resp 14    Ht 5' 3\" (1.6 m)    Wt 185 lb (83.9 kg)    LMP 08/23/2003    SpO2 100%    BMI 32.77 kg/m2     Appearance: alert, well appearing, and in no distress. Exam: heart sounds normal rate, regular rhythm, normal S1, S2, no murmurs, rubs, clicks or gallops, chest clear  Foot exam: Deferred, right knee incision looks well-healed with minimal swelling or redness. Lab review: labs reviewed, I note that glycosylated hemoglobin mildly abnormal but acceptable. Assessment/Plan:     Follow-up diabetes stable. Diabetic issues reviewed with her: all medications, side effects and compliance discussed carefully and glycohemoglobin and other lab monitoring discussed. 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 Essential hypertension    3.  Diabetes mellitus due to underlying condition with diabetic nephropathy, with long-term current use of insulin (HCC)     Relevant Medications     insulin aspart protamine/insulin aspart (NOVOLOG MIX 70-30FLEXPEN U-100) 100 unit/mL (70-30) inpn      Acute Diagnoses Addressed Today     Status post right knee replacement    -  Primary        Relevant Medications        acetaminophen-codeine (TYLENOL-CODEINE #3) 300-30 mg per tablet      Above issues are stable. Okay some T3's are doing physical therapy. We discussed this pain and the usage of controlled substances for knee pain relief. In general these medications are indicated for the acute symptom relief and not for chronic usage, allthough they are frequently used for chronic management  After a certain period of time they should be stopped to avoid dependence and/or addiction. I warned her about the dangers of addiction and other side affects including respiratory depression and death. Discussed how this is a controlled substance and that the prescribing of it is should be monitored very carefully. Drug usage is also monitored by our practise and the [facility], since there has been a lot of abuse and diversion of controlled substances. In general we do not refill this medication over the phone. PLease ask for enough pills to get you to your next appointment and make sure you keep your appointments. Warned not to take other medications like alcohol, other benzodiazapines marijuana or other narcotics when on this medication. Orders Placed This Encounter    insulin aspart protamine/insulin aspart (NOVOLOG MIX 70-30FLEXPEN U-100) 100 unit/mL (70-30) inpn     Sig: 15 Units by SubCUTAneous route Before breakfast and dinner. Dispense:  5 Pen     Refill:  5    acetaminophen-codeine (TYLENOL-CODEINE #3) 300-30 mg per tablet     Sig: Take 1 Tab by mouth every six (6) hours as needed for Pain. Max Daily Amount: 4 Tabs.  Before and after PT  Indications: Pain     Dispense:  20 Tab     Refill:  0     Current Outpatient Prescriptions   Medication Sig Dispense Refill    insulin aspart protamine/insulin aspart (NOVOLOG MIX 70-30FLEXPEN U-100) 100 unit/mL (70-30) inpn 15 Units by SubCUTAneous route Before breakfast and dinner. 5 Pen 5    acetaminophen-codeine (TYLENOL-CODEINE #3) 300-30 mg per tablet Take 1 Tab by mouth every six (6) hours as needed for Pain. Max Daily Amount: 4 Tabs. Before and after PT  Indications: Pain 20 Tab 0    losartan (COZAAR) 100 mg tablet Take 1 Tab by mouth daily. 90 Tab 1    naproxen sodium (ALEVE) 220 mg tablet Take 220 mg by mouth two (2) times daily (with meals).  metFORMIN (GLUCOPHAGE) 500 mg tablet Take 1 Tab by mouth two (2) times daily (with meals). 180 Tab 1    acetaminophen (TYLENOL) 500 mg tablet Take 1 Tab by mouth every four (4) hours (while awake). Indications: Pain 100 Tab 0    colchicine (COLCRYS) 0.6 mg tablet Take 1 Tab by mouth daily. 30 Tab 1    simvastatin (ZOCOR) 40 mg tablet Take 1 Tab by mouth daily. (Patient taking differently: Take 40 mg by mouth nightly.) 90 Tab 3    raNITIdine (ZANTAC) 150 mg tablet Take 1 Tab by mouth two (2) times a day. 180 Tab 3    gabapentin (NEURONTIN) 100 mg capsule Take 1 Cap by mouth three (3) times daily. 270 Cap 3     Follow-up Disposition:  Return in about 3 months (around 6/19/2018) for routine follow up.

## 2018-03-21 ENCOUNTER — PATIENT OUTREACH (OUTPATIENT)
Dept: INTERNAL MEDICINE CLINIC | Age: 63
End: 2018-03-21

## 2018-03-29 ENCOUNTER — PATIENT OUTREACH (OUTPATIENT)
Dept: INTERNAL MEDICINE CLINIC | Age: 63
End: 2018-03-29

## 2018-05-15 RX ORDER — INSULIN ASPART 100 [IU]/ML
INJECTION, SUSPENSION SUBCUTANEOUS
Qty: 10 ADJUSTABLE DOSE PRE-FILLED PEN SYRINGE | Refills: 3 | Status: SHIPPED | OUTPATIENT
Start: 2018-05-15 | End: 2018-06-06 | Stop reason: DRUGHIGH

## 2018-05-16 RX ORDER — COLCHICINE 0.6 MG/1
TABLET, FILM COATED ORAL
Qty: 30 TAB | Refills: 1 | Status: SHIPPED | OUTPATIENT
Start: 2018-05-16 | End: 2018-08-31 | Stop reason: SDUPTHER

## 2018-06-06 ENCOUNTER — OFFICE VISIT (OUTPATIENT)
Dept: INTERNAL MEDICINE CLINIC | Age: 63
End: 2018-06-06

## 2018-06-06 VITALS
HEART RATE: 110 BPM | HEIGHT: 63 IN | OXYGEN SATURATION: 98 % | DIASTOLIC BLOOD PRESSURE: 77 MMHG | SYSTOLIC BLOOD PRESSURE: 136 MMHG | BODY MASS INDEX: 32.78 KG/M2 | WEIGHT: 185 LBS | RESPIRATION RATE: 18 BRPM | TEMPERATURE: 98.8 F

## 2018-06-06 DIAGNOSIS — S78.112S TRAUMATIC AMPUTATION OF LEFT LOWER EXTREMITY ABOVE KNEE, SEQUELA: Chronic | ICD-10-CM

## 2018-06-06 DIAGNOSIS — Z79.4 DIABETES MELLITUS DUE TO UNDERLYING CONDITION WITH DIABETIC NEPHROPATHY, WITH LONG-TERM CURRENT USE OF INSULIN (HCC): ICD-10-CM

## 2018-06-06 DIAGNOSIS — R06.00 DYSPNEA, UNSPECIFIED TYPE: ICD-10-CM

## 2018-06-06 DIAGNOSIS — I10 ESSENTIAL HYPERTENSION: ICD-10-CM

## 2018-06-06 DIAGNOSIS — M19.90 ARTHRITIS: Primary | ICD-10-CM

## 2018-06-06 DIAGNOSIS — E78.00 HYPERCHOLESTEREMIA: ICD-10-CM

## 2018-06-06 DIAGNOSIS — E08.21 DIABETES MELLITUS DUE TO UNDERLYING CONDITION WITH DIABETIC NEPHROPATHY, WITH LONG-TERM CURRENT USE OF INSULIN (HCC): ICD-10-CM

## 2018-06-06 DIAGNOSIS — D50.0 IRON DEFICIENCY ANEMIA DUE TO CHRONIC BLOOD LOSS: ICD-10-CM

## 2018-06-06 NOTE — PROGRESS NOTES
Chief Complaint   Patient presents with    Joint Pain     pain in hands, shoulders, neck and elbows    Shortness of Breath     after amb a short distance, pt became SOB, sweating and pain with hands     I have reviewed the patient's medical history in detail and updated the computerized patient record. Health Maintenance reviewed. 1. Have you been to the ER, urgent care clinic since your last visit? Hospitalized since your last visit?no    2. Have you seen or consulted any other health care providers outside of the [address] [place] [address] [place] since your last visit? Include any pap smears or colon screening. No    Encouraged pt to discuss pt's wishes with spouse/partner/family and bring them in the next appt to follow thru with the Advanced Directive    Fall Risk Assessment, last 12 mths 2/7/2018   Able to walk? Yes   Fall in past 12 months? Yes   Fall with injury? No   Number of falls in past 12 months 8 or more   Fall Risk Score 8       PHQ over the last two weeks 6/6/2018   Little interest or pleasure in doing things Several days   Feeling down, depressed or hopeless Several days   Total Score PHQ 2 2       Abuse Screening Questionnaire 2/7/2018   Do you ever feel afraid of your partner? N   Are you in a relationship with someone who physically or mentally threatens you? N   Is it safe for you to go home?  Y       ADL Assessment 2/7/2018   Feeding yourself No Help Needed   Getting from bed to chair No Help Needed   Getting dressed No Help Needed   Bathing or showering No Help Needed   Walk across the room (includes cane/walker) No Help Needed   Using the telphone No Help Needed   Taking your medications No Help Needed   Preparing meals No Help Needed   Managing money (expenses/bills) No Help Needed   Moderately strenuous housework (laundry) No Help Needed   Shopping for personal items (toiletries/medicines) No Help Needed   Shopping for groceries No Help Needed   Driving No Help Needed   Climbing a flight of stairs No Help Needed   Getting to places beyond walking distances No Help Needed

## 2018-06-06 NOTE — MR AVS SNAPSHOT
[address] [address] [facility] 
[phone] Patient: Basilio Galvan MRN: [id number] Visit Information Date & Time Provider Department Dept. Phone Encounter #  
 2018  8:00 AM Lyndsey Garcia MD [place] [address] [phone] [id number] Follow-up Instructions Return in about 4 weeks (around 2018) for routine follow up. Upcoming Health Maintenance Date Due Hepatitis C Screening 1955 ZOSTER VACCINE AGE 60> 2016 MICROALBUMIN Q1 3/23/2018 MEDICARE YEARLY EXAM 3/28/2018 Influenza Age 5 to Adult 2018 HEMOGLOBIN A1C Q6M 2018 LIPID PANEL Q1 2018 FOOT EXAM Q1 2018 EYE EXAM RETINAL OR DILATED Q1 10/17/2018 BREAST CANCER SCRN MAMMOGRAM 2019 PAP AKA CERVICAL CYTOLOGY 2020 COLONOSCOPY 10/12/2022 DTaP/Tdap/Td series (2 - Td) 2026 Allergies as of 2018  Review Complete On: 2018 By: Lyndsey Garcia MD  
  
 Severity Noted Reaction Type Reactions Keflex [Cephalexin]  2016    Itching Lortab [Hydrocodone-acetaminophen]  2012    Hives, Itching Patient states she can take tylenol #3 without problem. Current Immunizations  Reviewed on 2018 Name Date Influenza Vaccine (Quad) PF 2017, 2016 Pneumococcal Conjugate (PCV-13) 2016 Pneumococcal Polysaccharide (PPSV-23) 2018 Tdap 2016 Not reviewed this visit You Were Diagnosed With   
  
 Codes Comments Arthritis    -  Primary ICD-10-CM: M19.90 ICD-9-CM: 716.90 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, sequela ([facility] [address]     ICD-10-CM: G43.025R ICD-9-CM: 905.9 Diabetes mellitus due to underlying condition with diabetic nephropathy, with long-term current use of insulin (HCC)     ICD-10-CM: E08.21, Z79.4 ICD-9-CM: 249.40, 583.81, V58.67   
 Dyspnea, unspecified type     ICD-10-CM: R06.00 
ICD-9-CM: 786.09 Essential hypertension     ICD-10-CM: I10 
ICD-9-CM: 401.9 Hypercholesteremia     ICD-10-CM: E78.00 ICD-9-CM: 272.0 Iron deficiency anemia due to chronic blood loss     ICD-10-CM: D50.0 ICD-9-CM: 280.0 Vitals BP Pulse Temp Resp Height(growth percentile) Weight(growth percentile) 136/77 (BP 1 Location: Left arm, BP Patient Position: Sitting) (!) 110 98.8 °F (37.1 °C) (Oral) 18 5' 3\" (1.6 m) 185 lb (83.9 kg) LMP SpO2 BMI OB Status Smoking Status 08/23/2003 98% 32.77 kg/m2 Postmenopausal Former Smoker Vitals History BMI and BSA Data Body Mass Index Body Surface Area 32.77 kg/m 2 1.93 m 2 Preferred Pharmacy Pharmacy Name Phone [address] [place] [phone] Your Updated Medication List  
  
   
This list is accurate as of 6/6/18  8:52 AM.  Always use your most recent med list.  
  
  
  
  
 acetaminophen 500 mg tablet Commonly known as:  TYLENOL Take 1 Tab by mouth every four (4) hours (while awake). Indications: Pain ALEVE 220 mg tablet Generic drug:  naproxen sodium Take 220 mg by mouth two (2) times daily (with meals). COLCRYS 0.6 mg tablet Generic drug:  colchicine TAKE 1 TABLET BY MOUTH ONCE DAILY  
  
 gabapentin 100 mg capsule Commonly known as:  NEURONTIN Take 1 Cap by mouth three (3) times daily. hydroCHLOROthiazide 25 mg tab 25 mg, lisinopril 20 mg tab 20 mg Take  by mouth daily. insulin aspart protamine/insulin aspart 100 unit/mL (70-30) Inpn Commonly known as:  NovoLOG Mix 70-30FlexPen U-100  
15 Units by SubCUTAneous route Before breakfast and dinner. metFORMIN 500 mg tablet Commonly known as:  GLUCOPHAGE Take 1 Tab by mouth two (2) times daily (with meals). raNITIdine 150 mg tablet Commonly known as:  ZANTAC Take 1 Tab by mouth two (2) times a day. simvastatin 40 mg tablet Commonly known as:  ZOCOR Take 1 Tab by mouth daily. We Performed the Following AMB POC EKG ROUTINE W/ 12 LEADS, INTER & REP [79476 CPT(R)] CBC W/O DIFF [29081 CPT(R)] COLLECTION VENOUS BLOOD,VENIPUNCTURE [id number] CPT(R)] HEMOGLOBIN A1C WITH EAG [15909 CPT(R)] METABOLIC PANEL, COMPREHENSIVE [06176 CPT(R)] IN HANDLG&/OR CONVEY OF SPEC FOR TR OFFICE TO LAB [51140 CPT(R)]   
 RA + CCP ABS [FHV60161 Custom] SED RATE (ESR) [id number] CPT(R)] Follow-up Instructions Return in about 4 weeks (around 7/4/2018) for routine follow up. To-Do List   
 06/13/2018 ECG:  STRESS TEST DOBUTAMINE/THALLIUM Introducing hospitals & HEALTH SERVICES! [employer] introduces [employer] patient portal. Now you can access parts of your medical record, email your doctor's office, and request medication refills online. 1. In your internet browser, go to [email or web address] 2. Click on the First Time User? Click Here link in the Sign In box. You will see the New Member Sign Up page. 3. Enter your [employer] Access Code exactly as it appears below. You will not need to use this code after youve completed the sign-up process. If you do not sign up before the expiration date, you must request a new code. · [employer] Access Code: [email or web address] Expires: 6/17/2018  9:56 AM 
 
4. Enter the last four digits of your Social Security Number (xxxx) and Date of Birth (mm/dd/yyyy) as indicated and click Submit. You will be taken to the next sign-up page. 5. Create a [employer] ID. This will be your [employer] login ID and cannot be changed, so think of one that is secure and easy to remember. 6. Create a [employer] password. You can change your password at any time. 7. Enter your Password Reset Question and Answer. This can be used at a later time if you forget your password. 8. Enter your e-mail address.  You will receive e-mail notification when new information is available in [employer]. 9. Click Sign Up. You can now view and download portions of your medical record. 10. Click the Download Summary menu link to download a portable copy of your medical information. If you have questions, please visit the Frequently Asked Questions section of the [employer] website. Remember, [employer] is NOT to be used for urgent needs. For medical emergencies, dial 911. Now available from your iPhone and Android! Please provide this summary of care documentation to your next provider. Your primary care clinician is listed as Levi De Santiago. If you have any questions after today's visit, please call [phone].

## 2018-06-06 NOTE — PROGRESS NOTES
Subjective:     Cassia Crystal is a 59 y.o. female seen for follow-up of diabetes. She has had hypoglycemic attacks. .yes as low as 81  Blood sugar control has been low 100's mainly  She has diabetes, hypertension and hyperlipidemia. Cassia Crystal has the additional concern of arthritis pain in fingers and joints, injection helped but some depigmentation finger, elbows and shoulders hurt, burn thinks arthritis interested in specialist.    In [facility] put in tube. HR elevated, Scan neg for PE. Has an episode of dyspnea a week ago when visiting a client, lasted 10 min resolved. No known cardiac disease. Not had any cardiac testing. Knee replaced 2/13/2018, finished PT independent of ADL's and is back to work. Diabetic Review of Systems: no polyuria or polydipsia, no chest pain, dyspnea or TIA's, has dysesthesias in the feet. Allergies   Allergen Reactions    Keflex [Cephalexin] Itching    Lortab [Hydrocodone-Acetaminophen] Hives and Itching     Patient states she can take tylenol #3 without problem. Diet and Lifestyle: does not rigorously follow a diabetic diet.     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 ([address] 11/21/2016    Type 2 diabetes mellitus with hyperglycemia ([address] 04/06/2016    Phantom limb pain ([address] 05/14/2012     Social History   Substance Use Topics    Smoking status: Former Smoker     Packs/day: 0.50     Years: 15.00     Quit date: 3/30/2009    Smokeless tobacco: Never Used    Alcohol use No        Lab Results  Component Value Date/Time   WBC 7.8 02/19/2018 06:10 AM   HGB 10.4 (L) 02/19/2018 06:10 AM   HCT 33.6 (L) 02/19/2018 06:10 AM   PLATELET 579 96/15/6283 06:10 AM   MCV 78.7 (L) 02/19/2018 06:10 AM     Lab Results  Component Value Date/Time   Hemoglobin A1c 8.3 (H) 02/14/2018 03:58 AM   Hemoglobin A1c 8.1 (H) 01/29/2018 09:14 AM   Hemoglobin A1c 8.1 (H) 01/23/2018 03:47 PM   Glucose 132 (H) 02/19/2018 06:10 AM   Glucose (POC) 146 (H) 02/18/2018 11:22 AM   Microalb/Creat ratio (ug/mg creat.) 65.4 (H) 03/23/2017 08:03 AM   LDL, calculated 23 09/01/2017 09:03 AM   Creatinine 1.21 (H) 02/19/2018 06:10 AM      Lab Results  Component Value Date/Time   Cholesterol, total 126 09/01/2017 09:03 AM   HDL Cholesterol 24 (L) 09/01/2017 09:03 AM   LDL, calculated 23 09/01/2017 09:03 AM   Triglyceride 394 (H) 09/01/2017 09:03 AM     Lab Results  Component Value Date/Time   ALT (SGPT) 11 01/23/2018 03:47 PM   AST (SGOT) 11 01/23/2018 03:47 PM   Alk. phosphatase 77 01/23/2018 03:47 PM   Bilirubin, total <0.2 01/23/2018 03:47 PM   Albumin 4.2 01/23/2018 03:47 PM   Protein, total 6.5 01/23/2018 03:47 PM   INR 1.7 (H) 02/18/2018 02:46 AM   INR POC 1.3 03/05/2018 12:09 PM   Prothrombin time 17.0 (H) 02/18/2018 02:46 AM   PLATELET 917 64/21/6350 06:10 AM       Lab Results  Component Value Date/Time   GFR est non-AA 45 (L) 02/19/2018 06:10 AM   GFR est AA 55 (L) 02/19/2018 06:10 AM   Creatinine 1.21 (H) 02/19/2018 06:10 AM   BUN 22 (H) 02/19/2018 06:10 AM   Sodium 135 (L) 02/19/2018 06:10 AM   Potassium 4.5 02/19/2018 06:10 AM   Chloride 100 02/19/2018 06:10 AM   CO2 28 02/19/2018 06:10 AM     Lab Results   Component Value Date/Time    Glucose 132 (H) 02/19/2018 06:10 AM    Glucose (POC) 146 (H) 02/18/2018 11:22 AM         Review of Systems  Pertinent items are noted in HPI.     Objective:     Significant for the following:     Visit Vitals    /77 (BP 1 Location: Left arm, BP Patient Position: Sitting)    Pulse (!) 110    Temp 98.8 °F (37.1 °C) (Oral)    Resp 18    Ht 5' 3\" (1.6 m)    Wt 185 lb (83.9 kg)    [facility] 08/23/2003    SpO2 98%    BMI 32.77 kg/m2     Appearance: alert, well appearing, and in no distress. Exam: heart sounds normal rate, regular rhythm, normal S1, S2, no murmurs, rubs, clicks or gallops, chest clear, feet: warm, good capillary refill  Foot exam: deferred    Lab review: labs reviewed, I note that glycosylated hemoglobin mildly abnormal but acceptable. Assessment/Plan:     Follow-up diabetes stable. Diabetic issues reviewed with her: diabetic diet discussed in detail, written exchange diet given, all medications, side effects and compliance discussed carefully and glycohemoglobin and other lab monitoring discussed. 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 Essential hypertension     Relevant Medications     hydroCHLOROthiazide 25 mg tab 25 mg, lisinopril 20 mg tab 20 mg     Other Relevant Orders     METABOLIC PANEL, COMPREHENSIVE     CBC W/O DIFF     OR HANDLG&/OR CONVEY OF SPEC FOR TR OFFICE TO LAB     COLLECTION VENOUS BLOOD,VENIPUNCTURE    3.  Diabetes mellitus due to underlying condition with diabetic nephropathy, with long-term current use of insulin (HCC)     Relevant Medications     hydroCHLOROthiazide 25 mg tab 25 mg, lisinopril 20 mg tab 20 mg     Other Relevant Orders     HEMOGLOBIN A1C WITH EAG     OR HANDLG&/OR CONVEY OF SPEC FOR TR OFFICE TO LAB     COLLECTION VENOUS BLOOD,VENIPUNCTURE      Acute Diagnoses Addressed Today     Arthritis    -  Primary        Relevant Orders        SED RATE (ESR)        RA + CCP ABS        OR HANDLG&/OR CONVEY OF SPEC FOR TR OFFICE TO LAB        COLLECTION VENOUS BLOOD,VENIPUNCTURE    Dyspnea, unspecified type            Relevant Orders        AMB POC EKG ROUTINE W/ 12 LEADS, INTER & REP (Completed)        OR HANDLG&/OR CONVEY OF SPEC FOR TR OFFICE TO LAB COLLECTION VENOUS BLOOD,VENIPUNCTURE        STRESS TEST DOBUTAMINE/THALLIUM    Hypercholesteremia            Relevant Orders        SD HANDLG&/OR CONVEY OF SPEC FOR TR OFFICE TO LAB        COLLECTION VENOUS BLOOD,VENIPUNCTURE    Iron deficiency anemia due to chronic blood loss            Relevant Orders        SD HANDLG&/OR CONVEY OF SPEC FOR TR OFFICE TO LAB        COLLECTION VENOUS BLOOD,VENIPUNCTURE        Orders Placed This Encounter    METABOLIC PANEL, COMPREHENSIVE    SED RATE (ESR)    HEMOGLOBIN A1C WITH EAG    RA + CCP ABS    CBC W/O DIFF    AMB POC EKG ROUTINE W/ 12 LEADS, INTER & REP     Order Specific Question:   Reason for Exam:     Answer:   dyspnea    STRESS TEST DOBUTAMINE/THALLIUM     Standing Status:   Future     Standing Expiration Date:   9/5/2018     Scheduling Instructions:      [facility]     Order Specific Question:   Reason for Exam:     Answer:   tachycardia and episodes of dyspnea DM anemia aka    SD HANDLG&/OR CONVEY OF SPEC FOR TR OFFICE TO LAB    COLLECTION VENOUS BLOOD,VENIPUNCTURE    DISCONTD: hydroCHLOROthiazide 25 mg tab 25 mg, lisinopril 20 mg tab 20 mg     Sig: Take  by mouth daily.  hydroCHLOROthiazide 25 mg tab 25 mg, lisinopril 20 mg tab 20 mg     Sig: Take  by mouth daily.   '  Start a rheumatological workup, if labs are positive will send to rheumatology. Probably osteoarthritis, after cardiac testing discuss further. He will cardiac evaluation help rule out heart disease as a cause of her dyspnea. Diabetes stable  Follow-up Disposition:  Return in about 4 weeks (around 7/4/2018) for routine follow up.

## 2018-06-13 LAB
ALBUMIN SERPL-MCNC: 4.5 G/DL (ref 3.6–4.8)
ALBUMIN/GLOB SERPL: 2 {RATIO} (ref 1.2–2.2)
ALP SERPL-CCNC: 96 IU/L (ref 39–117)
ALT SERPL-CCNC: 17 IU/L (ref 0–32)
AST SERPL-CCNC: 11 IU/L (ref 0–40)
BILIRUB SERPL-MCNC: <0.2 MG/DL (ref 0–1.2)
BUN SERPL-MCNC: 20 MG/DL (ref 8–27)
BUN/CREAT SERPL: 21 (ref 12–28)
CALCIUM SERPL-MCNC: 9.4 MG/DL (ref 8.7–10.3)
CCP IGA+IGG SERPL IA-ACNC: 6 UNITS (ref 0–19)
CHLORIDE SERPL-SCNC: 101 MMOL/L (ref 96–106)
CO2 SERPL-SCNC: 21 MMOL/L (ref 18–29)
CREAT SERPL-MCNC: 0.96 MG/DL (ref 0.57–1)
ERYTHROCYTE [DISTWIDTH] IN BLOOD BY AUTOMATED COUNT: 18.3 % (ref 12.3–15.4)
ERYTHROCYTE [SEDIMENTATION RATE] IN BLOOD BY WESTERGREN METHOD: NORMAL MM/HR
EST. AVERAGE GLUCOSE BLD GHB EST-MCNC: 169 MG/DL
GFR SERPLBLD CREATININE-BSD FMLA CKD-EPI: 64 ML/MIN/1.73
GFR SERPLBLD CREATININE-BSD FMLA CKD-EPI: 73 ML/MIN/1.73
GLOBULIN SER CALC-MCNC: 2.3 G/DL (ref 1.5–4.5)
GLUCOSE SERPL-MCNC: 288 MG/DL (ref 65–99)
HBA1C MFR BLD: 7.5 % (ref 4.8–5.6)
HCT VFR BLD AUTO: 44.1 % (ref 34–46.6)
HGB BLD-MCNC: 14 G/DL (ref 11.1–15.9)
MCH RBC QN AUTO: 24.6 PG (ref 26.6–33)
MCHC RBC AUTO-ENTMCNC: 31.7 G/DL (ref 31.5–35.7)
MCV RBC AUTO: 78 FL (ref 79–97)
PLATELET # BLD AUTO: 212 X10E3/UL (ref 150–379)
POTASSIUM SERPL-SCNC: 4.2 MMOL/L (ref 3.5–5.2)
PROT SERPL-MCNC: 6.8 G/DL (ref 6–8.5)
RBC # BLD AUTO: 5.68 X10E6/UL (ref 3.77–5.28)
RHEUMATOID FACT SERPL-ACNC: <10 IU/ML (ref 0–13.9)
SODIUM SERPL-SCNC: 142 MMOL/L (ref 134–144)
WBC # BLD AUTO: 6.1 X10E3/UL (ref 3.4–10.8)

## 2018-07-11 ENCOUNTER — OFFICE VISIT (OUTPATIENT)
Dept: INTERNAL MEDICINE CLINIC | Age: 63
End: 2018-07-11

## 2018-07-11 VITALS
OXYGEN SATURATION: 99 % | SYSTOLIC BLOOD PRESSURE: 138 MMHG | HEART RATE: 91 BPM | HEIGHT: 63 IN | RESPIRATION RATE: 20 BRPM | TEMPERATURE: 96.7 F | WEIGHT: 192 LBS | BODY MASS INDEX: 34.02 KG/M2 | DIASTOLIC BLOOD PRESSURE: 82 MMHG

## 2018-07-11 DIAGNOSIS — M10.9 ARTHRITIS DUE TO GOUT: ICD-10-CM

## 2018-07-11 DIAGNOSIS — Z79.4 TYPE 2 DIABETES MELLITUS WITH DIABETIC NEUROPATHY, WITH LONG-TERM CURRENT USE OF INSULIN (HCC): Primary | ICD-10-CM

## 2018-07-11 DIAGNOSIS — G54.6 PHANTOM LIMB PAIN (HCC): ICD-10-CM

## 2018-07-11 DIAGNOSIS — I10 ESSENTIAL HYPERTENSION: ICD-10-CM

## 2018-07-11 DIAGNOSIS — R63.5 WEIGHT GAIN: ICD-10-CM

## 2018-07-11 DIAGNOSIS — E11.40 TYPE 2 DIABETES MELLITUS WITH DIABETIC NEUROPATHY, WITH LONG-TERM CURRENT USE OF INSULIN (HCC): Primary | ICD-10-CM

## 2018-07-11 RX ORDER — FENOPROFEN CALCIUM 200 MG/1
CAPSULE ORAL
COMMUNITY
Start: 2018-06-26 | End: 2019-08-16 | Stop reason: ALTCHOICE

## 2018-07-11 RX ORDER — DICLOFENAC SODIUM, CAPSAICIN 1.5-0.025%
KIT TOPICAL
COMMUNITY
Start: 2018-06-26 | End: 2019-08-16 | Stop reason: ALTCHOICE

## 2018-07-11 RX ORDER — GABAPENTIN 300 MG/1
300 CAPSULE ORAL 3 TIMES DAILY
Qty: 90 CAP | Refills: 5 | Status: SHIPPED | OUTPATIENT
Start: 2018-07-11 | End: 2019-03-01

## 2018-07-11 RX ORDER — LISINOPRIL AND HYDROCHLOROTHIAZIDE 20; 25 MG/1; MG/1
TABLET ORAL DAILY
COMMUNITY
End: 2019-03-07 | Stop reason: SDUPTHER

## 2018-07-11 RX ORDER — LIDOCAINE 2.5% AND PRILOCAINE 2.5% 2.5 %-2.5%
KIT TOPICAL
COMMUNITY
Start: 2018-06-26 | End: 2019-08-16 | Stop reason: ALTCHOICE

## 2018-07-11 RX ORDER — LIDOCAINE AND TETRACAINE 70; 70 MG/G; MG/G
CREAM TOPICAL
COMMUNITY
Start: 2018-06-26 | End: 2019-08-16 | Stop reason: ALTCHOICE

## 2018-07-11 RX ORDER — IBUPROFEN 200 MG
TABLET ORAL
COMMUNITY
End: 2019-08-16 | Stop reason: ALTCHOICE

## 2018-07-11 NOTE — PROGRESS NOTES
Subjective:     Jaleesa Perez is a 59 y.o. female seen for follow-up of diabetes. She has had hypoglycemic attacks. .no  Blood sugar control has been 's AM fasting  She has diabetes, hypertension and hyperlipidemia. Jaleesa Perez has the additional concern of joints hurt finger swollen, left hand middle finger  Phantom limb pain cont bad. Diabetic Review of Systems: no polyuria or polydipsia, no chest pain, dyspnea or TIA's, has dysesthesias in the feet. Allergies   Allergen Reactions    Keflex [Cephalexin] Itching    Lortab [Hydrocodone-Acetaminophen] Hives and Itching     Patient states she can take tylenol #3 without problem. Diet and Lifestyle: does not rigorously follow a diabetic diet, nonsmoker. 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      Social History   Substance Use Topics    Smoking status: Former Smoker     Packs/day: 0.50     Years: 15.00     Quit date: 3/30/2009    Smokeless tobacco: Never Used    Alcohol use No        Lab Results  Component Value Date/Time   WBC 6.1 06/06/2018 08:27 AM   HGB 14.0 06/06/2018 08:27 AM   HCT 44.1 06/06/2018 08:27 AM   PLATELET 506 90/80/3052 08:27 AM   MCV 78 (L) 06/06/2018 08:27 AM     Lab Results  Component Value Date/Time   Hemoglobin A1c 7.5 (H) 06/06/2018 08:27 AM   Hemoglobin A1c 8.3 (H) 02/14/2018 03:58 AM   Hemoglobin A1c 8.1 (H) 01/29/2018 09:14 AM   Glucose 288 (H) 06/06/2018 08:27 AM   Glucose (POC) 146 (H) 02/18/2018 11:22 AM   Microalb/Creat ratio (ug/mg creat.) 102.6 (H) 07/11/2018 08:51 AM   LDL, calculated 23 09/01/2017 09:03 AM   Creatinine 0.96 06/06/2018 08:27 AM      Lab Results  Component Value Date/Time   Cholesterol, total 126 09/01/2017 09:03 AM   HDL Cholesterol 24 (L) 09/01/2017 09:03 AM   LDL, calculated 23 09/01/2017 09:03 AM   Triglyceride 394 (H) 09/01/2017 09:03 AM     Lab Results  Component Value Date/Time   ALT (SGPT) 17 06/06/2018 08:27 AM   AST (SGOT) 11 06/06/2018 08:27 AM   Alk. phosphatase 96 06/06/2018 08:27 AM   Bilirubin, total <0.2 06/06/2018 08:27 AM   Albumin 4.5 06/06/2018 08:27 AM   Protein, total 6.8 06/06/2018 08:27 AM   INR 1.7 (H) 02/18/2018 02:46 AM   INR POC 1.3 03/05/2018 12:09 PM   Prothrombin time 17.0 (H) 02/18/2018 02:46 AM   PLATELET 000 07/91/0034 08:27 AM       Lab Results  Component Value Date/Time   GFR est non-AA 64 06/06/2018 08:27 AM   GFR est AA 73 06/06/2018 08:27 AM   Creatinine 0.96 06/06/2018 08:27 AM   BUN 20 06/06/2018 08:27 AM   Sodium 142 06/06/2018 08:27 AM   Potassium 4.2 06/06/2018 08:27 AM   Chloride 101 06/06/2018 08:27 AM   CO2 21 06/06/2018 08:27 AM     Lab Results   Component Value Date/Time    Glucose 288 (H) 06/06/2018 08:27 AM    Glucose (POC) 146 (H) 02/18/2018 11:22 AM         Review of Systems  Pertinent items are noted in HPI. Objective:     Significant for the following:     Visit Vitals    /82 (BP 1 Location: Right arm, BP Patient Position: Sitting)    Pulse 91    Temp 96.7 °F (35.9 °C) (Oral)    Resp 20    Ht 5' 3\" (1.6 m)    Wt 192 lb (87.1 kg)    LMP 08/23/2003    SpO2 99%    BMI 34.01 kg/m2     Appearance: alert, well appearing, and in no distress.   Exam: heart sounds normal rate, regular rhythm, normal S1, S2, no murmurs, rubs, clicks or gallops, chest clear  Foot exam: deferred    Lab review: labs are reviewed, up to date and normal.    Assessment/Plan:       ICD-10-CM ICD-9-CM    1. Type 2 diabetes mellitus with diabetic neuropathy, with long-term current use of insulin (HCC) E11.40 250.60 REFERRAL TO ENDOCRINOLOGY    Z79.4 357.2 MICROALBUMIN, UR, RAND W/ MICROALB/CREAT RATIO     V58.67    2. Arthritis due to gout M10.9 274.00 REFERRAL TO RHEUMATOLOGY   3. Weight gain R63.5 783.1    4. Essential hypertension I10 401.9    5. Phantom limb pain (HCC) G54.6 353.6        Follow-up diabetes stable, borderline controlled. Diabetic issues reviewed with her: referral to Diabetic Education department and glycohemoglobin and other lab monitoring discussed. Chronic Conditions Addressed Today     1. Phantom limb pain (HCC)     Relevant Medications     gabapentin (NEURONTIN) 300 mg capsule    2. Essential hypertension     Relevant Medications     lisinopril-hydroCHLOROthiazide (PRINZIDE, ZESTORETIC) 20-25 mg per tablet    3. Type 2 diabetes mellitus with diabetic neuropathy (HCC) - Primary     Relevant Medications     lisinopril-hydroCHLOROthiazide (PRINZIDE, ZESTORETIC) 20-25 mg per tablet     gabapentin (NEURONTIN) 300 mg capsule     Other Relevant Orders     REFERRAL TO ENDOCRINOLOGY     MICROALBUMIN, UR, RAND W/ MICROALB/CREAT RATIO (Completed)      Acute Diagnoses Addressed Today     Arthritis due to gout            Relevant Orders        REFERRAL TO RHEUMATOLOGY    Weight gain            Follow-up Disposition:  Return in about 3 months (around 10/11/2018) for routine follow up.

## 2018-07-11 NOTE — PROGRESS NOTES
Chief Complaint   Patient presents with    Referral Request     ARTHRITIS SPECIALIST    Cardiac Testing     FOLLOW UP     1. Have you been to the ER, urgent care clinic since your last visit? Hospitalized since your last visit? No    2. Have you seen or consulted any other health care providers outside of the [address] [place] [address] since your last visit? Include any pap smears or colon screening. No     Health Maintenance Due   Topic Date Due    Hepatitis C Screening  1955    ZOSTER VACCINE AGE 60>  01/31/2016    MICROALBUMIN Q1  03/23/2018     Do you have an [address] in place in the event that you have a healthcare crisis that could impact your decision making as it pertains to your health? NO    Would you like information about Advance Care Planning? NO    Information given.  NO

## 2018-07-11 NOTE — MR AVS SNAPSHOT
[address] [place] [address]. [address] [facility] 
[phone] Patient: Esdras Almazan MRN: [id number] VIW:[birth year] Visit Information Date & Time Provider Department Dept. Phone Encounter #  
 7/11/2018  8:45 AM Rob Julien  Judy Cowan [id number] Follow-up Instructions Return in about 3 months (around 10/11/2018) for routine follow up. Upcoming Health Maintenance Date Due Hepatitis C Screening 1955 ZOSTER VACCINE AGE 60> 1/31/2016 MICROALBUMIN Q1 3/23/2018 Influenza Age 5 to Adult 8/1/2018 LIPID PANEL Q1 9/1/2018 FOOT EXAM Q1 9/27/2018 EYE EXAM RETINAL OR DILATED Q1 10/17/2018 HEMOGLOBIN A1C Q6M 12/6/2018 BREAST CANCER SCRN MAMMOGRAM 6/30/2019 PAP AKA CERVICAL CYTOLOGY 9/28/2020 COLONOSCOPY 10/12/2022 DTaP/Tdap/Td series (2 - Td) 6/17/2026 Allergies as of 7/11/2018  Review Complete On: 7/11/2018 By: Rob Julien MD  
  
 Severity Noted Reaction Type Reactions Keflex [Cephalexin]  06/29/2016    Itching Lortab [Hydrocodone-acetaminophen]  03/30/2012    Hives, Itching Patient states she can take tylenol #3 without problem. Current Immunizations  Reviewed on 2/7/2018 Name Date Influenza Vaccine (Quad) PF 9/1/2017, 9/7/2016 Pneumococcal Conjugate (PCV-13) 9/7/2016 Pneumococcal Polysaccharide (PPSV-23) 1/23/2018 Tdap 6/17/2016 Not reviewed this visit You Were Diagnosed With   
  
 Codes Comments Type 2 diabetes mellitus with diabetic neuropathy, with long-term current use of insulin (HCC)    -  Primary ICD-10-CM: E11.40, Z79.4 ICD-9-CM: 250.60, 357.2, V58.67 Arthritis due to gout     ICD-10-CM: M10.9 ICD-9-CM: 274.00 Weight gain     ICD-10-CM: R63.5 ICD-9-CM: 783.1 Essential hypertension     ICD-10-CM: I10 
ICD-9-CM: 401.9 Phantom limb pain ([address]     ICD-10-CM: G54.6 ICD-9-CM: 353.6 Vitals BP Pulse Temp Resp Height(growth percentile) Weight(growth percentile) 138/82 (BP 1 Location: Right arm, BP Patient Position: Sitting) 91 96.7 °F (35.9 °C) (Oral) 20 5' 3\" (1.6 m) 192 lb (87.1 kg) LMP SpO2 BMI OB Status Smoking Status 08/23/2003 99% 34.01 kg/m2 Postmenopausal Former Smoker Vitals History BMI and BSA Data Body Mass Index Body Surface Area 34.01 kg/m 2 1.97 m 2 Preferred Pharmacy Pharmacy Name Phone [address] [place] [phone] Your Updated Medication List  
  
   
This list is accurate as of 7/11/18  9:02 AM.  Always use your most recent med list.  
  
  
  
  
 acetaminophen 500 mg tablet Commonly known as:  TYLENOL Take 1 Tab by mouth every four (4) hours (while awake). Indications: Pain ALEVE 220 mg tablet Generic drug:  naproxen sodium Take 220 mg by mouth two (2) times daily (with meals). COLCRYS 0.6 mg tablet Generic drug:  colchicine TAKE 1 TABLET BY MOUTH ONCE DAILY [place] 1.5-0.025 % [facility] Generic drug:  diclofenac-capsicum oleoresin  
  
 fenoprofen 200 mg Cap  
  
 gabapentin 300 mg capsule Commonly known as:  NEURONTIN Take 1 Cap by mouth three (3) times daily. Start 1 tablet daily, usually in the evening, every few days increase as tolerated, pain. ibuprofen 200 mg tablet Commonly known as:  MOTRIN Take  by mouth every six (6) hours as needed for Pain. insulin aspart protamine/insulin aspart 100 unit/mL (70-30) Inpn Commonly known as:  NovoLOG Mix 70-30FlexPen U-100  
15 Units by SubCUTAneous route Before breakfast and dinner. lidocaine-tetracaine 7-7 % Crea  
  
 lisinopril-hydroCHLOROthiazide 20-25 mg per tablet Commonly known as:  Loyce Harden Take  by mouth daily. LIVIXIL AVA cream  
Generic drug:  lidocaine-prilocaine  
  
 metFORMIN 500 mg tablet Commonly known as:  GLUCOPHAGE  
 Take 1 Tab by mouth two (2) times daily (with meals). raNITIdine 150 mg tablet Commonly known as:  ZANTAC Take 1 Tab by mouth two (2) times a day. simvastatin 40 mg tablet Commonly known as:  ZOCOR Take 1 Tab by mouth daily. Prescriptions Sent to Pharmacy Refills  
 gabapentin (NEURONTIN) 300 mg capsule 5 Sig: Take 1 Cap by mouth three (3) times daily. Start 1 tablet daily, usually in the evening, every few days increase as tolerated, pain. Class: Normal  
 Pharmacy: [facility] [address] [place] Ph #: [phone] Route: Oral  
  
We Performed the Following MICROALBUMIN, UR, RAND W/ MICROALB/CREAT RATIO [id number] CPT(R)] REFERRAL TO ENDOCRINOLOGY [MMX42 Custom] REFERRAL TO RHEUMATOLOGY [AJZ14 Custom] Follow-up Instructions Return in about 3 months (around 10/11/2018) for routine follow up. Referral Information Referral ID Referred By Referred To  
  
 [id number] Seema BHAGAT MD   
   [facility], [address] [place] [address] Phone: [phone] Fax: [phone] Visits Status Start Date End Date 1 New Request 7/11/18 7/11/19 If your referral has a status of pending review or denied, additional information will be sent to support the outcome of this decision. Referral ID Referred By Referred To  
 [id number] Kezia Cabral, RD  
   [id number]  [facility], [address] Visits Status Start Date End Date 1 New Request 7/11/18 7/11/19 If your referral has a status of pending review or denied, additional information will be sent to support the outcome of this decision. Introducing [facility]! Laura Colón introduces [employer] patient portal. Now you can access parts of your medical record, email your doctor's office, and request medication refills online.    
 
1. In your internet browser, go to [email or web address] 2. Click on the First Time User? Click Here link in the Sign In box. You will see the New Member Sign Up page. 3. Enter your [employer] Access Code exactly as it appears below. You will not need to use this code after youve completed the sign-up process. If you do not sign up before the expiration date, you must request a new code. · [employer] Access Code: [email or web address] Expires: 9/17/2018  9:47 AM 
 
4. Enter the last four digits of your Social Security Number (xxxx) and Date of Birth (mm/dd/yyyy) as indicated and click Submit. You will be taken to the next sign-up page. 5. Create a [employer] ID. This will be your [employer] login ID and cannot be changed, so think of one that is secure and easy to remember. 6. Create a [employer] password. You can change your password at any time. 7. Enter your Password Reset Question and Answer. This can be used at a later time if you forget your password. 8. Enter your e-mail address. You will receive e-mail notification when new information is available in [address] 9. Click Sign Up. You can now view and download portions of your medical record. 10. Click the Download Summary menu link to download a portable copy of your medical information. If you have questions, please visit the Frequently Asked Questions section of the [employer] website. Remember, [employer] is NOT to be used for urgent needs. For medical emergencies, dial 911. Now available from your iPhone and Android! Please provide this summary of care documentation to your next provider. Your primary care clinician is listed as Paloma Hernandez. If you have any questions after today's visit, please call [phone].

## 2018-07-12 LAB
ALBUMIN/CREAT UR: 102.6 MG/G CREAT (ref 0–30)
CREAT UR-MCNC: 60.7 MG/DL
MICROALBUMIN UR-MCNC: 62.3 UG/ML

## 2018-07-13 ENCOUNTER — TELEPHONE (OUTPATIENT)
Dept: INTERNAL MEDICINE CLINIC | Age: 63
End: 2018-07-13

## 2018-07-13 NOTE — TELEPHONE ENCOUNTER
Pt called stated that dr Michael Mc stated that he cant see her until 2019 but dr Michael Mc office advised her that if dr Tiki Grant call and tell them that she could be seen sooner if the office calls and makes the appt for her that they could double book her to possibly see another provider in office

## 2018-07-17 NOTE — TELEPHONE ENCOUNTER
Called Dr. aLdarius Lantigua office at 10:31 am.  Asked to be placed back on hold and waited. At 23 minutes, Angelita came onto the line and got distracted and hung up the phone after waiting a total of 24 minutes and 32 seconds.

## 2018-07-20 NOTE — TELEPHONE ENCOUNTER
Patient called wanting to know the status of an earlier appt for her. She called office back and they told her the earliest they now have is February 2019. Please call her at [phone].

## 2018-07-20 NOTE — TELEPHONE ENCOUNTER
Returned pt's call and was put on hold, long time. Will have to contact later,  No success with Dr. Ladarius Lantigua office in giving a sooner appt.

## 2018-08-30 ENCOUNTER — HOSPITAL ENCOUNTER (OUTPATIENT)
Dept: DIABETES SERVICES | Age: 63
Discharge: HOME | End: 2018-08-30
Payer: COMMERCIAL

## 2018-08-30 DIAGNOSIS — E11.9 DIABETES MELLITUS WITHOUT COMPLICATION (HCC): ICD-10-CM

## 2018-08-30 PROCEDURE — G0108 DIAB MANAGE TRN  PER INDIV: HCPCS | Performed by: DIETITIAN, REGISTERED

## 2018-08-31 ENCOUNTER — TELEPHONE (OUTPATIENT)
Dept: INTERNAL MEDICINE CLINIC | Age: 63
End: 2018-08-31

## 2018-08-31 RX ORDER — COLCHICINE 0.6 MG/1
TABLET, FILM COATED ORAL
Qty: 30 TAB | Refills: 5 | Status: SHIPPED | OUTPATIENT
Start: 2018-08-31 | End: 2019-03-25 | Stop reason: SDUPTHER

## 2018-09-17 ENCOUNTER — OFFICE VISIT (OUTPATIENT)
Dept: INTERNAL MEDICINE CLINIC | Age: 63
End: 2018-09-17

## 2018-09-17 VITALS
HEART RATE: 108 BPM | SYSTOLIC BLOOD PRESSURE: 124 MMHG | BODY MASS INDEX: 32 KG/M2 | HEIGHT: 63 IN | RESPIRATION RATE: 18 BRPM | DIASTOLIC BLOOD PRESSURE: 74 MMHG | TEMPERATURE: 97.2 F | OXYGEN SATURATION: 96 % | WEIGHT: 180.6 LBS

## 2018-09-17 DIAGNOSIS — S78.112S TRAUMATIC AMPUTATION OF LEFT LOWER EXTREMITY ABOVE KNEE, SEQUELA: Chronic | ICD-10-CM

## 2018-09-17 DIAGNOSIS — E11.65 TYPE 2 DIABETES MELLITUS WITH HYPERGLYCEMIA, WITH LONG-TERM CURRENT USE OF INSULIN (HCC): ICD-10-CM

## 2018-09-17 DIAGNOSIS — Z79.4 TYPE 2 DIABETES MELLITUS WITH HYPERGLYCEMIA, WITH LONG-TERM CURRENT USE OF INSULIN (HCC): ICD-10-CM

## 2018-09-17 DIAGNOSIS — I10 ESSENTIAL HYPERTENSION: ICD-10-CM

## 2018-09-17 RX ORDER — DOXYCYCLINE 100 MG/1
100 TABLET ORAL 2 TIMES DAILY
Qty: 14 TAB | Refills: 0 | Status: SHIPPED | OUTPATIENT
Start: 2018-09-17 | End: 2018-09-24

## 2018-09-17 NOTE — PROGRESS NOTES
Subjective:     Jennifer Echeverria is a 59 y.o. female seen for follow-up of diabetes. She has had hypoglycemic attacks. .no  Blood sugar control has been OK  She has diabetes and hypertension. Jennifer Echeverria has the additional concern of injury to fold of left groin where prosthesis rubs against her. Called prosthesis Co will see her Fri  Seeing nutritionist    Diabetic Review of Systems: no polyuria or polydipsia, no chest pain, dyspnea or TIA's, no numbness, tingling or pain in extremities, fever. Allergies   Allergen Reactions    Keflex [Cephalexin] Itching    Lortab [Hydrocodone-Acetaminophen] Hives and Itching     Patient states she can take tylenol #3 without problem. Diet and Lifestyle: follows a diabetic diet regularly, nonsmoker. 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      Social History   Substance Use Topics    Smoking status: Former Smoker     Packs/day: 0.50     Years: 15.00     Quit date: 3/30/2009    Smokeless tobacco: Never Used    Alcohol use No        Lab Results  Component Value Date/Time   Hemoglobin A1c 7.5 (H) 06/06/2018 08:27 AM   Hemoglobin A1c 8.3 (H) 02/14/2018 03:58 AM   Hemoglobin A1c 8.1 (H) 01/29/2018 09:14 AM   Glucose 288 (H) 06/06/2018 08:27 AM   Glucose (POC) 146 (H) 02/18/2018 11:22 AM   Microalb/Creat ratio (ug/mg creat.) 102.6 (H) 07/11/2018 08:51 AM   LDL, calculated 23 09/01/2017 09:03 AM   Creatinine 0.96 06/06/2018 08:27 AM      Lab Results  Component Value Date/Time   GFR est non-AA 64 06/06/2018 08:27 AM   GFR est AA 73 06/06/2018 08:27 AM   Creatinine 0.96 06/06/2018 08:27 AM   BUN 20 06/06/2018 08:27 AM   Sodium 142 06/06/2018 08:27 AM   Potassium 4.2 06/06/2018 08:27 AM   Chloride 101 06/06/2018 08:27 AM   CO2 21 06/06/2018 08:27 AM     Lab Results   Component Value Date/Time    Glucose 288 (H) 06/06/2018 08:27 AM    Glucose (POC) 146 (H) 02/18/2018 11:22 AM         Review of Systems  Pertinent items are noted in HPI. Objective:     Significant for the following:     Visit Vitals    /74 (BP 1 Location: Right arm, BP Patient Position: Sitting)    Pulse (!) 108    Temp 97.2 °F (36.2 °C) (Oral)    Resp 18    Ht 5' 3\" (1.6 m)    Wt 180 lb 9.6 oz (81.9 kg)    LMP 08/23/2003    SpO2 96%    BMI 31.99 kg/m2     Appearance: alert, well appearing, and in no distress. Exam: heart sounds normal rate, regular rhythm, normal S1, S2, no murmurs, rubs, clicks or gallops, chest clear  Foot exam: Diabetic foot exam was performed. No obvious sores or red lesions. Sensation checked by monofilament exam which was normal.  Dorsalis pedis pulse was present. 2 cm laceration/abraion left groin fold    Lab review:   A1C=7.5    Assessment/Plan:     Follow-up diabetes stable. Diabetic issues reviewed with her: all medications, side effects and compliance discussed carefully, foot care discussed and Podiatry visits discussed and glycohemoglobin and other lab monitoring discussed. 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 Type 2 diabetes mellitus with hyperglycemia (HCC)    3.  Essential hypertension      Acute Diagnoses Addressed Today     Wound abscess, initial encounter    -  Primary        Orders Placed This Encounter    doxycycline (ADOXA) 100 mg tablet     Sig: Take 1 Tab by mouth two (2) times a day for 7 days. Dispense:  14 Tab     Refill:  0     Hold prosthesis x 1 week must go see prosthesis PT    Follow-up Disposition:  Return in about 4 weeks (around 10/15/2018) for routine follow up.

## 2018-09-17 NOTE — MR AVS SNAPSHOT
[address] [address]66 [facility] 
[phone] Patient: Tariq Roberson MRN: [id number] DAZ:[birth year] Visit Information Date & Time Provider Department Dept. Phone Encounter #  
 9/17/2018  1:45 PM MD Casie Bundy Dr [id number] Follow-up Instructions Return in about 4 weeks (around 10/15/2018) for routine follow up. Your Appointments 10/17/2018  8:00 AM  
ROUTINE CARE with Juan Johnson MD  
[facility] ([facility]) Appt Note: f/u on diabetes $25 cp ca 7/11/18 [address] [place] [id number]  
[phone]  
  
   
 [address] [place] [address] [address]  
  
    
 12/17/2018 11:00 AM  
New Patient with John Claudio MD  
[facility] Appt Note: Referred by Juan Johnson MD, Arthritis due to gout; . ..; r/s 08/22/18 [facility] [place] [address]  
[address] [place] [address] Upcoming Health Maintenance Date Due Hepatitis C Screening 1955 ZOSTER VACCINE AGE 60> 1/31/2016 Influenza Age 5 to Adult 8/1/2018 LIPID PANEL Q1 9/1/2018 EYE EXAM RETINAL OR DILATED Q1 10/17/2018 HEMOGLOBIN A1C Q6M 12/6/2018 BREAST CANCER SCRN MAMMOGRAM 6/30/2019 MICROALBUMIN Q1 7/11/2019 FOOT EXAM Q1 9/17/2019 PAP AKA CERVICAL CYTOLOGY 9/28/2020 COLONOSCOPY 10/12/2022 DTaP/Tdap/Td series (2 - Td) 6/17/2026 Allergies as of 9/17/2018  Review Complete On: 7/11/2018 By: Juan Johnson MD  
  
 Severity Noted Reaction Type Reactions Keflex [Cephalexin]  06/29/2016    Itching Lortab [Hydrocodone-acetaminophen]  03/30/2012    Hives, Itching Patient states she can take tylenol #3 without problem. Current Immunizations  Reviewed on 9/17/2018 Name Date Influenza Vaccine (Quad) PF 9/1/2017, 9/7/2016 Pneumococcal Conjugate (PCV-13) 9/7/2016 Pneumococcal Polysaccharide (PPSV-23) 1/23/2018 Tdap 6/17/2016 Reviewed by Rob Julien MD on 9/17/2018 at  2:26 PM  
You Were Diagnosed With   
  
 Codes Comments Wound abscess, initial encounter    -  Primary ICD-10-CM: T81. 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 9 Type 2 diabetes mellitus with hyperglycemia, with long-term current use of insulin (HCC)     ICD-10-CM: E11.65, Z79.4 ICD-9-CM: 250.00, 790.29, V58.67 Essential hypertension     ICD-10-CM: I10 
ICD-9-CM: 401.9 Vitals BP Pulse Temp Resp Height(growth percentile) Weight(growth percentile) 124/74 (BP 1 Location: Right arm, BP Patient Position: Sitting) (!) 108 97.2 °F (36.2 °C) (Oral) 18 5' 3\" (1.6 m) 180 lb 9.6 oz (81.9 kg) LMP SpO2 BMI OB Status Smoking Status 08/23/2003 96% 31.99 kg/m2 Postmenopausal Former Smoker Vitals History BMI and BSA Data Body Mass Index Body Surface Area 31.99 kg/m 2 1.91 m 2 Preferred Pharmacy Pharmacy Name Phone [address] [facility], [address] [phone] Your Updated Medication List  
  
   
This list is accurate as of 9/17/18  2:36 PM.  Always use your most recent med list.  
  
  
  
  
 acetaminophen 500 mg tablet Commonly known as:  TYLENOL Take 1 Tab by mouth every four (4) hours (while awake). Indications: Pain ALEVE 220 mg tablet Generic drug:  naproxen sodium Take 220 mg by mouth two (2) times daily (with meals). COLCRYS 0.6 mg tablet Generic drug:  colchicine TAKE 1 TABLET BY MOUTH ONCE DAILY Eduard Kinney 1.5-0.025 % Cslc Generic drug:  diclofenac-capsicum oleoresin  
  
 doxycycline 100 mg tablet Commonly known as:  ADOXA Take 1 Tab by mouth two (2) times a day for 7 days. fenoprofen 200 mg Cap  
  
 gabapentin 300 mg capsule Commonly known as:  NEURONTIN Take 1 Cap by mouth three (3) times daily. Start 1 tablet daily, usually in the evening, every few days increase as tolerated, pain. ibuprofen 200 mg tablet Commonly known as:  MOTRIN Take  by mouth every six (6) hours as needed for Pain. insulin aspart protamine/insulin aspart 100 unit/mL (70-30) Inpn Commonly known as:  NovoLOG Mix 70-30FlexPen U-100  
15 Units by SubCUTAneous route Before breakfast and dinner. lidocaine-tetracaine 7-7 % Crea  
  
 lisinopril-hydroCHLOROthiazide 20-25 mg per tablet Commonly known as:  Maxx Avitia Take  by mouth daily. LIVIXIL AVA cream  
Generic drug:  lidocaine-prilocaine  
  
 metFORMIN 500 mg tablet Commonly known as:  GLUCOPHAGE Take 1 Tab by mouth two (2) times daily (with meals). raNITIdine 150 mg tablet Commonly known as:  ZANTAC Take 1 Tab by mouth two (2) times a day. simvastatin 40 mg tablet Commonly known as:  ZOCOR Take 1 Tab by mouth daily. Prescriptions Sent to Pharmacy Refills  
 doxycycline (ADOXA) 100 mg tablet 0 Sig: Take 1 Tab by mouth two (2) times a day for 7 days. Class: Normal  
 Pharmacy: [facility] [address] SHAI Lin [address] [address] Cm Elaine Ph #: [phone] Route: Oral  
  
Follow-up Instructions Return in about 4 weeks (around 10/15/2018) for routine follow up. Introducing [facility]! Chai Schuster introduces [employer] patient portal. Now you can access parts of your medical record, email your doctor's office, and request medication refills online. 1. In your internet browser, go to [email or web address] 2. Click on the First Time User? Click Here link in the Sign In box. You will see the New Member Sign Up page. 3. Enter your Diurnal Access Code exactly as it appears below. You will not need to use this code after youve completed the sign-up process. If you do not sign up before the expiration date, you must request a new code. · Diurnal Access Code: [email or web address] Expires: 12/16/2018  1:30 PM 
 
4. Enter the last four digits of your Social Security Number (xxxx) and Date of Birth (mm/dd/yyyy) as indicated and click Submit. You will be taken to the next sign-up page. 5. Create a Diurnal ID. This will be your Diurnal login ID and cannot be changed, so think of one that is secure and easy to remember. 6. Create a Diurnal password. You can change your password at any time. 7. Enter your Password Reset Question and Answer. This can be used at a later time if you forget your password. 8. Enter your e-mail address. You will receive e-mail notification when new information is available in [address]. 9. Click Sign Up. You can now view and download portions of your medical record. 10. Click the Download Summary menu link to download a portable copy of your medical information. If you have questions, please visit the Frequently Asked Questions section of the Diurnal website. Remember, Diurnal is NOT to be used for urgent needs. For medical emergencies, dial 911. Now available from your iPhone and Android! Please provide this summary of care documentation to your next provider. Your primary care clinician is listed as Michele Monroe. If you have any questions after today's visit, please call [phone].

## 2018-09-17 NOTE — PROGRESS NOTES
Chief Complaint   Patient presents with    Leg Pain     1. Have you been to the ER, urgent care clinic since your last visit? Hospitalized since your last visit? No    2. Have you seen or consulted any other health care providers outside of the [address] [place] [address] since your last visit? Include any pap smears or colon screening.  No    Health Maintenance Due   Topic Date Due    Hepatitis C Screening  1955    ZOSTER VACCINE AGE 60>  01/31/2016    Influenza Age 5 to Adult  08/01/2018    LIPID PANEL Q1  09/01/2018    FOOT EXAM Q1  09/27/2018    EYE EXAM RETINAL OR DILATED Q1  10/17/2018     Eye exam (Dr. Lea Venegas)

## 2018-09-24 ENCOUNTER — TELEPHONE (OUTPATIENT)
Dept: INTERNAL MEDICINE CLINIC | Age: 63
End: 2018-09-24

## 2018-09-24 NOTE — TELEPHONE ENCOUNTER
Pt called in reference to her leg still having an open wound. She said it is healing. Mrs. Kylie Hays would like to know if she can be taken out of work the rest of the week because she doesn't want to irritate her wound anymore by putting the leg on. Please call her at [phone]. Pt would like letter faxed to [address] [place] [address] at [phone].

## 2018-09-26 NOTE — TELEPHONE ENCOUNTER
Returned call to patient - no answer - unable to leave message due to mail box is full  Elodia Finney LPN  4/29/5925  2:34 PM

## 2018-10-17 ENCOUNTER — OFFICE VISIT (OUTPATIENT)
Dept: INTERNAL MEDICINE CLINIC | Age: 63
End: 2018-10-17

## 2018-10-17 VITALS
RESPIRATION RATE: 16 BRPM | WEIGHT: 178 LBS | OXYGEN SATURATION: 99 % | HEIGHT: 63 IN | TEMPERATURE: 96.5 F | BODY MASS INDEX: 31.54 KG/M2 | DIASTOLIC BLOOD PRESSURE: 70 MMHG | SYSTOLIC BLOOD PRESSURE: 125 MMHG | HEART RATE: 96 BPM

## 2018-10-17 DIAGNOSIS — E78.00 ELEVATED CHOLESTEROL: ICD-10-CM

## 2018-10-17 DIAGNOSIS — E08.21 DIABETES MELLITUS DUE TO UNDERLYING CONDITION WITH DIABETIC NEPHROPATHY, WITH LONG-TERM CURRENT USE OF INSULIN (HCC): Primary | ICD-10-CM

## 2018-10-17 DIAGNOSIS — Z23 ENCOUNTER FOR IMMUNIZATION: ICD-10-CM

## 2018-10-17 DIAGNOSIS — S78.112D: ICD-10-CM

## 2018-10-17 DIAGNOSIS — Z79.4 DIABETES MELLITUS DUE TO UNDERLYING CONDITION WITH DIABETIC NEPHROPATHY, WITH LONG-TERM CURRENT USE OF INSULIN (HCC): Primary | ICD-10-CM

## 2018-10-17 RX ORDER — INSULIN ASPART 100 [IU]/ML
14 INJECTION, SUSPENSION SUBCUTANEOUS
Qty: 5 PEN | Refills: 5 | Status: SHIPPED | OUTPATIENT
Start: 2018-10-17 | End: 2019-05-03

## 2018-10-17 NOTE — PATIENT INSTRUCTIONS
You can try Salonpas patches for your pain, these are 4% lidocaine patches and can help a great deal for neuropathic pain. They are available at routine pharmacies and [employer].

## 2018-10-17 NOTE — PROGRESS NOTES
Subjective:     Jose Mckoy is a 59 y.o. female seen for follow-up of diabetes. Reports wound healed up, c/o some myalgias lately and inc phantom limb npain  She has had hypoglycemic attacks. .yes  Blood sugar control has been as low 100 felt poorly, eating better lately  She has diabetes, hypertension and hyperlipidemia. Jose Mckoy has the additional concern of saw nutritionist. Follows diet, had prosthesis adjusted doing well      Diabetic Review of Systems: no polyuria or polydipsia, no chest pain, dyspnea or TIA's, no numbness, tingling or pain in extremities, has dysesthesias in the feet. Allergies   Allergen Reactions    Keflex [Cephalexin] Itching    Lortab [Hydrocodone-Acetaminophen] Hives and Itching     Patient states she can take tylenol #3 without problem. Diet and Lifestyle: follows a diabetic diet regularly, nonsmoker. 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  lisinopril-hydroCHLOROthiazide (PRINZIDE, ZESTORETIC) 20-25 mg per tablet Take  by mouth daily.       DERMACINRX LEXITRAL 1.5-0.025 % cslc       fenoprofen 200 mg cap       lidocaine-tetracaine 7-7 % crea       LIVIXIL AVA cream       gabapentin (NEURONTIN) 300 mg capsule Take 1 Cap by mouth three (3) times daily. Start 1 tablet daily, usually in the evening, every few days increase as tolerated, pain. 90 Cap 5    insulin aspart protamine/insulin aspart (NOVOLOG MIX 70-30FLEXPEN U-100) 100 unit/mL (70-30) inpn 15 Units by SubCUTAneous route Before breakfast and dinner. 5 Pen 5    naproxen sodium (ALEVE) 220 mg tablet Take 220 mg by mouth two (2) times daily (with meals).  metFORMIN (GLUCOPHAGE) 500 mg tablet Take 1 Tab by mouth two (2) times daily (with meals). 180 Tab 1    acetaminophen (TYLENOL) 500 mg tablet Take 1 Tab by mouth every four (4) hours (while awake). Indications: Pain (Patient taking differently: Take 650 mg by mouth every four (4) hours (while awake). Indications: Pain) 100 Tab 0    simvastatin (ZOCOR) 40 mg tablet Take 1 Tab by mouth daily. (Patient taking differently: Take 40 mg by mouth nightly.) 90 Tab 3    raNITIdine (ZANTAC) 150 mg tablet Take 1 Tab by mouth two (2) times a day. 180 Tab 3     Allergies   Allergen Reactions    Keflex [Cephalexin] Itching    Lortab [Hydrocodone-Acetaminophen] Hives and Itching     Patient states she can take tylenol #3 without problem.      Social History     Tobacco Use    Smoking status: Former Smoker     Packs/day: 0.50     Years: 15.00     Pack years: 7.50     Last attempt to quit: 3/30/2009     Years since quittin.5    Smokeless tobacco: Never Used   Substance Use Topics    Alcohol use: No     Alcohol/week: 0.0 oz        Lab Results   Component Value Date/Time    WBC 6.1 2018 08:27 AM    HGB 14.0 2018 08:27 AM    HCT 44.1 2018 08:27 AM    PLATELET 306  08:27 AM    MCV 78 (L) 2018 08:27 AM     Lab Results   Component Value Date/Time    Hemoglobin A1c 7.5 (H) 2018 08:27 AM    Hemoglobin A1c 8.3 (H) 2018 03:58 AM    Hemoglobin A1c 8.1 (H) 2018 09:14 AM    Glucose 288 (H) 2018 08:27 AM    Glucose (POC) 146 (H) 2018 11:22 AM Microalb/Creat ratio (ug/mg creat.) 102.6 (H) 07/11/2018 08:51 AM    LDL, calculated 23 09/01/2017 09:03 AM    Creatinine 0.96 06/06/2018 08:27 AM      Lab Results   Component Value Date/Time    ALT (SGPT) 17 06/06/2018 08:27 AM    AST (SGOT) 11 06/06/2018 08:27 AM    Alk. phosphatase 96 06/06/2018 08:27 AM    Bilirubin, total <0.2 06/06/2018 08:27 AM    Albumin 4.5 06/06/2018 08:27 AM    Protein, total 6.8 06/06/2018 08:27 AM    INR 1.7 (H) 02/18/2018 02:46 AM    INR POC 1.3 03/05/2018 12:09 PM    Prothrombin time 17.0 (H) 02/18/2018 02:46 AM    PLATELET 348 50/98/4145 08:27 AM     Lab Results   Component Value Date/Time    GFR est non-AA 64 06/06/2018 08:27 AM    GFR est AA 73 06/06/2018 08:27 AM    Creatinine 0.96 06/06/2018 08:27 AM    BUN 20 06/06/2018 08:27 AM    Sodium 142 06/06/2018 08:27 AM    Potassium 4.2 06/06/2018 08:27 AM    Chloride 101 06/06/2018 08:27 AM    CO2 21 06/06/2018 08:27 AM     Lab Results   Component Value Date/Time    Glucose 288 (H) 06/06/2018 08:27 AM    Glucose (POC) 146 (H) 02/18/2018 11:22 AM         Review of Systems  Pertinent items are noted in HPI. Objective:     Significant for the following:     Visit Vitals  /70 (BP 1 Location: Left arm, BP Patient Position: Sitting)   Pulse 96   Temp 96.5 °F (35.8 °C) (Oral)   Resp 16   Ht 5' 3\" (1.6 m)   Wt 178 lb (80.7 kg) Comment: without prothesis   LMP 08/23/2003   SpO2 99%   BMI 31.53 kg/m²     Appearance: alert, well appearing, and in no distress. Exam: heart sounds normal rate, regular rhythm, normal S1, S2, no murmurs, rubs, clicks or gallops, chest clear  Foot exam: deferred  Thigh wound healing well  Lab review: orders written for new lab studies as appropriate; see orders. Assessment/Plan:     Follow-up diabetes stable. Diabetic issues reviewed with her: all medications, side effects and compliance discussed carefully and glycohemoglobin and other lab monitoring discussed.      Chronic Conditions Addressed Today 1. 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 Diabetes mellitus due to underlying condition with diabetic nephropathy, with long-term current use of insulin ([facility] - Primary      Acute Diagnoses Addressed Today     Encounter for immunization        Elevated cholesterol            Inc hol check levels  Dec insulin as below    Orders Placed This Encounter    THER/PROPH/DIAG INJECTION, SUBCUT/IM    INFLUENZA VACCINE INACTIVATED (IIV), SUBUNIT, ADJUVANTED, IM    METABOLIC PANEL, BASIC    LIPID PANEL    HEMOGLOBIN A1C WITH EAG     Standing Status:   Future     Standing Expiration Date:   2019    HEMOGLOBIN A1C WITH EAG    CVD REPORT    DIABETES PATIENT EDUCATION    [place] HANDLG&/OR CONVEY OF SPEC FOR TR OFFICE TO LAB    COLLECTION VENOUS BLOOD,VENIPUNCTURE    ADMIN INFLUENZA VIRUS VAC    varicella-zoster recombinant, PF, (SHINGRIX) 50 mcg/0.5 mL susr injection     Si.5 mL by IntraMUSCular route once for 1 dose. Dispense:  0.5 mL     Refill:  0    insulin aspart protamine/insulin aspart (NOVOLOG MIX 70-30FLEXPEN U-100) 100 unit/mL (70-30) inpn     Si Units by SubCUTAneous route Before breakfast and dinner. Dispense:  5 Pen     Refill:  5     See patient instructions, went over them personally with the patient. Emphasized compliance. Questions answered. Patient states that they understand the plan of action and will call if there are any issues or misunderstandings. Follow-up Disposition:  Return in about 3 months (around 2019), or if symptoms worsen or fail to improve, for routine follow up.

## 2018-10-17 NOTE — PROGRESS NOTES
Chief Complaint   Patient presents with    Wound Check     wound check L/thigh     I have reviewed the patient's medical history in detail and updated the computerized patient record. Health Maintenance reviewed. 1. Have you been to the ER, urgent care clinic since your last visit? Hospitalized since your last visit? 2. Have you seen or consulted any other health care providers outside of the [facility] since your last visit? Include any pap smears or colon screening. Encouraged pt to discuss pt's wishes with spouse/partner/family and bring them in the next appt to follow thru with the Advanced Directive    Fall Risk Assessment, last 12 mths 2/7/2018   Able to walk? Yes   Fall in past 12 months? Yes   Fall with injury? No   Number of falls in past 12 months 8 or more   Fall Risk Score 8       PHQ over the last two weeks 10/17/2018   Little interest or pleasure in doing things Several days   Feeling down, depressed, irritable, or hopeless Several days   Total Score PHQ 2 2       Abuse Screening Questionnaire 2/7/2018   Do you ever feel afraid of your partner? N   Are you in a relationship with someone who physically or mentally threatens you? N   Is it safe for you to go home?  Y       ADL Assessment 2/7/2018   Feeding yourself No Help Needed   Getting from bed to chair No Help Needed   Getting dressed No Help Needed   Bathing or showering No Help Needed   Walk across the room (includes cane/walker) No Help Needed   Using the telphone No Help Needed   Taking your medications No Help Needed   Preparing meals No Help Needed   Managing money (expenses/bills) No Help Needed   Moderately strenuous housework (laundry) No Help Needed   Shopping for personal items (toiletries/medicines) No Help Needed   Shopping for groceries No Help Needed   Driving No Help Needed   Climbing a flight of stairs No Help Needed   Getting to places beyond walking distances No Help Needed         Mailed RX Shingrix to patient as per verbal order from Dr. Elmira Reyes

## 2018-10-18 LAB
BUN SERPL-MCNC: 24 MG/DL (ref 8–27)
BUN/CREAT SERPL: 25 (ref 12–28)
CALCIUM SERPL-MCNC: 9.8 MG/DL (ref 8.7–10.3)
CHLORIDE SERPL-SCNC: 99 MMOL/L (ref 96–106)
CHOLEST SERPL-MCNC: 111 MG/DL (ref 100–199)
CO2 SERPL-SCNC: 21 MMOL/L (ref 20–29)
CREAT SERPL-MCNC: 0.96 MG/DL (ref 0.57–1)
EST. AVERAGE GLUCOSE BLD GHB EST-MCNC: 177 MG/DL
GLUCOSE SERPL-MCNC: 191 MG/DL (ref 65–99)
HBA1C MFR BLD: 7.8 % (ref 4.8–5.6)
HDLC SERPL-MCNC: 19 MG/DL
INTERPRETATION, 910389: NORMAL
LDLC SERPL CALC-MCNC: ABNORMAL MG/DL (ref 0–99)
Lab: NORMAL
POTASSIUM SERPL-SCNC: 4.1 MMOL/L (ref 3.5–5.2)
SODIUM SERPL-SCNC: 141 MMOL/L (ref 134–144)
TRIGL SERPL-MCNC: 708 MG/DL (ref 0–149)
VLDLC SERPL CALC-MCNC: ABNORMAL MG/DL (ref 5–40)

## 2018-10-20 ENCOUNTER — TELEPHONE (OUTPATIENT)
Dept: INTERNAL MEDICINE CLINIC | Age: 63
End: 2018-10-20

## 2018-10-20 PROBLEM — M17.11 PRIMARY LOCALIZED OSTEOARTHRITIS OF RIGHT KNEE: Status: RESOLVED | Noted: 2018-02-13 | Resolved: 2018-10-20

## 2018-10-20 RX ORDER — RANITIDINE 150 MG/1
150 TABLET, FILM COATED ORAL 2 TIMES DAILY
Qty: 180 TAB | Refills: 3 | Status: SHIPPED | OUTPATIENT
Start: 2018-10-20 | End: 2019-10-08 | Stop reason: SDUPTHER

## 2018-10-22 ENCOUNTER — TELEPHONE (OUTPATIENT)
Dept: INTERNAL MEDICINE CLINIC | Age: 63
End: 2018-10-22

## 2018-10-22 RX ORDER — SIMVASTATIN 80 MG/1
80 TABLET, FILM COATED ORAL
Qty: 30 TAB | Refills: 5 | Status: SHIPPED | OUTPATIENT
Start: 2018-10-22 | End: 2019-02-18 | Stop reason: DRUGHIGH

## 2018-11-15 ENCOUNTER — DOCUMENTATION ONLY (OUTPATIENT)
Dept: INTERNAL MEDICINE CLINIC | Age: 63
End: 2018-11-15

## 2018-11-15 NOTE — PROGRESS NOTES
Pt called wanting her immunizations be faxed to Nurse [place] at [employer] [facility] for employment purposes. The fax was sent to [phone].

## 2018-12-12 NOTE — TELEPHONE ENCOUNTER
- refill for Contour testing strips sent to Dr Destin Barajas for approval  Satish Miller, MANNIE  79/14/4898  5:28 PM

## 2018-12-12 NOTE — TELEPHONE ENCOUNTER
Pt would like a prescription sent to [facility] for her blood sugar test strips (contour blood glucose test strips). She has been paying out of pocket because she didn't realize she could get through the pharmacy. Please call her at [phone].

## 2018-12-17 ENCOUNTER — OFFICE VISIT (OUTPATIENT)
Dept: RHEUMATOLOGY | Age: 63
End: 2018-12-17

## 2018-12-17 VITALS
DIASTOLIC BLOOD PRESSURE: 85 MMHG | TEMPERATURE: 98.4 F | OXYGEN SATURATION: 97 % | RESPIRATION RATE: 16 BRPM | WEIGHT: 198.4 LBS | HEART RATE: 99 BPM | SYSTOLIC BLOOD PRESSURE: 143 MMHG | BODY MASS INDEX: 35.14 KG/M2

## 2018-12-17 DIAGNOSIS — M19.041 OSTEOARTHRITIS OF BOTH HANDS, UNSPECIFIED OSTEOARTHRITIS TYPE: ICD-10-CM

## 2018-12-17 DIAGNOSIS — M10.9 GOUTY ARTHRITIS: Primary | ICD-10-CM

## 2018-12-17 DIAGNOSIS — M19.042 OSTEOARTHRITIS OF BOTH HANDS, UNSPECIFIED OSTEOARTHRITIS TYPE: ICD-10-CM

## 2018-12-17 PROBLEM — E66.01 SEVERE OBESITY (HCC): Status: ACTIVE | Noted: 2018-12-17

## 2018-12-17 RX ORDER — SIMVASTATIN 40 MG/1
80 TABLET, FILM COATED ORAL
COMMUNITY
Start: 2018-11-15 | End: 2019-02-18 | Stop reason: DRUGHIGH
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
## 2018-12-19 LAB
ALBUMIN SERPL-MCNC: 4.7 G/DL (ref 3.6–4.8)
ALBUMIN/GLOB SERPL: 2.1 {RATIO} (ref 1.2–2.2)
ALP SERPL-CCNC: 73 IU/L (ref 39–117)
ALT SERPL-CCNC: 18 IU/L (ref 0–32)
AST SERPL-CCNC: 19 IU/L (ref 0–40)
BASOPHILS # BLD MANUAL: 0 X10E3/UL (ref 0–0.2)
BASOPHILS NFR BLD MANUAL: 1 %
BILIRUB SERPL-MCNC: 0.3 MG/DL (ref 0–1.2)
BUN SERPL-MCNC: 24 MG/DL (ref 8–27)
BUN/CREAT SERPL: 17 (ref 12–28)
CALCIUM SERPL-MCNC: 9.9 MG/DL (ref 8.7–10.3)
CHLORIDE SERPL-SCNC: 103 MMOL/L (ref 96–106)
CO2 SERPL-SCNC: 22 MMOL/L (ref 20–29)
CREAT SERPL-MCNC: 1.39 MG/DL (ref 0.57–1)
CRP SERPL-MCNC: 0.3 MG/L (ref 0–4.9)
DIFFERENTIAL COMMENT, 115260: ABNORMAL
EOSINOPHIL # BLD MANUAL: 0.1 X10E3/UL (ref 0–0.4)
EOSINOPHIL NFR BLD MANUAL: 2 %
ERYTHROCYTE [DISTWIDTH] IN BLOOD BY AUTOMATED COUNT: 16.3 % (ref 12.3–15.4)
ERYTHROCYTE [SEDIMENTATION RATE] IN BLOOD BY WESTERGREN METHOD: 10 MM/HR (ref 0–40)
GLOBULIN SER CALC-MCNC: 2.2 G/DL (ref 1.5–4.5)
GLUCOSE SERPL-MCNC: 97 MG/DL (ref 65–99)
HCT VFR BLD AUTO: 40.9 % (ref 34–46.6)
HGB BLD-MCNC: 13.1 G/DL (ref 11.1–15.9)
LYMPHOCYTES # BLD MANUAL: 1.5 X10E3/UL (ref 0.7–3.1)
LYMPHOCYTES NFR BLD MANUAL: 36 %
MCH RBC QN AUTO: 25.2 PG (ref 26.6–33)
MCHC RBC AUTO-ENTMCNC: 32 G/DL (ref 31.5–35.7)
MCV RBC AUTO: 79 FL (ref 79–97)
MONOCYTES # BLD MANUAL: 0.3 X10E3/UL (ref 0.1–0.9)
MONOCYTES NFR BLD MANUAL: 7 %
NEUTROPHILS # BLD MANUAL: 2.3 X10E3/UL (ref 1.4–7)
NEUTROPHILS NFR BLD MANUAL: 54 %
PLATELET # BLD AUTO: 201 X10E3/UL (ref 150–379)
PLATELET BLD QL SMEAR: ADEQUATE
POTASSIUM SERPL-SCNC: 4.2 MMOL/L (ref 3.5–5.2)
PROT SERPL-MCNC: 6.9 G/DL (ref 6–8.5)
RBC # BLD AUTO: 5.2 X10E6/UL (ref 3.77–5.28)
RBC MORPH BLD: ABNORMAL
SODIUM SERPL-SCNC: 142 MMOL/L (ref 134–144)
URATE SERPL-MCNC: 11.1 MG/DL (ref 2.5–7.1)
WBC # BLD AUTO: 4.2 X10E3/UL (ref 3.4–10.8)

## 2019-01-17 ENCOUNTER — OFFICE VISIT (OUTPATIENT)
Dept: INTERNAL MEDICINE CLINIC | Age: 64
End: 2019-01-17

## 2019-01-17 VITALS
TEMPERATURE: 98.1 F | HEIGHT: 63 IN | WEIGHT: 190 LBS | DIASTOLIC BLOOD PRESSURE: 85 MMHG | SYSTOLIC BLOOD PRESSURE: 124 MMHG | BODY MASS INDEX: 33.66 KG/M2 | OXYGEN SATURATION: 98 % | HEART RATE: 91 BPM | RESPIRATION RATE: 16 BRPM

## 2019-01-17 DIAGNOSIS — M19.90 ARTHRITIS: ICD-10-CM

## 2019-01-17 DIAGNOSIS — E08.21 DIABETES MELLITUS DUE TO UNDERLYING CONDITION WITH DIABETIC NEPHROPATHY, WITH LONG-TERM CURRENT USE OF INSULIN (HCC): ICD-10-CM

## 2019-01-17 DIAGNOSIS — E78.00 ELEVATED CHOLESTEROL: ICD-10-CM

## 2019-01-17 DIAGNOSIS — Z79.4 DIABETES MELLITUS DUE TO UNDERLYING CONDITION WITH DIABETIC NEPHROPATHY, WITH LONG-TERM CURRENT USE OF INSULIN (HCC): ICD-10-CM

## 2019-01-17 DIAGNOSIS — S78.112D: ICD-10-CM

## 2019-01-17 DIAGNOSIS — G54.6 PHANTOM LIMB PAIN (HCC): Primary | ICD-10-CM

## 2019-01-22 ENCOUNTER — OFFICE VISIT (OUTPATIENT)
Dept: RHEUMATOLOGY | Age: 64
End: 2019-01-22

## 2019-01-22 VITALS
RESPIRATION RATE: 20 BRPM | TEMPERATURE: 98.2 F | HEART RATE: 94 BPM | BODY MASS INDEX: 33.31 KG/M2 | HEIGHT: 63 IN | DIASTOLIC BLOOD PRESSURE: 92 MMHG | SYSTOLIC BLOOD PRESSURE: 153 MMHG | OXYGEN SATURATION: 94 % | WEIGHT: 188 LBS

## 2019-01-22 DIAGNOSIS — M19.041 OSTEOARTHRITIS OF BOTH HANDS, UNSPECIFIED OSTEOARTHRITIS TYPE: ICD-10-CM

## 2019-01-22 DIAGNOSIS — M10.9 GOUTY ARTHRITIS: Primary | ICD-10-CM

## 2019-01-22 DIAGNOSIS — M19.042 OSTEOARTHRITIS OF BOTH HANDS, UNSPECIFIED OSTEOARTHRITIS TYPE: ICD-10-CM

## 2019-01-22 RX ORDER — PREDNISONE 5 MG/1
5 TABLET ORAL DAILY
Qty: 30 TAB | Refills: 1 | Status: SHIPPED | OUTPATIENT
Start: 2019-01-22 | End: 2019-02-21

## 2019-01-22 RX ORDER — ALLOPURINOL 300 MG/1
300 TABLET ORAL DAILY
Qty: 30 TAB | Refills: 6 | Status: SHIPPED | OUTPATIENT
Start: 2019-01-22 | End: 2019-08-16 | Stop reason: SDUPTHER

## 2019-01-22 NOTE — PROGRESS NOTES
RHEUMATOLOGY PROBLEM LIST AND CHIEF COMPLAINT  1. Gout - elbow morning stiffness, most recent Uric Acid (12/2018): 11.1    INTERVAL HISTORY  Ms. Siomara Hunter is a 59 y.o.  female who returns for follow up. We discussed the study results in detail. We reviewed her labs, most recent Uric Acid (12/2018) was 11.1. We reviewed her most recent x-rays, which revealed only degenerative changes secondary to OA. Today she complains of weakness and burning pain in her hands. She states her blood sugar has recently been elevated. PHYSICAL EXAM  Blood pressure (!) 153/92, pulse 94, temperature 98.2 °F (36.8 °C), temperature source Oral, resp. rate 20, height 5' 3\" (1.6 m), weight 188 lb (85.3 kg), last menstrual period 08/23/2003, SpO2 94 %. Patient not examined, reviewed lab studies and x-rays. LABS, RADIOLOGY AND PROCEDURES - Previous available labs, radiology and procedures were reviewed in detail with the patient. The patient was counseled on the labs that were ordered and the meaning of positive and negative results and any disease implication that these labs may have. ASSESSMENT  1. Gout - The patient has gouty arthritis. The uric acid level is elevated. X-rays will be monitored as we treat her. The nature of gout was fully explained, including dietary relationship, acute and interval phase and treatment of both. Long term complications such as kidney stones, tophi and arthritis have been discussed. Avoidance of alcohol recommended. Literature on the diease was given to the patient. Indications for the use of allopurinol and other uric acid lowering therapy for prophylaxis was discussed. The use of colchicine, steroids and NSAID's to prevent or treat flare-ups was also discussed. The following treatment guidelines will be used:      Acute gouty arthritis treatment - Based on the severity of the attack the patient can use Colchcine, NSAID's or oral steroids.   I recommend the one of following regimens for acute flares:  Colchicine: Day one - 1.2mg initially (within 36 hours of flare) and then 0.6mg 1 hour later, After day one the patient should take 0.6mg twice a day. NSAID's: Indomethacin (50mg tid) or Naprosyn (500mg bid) are preferred. Steroids: Prednisone taper 5-15 days or intraarticular steroids if appropriate     PLAN  1. Prednisone 5 mg daily for 2 weeks; PRN for flares  2. Start Allopurinol 300 mg daily  3. Colchicine PRN for flares  4. Return in 6 weeks     Total face-to face time was 25 minutes, greater than 50% of which was spent in counseling and coordination of care. The diagnosis, treatment and various other items were discussed in detail: Test results, medication options, possible side effects, lifestyle changes. Elvin Emerson MD  Adult and Pediatric Rheumatology     [address] [address], Phone [phone], Fax [phone]   E-mail: [email or web address]    Visiting  of Pediatrics    Department of Pediatrics, [facility] of [address] [place] [address], [address] [place] [address], Phone [phone], Fax [phone]  E-mail: [email or web address]    There are no Patient Instructions on file for this visit.     cc:  Turner Garcia MD

## 2019-02-18 ENCOUNTER — TELEPHONE (OUTPATIENT)
Dept: INTERNAL MEDICINE CLINIC | Age: 64
End: 2019-02-18

## 2019-02-18 RX ORDER — SIMVASTATIN 80 MG/1
80 TABLET, FILM COATED ORAL
Qty: 30 TAB | Refills: 5 | Status: SHIPPED | OUTPATIENT
Start: 2019-02-18 | End: 2019-02-18 | Stop reason: SDUPTHER

## 2019-02-21 ENCOUNTER — TELEPHONE (OUTPATIENT)
Dept: INTERNAL MEDICINE CLINIC | Age: 64
End: 2019-02-21

## 2019-02-21 NOTE — TELEPHONE ENCOUNTER
2/21/19 [address] [place] @ 1 [phone]. PA initiated with ROSINA Quiles for Simivastin 80 mg tablets. Case ID: [id number],OTQRHNEP effective 2/21/19 thru 2/20/20. [facility] pharmacy called @ [phone],MARILYNN to Kaye,claim went thru with out of pocket cost $14.96.

## 2019-03-01 ENCOUNTER — OFFICE VISIT (OUTPATIENT)
Dept: INTERNAL MEDICINE CLINIC | Age: 64
End: 2019-03-01

## 2019-03-01 VITALS
DIASTOLIC BLOOD PRESSURE: 82 MMHG | TEMPERATURE: 98.2 F | BODY MASS INDEX: 34.34 KG/M2 | WEIGHT: 193.8 LBS | HEIGHT: 63 IN | RESPIRATION RATE: 19 BRPM | HEART RATE: 102 BPM | SYSTOLIC BLOOD PRESSURE: 149 MMHG | OXYGEN SATURATION: 95 %

## 2019-03-01 DIAGNOSIS — S78.112D: Chronic | ICD-10-CM

## 2019-03-01 DIAGNOSIS — M70.20 OLECRANON BURSITIS, UNSPECIFIED LATERALITY: ICD-10-CM

## 2019-03-01 DIAGNOSIS — I10 ESSENTIAL HYPERTENSION: ICD-10-CM

## 2019-03-01 DIAGNOSIS — M19.90 ARTHRITIS: Primary | ICD-10-CM

## 2019-03-01 DIAGNOSIS — Z79.4 TYPE 2 DIABETES MELLITUS WITH HYPERGLYCEMIA, WITH LONG-TERM CURRENT USE OF INSULIN (HCC): ICD-10-CM

## 2019-03-01 DIAGNOSIS — Z79.4 TYPE 2 DIABETES MELLITUS WITH DIABETIC NEUROPATHY, WITH LONG-TERM CURRENT USE OF INSULIN (HCC): ICD-10-CM

## 2019-03-01 DIAGNOSIS — E78.00 HYPERCHOLESTEREMIA: ICD-10-CM

## 2019-03-01 DIAGNOSIS — G54.6 PHANTOM LIMB PAIN (HCC): ICD-10-CM

## 2019-03-01 DIAGNOSIS — E11.40 TYPE 2 DIABETES MELLITUS WITH DIABETIC NEUROPATHY, WITH LONG-TERM CURRENT USE OF INSULIN (HCC): ICD-10-CM

## 2019-03-01 DIAGNOSIS — E11.65 TYPE 2 DIABETES MELLITUS WITH HYPERGLYCEMIA, WITH LONG-TERM CURRENT USE OF INSULIN (HCC): ICD-10-CM

## 2019-03-01 RX ORDER — DICLOFENAC SODIUM 10 MG/G
GEL TOPICAL 4 TIMES DAILY
Qty: 1 EACH | Refills: 5 | Status: SHIPPED | OUTPATIENT
Start: 2019-03-01 | End: 2019-10-02

## 2019-03-01 RX ORDER — ACETAMINOPHEN AND CODEINE PHOSPHATE 300; 30 MG/1; MG/1
1 TABLET ORAL
Qty: 12 TAB | Refills: 0 | Status: SHIPPED | OUTPATIENT
Start: 2019-03-01 | End: 2019-03-04

## 2019-03-01 NOTE — PROGRESS NOTES
Subjective:     Charles Baltazar is a 59 y.o. female seen for follow-up of diabetes. She has had hypoglycemic attacks. .no  Blood sugar control has been 100's to 300's after eating cookies  She has diabetes, hypertension and hyperlipidemia. Charles Baltazar has the additional concern of right elbow haing pain enough went to er, given toradol and indocin, diagnosed with bursitis and possibly gout. Pain and swelling may be a little better still quite painful. Sees rheumatology for gout. Lately she has not been compliant with diet, she is going to do better. Started her on gabapentin for her neuropathy and phantom limb pain. States it really has not helped any. Diabetic Review of Systems: no chest pain, dyspnea or TIA's, no hypoglycemia, has noted excessive thirstiness and frequent urination. Allergies   Allergen Reactions    Keflex [Cephalexin] Itching    Lortab [Hydrocodone-Acetaminophen] Hives and Itching     Patient states she can take tylenol #3 without problem. Diet and Lifestyle: does not rigorously follow a diabetic diet, nonsmoker.     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 ([address] 2016    Type 2 diabetes mellitus with hyperglycemia ([address] 2016    Phantom limb pain ([facility] 2012     Social History     Tobacco Use    Smoking status: Former Smoker     Packs/day: 0.50     Years: 15.00     Pack years: 7.50     Last attempt to quit: 3/30/2009     Years since quittin.9    Smokeless tobacco: Never Used   Substance Use Topics    Alcohol use: No     Alcohol/week: 0.0 oz        Lab Results   Component Value Date/Time    WBC 4.2 12/17/2018 11:56 AM    HGB 13.1 12/17/2018 11:56 AM    HCT 40.9 12/17/2018 11:56 AM    PLATELET 458 10/27/1042 11:56 AM    MCV 79 12/17/2018 11:56 AM     Lab Results   Component Value Date/Time    Hemoglobin A1c 7.8 (H) 10/17/2018 09:50 AM    Hemoglobin A1c 7.5 (H) 06/06/2018 08:27 AM    Hemoglobin A1c 8.3 (H) 02/14/2018 03:58 AM    Glucose 97 12/17/2018 11:56 AM    Glucose (POC) 146 (H) 02/18/2018 11:22 AM    Microalb/Creat ratio (ug/mg creat.) 102.6 (H) 07/11/2018 08:51 AM    LDL, calculated Comment 10/17/2018 09:50 AM    Creatinine 1.39 (H) 12/17/2018 11:56 AM      Lab Results   Component Value Date/Time    Cholesterol, total 111 10/17/2018 09:50 AM    HDL Cholesterol 19 (L) 10/17/2018 09:50 AM    LDL, calculated Comment 10/17/2018 09:50 AM    Triglyceride 708 (HH) 10/17/2018 09:50 AM     Lab Results   Component Value Date/Time    ALT (SGPT) 18 12/17/2018 11:56 AM    AST (SGOT) 19 12/17/2018 11:56 AM    Alk. phosphatase 73 12/17/2018 11:56 AM    Bilirubin, total 0.3 12/17/2018 11:56 AM    Albumin 4.7 12/17/2018 11:56 AM    Protein, total 6.9 12/17/2018 11:56 AM    INR 1.7 (H) 02/18/2018 02:46 AM    INR POC 1.3 03/05/2018 12:09 PM    Prothrombin time 17.0 (H) 02/18/2018 02:46 AM    PLATELET 012 03/51/4400 11:56 AM     Lab Results   Component Value Date/Time    GFR est non-AA 41 (L) 12/17/2018 11:56 AM    GFR est AA 47 (L) 12/17/2018 11:56 AM    Creatinine 1.39 (H) 12/17/2018 11:56 AM    BUN 24 12/17/2018 11:56 AM    Sodium 142 12/17/2018 11:56 AM    Potassium 4.2 12/17/2018 11:56 AM    Chloride 103 12/17/2018 11:56 AM    CO2 22 12/17/2018 11:56 AM     Lab Results   Component Value Date/Time    Glucose 97 12/17/2018 11:56 AM    Glucose (POC) 146 (H) 02/18/2018 11:22 AM         Review of Systems  Pertinent items are noted in HPI.     Objective:     Significant for the following:     Visit Vitals  /82 (BP 1 Location: Left arm, BP Patient Position: Sitting)   Pulse (!) 102   Temp 98.2 °F (36.8 °C) (Oral)   Resp 19   Ht 5' 3\" (1.6 m)   Wt 193 lb 12.8 oz (87.9 kg)   LMP 08/23/2003   SpO2 95%   BMI 34.33 kg/m²     Appearance: alert, well appearing, and in no distress. Exam: heart sounds normal rate, regular rhythm, normal S1, S2, no murmurs, rubs, clicks or gallops, chest clear  Foot exam: Deferred  Right elbow mildly swollen, minimal redness, tender to palpation. There does seem to be bogginess of the joint possibly indicative of a swollen bursa. She is declining the needle aspiration. Lab review: labs reviewed, I note that glycosylated hemoglobin mildly abnormal but acceptable. Assessment/Plan:     Follow-up diabetes stable, borderline controlled, loss of control due to intercurrent illness, needs further observation, needs to follow diet more regularly. Diabetic issues reviewed with her: Diet. Neuropathy manish wean gabapentin    Chronic Conditions Addressed Today     1. Type 2 diabetes mellitus with hyperglycemia (HCC)    2. Type 2 diabetes mellitus with diabetic neuropathy (HCC)     Relevant Medications     aspirin/salicylamide/caffeine (ARTHRITIS STRENGTH BC POWDER PO)     acetaminophen-codeine (TYLENOL #3) 300-30 mg per tablet    3. 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 Phantom limb pain (HCC)     Relevant Medications     aspirin/salicylamide/caffeine (ARTHRITIS STRENGTH BC POWDER PO)     acetaminophen-codeine (TYLENOL #3) 300-30 mg per tablet    5.  Essential hypertension      Acute Diagnoses Addressed Today     Arthritis    -  Primary        Relevant Medications        aspirin/salicylamide/caffeine (ARTHRITIS STRENGTH BC POWDER PO)        diclofenac (VOLTAREN) 1 % gel        acetaminophen-codeine (TYLENOL #3) 300-30 mg per tablet    Olecranon bursitis, unspecified laterality            Relevant Medications        acetaminophen-codeine (TYLENOL #3) 300-30 mg per tablet    Hypercholesteremia            Orders Placed This Encounter    aspirin/salicylamide/caffeine (ARTHRITIS STRENGTH BC POWDER PO)     Sig: Take  by mouth.  diclofenac (VOLTAREN) 1 % gel     Sig: Apply  to affected area four (4) times daily. Dispense:  1 Each     Refill:  5    acetaminophen-codeine (TYLENOL #3) 300-30 mg per tablet     Sig: Take 1 Tab by mouth every six (6) hours as needed for Pain for up to 3 days. Max Daily Amount: 4 Tabs. Dispense:  12 Tab     Refill:  0       We discussed this pain and the usage of controlled substances for buritis pain relief. In general these medications are indicated for the acute symptom relief and not for chronic usage, allthough they are frequently used for chronic management  After a certain period of time they should be stopped to avoid dependence and/or addiction. I warned her about the dangers of addiction and other side affects including respiratory depression and death. Discussed how this is a controlled substance and that the prescribing of it is should be monitored very carefully. Drug usage is also monitored by our practise and the [facility], since there has been a lot of abuse and diversion of controlled substances. In general we do not refill this medication over the phone. PLease ask for enough pills to get you to your next appointment and make sure you keep your appointments. Warned not to take other medications like alcohol, other benzodiazapines marijuana or other narcotics when on this medication. The pain is not better in a few days come in for joint tapping steroid injection. We will give the medications time to work though. Suspect diabetes not too good due to noncompliance, bring her back when she is doing better do her lab work. Gabapentin not helping given weaning instructions. Can discuss alternatives he follows up. Current Outpatient Medications   Medication Sig Dispense Refill    aspirin/salicylamide/caffeine (ARTHRITIS STRENGTH BC POWDER PO) Take  by mouth.       diclofenac (VOLTAREN) 1 % gel Apply  to affected area four (4) times daily. 1 Each 5    acetaminophen-codeine (TYLENOL #3) 300-30 mg per tablet Take 1 Tab by mouth every six (6) hours as needed for Pain for up to 3 days. Max Daily Amount: 4 Tabs. 12 Tab 0    indomethacin (INDOCIN) 25 mg capsule Take 2 Caps by mouth three (3) times daily for 5 days. With food 30 Cap 0    simvastatin (ZOCOR) 80 mg tablet Take 1 Tab by mouth nightly. 30 Tab 5    allopurinol (ZYLOPRIM) 300 mg tablet Take 1 Tab by mouth daily. 30 Tab 6    glucose blood VI test strips (ASCENSIA CONTOUR) strip Check blood sugar up to three times per day 100 Strip 11    raNITIdine (ZANTAC) 150 mg tablet Take 1 Tab by mouth two (2) times a day. 180 Tab 3    insulin aspart protamine/insulin aspart (NOVOLOG MIX 70-30FLEXPEN U-100) 100 unit/mL (70-30) inpn 14 Units by SubCUTAneous route Before breakfast and dinner. 5 Pen 5    COLCRYS 0.6 mg tablet TAKE 1 TABLET BY MOUTH ONCE DAILY 30 Tab 5    lisinopril-hydroCHLOROthiazide (PRINZIDE, ZESTORETIC) 20-25 mg per tablet Take  by mouth daily.  fenoprofen 200 mg cap       metFORMIN (GLUCOPHAGE) 500 mg tablet Take 1 Tab by mouth two (2) times daily (with meals). 180 Tab 1    ibuprofen (MOTRIN) 200 mg tablet Take  by mouth every six (6) hours as needed for Pain.  DERMACINRX LEXITRAL 1.5-0.025 % cslc       lidocaine-tetracaine 7-7 % crea       LIVIXIL AVA cream       naproxen sodium (ALEVE) 220 mg tablet Take 220 mg by mouth two (2) times daily (with meals).  acetaminophen (TYLENOL) 500 mg tablet Take 1 Tab by mouth every four (4) hours (while awake). Indications: Pain (Patient taking differently: Take 650 mg by mouth every four (4) hours (while awake). Indications: Pain) 100 Tab 0       Follow-up Disposition:  Return in about 4 weeks (around 3/29/2019) for routine follow up.

## 2019-03-01 NOTE — PROGRESS NOTES
Chief Complaint   Patient presents with   [facility] Follow Up     patient went to ED for right elbow pain since monday     1. Have you been to the ER, urgent care clinic since your last visit? [facility]  Hospitalized since your last visit? No     2. Have you seen or consulted any other health care providers outside of the [address] [place] [address] since your last visit?   No

## 2019-03-07 RX ORDER — SIMVASTATIN 80 MG/1
80 TABLET, FILM COATED ORAL
Qty: 30 TAB | Refills: 5 | Status: SHIPPED | OUTPATIENT
Start: 2019-03-07 | End: 2019-08-16 | Stop reason: SDUPTHER

## 2019-03-07 RX ORDER — LISINOPRIL AND HYDROCHLOROTHIAZIDE 20; 25 MG/1; MG/1
1 TABLET ORAL DAILY
Qty: 90 TAB | Refills: 3 | Status: SHIPPED | OUTPATIENT
Start: 2019-03-07 | End: 2019-10-02 | Stop reason: DRUGHIGH

## 2019-03-18 ENCOUNTER — TELEPHONE (OUTPATIENT)
Dept: RHEUMATOLOGY | Age: 64
End: 2019-03-18

## 2019-03-18 NOTE — TELEPHONE ENCOUNTER
----- Message from Drew Chun MD sent at 3/15/2019 12:41 PM EDT -----  Regarding: RE: Dr. Asha Sandoval: [phone]  Ask her if she wants steroids for her elbow swelling.  ----- Message -----  From: Holly Mandel LPN  Sent: 4/21/3069   1:05 PM  To: Drew Chun MD  Subject: FW: Dr. Metzger/Telephone                              ----- Message -----  From: Min Shah  Sent: 2/28/2019  11:45 AM  To: [facility] Nurse Pool  Subject: Dr. Metzger/Telephone                              Pt wanted to inform the dr that their right elbow has become swollen.  Pt states that they will still come to apt

## 2019-03-21 RX ORDER — PREDNISONE 5 MG/1
5 TABLET ORAL DAILY
Qty: 30 TAB | Refills: 1 | Status: SHIPPED | OUTPATIENT
Start: 2019-03-21 | End: 2019-03-22 | Stop reason: SDUPTHER

## 2019-03-22 RX ORDER — PREDNISONE 5 MG/1
TABLET ORAL
Qty: 30 TAB | Refills: 1 | Status: SHIPPED | OUTPATIENT
Start: 2019-03-22 | End: 2019-08-16 | Stop reason: ALTCHOICE

## 2019-03-25 RX ORDER — COLCHICINE 0.6 MG/1
TABLET, FILM COATED ORAL
Qty: 90 TAB | Refills: 1 | Status: CANCELLED | OUTPATIENT
Start: 2019-03-25

## 2019-03-25 RX ORDER — COLCHICINE 0.6 MG/1
TABLET, FILM COATED ORAL
Qty: 90 TAB | Refills: 1 | Status: SHIPPED | OUTPATIENT
Start: 2019-03-25 | End: 2019-04-23 | Stop reason: SDUPTHER

## 2019-03-25 NOTE — TELEPHONE ENCOUNTER
Requested Prescriptions     Pending Prescriptions Disp Refills    COLCRYS 0.6 mg tablet 90 Tab 1     Sig: TAKE 1 TABLET BY MOUTH ONCE DAILY

## 2019-04-08 ENCOUNTER — OFFICE VISIT (OUTPATIENT)
Dept: INTERNAL MEDICINE CLINIC | Age: 64
End: 2019-04-08

## 2019-04-08 VITALS
HEIGHT: 63 IN | WEIGHT: 180 LBS | BODY MASS INDEX: 31.89 KG/M2 | DIASTOLIC BLOOD PRESSURE: 64 MMHG | OXYGEN SATURATION: 97 % | TEMPERATURE: 98.1 F | RESPIRATION RATE: 18 BRPM | HEART RATE: 131 BPM | SYSTOLIC BLOOD PRESSURE: 102 MMHG

## 2019-04-08 DIAGNOSIS — S76.911D STRAIN OF RIGHT HIP AND THIGH, SUBSEQUENT ENCOUNTER: Primary | ICD-10-CM

## 2019-04-08 DIAGNOSIS — E11.65 TYPE 2 DIABETES MELLITUS WITH HYPERGLYCEMIA, WITH LONG-TERM CURRENT USE OF INSULIN (HCC): ICD-10-CM
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
## 2019-04-08 DIAGNOSIS — S76.011D STRAIN OF RIGHT HIP AND THIGH, SUBSEQUENT ENCOUNTER: Primary | ICD-10-CM

## 2019-04-08 DIAGNOSIS — Z79.4 TYPE 2 DIABETES MELLITUS WITH HYPERGLYCEMIA, WITH LONG-TERM CURRENT USE OF INSULIN (HCC): ICD-10-CM

## 2019-04-08 RX ORDER — ACETAMINOPHEN AND CODEINE PHOSPHATE 300; 30 MG/1; MG/1
1 TABLET ORAL
Qty: 10 TAB | Refills: 0 | Status: SHIPPED | OUTPATIENT
Start: 2019-04-08 | End: 2019-04-11
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
## 2019-04-08 NOTE — PROGRESS NOTES
Chief Complaint   Patient presents with    Leg Pain     R/thigh pain     I have reviewed the patient's medical history in detail and updated the computerized patient record. Health Maintenance reviewed. 1. Have you been to the ER, urgent care clinic since your last visit? Hospitalized since your last visit? yes    2. Have you seen or consulted any other health care providers outside of the [address] [place] [address] [place] since your last visit? Include any pap smears or colon screening. Yes  [facility]      Encouraged pt to discuss pt's wishes with spouse/partner/family and bring them in the next appt to follow thru with the Advanced Directive    Fall Risk Assessment, last 12 mths 1/17/2019   Able to walk? Yes   Fall in past 12 months? Yes   Fall with injury? Yes   Number of falls in past 12 months 8 or more   Fall Risk Score 9       3 most recent PHQ Screens 4/8/2019   Little interest or pleasure in doing things Several days   Feeling down, depressed, irritable, or hopeless Several days   Total Score PHQ 2 2       Abuse Screening Questionnaire 1/17/2019   Do you ever feel afraid of your partner? N   Are you in a relationship with someone who physically or mentally threatens you? N   Is it safe for you to go home?  Y       ADL Assessment 1/17/2019   Feeding yourself No Help Needed   Getting from bed to chair No Help Needed   Getting dressed No Help Needed   Bathing or showering No Help Needed   Walk across the room (includes cane/walker) No Help Needed   Using the telphone No Help Needed   Taking your medications No Help Needed   Preparing meals No Help Needed   Managing money (expenses/bills) No Help Needed   Moderately strenuous housework (laundry) No Help Needed   Shopping for personal items (toiletries/medicines) No Help Needed   Shopping for groceries No Help Needed   Driving No Help Needed   Climbing a flight of stairs No Help Needed   Getting to places beyond walking distances No Help Needed

## 2019-04-10 ENCOUNTER — TELEPHONE (OUTPATIENT)
Dept: INTERNAL MEDICINE CLINIC | Age: 64
End: 2019-04-10

## 2019-04-10 NOTE — TELEPHONE ENCOUNTER
Pt called in reference to her xray results. She said that she is in a lot of pain. Please call her at [phone].
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
## 2019-04-23 ENCOUNTER — OFFICE VISIT (OUTPATIENT)
Dept: RHEUMATOLOGY | Age: 64
End: 2019-04-23

## 2019-04-23 VITALS
DIASTOLIC BLOOD PRESSURE: 84 MMHG | OXYGEN SATURATION: 96 % | SYSTOLIC BLOOD PRESSURE: 137 MMHG | WEIGHT: 190.4 LBS | BODY MASS INDEX: 33.73 KG/M2 | TEMPERATURE: 97.8 F | HEART RATE: 116 BPM | RESPIRATION RATE: 16 BRPM

## 2019-04-23 DIAGNOSIS — M10.9 GOUTY ARTHRITIS: Primary | ICD-10-CM

## 2019-04-23 RX ORDER — GABAPENTIN 300 MG/1
CAPSULE ORAL
COMMUNITY
Start: 2019-02-06 | End: 2019-08-16 | Stop reason: ALTCHOICE

## 2019-04-23 RX ORDER — COLCHICINE 0.6 MG/1
TABLET, FILM COATED ORAL
Qty: 90 TAB | Refills: 1 | Status: SHIPPED | OUTPATIENT
Start: 2019-04-23 | End: 2019-08-16 | Stop reason: ALTCHOICE
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
## 2019-04-24 LAB
ALBUMIN SERPL-MCNC: 4.4 G/DL (ref 3.6–4.8)
ALBUMIN/GLOB SERPL: 2.1 {RATIO} (ref 1.2–2.2)
ALP SERPL-CCNC: 92 IU/L (ref 39–117)
ALT SERPL-CCNC: 19 IU/L (ref 0–32)
AST SERPL-CCNC: 13 IU/L (ref 0–40)
BASOPHILS # BLD MANUAL: 0 X10E3/UL (ref 0–0.2)
BASOPHILS NFR BLD MANUAL: 0 %
BILIRUB SERPL-MCNC: 0.2 MG/DL (ref 0–1.2)
BUN SERPL-MCNC: 19 MG/DL (ref 8–27)
BUN/CREAT SERPL: 22 (ref 12–28)
CALCIUM SERPL-MCNC: 9.9 MG/DL (ref 8.7–10.3)
CHLORIDE SERPL-SCNC: 99 MMOL/L (ref 96–106)
CO2 SERPL-SCNC: 26 MMOL/L (ref 20–29)
CREAT SERPL-MCNC: 0.85 MG/DL (ref 0.57–1)
DIFFERENTIAL COMMENT, 115260: ABNORMAL
EOSINOPHIL # BLD MANUAL: 0.1 X10E3/UL (ref 0–0.4)
EOSINOPHIL NFR BLD MANUAL: 2 %
ERYTHROCYTE [DISTWIDTH] IN BLOOD BY AUTOMATED COUNT: 17.4 % (ref 12.3–15.4)
GLOBULIN SER CALC-MCNC: 2.1 G/DL (ref 1.5–4.5)
GLUCOSE SERPL-MCNC: 296 MG/DL (ref 65–99)
HCT VFR BLD AUTO: 42 % (ref 34–46.6)
HGB BLD-MCNC: 14.1 G/DL (ref 11.1–15.9)
LYMPHOCYTES # BLD MANUAL: 2.6 X10E3/UL (ref 0.7–3.1)
LYMPHOCYTES NFR BLD MANUAL: 40 %
MCH RBC QN AUTO: 26.3 PG (ref 26.6–33)
MCHC RBC AUTO-ENTMCNC: 33.6 G/DL (ref 31.5–35.7)
MCV RBC AUTO: 78 FL (ref 79–97)
MONOCYTES # BLD MANUAL: 0.3 X10E3/UL (ref 0.1–0.9)
MONOCYTES NFR BLD MANUAL: 5 %
NEUTROPHILS # BLD MANUAL: 3.4 X10E3/UL (ref 1.4–7)
NEUTROPHILS NFR BLD MANUAL: 53 %
PLATELET # BLD AUTO: 216 X10E3/UL (ref 150–379)
PLATELET BLD QL SMEAR: ADEQUATE
POTASSIUM SERPL-SCNC: 4.1 MMOL/L (ref 3.5–5.2)
PROT SERPL-MCNC: 6.5 G/DL (ref 6–8.5)
RBC # BLD AUTO: 5.37 X10E6/UL (ref 3.77–5.28)
RBC MORPH BLD: ABNORMAL
SODIUM SERPL-SCNC: 141 MMOL/L (ref 134–144)
URATE SERPL-MCNC: 7 MG/DL (ref 2.5–7.1)
WBC # BLD AUTO: 6.4 X10E3/UL (ref 3.4–10.8)

## 2019-04-30 ENCOUNTER — OFFICE VISIT (OUTPATIENT)
Dept: INTERNAL MEDICINE CLINIC | Age: 64
End: 2019-04-30

## 2019-04-30 VITALS
DIASTOLIC BLOOD PRESSURE: 74 MMHG | SYSTOLIC BLOOD PRESSURE: 134 MMHG | TEMPERATURE: 96.5 F | BODY MASS INDEX: 33.66 KG/M2 | HEIGHT: 63 IN | WEIGHT: 190 LBS | HEART RATE: 122 BPM | RESPIRATION RATE: 16 BRPM | OXYGEN SATURATION: 95 %

## 2019-04-30 DIAGNOSIS — I10 ESSENTIAL HYPERTENSION: ICD-10-CM

## 2019-04-30 DIAGNOSIS — Z79.4 TYPE 2 DIABETES MELLITUS WITH HYPERGLYCEMIA, WITH LONG-TERM CURRENT USE OF INSULIN (HCC): ICD-10-CM

## 2019-04-30 DIAGNOSIS — S78.112S: ICD-10-CM

## 2019-04-30 DIAGNOSIS — T14.8XXA WOUND OF SKIN: Primary | ICD-10-CM

## 2019-04-30 DIAGNOSIS — G54.6 PHANTOM LIMB PAIN (HCC): ICD-10-CM

## 2019-04-30 DIAGNOSIS — E11.65 TYPE 2 DIABETES MELLITUS WITH HYPERGLYCEMIA, WITH LONG-TERM CURRENT USE OF INSULIN (HCC): ICD-10-CM

## 2019-04-30 DIAGNOSIS — M10.00 IDIOPATHIC GOUT, UNSPECIFIED CHRONICITY, UNSPECIFIED SITE: ICD-10-CM

## 2019-04-30 LAB — HBA1C MFR BLD HPLC: 9.5 %

## 2019-04-30 RX ORDER — INDOMETHACIN 25 MG/1
25 CAPSULE ORAL 3 TIMES DAILY
Qty: 90 CAP | Refills: 1 | Status: SHIPPED | OUTPATIENT
Start: 2019-04-30 | End: 2019-07-25 | Stop reason: SDUPTHER

## 2019-04-30 NOTE — PROGRESS NOTES
Chief Complaint   Patient presents with    Abrasion     raw spot behind L/thich where it rubs on the prothesis     I have reviewed the patient's medical history in detail and updated the computerized patient record. Health Maintenance reviewed. 1. Have you been to the ER, urgent care clinic since your last visit? Hospitalized since your last visit?no    2. Have you seen or consulted any other health care providers outside of the [address] [place] [address] [place] since your last visit? Include any pap smears or colon screening. No      Encouraged pt to discuss pt's wishes with spouse/partner/family and bring them in the next appt to follow thru with the Advanced Directive    Fall Risk Assessment, last 12 mths 1/17/2019   Able to walk? Yes   Fall in past 12 months? Yes   Fall with injury? Yes   Number of falls in past 12 months 8 or more   Fall Risk Score 9       3 most recent PHQ Screens 4/23/2019   Little interest or pleasure in doing things Not at all   Feeling down, depressed, irritable, or hopeless Not at all   Total Score PHQ 2 0       Abuse Screening Questionnaire 1/17/2019   Do you ever feel afraid of your partner? N   Are you in a relationship with someone who physically or mentally threatens you? N   Is it safe for you to go home?  Y       ADL Assessment 1/17/2019   Feeding yourself No Help Needed   Getting from bed to chair No Help Needed   Getting dressed No Help Needed   Bathing or showering No Help Needed   Walk across the room (includes cane/walker) No Help Needed   Using the telphone No Help Needed   Taking your medications No Help Needed   Preparing meals No Help Needed   Managing money (expenses/bills) No Help Needed   Moderately strenuous housework (laundry) No Help Needed   Shopping for personal items (toiletries/medicines) No Help Needed   Shopping for groceries No Help Needed   Driving No Help Needed   Climbing a flight of stairs No Help Needed   Getting to places beyond walking distances No Help Needed

## 2019-05-01 ENCOUNTER — TELEPHONE (OUTPATIENT)
Dept: INTERNAL MEDICINE CLINIC | Age: 64
End: 2019-05-01

## 2019-05-01 PROBLEM — M10.9 GOUT: Status: ACTIVE | Noted: 2019-05-01

## 2019-05-01 NOTE — TELEPHONE ENCOUNTER
Faxed last office note and demographics with Home Health referral to [facility] intake at [phone] - confirmation of receipt received  Radha Pool LPN  1/6/1312  7:00 AM

## 2019-05-01 NOTE — PROGRESS NOTES
HISTORY OF PRESENT ILLNESS  Cheyenne De Anda is a 62 y.o. female. Abrasion    The history is provided by the patient. The incident occurred 2 days ago. Pain location: butt. The laceration is 1 cm in size. The injury mechanism is other. Foreign body present: no. The patient is experiencing no pain. The patient's last tetanus shot was less than 5 years ago. Wears a full leg prosthesis, thinks prosthesis is rubbing the area. She called the prosthesis company no sending over someone to look at it. She wants to continue to work and can do so without necessarily having to use it. Is more of a wound than abrasion. Indomethacin works well for her pain issues she would like a refill. Reports taking blood pressure medications without side affects. No complaints of exertional chest pain, excessive shortness of breath or focal weakness. Minimal swelling in lower legs or dizziness with standing. 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 Gastrointestinal: Negative for abdominal pain. Genitourinary: Negative for frequency. Skin: Negative for itching.        Physical Exam  Visit Vitals  /74 (BP 1 Location: Left arm, BP Patient Position: Sitting)   Pulse (!) 122   Temp 96.5 °F (35.8 °C) (Temporal)   Resp 16   Ht 5' 3\" (1.6 m)   Wt 190 lb (86.2 kg)   LMP 08/23/2003   SpO2 95%   BMI 33.66 kg/m²     WD WN female NAD  Heart RRR without murmers clicks or rubs  Lungs CTA  Abdo soft nontender  Ext no edema  1.5 cm wound on her buttock, bed of the wound clean no redness or discharge. ASSESSMENT and PLAN  Encounter Diagnoses   Name Primary? 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 Dm check A1C will discuss afterwards since she needs to go.

## 2019-05-01 NOTE — TELEPHONE ENCOUNTER
----- Message from Julienne Che sent at 5/1/2019  7:53 AM EDT -----  Regarding: Leesa Araya / telephone   Patient is attempting to reach office to advise she will not be going to work and will be at home waiting from home health Nurse Per doctor instructions Best contact [phone]

## 2019-05-01 NOTE — TELEPHONE ENCOUNTER
Needs a new order for home health that states skilled nursing - evaluate and treat - call dr with treatment recommendations to wounds - message sent to Dr Constance Frost LPN  8/0/0471  1:64 PM

## 2019-05-01 NOTE — TELEPHONE ENCOUNTER
Returned call to Mike Parmar - she is in a meeting and will have to call me back  Paco Ortiz LPN  7/1/0015  2:12 PM

## 2019-05-01 NOTE — TELEPHONE ENCOUNTER
----- Message from Neva Swain sent at 5/1/2019 12:33 PM EDT -----  Regarding: Gissel Green [facility] for [facility] nursing and any wound care orders. Tiburcio Santo number is [phone] and fax [phone].

## 2019-05-02 NOTE — TELEPHONE ENCOUNTER
Called patient - after speaking with [facility] they are going out to see patient today - could not give an exact time - notified patient that they will be seeing her today, this afternoon  Suzie Varela LPN  6/6/6391  2:20 PM

## 2019-05-02 NOTE — TELEPHONE ENCOUNTER
----- Message from Will Enriquez sent at 5/2/2019  1:04 PM EDT -----  Regarding: Dr. Rony Manuel: [phone]  Patient has a (L) prosthetic leg and was told by Dr. Tiffanie Sin to take it off because the home health was coming to dress the wound however they have been there to do so and the patient has taken of Wednesday, today, and tomorrow from work. She needs to know when they are coming, she has and appt in [place] with the prothesis doctor.  The patient needs a call regarding this this today, patient best contact # [phone]

## 2019-05-02 NOTE — TELEPHONE ENCOUNTER
----- Message from Haroldine Bumpers sent at 5/2/2019  8:04 AM EDT -----  Regarding: Dr. Naomi Robles is requesting a call back from the office in reference to having home health services set up for her.  Phone: [phone]

## 2019-05-03 ENCOUNTER — TELEPHONE (OUTPATIENT)
Dept: INTERNAL MEDICINE CLINIC | Age: 64
End: 2019-05-03

## 2019-05-03 DIAGNOSIS — Z79.4 DIABETES MELLITUS DUE TO UNDERLYING CONDITION WITH DIABETIC NEPHROPATHY, WITH LONG-TERM CURRENT USE OF INSULIN (HCC): ICD-10-CM

## 2019-05-03 DIAGNOSIS — E08.21 DIABETES MELLITUS DUE TO UNDERLYING CONDITION WITH DIABETIC NEPHROPATHY, WITH LONG-TERM CURRENT USE OF INSULIN (HCC): ICD-10-CM

## 2019-05-03 RX ORDER — INSULIN ASPART 100 [IU]/ML
17 INJECTION, SUSPENSION SUBCUTANEOUS
Qty: 5 PEN | Refills: 5
Start: 2019-05-03 | End: 2019-06-29 | Stop reason: SDUPTHER

## 2019-05-08 ENCOUNTER — TELEPHONE (OUTPATIENT)
Dept: INTERNAL MEDICINE CLINIC | Age: 64
End: 2019-05-08

## 2019-05-08 NOTE — TELEPHONE ENCOUNTER
Faxed letter for out of work to [employer] per request of Dr Elizabeth Luna and patient  Corky Aldrich, LPN  1/7/2310  3:55 PM
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
## 2019-05-14 ENCOUNTER — TELEPHONE (OUTPATIENT)
Dept: INTERNAL MEDICINE CLINIC | Age: 64
End: 2019-05-14

## 2019-05-14 NOTE — TELEPHONE ENCOUNTER
Returned call to [facility] at given number - no answer - LM for a return call  Dar Foster LPN  5/59/3364  8:85 PM

## 2019-05-14 NOTE — TELEPHONE ENCOUNTER
----- Message from Dulce Part sent at 5/14/2019  2:11 PM EDT -----  Regarding: Dr. Diana De Souza is calling from [facility] in regards of the pt. She would like to speak to someone in clinical about the pt. Best contact number is [phone].

## 2019-05-21 RX ORDER — METFORMIN HYDROCHLORIDE 500 MG/1
500 TABLET ORAL 2 TIMES DAILY WITH MEALS
Qty: 180 TAB | Refills: 1 | Status: SHIPPED | OUTPATIENT
Start: 2019-05-21 | End: 2019-08-16 | Stop reason: SDUPTHER

## 2019-05-25 NOTE — PROGRESS NOTES
"Encounter Date: 5/25/2019    SCRIBE #1 NOTE: I, Jinny Grimm, am scribing for, and in the presence of, Dr. Nehemias Andrew.       History     Chief Complaint   Patient presents with    Barker Problem     Pt states he hasn't had any urine output from catheter for approximatley 3 hours. Denies any increased pain.        Time seen by provider: 3:28 PM on 05/25/2019    Mo Escobar is a 60 y.o. male with PMHx of thyroid disease, PE, anticoagulant long-term use, DVT, and microcytic anemia who presents to the ED with complaints of difficulty urinating that started x3 PTA. The patient has a barker catheter in place and states "it's clogged up with blood clots". He also mentions having lower abdominal pressure due to urinary retention. Dr. Mike Mcconnell is the patient's urologist. Per wife, the patient had the barker catheter placed secondary to persistent abdominal pain x4 days ago. The catheter was changed yesterday due to bleeding in the urine and "clogged catheter". The patient is on blood thinners but discontinued the medications x2 days ago. The patient had an MRI of the prostate last week and history of 2 prostate surgeries. The patient has no other medical concerns or complaints at this moment.     The history is provided by the patient.     Review of patient's allergies indicates:  No Known Allergies  Past Medical History:   Diagnosis Date    Acute deep vein thrombosis (DVT) of popliteal vein of right lower extremity 6/27/2017    Anemia due to chronic blood loss 10/3/2017    Anemia due to chronic blood loss 10/3/2017    Anticoagulant long-term use     Blood clotting disorder     Dr. Garrido    Barker catheter in place     Heterozygous MTHFR mutation [id number] 6/27/2017    Heterozygous MTHFR mutation [id number] 6/27/2017    Microcytic anemia 10/3/2017    Pulmonary embolism     2014  - Due to clotting disorder    Sleep apnea     uses c-pap    Thyroid disease      Past Surgical History:   Procedure Laterality " Chief Complaint   Patient presents with    Hypertension     I have reviewed the patient's medical history in detail and updated the computerized patient record. Health Maintenance reviewed. 1. Have you been to the ER, urgent care clinic since your last visit? Hospitalized since your last visit?no    2. Have you seen or consulted any other health care providers outside of the [address] [place] [address] [place] since your last visit? Include any pap smears or colon screening. No      Encouraged pt to discuss pt's wishes with spouse/partner/family and bring them in the next appt to follow thru with the Advanced Directive    Fall Risk Assessment, last 12 mths 1/17/2019   Able to walk? Yes   Fall in past 12 months? Yes   Fall with injury? Yes   Number of falls in past 12 months 8 or more   Fall Risk Score 9       PHQ over the last two weeks 1/17/2019   Little interest or pleasure in doing things Several days   Feeling down, depressed, irritable, or hopeless Several days   Total Score PHQ 2 2       Abuse Screening Questionnaire 1/17/2019   Do you ever feel afraid of your partner? N   Are you in a relationship with someone who physically or mentally threatens you? N   Is it safe for you to go home?  Y       ADL Assessment 1/17/2019   Feeding yourself No Help Needed   Getting from bed to chair No Help Needed   Getting dressed No Help Needed   Bathing or showering No Help Needed   Walk across the room (includes cane/walker) No Help Needed   Using the telphone No Help Needed   Taking your medications No Help Needed   Preparing meals No Help Needed   Managing money (expenses/bills) No Help Needed   Moderately strenuous housework (laundry) No Help Needed   Shopping for personal items (toiletries/medicines) No Help Needed   Shopping for groceries No Help Needed   Driving No Help Needed   Climbing a flight of stairs No Help Needed   Getting to places beyond walking distances No Help Needed Date    BACK SURGERY      CARDIAC CATHETERIZATION          CYSTOSCOPY N/A 2017    [facility] [place]    HAND TENDON SURGERY      PROSTATECTOMY-TRANSURETHRAL N/A 2017    [facility] [place]    TRANSRECTAL ULTRASOUND GUIDED PROSTATE BIOPSY N/A 2017    [facility] [place]    TRANSURETHRAL LASER VAPORIZATION OF PROSTATE WITH QANTA LASER N/A 2017    [facility] at [facility] [place]     Family History   Problem Relation Age of Onset    Cancer Mother     No Known Problems Father      Social History     Tobacco Use    Smoking status: Former Smoker     Packs/day: 1.00     Years: 7.00     Pack years: 7.00     Types: Cigarettes     Last attempt to quit:      Years since quittin.4    Smokeless tobacco: Never Used   Substance Use Topics    Alcohol use: No    Drug use: No     Review of Systems   Constitutional: Negative for fever.   HENT: Negative for congestion.    Respiratory: Negative for shortness of breath.    Cardiovascular: Negative for chest pain.   Gastrointestinal: Positive for abdominal pain. Negative for nausea and vomiting.   Genitourinary: Positive for difficulty urinating and hematuria. Negative for dysuria, penile swelling and urgency.   Musculoskeletal: Negative for back pain and joint swelling.   Skin: Negative for pallor and rash.   Neurological: Negative for weakness and numbness.   Hematological: Does not bruise/bleed easily.   Psychiatric/Behavioral: The patient is not nervous/anxious.        Physical Exam     Initial Vitals   BP Pulse Resp Temp SpO2   19 1458 19 1457 19 1457 19 1457 19 1457   (!) 142/78 98 18 98.3 °F (36.8 °C) 96 %      MAP       --                Physical Exam    Nursing note and vitals reviewed.  Constitutional: He appears well-developed and well-nourished.  Non-toxic appearance. No distress.   HENT:   Head:  Normocephalic and atraumatic.   Eyes: EOM are normal. Pupils are equal, round, and reactive to light.   Neck: Normal range of motion. Neck supple. No neck rigidity. No JVD present.   Cardiovascular: Normal rate, regular rhythm, normal heart sounds and intact distal pulses. Exam reveals no gallop and no friction rub.    No murmur heard.  Pulmonary/Chest: Breath sounds normal. He has no wheezes. He has no rhonchi. He has no rales.   Abdominal: Soft. Bowel sounds are normal. He exhibits distension. There is tenderness in the suprapubic area. There is no rigidity, no rebound and no guarding.   Suprapubic distention with mild tenderness.    Genitourinary:   Genitourinary Comments: Cotter catheter in place.    Musculoskeletal: Normal range of motion.   Neurological: He is alert and oriented to person, place, and time. He has normal strength and normal reflexes. No cranial nerve deficit or sensory deficit.   Skin: Skin is warm and dry.   Psychiatric: He has a normal mood and affect. His speech is normal and behavior is normal. He is not actively hallucinating.         ED Course   Procedures  Labs Reviewed - No data to display       Imaging Results    None          Medical Decision Making:   History:   Old Medical Records: I decided to obtain old medical records.  Initial Assessment:   Patient is a 60-year-old man with a history of urinary retention in indwelling Cotter catheter who presents emergency department for recurrent gross hematuria and urinary retention.  Catheter was exchanged to a 22 Serbian Cotter and thoroughly irrigated with clots removed.  Patient had proper drainage of the catheter but urine never quite cleared.  Care was turned over to Dr. So at end of shift with disposition pending relief of urinary obstruction after observation.  Patient to follow up with his urologist.            Scribe Attestation:   Scribe #1: I performed the above scribed service and the documentation accurately describes the  services I performed. I attest to the accuracy of the note.      I, Kamran Dominguez, personally performed the services described in this documentation. All medical record entries made by the scribe were at my direction and in my presence.  I have reviewed the chart and agree that the record reflects my personal performance and is accurate and complete. Nehemias Andrew MD.  7:45 AM 05/28/2019       DISCLAIMER: This note was prepared with [employer] Direct voice recognition transcription software. Garbled syntax, mangled pronouns, and other bizarre constructions may be attributed to that software system.               Clinical Impression:       ICD-10-CM ICD-9-CM   1. Gross hematuria R31.0 599.71   2. Problem with Cotter catheter, initial encounter T83.9XXA 996.76                                Nehemias Andrew MD  05/28/19 0745

## 2019-06-29 ENCOUNTER — TELEPHONE (OUTPATIENT)
Dept: INTERNAL MEDICINE CLINIC | Age: 64
End: 2019-06-29

## 2019-06-29 DIAGNOSIS — Z79.4 DIABETES MELLITUS DUE TO UNDERLYING CONDITION WITH DIABETIC NEPHROPATHY, WITH LONG-TERM CURRENT USE OF INSULIN (HCC): ICD-10-CM

## 2019-06-29 DIAGNOSIS — E08.21 DIABETES MELLITUS DUE TO UNDERLYING CONDITION WITH DIABETIC NEPHROPATHY, WITH LONG-TERM CURRENT USE OF INSULIN (HCC): ICD-10-CM

## 2019-06-29 RX ORDER — INSULIN ASPART 100 [IU]/ML
17 INJECTION, SUSPENSION SUBCUTANEOUS
Qty: 5 PEN | Refills: 5 | Status: SHIPPED | OUTPATIENT
Start: 2019-06-29 | End: 2019-11-06 | Stop reason: SDUPTHER

## 2019-07-25 ENCOUNTER — TELEPHONE (OUTPATIENT)
Dept: INTERNAL MEDICINE CLINIC | Age: 64
End: 2019-07-25

## 2019-07-25 RX ORDER — INDOMETHACIN 25 MG/1
25 CAPSULE ORAL 3 TIMES DAILY
Qty: 90 CAP | Refills: 5 | Status: SHIPPED | OUTPATIENT
Start: 2019-07-25 | End: 2019-09-05 | Stop reason: ALTCHOICE

## 2019-08-16 ENCOUNTER — OFFICE VISIT (OUTPATIENT)
Dept: INTERNAL MEDICINE CLINIC | Age: 64
End: 2019-08-16

## 2019-08-16 VITALS
SYSTOLIC BLOOD PRESSURE: 128 MMHG | HEIGHT: 62 IN | OXYGEN SATURATION: 97 % | BODY MASS INDEX: 34.96 KG/M2 | WEIGHT: 190 LBS | TEMPERATURE: 98 F | DIASTOLIC BLOOD PRESSURE: 87 MMHG | RESPIRATION RATE: 16 BRPM | HEART RATE: 107 BPM

## 2019-08-16 DIAGNOSIS — G54.6 PHANTOM LIMB PAIN (HCC): ICD-10-CM

## 2019-08-16 DIAGNOSIS — H92.01 OTALGIA, RIGHT: ICD-10-CM

## 2019-08-16 DIAGNOSIS — S78.112S TRAUMATIC AMPUTATION OF LEFT LOWER EXTREMITY ABOVE KNEE, SEQUELA: Chronic | ICD-10-CM

## 2019-08-16 DIAGNOSIS — E11.65 TYPE 2 DIABETES MELLITUS WITH HYPERGLYCEMIA, WITH LONG-TERM CURRENT USE OF INSULIN (HCC): Primary | ICD-10-CM

## 2019-08-16 DIAGNOSIS — S39.012A BACK STRAIN, INITIAL ENCOUNTER: ICD-10-CM

## 2019-08-16 DIAGNOSIS — Z79.4 TYPE 2 DIABETES MELLITUS WITH HYPERGLYCEMIA, WITH LONG-TERM CURRENT USE OF INSULIN (HCC): Primary | ICD-10-CM

## 2019-08-16 DIAGNOSIS — I10 ESSENTIAL HYPERTENSION: ICD-10-CM

## 2019-08-16 RX ORDER — GABAPENTIN 100 MG/1
100 CAPSULE ORAL 3 TIMES DAILY
Qty: 90 CAP | Refills: 1 | Status: SHIPPED | OUTPATIENT
Start: 2019-08-16 | End: 2019-10-02 | Stop reason: SDUPTHER

## 2019-08-16 RX ORDER — ALLOPURINOL 300 MG/1
300 TABLET ORAL DAILY
Qty: 90 TAB | Refills: 3 | Status: SHIPPED | OUTPATIENT
Start: 2019-08-16 | End: 2020-04-22 | Stop reason: SDUPTHER

## 2019-08-16 RX ORDER — CYCLOBENZAPRINE HCL 5 MG
5 TABLET ORAL
Qty: 60 TAB | Refills: 1 | Status: SHIPPED | OUTPATIENT
Start: 2019-08-16 | End: 2019-11-06 | Stop reason: SDUPTHER

## 2019-08-16 RX ORDER — SIMVASTATIN 80 MG/1
80 TABLET, FILM COATED ORAL
Qty: 90 TAB | Refills: 3 | Status: SHIPPED | OUTPATIENT
Start: 2019-08-16 | End: 2020-04-22 | Stop reason: SDUPTHER

## 2019-08-16 RX ORDER — METFORMIN HYDROCHLORIDE 500 MG/1
500 TABLET ORAL 2 TIMES DAILY WITH MEALS
Qty: 180 TAB | Refills: 3 | Status: SHIPPED | OUTPATIENT
Start: 2019-08-16 | End: 2020-04-22 | Stop reason: SDUPTHER

## 2019-08-16 NOTE — PROGRESS NOTES
Chief Complaint   Patient presents with    Ear Pain     L/ear pain after dental work     Back Pain     back pain after new L/leg prothesis     and     R/elbow pain     I have reviewed the patient's medical history in detail and updated the computerized patient record. Health Maintenance reviewed. 1. Have you been to the ER, urgent care clinic since your last visit? Hospitalized since your last visit?no    2. Have you seen or consulted any other health care providers outside of the [address] [place] [address] [place] since your last visit? Include any pap smears or colon screening. No      Encouraged pt to discuss pt's wishes with spouse/partner/family and bring them in the next appt to follow thru with the Advanced Directive    Fall Risk Assessment, last 12 mths 1/17/2019   Able to walk? Yes   Fall in past 12 months? Yes   Fall with injury? Yes   Number of falls in past 12 months 8 or more   Fall Risk Score 9       3 most recent PHQ Screens 8/16/2019   Little interest or pleasure in doing things Several days   Feeling down, depressed, irritable, or hopeless Several days   Total Score PHQ 2 2       Abuse Screening Questionnaire 1/17/2019   Do you ever feel afraid of your partner? N   Are you in a relationship with someone who physically or mentally threatens you? N   Is it safe for you to go home?  Y       ADL Assessment 1/17/2019   Feeding yourself No Help Needed   Getting from bed to chair No Help Needed   Getting dressed No Help Needed   Bathing or showering No Help Needed   Walk across the room (includes cane/walker) No Help Needed   Using the telphone No Help Needed   Taking your medications No Help Needed   Preparing meals No Help Needed   Managing money (expenses/bills) No Help Needed   Moderately strenuous housework (laundry) No Help Needed   Shopping for personal items (toiletries/medicines) No Help Needed   Shopping for groceries No Help Needed   Driving No Help Needed   Climbing a flight of stairs No Help Needed   Getting to places beyond walking distances No Help Needed

## 2019-08-16 NOTE — PROGRESS NOTES
Subjective:     Emile Ely is a 62 y.o. female seen for follow-up of diabetes. She has had hypoglycemic attacks. .no  Blood sugar control has been good  She has diabetes, hypertension and hyperlipidemia. Emile Ely has the additional concern of Agree with comments, see chief complaint. Back pain after lift placed in her good foot/leg. Diabetic Review of Systems: no polyuria or polydipsia, no chest pain, dyspnea or TIA's, has dysesthesias in the feet. Allergies   Allergen Reactions    Keflex [Cephalexin] Itching    Lortab [Hydrocodone-Acetaminophen] Hives and Itching     Patient states she can take tylenol #3 without problem. Diet and Lifestyle: nonsmoker. Patient Active Problem List    Diagnosis Date Noted    Gout 05/01/2019    Severe obesity ([facility] 12/17/2018    Type 2 diabetes mellitus with diabetic neuropathy ([facility] 03/05/2018    Post-menopausal bleeding 11/01/2017    Essential hypertension 05/04/2017    Diabetes mellitus due to underlying condition with diabetic nephropathy, with long-term current use of insulin (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 04/06/2016    Phantom [facility] pain ([facility] 05/14/2012     Current Outpatient Medications   Medication Sig Dispense Refill    allopurinol (ZYLOPRIM) 300 mg tablet Take 1 Tab by mouth daily. 90 Tab 3    simvastatin (ZOCOR) 80 mg tablet Take 1 Tab by mouth nightly. 90 Tab 3    metFORMIN (GLUCOPHAGE) 500 mg tablet Take 1 Tab by mouth two (2) times daily (with meals). 180 Tab 3    indomethacin (INDOCIN) 25 mg capsule Take 1 Cap by mouth three (3) times daily. As needed 90 Cap 5    insulin aspart protamine/insulin aspart (NOVOLOG MIX 70-30FLEXPEN U-100) 100 unit/mL (70-30) inpn 17 Units by SubCUTAneous route Before breakfast and dinner.  5 Pen 5    lisinopril-hydroCHLOROthiazide (PRINZIDE, ZESTORETIC) 20-25 mg per tablet Take 1 Tab by mouth daily. 90 Tab 3    aspirin/salicylamide/caffeine (ARTHRITIS STRENGTH BC POWDER PO) Take  by mouth.  glucose blood VI test strips (ASCENSIA CONTOUR) strip Check blood sugar up to three times per day 100 Strip 11    raNITIdine (ZANTAC) 150 mg tablet Take 1 Tab by mouth two (2) times a day. 180 Tab 3    diclofenac (VOLTAREN) 1 % gel Apply  to affected area four (4) times daily. 1 Each 5    acetaminophen (TYLENOL) 500 mg tablet Take 1 Tab by mouth every four (4) hours (while awake). Indications: Pain (Patient taking differently: Take 650 mg by mouth every four (4) hours (while awake). Indications: Pain) 100 Tab 0     Allergies   Allergen Reactions    Keflex [Cephalexin] Itching    Lortab [Hydrocodone-Acetaminophen] Hives and Itching     Patient states she can take tylenol #3 without problem.      Social History     Tobacco Use    Smoking status: Former Smoker     Packs/day: 0.50     Years: 15.00     Pack years: 7.50     Last attempt to quit: 3/30/2009     Years since quitting: 10.3    Smokeless tobacco: Never Used   Substance Use Topics    Alcohol use: No     Alcohol/week: 0.0 standard drinks        Lab Results   Component Value Date/Time    WBC 6.4 04/23/2019 01:41 PM    HGB 14.1 04/23/2019 01:41 PM    HCT 42.0 04/23/2019 01:41 PM    PLATELET 440 31/24/2764 01:41 PM    MCV 78 (L) 04/23/2019 01:41 PM     Lab Results   Component Value Date/Time    Hemoglobin A1c 9.1 (H) 08/16/2019 09:43 AM    Hemoglobin A1c 7.8 (H) 10/17/2018 09:50 AM    Hemoglobin A1c 7.5 (H) 06/06/2018 08:27 AM    Glucose 211 (H) 08/16/2019 09:43 AM    Glucose (POC) 146 (H) 02/18/2018 11:22 AM    Microalb/Creat ratio (ug/mg creat.) 82.2 (H) 08/16/2019 09:35 AM    LDL, calculated Comment 10/17/2018 09:50 AM    Creatinine 1.13 (H) 08/16/2019 09:43 AM      Lab Results   Component Value Date/Time    Cholesterol, total 111 10/17/2018 09:50 AM    HDL Cholesterol 19 (L) 10/17/2018 09:50 AM    LDL, calculated Comment 10/17/2018 09:50 AM Triglyceride 708 (HH) 10/17/2018 09:50 AM     Lab Results   Component Value Date/Time    GFR est non-AA 52 (L) 08/16/2019 09:43 AM    GFR est AA 60 08/16/2019 09:43 AM    Creatinine 1.13 (H) 08/16/2019 09:43 AM    BUN 19 08/16/2019 09:43 AM    Sodium 142 08/16/2019 09:43 AM    Potassium 4.4 08/16/2019 09:43 AM    Chloride 103 08/16/2019 09:43 AM    CO2 21 08/16/2019 09:43 AM     Lab Results   Component Value Date/Time    Glucose 211 (H) 08/16/2019 09:43 AM    Glucose (POC) 146 (H) 02/18/2018 11:22 AM         Review of Systems  Pertinent items are noted in HPI. Objective:     Significant for the following:     Visit Vitals  /87   Pulse (!) 107   Temp 98 °F (36.7 °C) (Oral)   Resp 16   Ht 5' 2\" (1.575 m)   Wt 190 lb (86.2 kg) Comment: weight with leg prothesis on   LMP 08/23/2003   SpO2 97%   BMI 34.75 kg/m²     Appearance: alert, well appearing, and in no distress. Exam: heart sounds normal rate, regular rhythm, normal S1, S2, no murmurs, rubs, clicks or gallops, chest clear, no hepatosplenomegaly  Foot exam: deferred    Lab review: labs reviewed, I note that glycosylated hemoglobin mildly abnormal but acceptable. Assessment/Plan:     Follow-up diabetes control uncertain. Diabetic issues reviewed with her: all medications, side effects and compliance discussed carefully and glycohemoglobin and other lab monitoring discussed. Inc gabapentin    Chronic Conditions Addressed Today     1. 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 Phantom limb pain (HCC)     Relevant Medications     gabapentin (NEURONTIN) 100 mg capsule     cyclobenzaprine (FLEXERIL) 5 mg tablet     Other Relevant Orders     ME HANDLG&/OR CONVEY OF SPEC FOR TR OFFICE TO LAB     COLLECTION VENOUS BLOOD,VENIPUNCTURE    3.  Type 2 diabetes mellitus with hyperglycemia (HCC) - Primary     Relevant Medications simvastatin (ZOCOR) 80 mg tablet     metFORMIN (GLUCOPHAGE) 500 mg tablet     gabapentin (NEURONTIN) 100 mg capsule     Other Relevant Orders     MICROALBUMIN, UR, RAND W/ MICROALB/CREAT RATIO (Completed)     METABOLIC PANEL, BASIC (Completed)     HEMOGLOBIN A1C WITH EAG (Completed)     MN HANDLG&/OR CONVEY OF SPEC FOR TR OFFICE TO LAB     COLLECTION VENOUS BLOOD,VENIPUNCTURE    4. Essential hypertension     Relevant Medications     simvastatin (ZOCOR) 80 mg tablet     Other Relevant Orders     MN HANDLG&/OR CONVEY OF SPEC FOR TR OFFICE TO LAB     COLLECTION VENOUS BLOOD,VENIPUNCTURE      Acute Diagnoses Addressed Today     Back strain, initial encounter            Relevant Orders        MN HANDLG&/OR CONVEY OF SPEC FOR TR OFFICE TO LAB        COLLECTION VENOUS BLOOD,VENIPUNCTURE    Otalgia, right            Relevant Orders        MN HANDLG&/OR CONVEY OF SPEC FOR TR OFFICE TO LAB        COLLECTION VENOUS BLOOD,VENIPUNCTURE        Orders Placed This Encounter    MICROALBUMIN, UR, RAND W/ MICROALB/CREAT RATIO    METABOLIC PANEL, BASIC    HEMOGLOBIN A1C WITH EAG    CKD REPORT    DIABETES PATIENT EDUCATION    DIABETES PATIENT EDUCATION    MN HANDLG&/OR CONVEY OF SPEC FOR TR OFFICE TO LAB    COLLECTION VENOUS BLOOD,VENIPUNCTURE    allopurinol (ZYLOPRIM) 300 mg tablet     Sig: Take 1 Tab by mouth daily. Dispense:  90 Tab     Refill:  3    simvastatin (ZOCOR) 80 mg tablet     Sig: Take 1 Tab by mouth nightly. Dispense:  90 Tab     Refill:  3    metFORMIN (GLUCOPHAGE) 500 mg tablet     Sig: Take 1 Tab by mouth two (2) times daily (with meals). Dispense:  180 Tab     Refill:  3    gabapentin (NEURONTIN) 100 mg capsule     Sig: Take 1 Cap by mouth three (3) times daily. Max Daily Amount: 300 mg. Indications: Neuropathic Pain     Dispense:  90 Cap     Refill:  1    cyclobenzaprine (FLEXERIL) 5 mg tablet     Sig: Take 1 Tab by mouth three (3) times daily as needed for Muscle Spasm(s).      Dispense:  60 Tab     Refill:  1     Follow-up and Dispositions    · Return in about 2 months (around 10/16/2019) for routine follow up.

## 2019-08-17 LAB
ALBUMIN/CREAT UR: 82.2 MG/G CREAT (ref 0–30)
BUN SERPL-MCNC: 19 MG/DL (ref 8–27)
BUN/CREAT SERPL: 17 (ref 12–28)
CALCIUM SERPL-MCNC: 9.7 MG/DL (ref 8.7–10.3)
CHLORIDE SERPL-SCNC: 103 MMOL/L (ref 96–106)
CO2 SERPL-SCNC: 21 MMOL/L (ref 20–29)
CREAT SERPL-MCNC: 1.13 MG/DL (ref 0.57–1)
CREAT UR-MCNC: 56.8 MG/DL
EST. AVERAGE GLUCOSE BLD GHB EST-MCNC: 214 MG/DL
GLUCOSE SERPL-MCNC: 211 MG/DL (ref 65–99)
HBA1C MFR BLD: 9.1 % (ref 4.8–5.6)
INTERPRETATION: NORMAL
Lab: NORMAL
Lab: NORMAL
MICROALBUMIN UR-MCNC: 46.7 UG/ML
POTASSIUM SERPL-SCNC: 4.4 MMOL/L (ref 3.5–5.2)
SODIUM SERPL-SCNC: 142 MMOL/L (ref 134–144)

## 2019-08-17 NOTE — PROGRESS NOTES
Send normal/stable results letter. Your results are normal/stable. If not signed up, consider getting my chart to get your results on-line. We can help you to sign up. Diabetes is not as good as we like, but it isn't too bad currently. It's slightly improved. For now no change in your medications. Please follow the diabetic diet carefully. Reduce starchy foods and sweet foods. Will need to re-check this levels in a few months. If diabetes worsens will need to increase your medications.

## 2019-09-05 ENCOUNTER — OFFICE VISIT (OUTPATIENT)
Dept: INTERNAL MEDICINE CLINIC | Age: 64
End: 2019-09-05

## 2019-09-05 VITALS
SYSTOLIC BLOOD PRESSURE: 131 MMHG | HEIGHT: 62 IN | BODY MASS INDEX: 33.68 KG/M2 | TEMPERATURE: 97.9 F | OXYGEN SATURATION: 97 % | DIASTOLIC BLOOD PRESSURE: 82 MMHG | HEART RATE: 95 BPM | WEIGHT: 183 LBS | RESPIRATION RATE: 18 BRPM

## 2019-09-05 DIAGNOSIS — M70.21 OLECRANON BURSITIS OF RIGHT ELBOW: Primary | ICD-10-CM

## 2019-09-05 DIAGNOSIS — E11.65 TYPE 2 DIABETES MELLITUS WITH HYPERGLYCEMIA, WITH LONG-TERM CURRENT USE OF INSULIN (HCC): ICD-10-CM

## 2019-09-05 DIAGNOSIS — Z79.4 TYPE 2 DIABETES MELLITUS WITH HYPERGLYCEMIA, WITH LONG-TERM CURRENT USE OF INSULIN (HCC): ICD-10-CM

## 2019-09-05 DIAGNOSIS — M10.00 IDIOPATHIC GOUT, UNSPECIFIED CHRONICITY, UNSPECIFIED SITE: ICD-10-CM

## 2019-09-05 RX ORDER — NAPROXEN 500 MG/1
500 TABLET ORAL 2 TIMES DAILY WITH MEALS
Qty: 30 TAB | Refills: 1 | Status: SHIPPED | OUTPATIENT
Start: 2019-09-05 | End: 2019-11-05 | Stop reason: SDUPTHER

## 2019-09-05 NOTE — PROGRESS NOTES
HISTORY OF PRESENT ILLNESS  Art Pollack is a 62 y.o. female. Elbow Pain    The history is provided by the patient. This is a new problem. Episode onset: 5 days. The problem occurs constantly. The problem has not changed since onset. The pain is present in the right elbow. The quality of the pain is described as aching. Last A1c was 9.1, no reports of hypoglycemia. Allergies   Allergen Reactions    Keflex [Cephalexin] Itching    Lortab [Hydrocodone-Acetaminophen] Hives and Itching     Patient states she can take tylenol #3 without problem. Review of Systems   Constitutional: Negative for fever. Musculoskeletal: Positive for joint pain. Negative for falls. Skin: Negative for rash.      Social History     Socioeconomic History    Marital status:      Spouse name: Not on file    Number of children: 11    Years of education: Not on file    Highest education level: Not on file   Occupational History    Occupation: community health worker   Social Needs    Financial resource strain: Not on file    Food insecurity:     Worry: Not on file     Inability: Not on file    Transportation needs:     Medical: Not on file     Non-medical: Not on file   Tobacco Use    Smoking status: Former Smoker     Packs/day: 0.50     Years: 15.00     Pack years: 7.50     Last attempt to quit: 3/30/2009     Years since quitting: 10.4    Smokeless tobacco: Never Used   Substance and Sexual Activity    Alcohol use: No     Alcohol/week: 0.0 standard drinks    Drug use: No    Sexual activity: Not Currently   Lifestyle    Physical activity:     Days per week: Not on file     Minutes per session: Not on file    Stress: Not on file   Relationships    Social connections:     Talks on phone: Not on file     Gets together: Not on file     Attends [employer] service: Not on file     Active member of club or organization: Not on file     Attends meetings of clubs or organizations: Not on file     Relationship status: Not on file    Intimate partner violence:     Fear of current or ex partner: Not on file     Emotionally abused: Not on file     Physically abused: Not on file     Forced sexual activity: Not on file   Other Topics Concern     Service Not Asked    Blood Transfusions Not Asked    Caffeine Concern Not Asked    Occupational Exposure Not Asked    Hobby Hazards Not Asked    Sleep Concern Yes     Comment: Pain, hot flashes    Stress Concern Not Asked    Weight Concern Not Asked    Special Diet Not Asked    Back Care Not Asked    Exercise Not Asked    Bike Helmet Not Asked   [address] Not Asked    Self-Exams Not Asked   Social History Narrative    Working as community health worker. Lives with  only. Physical Exam  Visit Vitals  /82 (BP 1 Location: Left arm, BP Patient Position: Sitting)   Pulse 95   Temp 97.9 °F (36.6 °C) (Oral)   Resp 18   Ht 5' 2\" (1.575 m)   Wt 183 lb (83 kg) Comment: took 10lbs off for L/leg prothesis   LMP 08/23/2003   SpO2 97%   BMI 33.47 kg/m²     WD WN female NAD  Heart RRR without murmers clicks or rubs  Lungs CTA  Abdo soft nontender  Ext no edema  Right elbow full range of motion no obvious redness or swelling tenderness over the olecranon bursa somewhat generalized minimal tenderness medial and lateral epicondyles  ASSESSMENT and PLAN  Encounter Diagnoses   Name Primary?  Olecranon bursitis of right elbow Yes    Type 2 diabetes mellitus with hyperglycemia, with long-term current use of insulin (HCC)     Idiopathic gout, unspecified chronicity, unspecified site      Orders Placed This Encounter    naproxen (NAPROSYN) 500 mg tablet   Possibly could be due to gout although a little unusual  Diabetes seems stable    Follow-up and Dispositions    · Return if symptoms worsen or fail to improve.

## 2019-09-05 NOTE — PATIENT INSTRUCTIONS
Bursitis of the Elbow: Care Instructions  Your Care Instructions  Bursitis is pain and swelling of the bursae. These are sacs of fluid that help your joints move smoothly. Olecranon bursitis is a type of bursitis that affects the back of the elbow. This is sometimes called Kit elbow because the bump that develops looks like the cartoon character Kit's elbow. Injury, overuse, or prolonged pressure on your elbow can cause this form of bursitis. Sometimes it happens when people have arthritis. It also can occur for unknown reasons. Treatment may include draining fluid from the bursa with a needle. If your doctor thought there was infection, he or she may have prescribed antibiotics. You also may get shots of medicine into the bursa to help the swelling go down. Your elbow should get better in a few days or weeks. Follow-up care is a key part of your treatment and safety. Be sure to make and go to all appointments, and call your doctor if you are having problems. It's also a good idea to know your test results and keep a list of the medicines you take. How can you care for yourself at home? · Take pain medicines exactly as directed. ? If the doctor gave you a prescription medicine for pain, take it as prescribed. ? If you are not taking a prescription pain medicine, ask your doctor if you can take an over-the-counter medicine. ? Do not take two or more pain medicines at the same time unless the doctor told you to. Many pain medicines have acetaminophen, which is Tylenol. Too much acetaminophen (Tylenol) can be harmful. · If your doctor prescribed antibiotics, take them as directed. Do not stop taking them just because you feel better. You need to take the full course of antibiotics. · If your doctor gave you a sling, an elastic bandage, or a compression sleeve, wear it exactly as instructed. · Put ice or a cold pack on your elbow for 10 to 20 minutes at a time.  Try to do this every 1 to 2 hours for the next 3 days (when you are awake) or until the swelling goes down. Put a thin cloth between the ice and your skin. · After 3 days, you can try heat, or alternate heat and ice. · Rest your elbow. Try to stop or reduce any activity that causes pain. · Wear elbow pads during physical activity to prevent injury. · Do not lean your elbows on tables or armrests. When should you call for help? Call your doctor now or seek immediate medical care if:    · You have new or worse symptoms of infection, such as:  ? Increased pain, swelling, warmth, or redness. ? Red streaks leading from the area. ? Pus draining from the area. ? A fever.    Watch closely for changes in your health, and be sure to contact your doctor if:    · You do not get better as expected. Where can you learn more? Go to http://xena-franky.info/. Enter  in the search box to learn more about \"Bursitis of the Elbow: Care Instructions. \"  Current as of: September 20, 2018  Content Version: 12.1  © 3585-9629 [employer]. Care instructions adapted under license by [employer] (which disclaims liability or warranty for this information). If you have questions about a medical condition or this instruction, always ask your healthcare professional. [address] any warranty or liability for your use of this information.

## 2019-09-05 NOTE — PROGRESS NOTES
Chief Complaint   Patient presents with    Elbow Pain     R/elbow pain x 4 days - shart pain shooting around R/elbos     I have reviewed the patient's medical history in detail and updated the computerized patient record. Health Maintenance reviewed. 1. Have you been to the ER, urgent care clinic since your last visit? Hospitalized since your last visit?no    2. Have you seen or consulted any other health care providers outside of the [address] [place] [address] [place] since your last visit? Include any pap smears or colon screening. No      Encouraged pt to discuss pt's wishes with spouse/partner/family and bring them in the next appt to follow thru with the Advanced Directive    Fall Risk Assessment, last 12 mths 1/17/2019   Able to walk? Yes   Fall in past 12 months? Yes   Fall with injury? Yes   Number of falls in past 12 months 8 or more   Fall Risk Score 9       3 most recent PHQ Screens 9/5/2019   Little interest or pleasure in doing things Several days   Feeling down, depressed, irritable, or hopeless Several days   Total Score PHQ 2 2       Abuse Screening Questionnaire 1/17/2019   Do you ever feel afraid of your partner? N   Are you in a relationship with someone who physically or mentally threatens you? N   Is it safe for you to go home?  Y       ADL Assessment 1/17/2019   Feeding yourself No Help Needed   Getting from bed to chair No Help Needed   Getting dressed No Help Needed   Bathing or showering No Help Needed   Walk across the room (includes cane/walker) No Help Needed   Using the telphone No Help Needed   Taking your medications No Help Needed   Preparing meals No Help Needed   Managing money (expenses/bills) No Help Needed   Moderately strenuous housework (laundry) No Help Needed   Shopping for personal items (toiletries/medicines) No Help Needed   Shopping for groceries No Help Needed   Driving No Help Needed   Climbing a flight of stairs No Help Needed   Getting to places beyond walking distances No Help Needed

## 2019-10-02 ENCOUNTER — OFFICE VISIT (OUTPATIENT)
Dept: INTERNAL MEDICINE CLINIC | Age: 64
End: 2019-10-02

## 2019-10-02 VITALS
BODY MASS INDEX: 34.41 KG/M2 | OXYGEN SATURATION: 96 % | SYSTOLIC BLOOD PRESSURE: 103 MMHG | DIASTOLIC BLOOD PRESSURE: 70 MMHG | HEART RATE: 122 BPM | HEIGHT: 62 IN | WEIGHT: 187 LBS | RESPIRATION RATE: 18 BRPM | TEMPERATURE: 97.6 F

## 2019-10-02 DIAGNOSIS — M10.00 IDIOPATHIC GOUT, UNSPECIFIED CHRONICITY, UNSPECIFIED SITE: ICD-10-CM

## 2019-10-02 DIAGNOSIS — I10 ESSENTIAL HYPERTENSION: ICD-10-CM

## 2019-10-02 DIAGNOSIS — G54.6 PHANTOM LIMB PAIN (HCC): ICD-10-CM

## 2019-10-02 DIAGNOSIS — E11.65 TYPE 2 DIABETES MELLITUS WITH HYPERGLYCEMIA, WITH LONG-TERM CURRENT USE OF INSULIN (HCC): ICD-10-CM

## 2019-10-02 DIAGNOSIS — Z79.4 TYPE 2 DIABETES MELLITUS WITH HYPERGLYCEMIA, WITH LONG-TERM CURRENT USE OF INSULIN (HCC): ICD-10-CM

## 2019-10-02 DIAGNOSIS — Z79.4 TYPE 2 DIABETES MELLITUS WITH DIABETIC NEUROPATHY, WITH LONG-TERM CURRENT USE OF INSULIN (HCC): Primary | ICD-10-CM

## 2019-10-02 DIAGNOSIS — M19.90 ARTHRITIS: ICD-10-CM

## 2019-10-02 DIAGNOSIS — E11.40 TYPE 2 DIABETES MELLITUS WITH DIABETIC NEUROPATHY, WITH LONG-TERM CURRENT USE OF INSULIN (HCC): Primary | ICD-10-CM

## 2019-10-02 DIAGNOSIS — N18.2 CRI (CHRONIC RENAL INSUFFICIENCY), STAGE 2 (MILD): ICD-10-CM

## 2019-10-02 RX ORDER — COLCHICINE 0.6 MG/1
0.6 TABLET ORAL DAILY
Qty: 30 TAB | Refills: 1 | Status: SHIPPED | OUTPATIENT
Start: 2019-10-02 | End: 2020-03-05 | Stop reason: SDUPTHER

## 2019-10-02 RX ORDER — GABAPENTIN 100 MG/1
CAPSULE ORAL
Qty: 120 CAP | Refills: 1 | Status: SHIPPED | OUTPATIENT
Start: 2019-10-02 | End: 2020-02-07

## 2019-10-02 RX ORDER — LISINOPRIL 10 MG/1
10 TABLET ORAL DAILY
Qty: 90 TAB | Refills: 1 | Status: SHIPPED | OUTPATIENT
Start: 2019-10-02 | End: 2020-01-02

## 2019-10-02 NOTE — PROGRESS NOTES
Subjective:     Gunjan Downs is a 62 y.o. female seen for follow-up of diabetes. She has had hypoglycemic attacks. .no  Blood sugar control has been so so 200's both high and low  She has diabetes, hypertension and hyperlipidemia. Gunjan Downs has the additional concern of BS run high but joints hurt finger and hands right, N/T bad, gabapetin bid Has had gout previous      Diabetic Review of Systems: no chest pain, dyspnea or TIA's, no hypoglycemia, has noted excessive thirstiness and frequent urination, has dysesthesias in the feet. Allergies   Allergen Reactions    Keflex [Cephalexin] Itching    Lortab [Hydrocodone-Acetaminophen] Hives and Itching     Patient states she can take tylenol #3 without problem. Diet and Lifestyle: follows a diabetic diet regularly, nonsmoker. 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 30 Tab 1    lisinopril (PRINIVIL, ZESTRIL) 10 mg tablet Take 1 Tab by mouth daily. 90 Tab 1    naproxen (NAPROSYN) 500 mg tablet Take 1 Tab by mouth two (2) times daily (with meals). As needed (Patient taking differently: Take 500 mg by mouth daily. As needed) 30 Tab 1    allopurinol (ZYLOPRIM) 300 mg tablet Take 1 Tab by mouth daily.  90 Tab 3    simvastatin (ZOCOR) 80 mg tablet Take 1 Tab by mouth nightly. 90 Tab 3    metFORMIN (GLUCOPHAGE) 500 mg tablet Take 1 Tab by mouth two (2) times daily (with meals). 180 Tab 3    cyclobenzaprine (FLEXERIL) 5 mg tablet Take 1 Tab by mouth three (3) times daily as needed for Muscle Spasm(s). 60 Tab 1    insulin aspart protamine/insulin aspart (NOVOLOG MIX 70-30FLEXPEN U-100) 100 unit/mL (70-30) inpn 17 Units by SubCUTAneous route Before breakfast and dinner. (Patient taking differently: 20 Units by SubCUTAneous route Before breakfast and dinner.) 5 Pen 5    glucose blood VI test strips (ASCENSIA CONTOUR) strip Check blood sugar up to three times per day 100 Strip 11    raNITIdine (ZANTAC) 150 mg tablet Take 1 Tab by mouth two (2) times a day. 180 Tab 3    acetaminophen (TYLENOL) 500 mg tablet Take 1 Tab by mouth every four (4) hours (while awake). Indications: Pain (Patient taking differently: Take 650 mg by mouth every four (4) hours (while awake). Indications: Pain) 100 Tab 0     Allergies   Allergen Reactions    Keflex [Cephalexin] Itching    Lortab [Hydrocodone-Acetaminophen] Hives and Itching     Patient states she can take tylenol #3 without problem.      Social History     Tobacco Use    Smoking status: Former Smoker     Packs/day: 0.50     Years: 15.00     Pack years: 7.50     Last attempt to quit: 3/30/2009     Years since quitting: 10.5    Smokeless tobacco: Never Used   Substance Use Topics    Alcohol use: No     Alcohol/week: 0.0 standard drinks        Lab Results   Component Value Date/Time    WBC 6.4 04/23/2019 01:41 PM    HGB 14.1 04/23/2019 01:41 PM    HCT 42.0 04/23/2019 01:41 PM    PLATELET 874 38/55/6985 01:41 PM    MCV 78 (L) 04/23/2019 01:41 PM     Lab Results   Component Value Date/Time    Hemoglobin A1c 9.1 (H) 08/16/2019 09:43 AM    Hemoglobin A1c 7.8 (H) 10/17/2018 09:50 AM    Hemoglobin A1c 7.5 (H) 06/06/2018 08:27 AM    Glucose 211 (H) 08/16/2019 09:43 AM    Glucose (POC) 146 (H) 02/18/2018 11:22 AM    Microalb/Creat ratio (ug/mg creat.) 82.2 (H) 08/16/2019 09:35 AM    LDL, calculated Comment 10/17/2018 09:50 AM    Creatinine 1.13 (H) 08/16/2019 09:43 AM      Lab Results   Component Value Date/Time    Cholesterol, total 111 10/17/2018 09:50 AM    HDL Cholesterol 19 (L) 10/17/2018 09:50 AM    LDL, calculated Comment 10/17/2018 09:50 AM    Triglyceride 708 (HH) 10/17/2018 09:50 AM     Lab Results   Component Value Date/Time    ALT (SGPT) 19 04/23/2019 01:41 PM    AST (SGOT) 13 04/23/2019 01:41 PM    Alk. phosphatase 92 04/23/2019 01:41 PM    Bilirubin, total 0.2 04/23/2019 01:41 PM    Albumin 4.4 04/23/2019 01:41 PM    Protein, total 6.5 04/23/2019 01:41 PM    INR 1.7 (H) 02/18/2018 02:46 AM    INR POC 1.3 03/05/2018 12:09 PM    Prothrombin time 17.0 (H) 02/18/2018 02:46 AM    PLATELET 261 16/12/0136 01:41 PM     Lab Results   Component Value Date/Time    GFR est non-AA 52 (L) 08/16/2019 09:43 AM    GFR est AA 60 08/16/2019 09:43 AM    Creatinine 1.13 (H) 08/16/2019 09:43 AM    BUN 19 08/16/2019 09:43 AM    Sodium 142 08/16/2019 09:43 AM    Potassium 4.4 08/16/2019 09:43 AM    Chloride 103 08/16/2019 09:43 AM    CO2 21 08/16/2019 09:43 AM       Lab Results   Component Value Date/Time    Glucose 211 (H) 08/16/2019 09:43 AM    Glucose (POC) 146 (H) 02/18/2018 11:22 AM         Review of Systems  Pertinent items are noted in HPI. Objective:     Significant for the following:     Visit Vitals  /70 (BP 1 Location: Left arm, BP Patient Position: Sitting)   Pulse (!) 122   Temp 97.6 °F (36.4 °C) (Oral)   Resp 18   Ht 5' 2\" (1.575 m)   Wt 187 lb (84.8 kg)   LMP 08/23/2003   SpO2 96%   BMI 34.20 kg/m²     Appearance: alert, well appearing, and in no distress. Exam: heart sounds normal rate, regular rhythm, normal S1, S2, no murmurs, rubs, clicks or gallops, chest clear, no hepatosplenomegaly  Foot exam: Diabetic foot exam was performed. No obvious sores or red lesions.   Sensation checked by monofilament exam which was normal.  Dorsalis pedis pulse wasok. Pain groin where prosthetic meets    Lab review: labs reviewed, I note that glycosylated hemoglobin normal, mildly abnormal but acceptable. Assessment/Plan:     Follow-up diabetes stable, no significant medication side effects noted, borderline controlled. Diabetic issues reviewed with her: all medications, side effects and compliance discussed carefully, foot care discussed and Podiatry visits discussed and glycohemoglobin and other lab monitoring discussed. Chronic Conditions Addressed Today     1. Phantom limb pain (HCC)     Relevant Medications     gabapentin (NEURONTIN) 100 mg capsule    2. Type 2 diabetes mellitus with hyperglycemia (HCC)     Relevant Medications     gabapentin (NEURONTIN) 100 mg capsule     lisinopril (PRINIVIL, ZESTRIL) 10 mg tablet     Other Relevant Orders     HEMOGLOBIN A1C WITH EAG    3. Essential hypertension     Relevant Medications     lisinopril (PRINIVIL, ZESTRIL) 10 mg tablet     Other Relevant Orders     METABOLIC PANEL, BASIC    4. Type 2 diabetes mellitus with diabetic neuropathy (HCC) - Primary     Relevant Medications     gabapentin (NEURONTIN) 100 mg capsule     lisinopril (PRINIVIL, ZESTRIL) 10 mg tablet    5.  Gout     Relevant Medications     colchicine (COLCRYS) 0.6 mg tablet     Other Relevant Orders     URIC ACID      Acute Diagnoses Addressed Today     Arthritis            Relevant Medications        colchicine (COLCRYS) 0.6 mg tablet    CRI (chronic renal insufficiency), stage 2 (mild)            Relevant Medications        lisinopril (PRINIVIL, ZESTRIL) 10 mg tablet        Orders Placed This Encounter    METABOLIC PANEL, BASIC     Standing Status:   Future     Standing Expiration Date:   4/3/2020    HEMOGLOBIN A1C WITH EAG     Standing Status:   Future     Standing Expiration Date:   3/31/2020    URIC ACID     Standing Status:   Future     Standing Expiration Date:   4/2/2020   Alvin gabapentin (NEURONTIN) 100 mg capsule     Si in AM 3 in PM  Indications: Neuropathic Pain     Dispense:  120 Cap     Refill:  1    colchicine (COLCRYS) 0.6 mg tablet     Sig: Take 1 Tab by mouth daily. Dispense:  30 Tab     Refill:  1    lisinopril (PRINIVIL, ZESTRIL) 10 mg tablet     Sig: Take 1 Tab by mouth daily. Dispense:  90 Tab     Refill:  1     Consider gout but also neuroathy inc meds as above. Discussed possible side affects, precautions, and drug interactions and possible benefits of the medication(s). Try to do better with diet no change in meds for now. Reduce HTNN meds and stop diuretic. Follow-up and Dispositions    · Return in about 6 weeks (around 2019) for routine follow up.

## 2019-10-02 NOTE — PROGRESS NOTES
Chief Complaint   Patient presents with    Diabetes     follow up     I have reviewed the patient's medical history in detail and updated the computerized patient record. Health Maintenance reviewed. 1. Have you been to the ER, urgent care clinic since your last visit? Hospitalized since your last visit?no    2. Have you seen or consulted any other health care providers outside of the [address] [place] [address] [place] since your last visit? Include any pap smears or colon screening. No      Encouraged pt to discuss pt's wishes with spouse/partner/family and bring them in the next appt to follow thru with the Advanced Directive    Fall Risk Assessment, last 12 mths 1/17/2019   Able to walk? Yes   Fall in past 12 months? Yes   Fall with injury? Yes   Number of falls in past 12 months 8 or more   Fall Risk Score 9       3 most recent PHQ Screens 10/2/2019   Little interest or pleasure in doing things Several days   Feeling down, depressed, irritable, or hopeless Several days   Total Score PHQ 2 2       Abuse Screening Questionnaire 1/17/2019   Do you ever feel afraid of your partner? N   Are you in a relationship with someone who physically or mentally threatens you? N   Is it safe for you to go home?  Y       ADL Assessment 1/17/2019   Feeding yourself No Help Needed   Getting from bed to chair No Help Needed   Getting dressed No Help Needed   Bathing or showering No Help Needed   Walk across the room (includes cane/walker) No Help Needed   Using the telphone No Help Needed   Taking your medications No Help Needed   Preparing meals No Help Needed   Managing money (expenses/bills) No Help Needed   Moderately strenuous housework (laundry) No Help Needed   Shopping for personal items (toiletries/medicines) No Help Needed   Shopping for groceries No Help Needed   Driving No Help Needed   Climbing a flight of stairs No Help Needed   Getting to places beyond walking distances No Help Needed

## 2019-10-08 RX ORDER — RANITIDINE 150 MG/1
150 TABLET, FILM COATED ORAL 2 TIMES DAILY
Qty: 180 TAB | Refills: 3 | Status: SHIPPED | OUTPATIENT
Start: 2019-10-08 | End: 2019-11-11 | Stop reason: RX

## 2019-11-05 RX ORDER — NAPROXEN 500 MG/1
500 TABLET ORAL 2 TIMES DAILY WITH MEALS
Qty: 60 TAB | Refills: 5 | Status: SHIPPED | OUTPATIENT
Start: 2019-11-05 | End: 2020-02-07 | Stop reason: ALTCHOICE

## 2019-11-06 ENCOUNTER — OFFICE VISIT (OUTPATIENT)
Dept: INTERNAL MEDICINE CLINIC | Age: 64
End: 2019-11-06

## 2019-11-06 VITALS
WEIGHT: 182 LBS | RESPIRATION RATE: 20 BRPM | TEMPERATURE: 97.4 F | HEART RATE: 108 BPM | SYSTOLIC BLOOD PRESSURE: 125 MMHG | OXYGEN SATURATION: 96 % | BODY MASS INDEX: 33.49 KG/M2 | HEIGHT: 62 IN | DIASTOLIC BLOOD PRESSURE: 83 MMHG

## 2019-11-06 DIAGNOSIS — E11.65 TYPE 2 DIABETES MELLITUS WITH HYPERGLYCEMIA, WITH LONG-TERM CURRENT USE OF INSULIN (HCC): ICD-10-CM

## 2019-11-06 DIAGNOSIS — M19.90 ARTHRITIS: ICD-10-CM

## 2019-11-06 DIAGNOSIS — Z79.4 TYPE 2 DIABETES MELLITUS WITH HYPERGLYCEMIA, WITH LONG-TERM CURRENT USE OF INSULIN (HCC): ICD-10-CM

## 2019-11-06 DIAGNOSIS — M10.00 IDIOPATHIC GOUT, UNSPECIFIED CHRONICITY, UNSPECIFIED SITE: ICD-10-CM

## 2019-11-06 DIAGNOSIS — E08.21 DIABETES MELLITUS DUE TO UNDERLYING CONDITION WITH DIABETIC NEPHROPATHY, WITH LONG-TERM CURRENT USE OF INSULIN (HCC): ICD-10-CM

## 2019-11-06 DIAGNOSIS — Z79.4 TYPE 2 DIABETES MELLITUS WITH DIABETIC NEUROPATHY, WITH LONG-TERM CURRENT USE OF INSULIN (HCC): ICD-10-CM

## 2019-11-06 DIAGNOSIS — I10 ESSENTIAL HYPERTENSION: ICD-10-CM

## 2019-11-06 DIAGNOSIS — E11.40 TYPE 2 DIABETES MELLITUS WITH DIABETIC NEUROPATHY, WITH LONG-TERM CURRENT USE OF INSULIN (HCC): ICD-10-CM

## 2019-11-06 DIAGNOSIS — E66.01 SEVERE OBESITY (HCC): ICD-10-CM

## 2019-11-06 DIAGNOSIS — Z23 ENCOUNTER FOR IMMUNIZATION: Primary | ICD-10-CM

## 2019-11-06 DIAGNOSIS — Z79.4 DIABETES MELLITUS DUE TO UNDERLYING CONDITION WITH DIABETIC NEPHROPATHY, WITH LONG-TERM CURRENT USE OF INSULIN (HCC): ICD-10-CM

## 2019-11-06 RX ORDER — INSULIN ASPART 100 [IU]/ML
20 INJECTION, SUSPENSION SUBCUTANEOUS
Qty: 5 PEN | Refills: 5 | Status: SHIPPED | OUTPATIENT
Start: 2019-11-06 | End: 2019-11-14 | Stop reason: DRUGHIGH

## 2019-11-06 RX ORDER — CYCLOBENZAPRINE HCL 5 MG
5 TABLET ORAL
Qty: 60 TAB | Refills: 3 | Status: SHIPPED | OUTPATIENT
Start: 2019-11-06 | End: 2020-01-27

## 2019-11-06 NOTE — PROGRESS NOTES
Subjective:     Summer Vasquez is a 62 y.o. female seen for follow-up of diabetes. She has had hypoglycemic attacks. .no  Blood sugar control has been good high 100's 200's  She has diabetes, hypertension and hyperlipidemia. Summer Vasquez has the additional concern of phantom loib pain, starting a top Rx OTC, gabapentin helps min  Elbow hurts, fell a week ago, prosthesis hit    Diabetic Review of Systems: no chest pain, dyspnea or TIA's, has noted excessive thirstiness and frequent urination, has dysesthesias in the feet. Allergies   Allergen Reactions    Keflex [Cephalexin] Itching    Lortab [Hydrocodone-Acetaminophen] Hives and Itching     Patient states she can take tylenol #3 without problem. Diet and Lifestyle: follows a diabetic diet regularly, nonsmoker. 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 As needed 60 Tab 5    raNITIdine (ZANTAC) 150 mg tablet Take 1 Tab by mouth two (2) times a day. 180 Tab 3    gabapentin (NEURONTIN) 100 mg capsule 1 in AM 3 in PM  Indications: Neuropathic Pain 120 Cap 1    colchicine (COLCRYS) 0.6 mg tablet Take 1 Tab by mouth daily. 30 Tab 1    lisinopril (PRINIVIL, ZESTRIL) 10 mg tablet Take 1 Tab by mouth daily. 90 Tab 1    allopurinol (ZYLOPRIM) 300 mg tablet Take 1 Tab by mouth daily. 90 Tab 3    simvastatin (ZOCOR) 80 mg tablet Take 1 Tab by mouth nightly. 90 Tab 3    metFORMIN (GLUCOPHAGE) 500 mg tablet Take 1 Tab by mouth two (2) times daily (with meals). 180 Tab 3    cyclobenzaprine (FLEXERIL) 5 mg tablet Take 1 Tab by mouth three (3) times daily as needed for Muscle Spasm(s). 60 Tab 1    insulin aspart protamine/insulin aspart (NOVOLOG MIX 70-30FLEXPEN U-100) 100 unit/mL (70-30) inpn 17 Units by SubCUTAneous route Before breakfast and dinner. (Patient taking differently: 20 Units by SubCUTAneous route Before breakfast and dinner.) 5 Pen 5    glucose blood VI test strips (ASCENSIA CONTOUR) strip Check blood sugar up to three times per day 100 Strip 11    acetaminophen (TYLENOL) 500 mg tablet Take 1 Tab by mouth every four (4) hours (while awake). Indications: Pain (Patient taking differently: Take 650 mg by mouth every four (4) hours (while awake). Indications: Pain) 100 Tab 0     Allergies   Allergen Reactions    Keflex [Cephalexin] Itching    Lortab [Hydrocodone-Acetaminophen] Hives and Itching     Patient states she can take tylenol #3 without problem.      Social History     Tobacco Use    Smoking status: Former Smoker     Packs/day: 0.50     Years: 15.00     Pack years: 7.50     Last attempt to quit: 3/30/2009     Years since quitting: 10.6    Smokeless tobacco: Never Used   Substance Use Topics    Alcohol use: No     Alcohol/week: 0.0 standard drinks        Lab Results   Component Value Date/Time    WBC 6.4 04/23/2019 01:41 PM    HGB 14.1 04/23/2019 01:41 PM    HCT 42.0 04/23/2019 01:41 PM    PLATELET 925 11/73/2730 01:41 PM    MCV 78 (L) 04/23/2019 01:41 PM     Lab Results   Component Value Date/Time    Hemoglobin A1c 9.1 (H) 08/16/2019 09:43 AM    Hemoglobin A1c 7.8 (H) 10/17/2018 09:50 AM    Hemoglobin A1c 7.5 (H) 06/06/2018 08:27 AM    Glucose 211 (H) 08/16/2019 09:43 AM    Glucose (POC) 146 (H) 02/18/2018 11:22 AM    Microalb/Creat ratio (ug/mg creat.) 82.2 (H) 08/16/2019 09:35 AM    LDL, calculated Comment 10/17/2018 09:50 AM    Creatinine 1.13 (H) 08/16/2019 09:43 AM      Lab Results   Component Value Date/Time    Cholesterol, total 111 10/17/2018 09:50 AM    HDL Cholesterol 19 (L) 10/17/2018 09:50 AM    LDL, calculated Comment 10/17/2018 09:50 AM    Triglyceride 708 (HH) 10/17/2018 09:50 AM     Lab Results   Component Value Date/Time    ALT (SGPT) 19 04/23/2019 01:41 PM    AST (SGOT) 13 04/23/2019 01:41 PM    Alk. phosphatase 92 04/23/2019 01:41 PM    Bilirubin, total 0.2 04/23/2019 01:41 PM    Albumin 4.4 04/23/2019 01:41 PM    Protein, total 6.5 04/23/2019 01:41 PM    INR 1.7 (H) 02/18/2018 02:46 AM    INR POC 1.3 03/05/2018 12:09 PM    Prothrombin time 17.0 (H) 02/18/2018 02:46 AM    PLATELET 544 96/54/9808 01:41 PM     Lab Results   Component Value Date/Time    GFR est non-AA 52 (L) 08/16/2019 09:43 AM    GFR est AA 60 08/16/2019 09:43 AM    Creatinine 1.13 (H) 08/16/2019 09:43 AM    BUN 19 08/16/2019 09:43 AM    Sodium 142 08/16/2019 09:43 AM    Potassium 4.4 08/16/2019 09:43 AM    Chloride 103 08/16/2019 09:43 AM    CO2 21 08/16/2019 09:43 AM     Lab Results   Component Value Date/Time    Glucose 211 (H) 08/16/2019 09:43 AM    Glucose (POC) 146 (H) 02/18/2018 11:22 AM         Review of Systems  Pertinent items are noted in HPI. Objective:     Significant for the following:     Visit Vitals  /83 (BP 1 Location: Left arm, BP Patient Position: Sitting)   Pulse (!) 117   Temp 97.4 °F (36.3 °C) (Oral)   Resp 20   Ht 5' 2\" (1.575 m)   Wt 182 lb (82.6 kg)   LMP 08/23/2003   SpO2 96%   BMI 33.29 kg/m²     Appearance: alert, well appearing, and in no distress. Exam: heart sounds normal rate, regular rhythm, normal S1, S2, no murmurs, rubs, clicks or gallops, chest clear  Foot exam: deferred    Lab review: orders written for new lab studies as appropriate; see orders. Assessment/Plan:     Follow-up diabetes stable, control uncertain, needs further observation.   Diabetic issues reviewed with her: all medications, side effects and compliance discussed carefully and glycohemoglobin and other lab monitoring discussed. Chronic Conditions Addressed Today     1. Type 2 diabetes mellitus with hyperglycemia (HCC)     Relevant Medications     insulin aspart protamine/insulin aspart (NOVOLOG MIX 70-30FLEXPEN U-100) 100 unit/mL (70-30) inpn     Other Relevant Orders     REFERRAL TO OPTOMETRY    2. Essential hypertension    3. Diabetes mellitus due to underlying condition with diabetic nephropathy, with long-term current use of insulin (HCC)     Relevant Medications     insulin aspart protamine/insulin aspart (NOVOLOG MIX 70-30FLEXPEN U-100) 100 unit/mL (70-30) inpn    4. Type 2 diabetes mellitus with diabetic neuropathy (HCC)     Relevant Medications     insulin aspart protamine/insulin aspart (NOVOLOG MIX 70-30FLEXPEN U-100) 100 unit/mL (70-30) inpn     cyclobenzaprine (FLEXERIL) 5 mg tablet    5. Severe obesity ([facility]    6. Gout      Acute Diagnoses Addressed Today     Encounter for immunization    -  Primary        Relevant Orders        INFLUENZA VIRUS VAC QUAD,SPLIT,PRESV FREE SYRINGE IM (Completed)        FL IMMUNIZ ADMIN,1 SINGLE/COMB VAC/TOXOID    Arthritis            Relevant Orders        AMB SUPPLY ORDER        Orders Placed This Encounter    FL IMMUNIZ ADMIN,1 SINGLE/COMB VAC/TOXOID    AMB SUPPLY ORDER     [facility]      INFLUENZA VIRUS VACCINE QUADRIVALENT, PRESERVATIVE FREE SYRINGE ([id number])    METABOLIC PANEL, BASIC    HEMOGLOBIN A1C WITH EAG    URIC ACID    REFERRAL TO OPTOMETRY     Referral Priority:   Routine     Referral Type:   Consultation     Referral Reason:   Specialty Services Required     Referred to Provider:   Jeri Phoenix, OD    insulin aspart protamine/insulin aspart (NOVOLOG MIX 70-30FLEXPEN U-100) 100 unit/mL (70-30) inpn     Si Units by SubCUTAneous route Before breakfast and dinner.      Dispense:  5 Pen     Refill:  5    cyclobenzaprine (FLEXERIL) 5 mg tablet     Sig: Take 1 Tab by mouth three (3) times daily as needed for Muscle Spasm(s). Dispense:  60 Tab     Refill:  3     Discussed falls need to see eye doctor  Pain. Gabapentin not helping maybe inc falls risk, wean off. Martin pain meds recommended  See patient instructions, went over them personally with the patient. Emphasized compliance. Questions answered. Patient states that they understand the plan of action and will call if there are any issues or misunderstandings. Follow-up and Dispositions    · Return in about 3 months (around 2/6/2020) for routine follow up.

## 2019-11-06 NOTE — PATIENT INSTRUCTIONS
Wean gabapentin 3 daily for a week 2 daily for a week then 1 daily for a week every other day for a week then stop. You can try Salonpas patches for your pain, these are 4% lidocaine patches and can help a great deal for neuropathic pain. They are available at routine pharmacies and [address] [facility] [address].

## 2019-11-06 NOTE — PROGRESS NOTES
Chief Complaint   Patient presents with    Diabetes     follow up    Fall     fall 6 days ago on L/hip -  muscle spasams in L/leg over the weekend       I have reviewed the patient's medical history in detail and updated the computerized patient record. Health Maintenance reviewed. 1. Have you been to the ER, urgent care clinic since your last visit? Hospitalized since your last visit?no    2. Have you seen or consulted any other health care providers outside of the [address] [place] [address] [place] since your last visit? Include any pap smears or colon screening. No      Encouraged pt to discuss pt's wishes with spouse/partner/family and bring them in the next appt to follow thru with the Advanced Directive    Fall Risk Assessment, last 12 mths 1/17/2019   Able to walk? Yes   Fall in past 12 months? Yes   Fall with injury? Yes   Number of falls in past 12 months 8 or more   Fall Risk Score 9       3 most recent PHQ Screens 11/6/2019   Little interest or pleasure in doing things Several days   Feeling down, depressed, irritable, or hopeless Several days   Total Score PHQ 2 2       Abuse Screening Questionnaire 1/17/2019   Do you ever feel afraid of your partner? N   Are you in a relationship with someone who physically or mentally threatens you? N   Is it safe for you to go home?  Y       ADL Assessment 1/17/2019   Feeding yourself No Help Needed   Getting from bed to chair No Help Needed   Getting dressed No Help Needed   Bathing or showering No Help Needed   Walk across the room (includes cane/walker) No Help Needed   Using the telphone No Help Needed   Taking your medications No Help Needed   Preparing meals No Help Needed   Managing money (expenses/bills) No Help Needed   Moderately strenuous housework (laundry) No Help Needed   Shopping for personal items (toiletries/medicines) No Help Needed   Shopping for groceries No Help Needed   Driving No Help Needed   Climbing a flight of stairs No Help Needed   Getting to places beyond walking distances No Help Needed

## 2019-11-07 LAB
BUN SERPL-MCNC: 17 MG/DL (ref 8–27)
BUN/CREAT SERPL: 17 (ref 12–28)
CALCIUM SERPL-MCNC: 9.6 MG/DL (ref 8.7–10.3)
CHLORIDE SERPL-SCNC: 103 MMOL/L (ref 96–106)
CO2 SERPL-SCNC: 21 MMOL/L (ref 20–29)
CREAT SERPL-MCNC: 0.99 MG/DL (ref 0.57–1)
EST. AVERAGE GLUCOSE BLD GHB EST-MCNC: 243 MG/DL
GLUCOSE SERPL-MCNC: 280 MG/DL (ref 65–99)
HBA1C MFR BLD: 10.1 % (ref 4.8–5.6)
POTASSIUM SERPL-SCNC: 4.7 MMOL/L (ref 3.5–5.2)
SODIUM SERPL-SCNC: 141 MMOL/L (ref 134–144)
URATE SERPL-MCNC: 4.2 MG/DL (ref 2.5–7.1)

## 2019-11-11 ENCOUNTER — TELEPHONE (OUTPATIENT)
Dept: INTERNAL MEDICINE CLINIC | Age: 64
End: 2019-11-11

## 2019-11-11 RX ORDER — CIMETIDINE 400 MG/1
400 TABLET, FILM COATED ORAL 2 TIMES DAILY
Qty: 60 TAB | Refills: 3 | Status: SHIPPED | OUTPATIENT
Start: 2019-11-11 | End: 2019-11-26 | Stop reason: RX

## 2019-11-12 ENCOUNTER — TELEPHONE (OUTPATIENT)
Dept: INTERNAL MEDICINE CLINIC | Age: 64
End: 2019-11-12

## 2019-11-12 NOTE — TELEPHONE ENCOUNTER
Send results letter as below:  Your recent lab showed the following poor control of diabetes, A1C too high at 10.1. Call me so I can adjust your insulin. We need you to return to re-check the laboratory value again in few weeks.

## 2019-11-12 NOTE — LETTER
11/13/2019 1:12 PM 
 
Ms. Duane Graham  [employer] [facility]. [facility] Dear Ms. Bowen: 
 
Your recent lab showed the following poor control of diabetes, A1C too high at 10.1. Call me so I can adjust your insulin. We need you to return to re-check the laboratory value again in few weeks. Sincerely, Shaw Leblanc MD

## 2019-11-13 ENCOUNTER — TELEPHONE (OUTPATIENT)
Dept: INTERNAL MEDICINE CLINIC | Age: 64
End: 2019-11-13

## 2019-11-14 ENCOUNTER — TELEPHONE (OUTPATIENT)
Dept: INTERNAL MEDICINE CLINIC | Age: 64
End: 2019-11-14

## 2019-11-14 DIAGNOSIS — E11.65 TYPE 2 DIABETES MELLITUS WITH HYPERGLYCEMIA, WITH LONG-TERM CURRENT USE OF INSULIN (HCC): Primary | ICD-10-CM

## 2019-11-14 DIAGNOSIS — Z79.4 TYPE 2 DIABETES MELLITUS WITH HYPERGLYCEMIA, WITH LONG-TERM CURRENT USE OF INSULIN (HCC): Primary | ICD-10-CM

## 2019-11-14 RX ORDER — INSULIN ASPART 100 [IU]/ML
25 INJECTION, SUSPENSION SUBCUTANEOUS
Qty: 5 PEN | Refills: 5 | Status: SHIPPED | OUTPATIENT
Start: 2019-11-14 | End: 2020-04-22 | Stop reason: SDUPTHER

## 2019-11-14 NOTE — TELEPHONE ENCOUNTER
Send results letter as below:  Your recent lab showed the following abnomality A1C too high at 10.1  Increase insulin to 25 units twice daily. .  We need you to return to re-check the laboratory value again in 3 mo.

## 2019-11-14 NOTE — LETTER
11/15/2019 2:37 PM 
 
Ms. Betito Childers  [employer] [address] Dear MsMegan Bowen: 
 
Your recent lab showed the following abnomality A1C too high at 10.1 Increase insulin to 25 units twice daily. Romulo Owen We need you to return to re-check the laboratory value again in 3 mo. Sincerely, Melody Hernández MD

## 2019-11-26 ENCOUNTER — TELEPHONE (OUTPATIENT)
Dept: INTERNAL MEDICINE CLINIC | Age: 64
End: 2019-11-26

## 2019-11-26 RX ORDER — FAMOTIDINE 40 MG/1
40 TABLET, FILM COATED ORAL DAILY
Qty: 30 TAB | Refills: 5 | Status: SHIPPED | OUTPATIENT
Start: 2019-11-26 | End: 2020-01-07 | Stop reason: RX

## 2019-12-11 ENCOUNTER — TELEPHONE (OUTPATIENT)
Dept: INTERNAL MEDICINE CLINIC | Age: 64
End: 2019-12-11

## 2020-01-07 ENCOUNTER — TELEPHONE (OUTPATIENT)
Dept: INTERNAL MEDICINE CLINIC | Age: 65
End: 2020-01-07

## 2020-01-07 RX ORDER — FAMOTIDINE 20 MG/1
20 TABLET, FILM COATED ORAL 2 TIMES DAILY
Qty: 60 TAB | Refills: 5 | Status: SHIPPED | OUTPATIENT
Start: 2020-01-07 | End: 2020-02-07

## 2020-01-15 ENCOUNTER — DOCUMENTATION ONLY (OUTPATIENT)
Dept: INTERNAL MEDICINE CLINIC | Age: 65
End: 2020-01-15

## 2020-01-24 NOTE — TELEPHONE ENCOUNTER
Requested Prescriptions     Pending Prescriptions Disp Refills    cyclobenzaprine (FLEXERIL) 5 mg tablet 60 Tab 3     Sig: Take 1 Tab by mouth three (3) times daily as needed for Muscle Spasm(s).

## 2020-01-27 RX ORDER — CYCLOBENZAPRINE HCL 5 MG
TABLET ORAL
Qty: 60 TAB | Refills: 0 | Status: SHIPPED | OUTPATIENT
Start: 2020-01-27 | End: 2020-02-07 | Stop reason: SDUPTHER

## 2020-01-27 RX ORDER — CYCLOBENZAPRINE HCL 5 MG
5 TABLET ORAL
Qty: 60 TAB | Refills: 3 | Status: SHIPPED | OUTPATIENT
Start: 2020-01-27 | End: 2020-02-07

## 2020-01-27 NOTE — TELEPHONE ENCOUNTER
Requested Prescriptions     Pending Prescriptions Disp Refills    cyclobenzaprine (FLEXERIL) 5 mg tablet 60 Tab 3     Sig: Take 1 Tab by mouth three (3) times daily as needed for Muscle Spasm(s).      Last office visit  11/06/19 Future 2/6/20  Last filled  11/6/19  Changes made to medication on last visit    None

## 2020-02-04 ENCOUNTER — TELEPHONE (OUTPATIENT)
Dept: INTERNAL MEDICINE CLINIC | Age: 65
End: 2020-02-04

## 2020-02-04 NOTE — TELEPHONE ENCOUNTER
Pt called in reference to the famotidine that was given to her. She stopped taking it due to allergic reactions. Please call her at [phone] or [phone] ext [phone] or .

## 2020-02-04 NOTE — TELEPHONE ENCOUNTER
Attempted to call patient at all 3 numbers, NISSA on work phone for a return call to my office Nitza Lutz LPN  8/5/1471  4:27 PM

## 2020-02-05 NOTE — TELEPHONE ENCOUNTER
Pt returned call and she has stopped taking famotidine due to rash and swelling and itching of both eyes this past weekend. She is doing find now. She is come in next week with her .

## 2020-02-07 ENCOUNTER — OFFICE VISIT (OUTPATIENT)
Dept: INTERNAL MEDICINE CLINIC | Age: 65
End: 2020-02-07

## 2020-02-07 VITALS
OXYGEN SATURATION: 95 % | RESPIRATION RATE: 16 BRPM | DIASTOLIC BLOOD PRESSURE: 71 MMHG | SYSTOLIC BLOOD PRESSURE: 129 MMHG | BODY MASS INDEX: 33.68 KG/M2 | HEIGHT: 62 IN | HEART RATE: 112 BPM | TEMPERATURE: 95.7 F | WEIGHT: 183 LBS

## 2020-02-07 DIAGNOSIS — K21.9 GASTROESOPHAGEAL REFLUX DISEASE, ESOPHAGITIS PRESENCE NOT SPECIFIED: ICD-10-CM

## 2020-02-07 DIAGNOSIS — I10 ESSENTIAL HYPERTENSION: ICD-10-CM

## 2020-02-07 DIAGNOSIS — Z79.4 TYPE 2 DIABETES MELLITUS WITH HYPERGLYCEMIA, WITH LONG-TERM CURRENT USE OF INSULIN (HCC): ICD-10-CM

## 2020-02-07 DIAGNOSIS — G54.6 PHANTOM LIMB PAIN (HCC): ICD-10-CM

## 2020-02-07 DIAGNOSIS — T50.905A ADVERSE EFFECT OF DRUG, INITIAL ENCOUNTER: Primary | ICD-10-CM

## 2020-02-07 DIAGNOSIS — E11.65 TYPE 2 DIABETES MELLITUS WITH HYPERGLYCEMIA, WITH LONG-TERM CURRENT USE OF INSULIN (HCC): ICD-10-CM

## 2020-02-07 DIAGNOSIS — M19.90 ARTHRITIS: ICD-10-CM

## 2020-02-07 RX ORDER — INDOMETHACIN 25 MG/1
CAPSULE ORAL
COMMUNITY
Start: 2020-01-22 | End: 2020-03-05 | Stop reason: ALTCHOICE

## 2020-02-07 RX ORDER — INDOMETHACIN 25 MG/1
CAPSULE ORAL 3 TIMES DAILY
COMMUNITY
End: 2020-02-07 | Stop reason: SDUPTHER

## 2020-02-07 RX ORDER — OMEPRAZOLE 20 MG/1
20 CAPSULE, DELAYED RELEASE ORAL DAILY
Qty: 30 CAP | Refills: 5 | Status: SHIPPED | OUTPATIENT
Start: 2020-02-07 | End: 2020-03-05 | Stop reason: SDUPTHER

## 2020-02-07 RX ORDER — BACLOFEN 10 MG/1
10 TABLET ORAL 3 TIMES DAILY
Qty: 30 TAB | Refills: 2 | Status: SHIPPED | OUTPATIENT
Start: 2020-02-07 | End: 2020-05-04 | Stop reason: SDUPTHER

## 2020-02-07 NOTE — PROGRESS NOTES
HISTORY OF PRESENT ILLNESS  Leni Wilson is a 62 y.o. female. Agree with comments, see chief complaint. Symptoms improved after stopping Pepcid. Takes her ACE inhibitor as well. Allergic Reaction   The history is provided by the patient. This is a new problem. The current episode started more than 1 week ago. The problem occurs daily. The problem has been gradually improving. Associated symptoms comments: Rash baggy eyes. Exacerbated by: as soon as famotidine started. She has tried Benadryl (prep h) for the symptoms. The treatment provided moderate relief. Her diabetes has not been that well controlled  Flexeril does not help her muscle spasms. Heartburn worse since stopping Pepcid. Lab Results   Component Value Date/Time    Hemoglobin A1c 10.1 (H) 11/06/2019 08:16 AM    Hemoglobin A1c 9.1 (H) 08/16/2019 09:43 AM    Hemoglobin A1c 7.8 (H) 10/17/2018 09:50 AM    Glucose 280 (H) 11/06/2019 08:16 AM    Glucose (POC) 146 (H) 02/18/2018 11:22 AM    Microalb/Creat ratio (ug/mg creat.) 82.2 (H) 08/16/2019 09:35 AM    LDL, calculated Comment 10/17/2018 09:50 AM    Creatinine 0.99 11/06/2019 08:16 AM     Says it has been doing better since she started going to physical therapy. I talked her into it and she is having less pain as well. Blood sugars have been better mainly 100s, minimal hypoglycemia. 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  No complaints of exertional chest pain, excessive shortness of breath or focal weakness. Minimal swelling in lower legs or dizziness with standing. Physical Exam   Visit Vitals  /71   Pulse (!) 112   Temp 95.7 °F (35.4 °C) (Temporal)   Resp 16   Ht 5' 2\" (1.575 m)   Wt 183 lb (83 kg) Comment: with leg prothesis   LMP 08/23/2003   SpO2 95%   BMI 33.47 kg/m²     WD WN female NAD  Heart RRR without murmers clicks or rubs  Lungs CTA  Abdo soft nontender  Ext no edema    ASSESSMENT and PLAN  Encounter Diagnoses   Name Primary?  Adverse effect of drug, initial encounter Yes    Type 2 diabetes mellitus with hyperglycemia, with long-term current use of insulin (HCC)     Essential hypertension     Gastroesophageal reflux disease, esophagitis presence not specified     Arthritis     Phantom limb pain (HCC)      Orders Placed This Encounter    AMB SUPPLY ORDER    HEMOGLOBIN A1C WITH EAG    METABOLIC PANEL, BASIC    CBC W/O DIFF    omeprazole (PRILOSEC) 20 mg capsule    baclofen (LIORESAL) 10 mg tablet    indomethacin (INDOCIN) 25 mg capsule    DISCONTD: indomethacin (INDOCIN) 25 mg capsule     Change acid medicine to as above  Diabetes better pruritus better continue PT. Diabetes pretension labs evaluate. Minimal complaints of gout. Current Outpatient Medications   Medication Sig Dispense Refill    omeprazole (PRILOSEC) 20 mg capsule Take 1 Cap by mouth daily. 30 Cap 5    baclofen (LIORESAL) 10 mg tablet Take 1 Tab by mouth three (3) times daily. 30 Tab 2    indomethacin (INDOCIN) 25 mg capsule TAKE 1 CAPSULE BY MOUTH THREE TIMES DAILY AS NEEDED      lisinopril (PRINIVIL, ZESTRIL) 10 mg tablet TAKE 1 TABLET BY MOUTH ONCE DAILY 90 Tab 1    insulin aspart protamine/insulin aspart (NOVOLOG MIX 70/30) 100 unit/mL (70-30) inpn 25 Units by SubCUTAneous route ACB/HS. 5 Pen 5    colchicine (COLCRYS) 0.6 mg tablet Take 1 Tab by mouth daily. 30 Tab 1    allopurinol (ZYLOPRIM) 300 mg tablet Take 1 Tab by mouth daily. 90 Tab 3    simvastatin (ZOCOR) 80 mg tablet Take 1 Tab by mouth nightly. 90 Tab 3    metFORMIN (GLUCOPHAGE) 500 mg tablet Take 1 Tab by mouth two (2) times daily (with meals). 180 Tab 3    glucose blood VI test strips (ASCENSIA CONTOUR) strip Check blood sugar up to three times per day 100 Strip 11    acetaminophen (TYLENOL) 500 mg tablet Take 1 Tab by mouth every four (4) hours (while awake). Indications: Pain (Patient taking differently: Take 650 mg by mouth every four (4) hours (while awake). Indications: Pain) 100 Tab 0     Follow-up and Dispositions    · Return in about 6 weeks (around 3/20/2020) for routine follow up.

## 2020-02-07 NOTE — PATIENT INSTRUCTIONS

## 2020-02-07 NOTE — PROGRESS NOTES
Chief Complaint   Patient presents with    Allergic Reaction     L/R eyes red, itching after taking famotidine, stopped taking the med x7 days ago     I have reviewed the patient's medical history in detail and updated the computerized patient record. Health Maintenance reviewed. 1. Have you been to the ER, urgent care clinic since your last visit? Hospitalized since your last visit?no    2. Have you seen or consulted any other health care providers outside of the [address] [place] [address] [place] since your last visit? Include any pap smears or colon screening. No    Encouraged pt to discuss pt's wishes with spouse/partner/family and bring them in the next appt to follow thru with the Advanced Directive    Fall Risk Assessment, last 12 mths 2/7/2020   Able to walk? Yes   Fall in past 12 months? Yes   Fall with injury? No   Number of falls in past 12 months 8 or more   Fall Risk Score 8       3 most recent PHQ Screens 2/7/2020   Little interest or pleasure in doing things Several days   Feeling down, depressed, irritable, or hopeless Several days   Total Score PHQ 2 2       Abuse Screening Questionnaire 2/7/2020   Do you ever feel afraid of your partner? N   Are you in a relationship with someone who physically or mentally threatens you? N   Is it safe for you to go home?  Y       ADL Assessment 2/7/2020   Feeding yourself No Help Needed   Getting from bed to chair No Help Needed   Getting dressed No Help Needed   Bathing or showering No Help Needed   Walk across the room (includes cane/walker) No Help Needed   Using the telphone No Help Needed   Taking your medications No Help Needed   Preparing meals No Help Needed   Managing money (expenses/bills) No Help Needed   Moderately strenuous housework (laundry) No Help Needed   Shopping for personal items (toiletries/medicines) No Help Needed   Shopping for groceries No Help Needed   Driving No Help Needed   Climbing a flight of stairs No Help Needed   Getting to places beyond walking distances No Help Needed

## 2020-02-11 ENCOUNTER — TELEPHONE (OUTPATIENT)
Dept: INTERNAL MEDICINE CLINIC | Age: 65
End: 2020-02-11

## 2020-02-11 NOTE — TELEPHONE ENCOUNTER
Pt called in reference to wanting to know if Dr Maurice Rao found out if her insurance will cover physical therapy. Please call her at [phone] ext [phone].

## 2020-02-25 LAB
BUN SERPL-MCNC: 19 MG/DL (ref 8–27)
BUN/CREAT SERPL: 18 (ref 12–28)
CALCIUM SERPL-MCNC: 9.7 MG/DL (ref 8.7–10.3)
CHLORIDE SERPL-SCNC: 105 MMOL/L (ref 96–106)
CO2 SERPL-SCNC: 24 MMOL/L (ref 20–29)
CREAT SERPL-MCNC: 1.05 MG/DL (ref 0.57–1)
ERYTHROCYTE [DISTWIDTH] IN BLOOD BY AUTOMATED COUNT: 16.9 % (ref 11.7–15.4)
EST. AVERAGE GLUCOSE BLD GHB EST-MCNC: 183 MG/DL
GLUCOSE SERPL-MCNC: 90 MG/DL (ref 65–99)
HBA1C MFR BLD: 8 % (ref 4.8–5.6)
HCT VFR BLD AUTO: 43.5 % (ref 34–46.6)
HGB BLD-MCNC: 14 G/DL (ref 11.1–15.9)
INTERPRETATION: NORMAL
Lab: NORMAL
MCH RBC QN AUTO: 26.3 PG (ref 26.6–33)
MCHC RBC AUTO-ENTMCNC: 32.2 G/DL (ref 31.5–35.7)
MCV RBC AUTO: 82 FL (ref 79–97)
PLATELET # BLD AUTO: 198 X10E3/UL (ref 150–450)
POTASSIUM SERPL-SCNC: 4.5 MMOL/L (ref 3.5–5.2)
RBC # BLD AUTO: 5.32 X10E6/UL (ref 3.77–5.28)
SODIUM SERPL-SCNC: 146 MMOL/L (ref 134–144)
WBC # BLD AUTO: 6.7 X10E3/UL (ref 3.4–10.8)

## 2020-02-28 NOTE — PROGRESS NOTES
Salt is up a little bit, drink more water. Your diabetes is not as good as we like but it's better. Please continue your current medications and vigorously follow your diabetic diet. No change in your medications for now.

## 2020-03-05 ENCOUNTER — OFFICE VISIT (OUTPATIENT)
Dept: INTERNAL MEDICINE CLINIC | Age: 65
End: 2020-03-05

## 2020-03-05 VITALS
WEIGHT: 183 LBS | OXYGEN SATURATION: 97 % | DIASTOLIC BLOOD PRESSURE: 82 MMHG | RESPIRATION RATE: 16 BRPM | HEIGHT: 62 IN | TEMPERATURE: 97.8 F | SYSTOLIC BLOOD PRESSURE: 127 MMHG | HEART RATE: 97 BPM | BODY MASS INDEX: 33.68 KG/M2

## 2020-03-05 DIAGNOSIS — H60.312 ACUTE DIFFUSE OTITIS EXTERNA OF LEFT EAR: ICD-10-CM

## 2020-03-05 DIAGNOSIS — I10 ESSENTIAL HYPERTENSION: ICD-10-CM

## 2020-03-05 DIAGNOSIS — M10.072 IDIOPATHIC GOUT INVOLVING TOE OF LEFT FOOT, UNSPECIFIED CHRONICITY: ICD-10-CM

## 2020-03-05 DIAGNOSIS — Z79.4 TYPE 2 DIABETES MELLITUS WITH HYPERGLYCEMIA, WITH LONG-TERM CURRENT USE OF INSULIN (HCC): Primary | ICD-10-CM

## 2020-03-05 DIAGNOSIS — E11.65 TYPE 2 DIABETES MELLITUS WITH HYPERGLYCEMIA, WITH LONG-TERM CURRENT USE OF INSULIN (HCC): Primary | ICD-10-CM

## 2020-03-05 DIAGNOSIS — H61.22 LEFT EAR IMPACTED CERUMEN: ICD-10-CM

## 2020-03-05 DIAGNOSIS — K21.9 GASTROESOPHAGEAL REFLUX DISEASE, ESOPHAGITIS PRESENCE NOT SPECIFIED: ICD-10-CM

## 2020-03-05 RX ORDER — ACETIC ACID 20.65 MG/ML
4 SOLUTION AURICULAR (OTIC) 3 TIMES DAILY
Qty: 15 ML | Refills: 0 | Status: SHIPPED | OUTPATIENT
Start: 2020-03-05 | End: 2020-03-12

## 2020-03-05 RX ORDER — COLCHICINE 0.6 MG/1
0.6 TABLET ORAL DAILY
Qty: 90 TAB | Refills: 1 | Status: SHIPPED | OUTPATIENT
Start: 2020-03-05 | End: 2020-08-24 | Stop reason: ALTCHOICE

## 2020-03-05 RX ORDER — LISINOPRIL 10 MG/1
TABLET ORAL
Qty: 90 TAB | Refills: 3 | Status: SHIPPED | OUTPATIENT
Start: 2020-03-05 | End: 2020-10-07 | Stop reason: SINTOL

## 2020-03-05 RX ORDER — NAPROXEN 500 MG/1
500 TABLET ORAL 2 TIMES DAILY WITH MEALS
Qty: 60 TAB | Refills: 5 | Status: SHIPPED | OUTPATIENT
Start: 2020-03-05 | End: 2020-08-24 | Stop reason: ALTCHOICE

## 2020-03-05 RX ORDER — OMEPRAZOLE 20 MG/1
20 CAPSULE, DELAYED RELEASE ORAL DAILY
Qty: 90 CAP | Refills: 1 | Status: SHIPPED | OUTPATIENT
Start: 2020-03-05 | End: 2020-06-23 | Stop reason: SDUPTHER

## 2020-03-05 RX ORDER — NAPROXEN 500 MG/1
500 TABLET ORAL 2 TIMES DAILY WITH MEALS
COMMUNITY
End: 2020-04-22 | Stop reason: SDUPTHER

## 2020-03-05 NOTE — PROGRESS NOTES
Chief Complaint   Patient presents with    Ear Pain     L/ear sharp pain    Labs     follow up     I have reviewed the patient's medical history in detail and updated the computerized patient record. Health Maintenance reviewed. 1. Have you been to the ER, urgent care clinic since your last visit? Hospitalized since your last visit?no    2. Have you seen or consulted any other health care providers outside of the [address] [place] [address] [place] since your last visit? Include any pap smears or colon screening. No      Encouraged pt to discuss pt's wishes with spouse/partner/family and bring them in the next appt to follow thru with the Advanced Directive    Fall Risk Assessment, last 12 mths 2/7/2020   Able to walk? Yes   Fall in past 12 months? Yes   Fall with injury? No   Number of falls in past 12 months 8 or more   Fall Risk Score 8       3 most recent PHQ Screens 2/7/2020   Little interest or pleasure in doing things Several days   Feeling down, depressed, irritable, or hopeless Several days   Total Score PHQ 2 2       Abuse Screening Questionnaire 2/7/2020   Do you ever feel afraid of your partner? N   Are you in a relationship with someone who physically or mentally threatens you? N   Is it safe for you to go home?  Y       ADL Assessment 2/7/2020   Feeding yourself No Help Needed   Getting from bed to chair No Help Needed   Getting dressed No Help Needed   Bathing or showering No Help Needed   Walk across the room (includes cane/walker) No Help Needed   Using the telphone No Help Needed   Taking your medications No Help Needed   Preparing meals No Help Needed   Managing money (expenses/bills) No Help Needed   Moderately strenuous housework (laundry) No Help Needed   Shopping for personal items (toiletries/medicines) No Help Needed   Shopping for groceries No Help Needed   Driving No Help Needed   Climbing a flight of stairs No Help Needed   Getting to places beyond walking distances No Help Needed

## 2020-03-05 NOTE — PATIENT INSTRUCTIONS
SGLT2 Inhibitors:  Mary Montoya    GLP agonist: Kerline Concepcion Tanzeum Rybelsus           Learning About Diabetes Food Guidelines  Your Care Instructions    Meal planning is important to manage diabetes. It helps keep your blood sugar at a target level (which you set with your doctor). You don't have to eat special foods. You can eat what your family eats, including sweets once in a while. But you do have to pay attention to how often you eat and how much you eat of certain foods. You may want to work with a dietitian or a certified diabetes educator (CDE) to help you plan meals and snacks. A dietitian or CDE can also help you lose weight if that is one of your goals. What should you know about eating carbs? Managing the amount of carbohydrate (carbs) you eat is an important part of healthy meals when you have diabetes. Carbohydrate is found in many foods. · Learn which foods have carbs. And learn the amounts of carbs in different foods. ? Bread, cereal, pasta, and rice have about 15 grams of carbs in a serving. A serving is 1 slice of bread (1 ounce), ½ cup of cooked cereal, or 1/3 cup of cooked pasta or rice. ? Fruits have 15 grams of carbs in a serving. A serving is 1 small fresh fruit, such as an apple or orange; ½ of a banana; ½ cup of cooked or canned fruit; ½ cup of fruit juice; 1 cup of melon or raspberries; or 2 tablespoons of dried fruit. ? Milk and no-sugar-added yogurt have 15 grams of carbs in a serving. A serving is 1 cup of milk or 2/3 cup of no-sugar-added yogurt. ? Starchy vegetables have 15 grams of carbs in a serving. A serving is ½ cup of mashed potatoes or sweet potato; 1 cup winter squash; ½ of a small baked potato; ½ cup of cooked beans; or ½ cup cooked corn or green peas. · Learn how much carbs to eat each day and at each meal. A dietitian or CDE can teach you how to keep track of the amount of carbs you eat.  This is called carbohydrate counting. · If you are not sure how to count carbohydrate grams, use the Plate Method to plan meals. It is a good, quick way to make sure that you have a balanced meal. It also helps you spread carbs throughout the day. ? Divide your plate by types of foods. Put non-starchy vegetables on half the plate, meat or other protein food on one-quarter of the plate, and a grain or starchy vegetable in the final quarter of the plate. To this you can add a small piece of fruit and 1 cup of milk or yogurt, depending on how many carbs you are supposed to eat at a meal.  · Try to eat about the same amount of carbs at each meal. Do not \"save up\" your daily allowance of carbs to eat at one meal.  · Proteins have very little or no carbs per serving. Examples of proteins are beef, chicken, turkey, fish, eggs, tofu, cheese, cottage cheese, and peanut butter. A serving size of meat is 3 ounces, which is about the size of a deck of cards. Examples of meat substitute serving sizes (equal to 1 ounce of meat) are 1/4 cup of cottage cheese, 1 egg, 1 tablespoon of peanut butter, and ½ cup of tofu. How can you eat out and still eat healthy? · Learn to estimate the serving sizes of foods that have carbohydrate. If you measure food at home, it will be easier to estimate the amount in a serving of restaurant food. · If the meal you order has too much carbohydrate (such as potatoes, corn, or baked beans), ask to have a low-carbohydrate food instead. Ask for a salad or green vegetables. · If you use insulin, check your blood sugar before and after eating out to help you plan how much to eat in the future. · If you eat more carbohydrate at a meal than you had planned, take a walk or do other exercise. This will help lower your blood sugar. What else should you know? · Limit saturated fat, such as the fat from meat and dairy products. This is a healthy choice because people who have diabetes are at higher risk of heart disease.  So choose lean cuts of meat and nonfat or low-fat dairy products. Use olive or canola oil instead of butter or shortening when cooking. · Don't skip meals. Your blood sugar may drop too low if you skip meals and take insulin or certain medicines for diabetes. · Check with your doctor before you drink alcohol. Alcohol can cause your blood sugar to drop too low. Alcohol can also cause a bad reaction if you take certain diabetes medicines. Follow-up care is a key part of your treatment and safety. Be sure to make and go to all appointments, and call your doctor if you are having problems. It's also a good idea to know your test results and keep a list of the medicines you take. Where can you learn more? Go to http://xena-franky.info/. Enter D472 in the search box to learn more about \"Learning About Diabetes Food Guidelines. \"  Current as of: April 16, 2019  Content Version: 12.2  © 7919-8423 [employer]. Care instructions adapted under license by [employer] (which disclaims liability or warranty for this information). If you have questions about a medical condition or this instruction, always ask your healthcare professional. [place] [address] any warranty or liability for your use of this information. Noninsulin Medicines for Type 2 Diabetes: Care Instructions  Your Care Instructions    There are different types of noninsulin medicines for diabetes. Each works in a different way. But they all help you control your blood sugar. Some types help your body make insulin to lower your blood sugar. Others lower how much insulin your body needs. Some can slow how fast your body digests sugars. And some can remove extra glucose through your urine. · Alpha-glucosidase inhibitors. These keep starches from breaking down. This means that they lower the amount of glucose absorbed when you eat. They don't help your body make more insulin.  So they will not cause low blood sugar unless you use them with other medicines for diabetes. They include acarbose and miglitol. · DPP-4 inhibitors. These help your body raise the level of insulin after you eat. They also help your body make less of a hormone that raises blood sugar. They include linagliptin, saxagliptin, and sitagliptin. · Incretin hormones (GLP-1 receptor agonists). Your body makes a protein that can raise your insulin level. It also can lower your blood sugar and make you less hungry. You can get shots of hormones that work the same way. They include exenatide and liraglutide. · Meglitinides. These help your body release insulin. They also help slow how your body digests sugars. So they can keep your blood sugar from rising too fast after you eat. They include nateglinide and repaglinide. · Metformin. This lowers how much glucose your liver makes. And it helps you respond better to insulin. It also lowers the amount of stored sugar that your liver releases when you are not eating. · SGLT2 inhibitors. These help to remove extra glucose through your urine. They may also help some people lose weight. They include canagliflozin, dapagliflozin, and empagliflozin. · Sulfonylureas. These help your body release more insulin. Some work for many hours. They can cause low blood sugar if you don't eat as you planned. They include glipizide and glyburide. · Thiazolidinediones. These reduce the amount of blood glucose. They also help you respond better to insulin. They include pioglitazone and rosiglitazone. You may need to take more than one medicine for diabetes. Two or more medicines may work better to lower your blood sugar level than just one does. Follow-up care is a key part of your treatment and safety. Be sure to make and go to all appointments, and call your doctor if you are having problems. It's also a good idea to know your test results and keep a list of the medicines you take.   How can you care for yourself at home?  · Eat a healthy diet. Get some exercise each day. This may help you to reduce how much medicine you need. · Do not take other prescription or over-the-counter medicines, vitamins, herbal products, or supplements without talking to your doctor first. Some medicines for type 2 diabetes can cause problems with other medicines or supplements. · Tell your doctor if you plan to get pregnant. Some of these drugs are not safe for pregnant women. · Be safe with medicines. Take your medicines exactly as prescribed. Meglitinides and sulfonylureas can cause your blood sugar to drop very low. Call your doctor if you think you are having a problem with your medicine. · Check your blood sugar often. You can use a glucose monitor. Keeping track can help you know how certain foods, activities, and medicines affect your blood sugar. And it can help you keep your blood sugar from getting so low that it's not safe. When should you call for help? Call 911 anytime you think you may need emergency care. For example, call if:    · You passed out (lost consciousness).     · You are confused or cannot think clearly.     · Your blood sugar is very high or very low.    Watch closely for changes in your health, and be sure to contact your doctor if:    · Your blood sugar stays outside the level your doctor set for you.     · You have any problems. Where can you learn more? Go to http://xena-franky.info/. Enter H153 in the search box to learn more about \"Noninsulin Medicines for Type 2 Diabetes: Care Instructions. \"  Current as of: April 16, 2019  Content Version: 12.2  © 8196-6254 [employer]. Care instructions adapted under license by [employer] (which disclaims liability or warranty for this information).  If you have questions about a medical condition or this instruction, always ask your healthcare professional. [place] [address] any warranty or liability for your use of this information.

## 2020-03-05 NOTE — PROGRESS NOTES
Subjective:     Yony Judge is a 62 y.o. female seen for follow-up of diabetes. She has had hypoglycemic attacks. .no  Blood sugar control has been better A1C was 8.1 from 10. Following diet much better. She has diabetes, hypertension and hyperlipidemia. Yony Judge has the additional concern of wants to lose weight. Left eara pain off on x 2 mo dental work 2 mo ago. COme and goes. No uri. No FB  Gout issues in the past, currently not bothering her much. Diabetic Review of Systems: no polyuria or polydipsia, no chest pain, dyspnea or TIA's, no hypoglycemia, but must eat fruit or gets jitteriness. .  No N/t in foot just both hands. Allergies   Allergen Reactions    Keflex [Cephalexin] Itching    Lortab [Hydrocodone-Acetaminophen] Hives and Itching     Patient states she can take tylenol #3 without problem. Diet and Lifestyle: follows a diabetic diet regularly, nonsmoker. 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      Social History     Tobacco Use    Smoking status: Former Smoker     Packs/day: 0.50     Years: 15.00     Pack years: 7.50     Last attempt to quit: 3/30/2009     Years since quitting: 10.9    Smokeless tobacco: Never Used   Substance Use Topics    Alcohol use: No     Alcohol/week: 0.0 standard drinks        Lab Results   Component Value Date/Time    WBC 6.7 02/24/2020 10:17 AM    HGB 14.0 02/24/2020 10:17 AM    HCT 43.5 02/24/2020 10:17 AM    PLATELET 638 52/26/7264 10:17 AM    MCV 82 02/24/2020 10:17 AM     Lab Results   Component Value Date/Time    Hemoglobin A1c 8.0 (H) 02/24/2020 10:17 AM    Hemoglobin A1c 10.1 (H) 11/06/2019 08:16 AM    Hemoglobin A1c 9.1 (H) 08/16/2019 09:43 AM    Glucose 90 02/24/2020 10:17 AM    Glucose (POC) 146 (H) 02/18/2018 11:22 AM    Microalb/Creat ratio (ug/mg creat.) 82.2 (H) 08/16/2019 09:35 AM    LDL, calculated Comment 10/17/2018 09:50 AM    Creatinine 1.05 (H) 02/24/2020 10:17 AM      Lab Results   Component Value Date/Time    Cholesterol, total 111 10/17/2018 09:50 AM    HDL Cholesterol 19 (L) 10/17/2018 09:50 AM    LDL, calculated Comment 10/17/2018 09:50 AM    Triglyceride 708 (HH) 10/17/2018 09:50 AM     Lab Results   Component Value Date/Time    ALT (SGPT) 19 04/23/2019 01:41 PM    AST (SGOT) 13 04/23/2019 01:41 PM    Alk. phosphatase 92 04/23/2019 01:41 PM    Bilirubin, total 0.2 04/23/2019 01:41 PM    Albumin 4.4 04/23/2019 01:41 PM    Protein, total 6.5 04/23/2019 01:41 PM    INR 1.7 (H) 02/18/2018 02:46 AM    INR POC 1.3 03/05/2018 12:09 PM    Prothrombin time 17.0 (H) 02/18/2018 02:46 AM    PLATELET 703 28/35/0289 10:17 AM     Lab Results   Component Value Date/Time    GFR est non-AA 57 (L) 02/24/2020 10:17 AM    GFR est AA 65 02/24/2020 10:17 AM    Creatinine 1.05 (H) 02/24/2020 10:17 AM    BUN 19 02/24/2020 10:17 AM    Sodium 146 (H) 02/24/2020 10:17 AM    Potassium 4.5 02/24/2020 10:17 AM    Chloride 105 02/24/2020 10:17 AM    CO2 24 02/24/2020 10:17 AM     Lab Results   Component Value Date/Time    Glucose 90 02/24/2020 10:17 AM    Glucose (POC) 146 (H) 02/18/2018 11:22 AM         Review of Systems  Pertinent items are noted in HPI.     Objective:     Significant for the following:     Visit Vitals  /82 (BP 1 Location: Left arm, BP Patient Position: Sitting)   Pulse 97   Temp 97.8 °F (36.6 °C) (Oral)   Resp 16   Ht 5' 2\" (1.575 m)   Wt 183 lb (83 kg)   LMP 08/23/2003   SpO2 97%   BMI 33.47 kg/m²     Appearance: alert, well appearing, and in no distress and overweight. Exam: heart sounds normal rate, regular rhythm, normal S1, S2, no murmurs, rubs, clicks or gallops, chest clear, no hepatosplenomegaly  Foot exam:  def    Lab review: labs reviewed, I note that glycosylated hemoglobin improved. Assessment/Plan:     Follow-up diabetes stable, improved. Diabetic issues reviewed with her: diabetic diet discussed in detail, written exchange diet given, all medications, side effects and compliance discussed carefully, glycohemoglobin and other lab monitoring discussed and long term diabetic complications discussed. Chronic Conditions Addressed Today     1. Type 2 diabetes mellitus with hyperglycemia (HCC) - Primary     Relevant Medications     lisinopril (PRINIVIL, ZESTRIL) 10 mg tablet    2. Essential hypertension     Relevant Medications     lisinopril (PRINIVIL, ZESTRIL) 10 mg tablet    3. Gout     Relevant Medications     naproxen (NAPROSYN) 500 mg tablet     naproxen (NAPROSYN) 500 mg tablet     colchicine (COLCRYS) 0.6 mg tablet      Acute Diagnoses Addressed Today     Left ear impacted cerumen        Acute diffuse otitis externa of left ear        Gastroesophageal reflux disease, esophagitis presence not specified            Relevant Medications        omeprazole (PRILOSEC) 20 mg capsule        See patient instructions, went over them personally with the patient. Emphasized compliance. Questions answered. Patient states that they understand the plan of action and will call if there are any issues or misunderstandings. Diabetes is better but she wants to lose weight and insulin tends to put it on. Consider switching over to non-insulin type medications but cost and side effects and issue.   Gave her names, she will discuss do some research and will talk about when she returns. Gout stable adjusted medications  Hypertension stable    Follow-up and Dispositions    · Return in about 6 weeks (around 4/16/2020) for routine follow up.

## 2020-03-18 ENCOUNTER — TELEPHONE (OUTPATIENT)
Dept: INTERNAL MEDICINE CLINIC | Age: 65
End: 2020-03-18

## 2020-03-18 NOTE — TELEPHONE ENCOUNTER
Pt called in reference to wanting to let Dr. Karyle Balding know she would like to try meglitinides or sglt2 inhibitor. She said this is research he wanted her to look up. Megan Ambika Jaimes said she thinks she would rather try the sglt2.

## 2020-04-10 ENCOUNTER — DOCUMENTATION ONLY (OUTPATIENT)
Dept: INTERNAL MEDICINE CLINIC | Age: 65
End: 2020-04-10

## 2020-04-13 ENCOUNTER — DOCUMENTATION ONLY (OUTPATIENT)
Dept: INTERNAL MEDICINE CLINIC | Age: 65
End: 2020-04-13

## 2020-04-13 NOTE — PROGRESS NOTES
Tried returning pts call in reference to virtual visit but was unable to lvm due to vm not being set up.

## 2020-04-16 ENCOUNTER — VIRTUAL VISIT (OUTPATIENT)
Dept: INTERNAL MEDICINE CLINIC | Age: 65
End: 2020-04-16

## 2020-04-16 NOTE — PROGRESS NOTES
Chief Complaint   Patient presents with    Diabetes     follow up    Medication Evaluation     pt voiding all the time     Patient has not been out of the country in 45-60 days, NO diarrhea, NO cough, NO chest conjestion, NO temp. Pt has not been around anyone with these symptoms. Health Maintenance reviewed. I have reviewed the patient's medical history in detail and updated the computerized patient record. 1. Have you been to the ER, urgent care clinic since your last visit? Hospitalized since your last visit?no    2. Have you seen or consulted any other health care providers outside of the [address] [place] [address] since your last visit? Include any pap smears or colon screening. No      Encouraged pt to discuss pt's wishes with spouse/partner/family and bring them in the next appt to follow thru with the Advanced Directive    Fall Risk Assessment, last 12 mths 2/7/2020   Able to walk? Yes   Fall in past 12 months? Yes   Fall with injury? No   Number of falls in past 12 months 8 or more   Fall Risk Score 8       3 most recent PHQ Screens 2/7/2020   Little interest or pleasure in doing things Several days   Feeling down, depressed, irritable, or hopeless Several days   Total Score PHQ 2 2       Abuse Screening Questionnaire 2/7/2020   Do you ever feel afraid of your partner? N   Are you in a relationship with someone who physically or mentally threatens you? N   Is it safe for you to go home?  Y       ADL Assessment 2/7/2020   Feeding yourself No Help Needed   Getting from bed to chair No Help Needed   Getting dressed No Help Needed   Bathing or showering No Help Needed   Walk across the room (includes cane/walker) No Help Needed   Using the telphone No Help Needed   Taking your medications No Help Needed   Preparing meals No Help Needed   Managing money (expenses/bills) No Help Needed   Moderately strenuous housework (laundry) No Help Needed   Shopping for personal items (toiletries/medicines) No Help Needed   Shopping for groceries No Help Needed   Driving No Help Needed   Climbing a flight of stairs No Help Needed   Getting to places beyond walking distances No Help Needed

## 2020-04-22 ENCOUNTER — VIRTUAL VISIT (OUTPATIENT)
Dept: INTERNAL MEDICINE CLINIC | Age: 65
End: 2020-04-22

## 2020-04-22 VITALS — HEIGHT: 62 IN | WEIGHT: 179 LBS | BODY MASS INDEX: 32.94 KG/M2

## 2020-04-22 DIAGNOSIS — I10 ESSENTIAL HYPERTENSION: ICD-10-CM

## 2020-04-22 DIAGNOSIS — G54.6 PHANTOM LIMB PAIN (HCC): Primary | ICD-10-CM

## 2020-04-22 DIAGNOSIS — S78.112S TRAUMATIC ABOVE-KNEE AMPUTATION OF LEFT LOWER EXTREMITY, SEQUELA (HCC): Chronic | ICD-10-CM

## 2020-04-22 DIAGNOSIS — E78.00 HYPERCHOLESTEREMIA: ICD-10-CM

## 2020-04-22 DIAGNOSIS — Z79.4 TYPE 2 DIABETES MELLITUS WITH HYPERGLYCEMIA, WITH LONG-TERM CURRENT USE OF INSULIN (HCC): ICD-10-CM

## 2020-04-22 DIAGNOSIS — E11.65 TYPE 2 DIABETES MELLITUS WITH HYPERGLYCEMIA, WITH LONG-TERM CURRENT USE OF INSULIN (HCC): ICD-10-CM

## 2020-04-22 RX ORDER — INSULIN ASPART 100 [IU]/ML
25 INJECTION, SUSPENSION SUBCUTANEOUS
Qty: 5 PEN | Refills: 5 | Status: SHIPPED | OUTPATIENT
Start: 2020-04-22 | End: 2020-05-04 | Stop reason: SDUPTHER

## 2020-04-22 RX ORDER — SIMVASTATIN 80 MG/1
80 TABLET, FILM COATED ORAL
Qty: 90 TAB | Refills: 3 | Status: SHIPPED | OUTPATIENT
Start: 2020-04-22 | End: 2021-06-07 | Stop reason: SDUPTHER

## 2020-04-22 RX ORDER — METFORMIN HYDROCHLORIDE 500 MG/1
500 TABLET ORAL 2 TIMES DAILY WITH MEALS
Qty: 180 TAB | Refills: 3 | Status: SHIPPED | OUTPATIENT
Start: 2020-04-22 | End: 2021-05-25 | Stop reason: SDUPTHER

## 2020-04-22 RX ORDER — ALLOPURINOL 300 MG/1
300 TABLET ORAL DAILY
Qty: 90 TAB | Refills: 3 | Status: SHIPPED | OUTPATIENT
Start: 2020-04-22 | End: 2020-08-24 | Stop reason: SDUPTHER

## 2020-04-22 NOTE — PROGRESS NOTES
Consent: Regulo Mcmahon, who was seen by synchronous (real-time) audio-video technology, and/or her healthcare decision maker, is aware that this patient-initiated, Telehealth encounter on 4/22/2020 is a billable service, with coverage as determined by her insurance carrier. She is aware that she may receive a bill and has provided verbal consent to proceed: Yes. Subjective:     Regulo Mcmahon is a 60 y.o. female seen for follow-up of diabetes. She has had hypoglycemic attacks. .no  Blood sugar control has been good 141 108, getting better. On Jardiance and insulin. She has diabetes and hypertension. Regulo Mcmahon has the additional concern of feels some inc urine freq with, min dysuria  Pain doing OK on current drug regiment. Lab Results   Component Value Date/Time    Hemoglobin A1c 8.0 (H) 02/24/2020 10:17 AM    Hemoglobin A1c 10.1 (H) 11/06/2019 08:16 AM    Hemoglobin A1c 9.1 (H) 08/16/2019 09:43 AM    Glucose 90 02/24/2020 10:17 AM    Glucose (POC) 146 (H) 02/18/2018 11:22 AM    Microalb/Creat ratio (ug/mg creat.) 82.2 (H) 08/16/2019 09:35 AM    LDL, calculated Comment 10/17/2018 09:50 AM    Creatinine 1.05 (H) 02/24/2020 10:17 AM       Diabetic Review of Systems: no chest pain, dyspnea or TIA's, no hypoglycemia, weight has decreased, has dysesthesias in the feet, and hands. Allergies   Allergen Reactions    Keflex [Cephalexin] Itching    Lortab [Hydrocodone-Acetaminophen] Hives and Itching     Patient states she can take tylenol #3 without problem. Diet and Lifestyle: follows a diabetic diet regularly, nonsmoker.     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 30 Tab 2    glucose blood VI test strips ([employer] CONTOUR) strip USE 1 TEST STRIP EACH TIME TO TEST BLOOD SUGAR UP TO 3 TIMES A  Strip 5    naproxen (NAPROSYN) 500 mg tablet Take 1 Tab by mouth two (2) times daily (with meals). As needed for gout or arthritis 60 Tab 5    colchicine (COLCRYS) 0.6 mg tablet Take 1 Tab by mouth daily. 90 Tab 1    lisinopril (PRINIVIL, ZESTRIL) 10 mg tablet TAKE 1 TABLET BY MOUTH ONCE DAILY 90 Tab 3    omeprazole (PRILOSEC) 20 mg capsule Take 1 Cap by mouth daily. 90 Cap 1    baclofen (LIORESAL) 10 mg tablet Take 1 Tab by mouth three (3) times daily. 30 Tab 2    insulin aspart protamine/insulin aspart (NOVOLOG MIX 70/30) 100 unit/mL (70-30) inpn 25 Units by SubCUTAneous route ACB/HS. 5 Pen 5    allopurinol (ZYLOPRIM) 300 mg tablet Take 1 Tab by mouth daily. 90 Tab 3    simvastatin (ZOCOR) 80 mg tablet Take 1 Tab by mouth nightly. 90 Tab 3    metFORMIN (GLUCOPHAGE) 500 mg tablet Take 1 Tab by mouth two (2) times daily (with meals). 180 Tab 3    acetaminophen (TYLENOL) 500 mg tablet Take 1 Tab by mouth every four (4) hours (while awake). Indications: Pain (Patient taking differently: Take 650 mg by mouth every four (4) hours (while awake). Indications: Pain) 100 Tab 0     Allergies   Allergen Reactions    Keflex [Cephalexin] Itching    Lortab [Hydrocodone-Acetaminophen] Hives and Itching     Patient states she can take tylenol #3 without problem.      Social History     Tobacco Use    Smoking status: Former Smoker     Packs/day: 0.50     Years: 15.00     Pack years: 7.50     Last attempt to quit: 3/30/2009     Years since quittin.0    Smokeless tobacco: Never Used   Substance Use Topics    Alcohol use: No     Alcohol/week: 0.0 standard drinks             Review of Systems  Pertinent items are noted in HPI. Objective:     Significant for the following:     Visit Vitals  LMP 08/23/2003     Appearance: alert, well appearing, and in no distress. Exam:  Lab review: labs reviewed, I note that glycosylated hemoglobin stable, mildly abnormal but acceptable. Assessment/Plan:     Follow-up diabetes stable, improved. Diabetic issues reviewed with her: all medications, side effects and compliance discussed carefully and glycohemoglobin and other lab monitoring discussed. Chronic Conditions Addressed Today     1. Phantom limb pain ([address] - Primary    2. Type 2 diabetes mellitus with hyperglycemia (HCC)     Relevant Medications     insulin aspart protamine/insulin aspart (NOVOLOG MIX 70/30) 100 unit/mL (70-30) inpn     metFORMIN (GLUCOPHAGE) 500 mg tablet     simvastatin (ZOCOR) 80 mg tablet     Other Relevant Orders     HEMOGLOBIN A1C WITH EAG    3. 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 Essential hypertension     Relevant Medications     simvastatin (ZOCOR) 80 mg tablet     Other Relevant Orders     METABOLIC PANEL, BASIC      Acute Diagnoses Addressed Today     Hypercholesteremia            Relevant Medications        simvastatin (ZOCOR) 80 mg tablet        Other Relevant Orders        LIPID PANEL        Discussed tips to dec urine freq, prob from glycosuria. Not too bad. Cont Jardiance  Other issues stable. Maybe able to wean insulin, after labs return further recommendations. Follow-up and Dispositions    · Return in about 3 months (around 7/22/2020).

## 2020-04-22 NOTE — PROGRESS NOTES
Chief Complaint   Patient presents with    Diabetes     BS have been over 120 for 1-1/2 weeks     Patient has not been out of the country in 45-60 days, NO diarrhea, NO cough, NO chest conjestion, NO temp. Pt has not been around anyone with these symptoms. Health Maintenance reviewed. I have reviewed the patient's medical history in detail and updated the computerized patient record. 1. Have you been to the ER, urgent care clinic since your last visit? Hospitalized since your last visit?no    2. Have you seen or consulted any other health care providers outside of the [address] [place] [address] since your last visit? Include any pap smears or colon screening. No      Encouraged pt to discuss pt's wishes with spouse/partner/family and bring them in the next appt to follow thru with the Advanced Directive    Fall Risk Assessment, last 12 mths 2/7/2020   Able to walk? Yes   Fall in past 12 months? Yes   Fall with injury? No   Number of falls in past 12 months 8 or more   Fall Risk Score 8       3 most recent PHQ Screens 2/7/2020   Little interest or pleasure in doing things Several days   Feeling down, depressed, irritable, or hopeless Several days   Total Score PHQ 2 2       Abuse Screening Questionnaire 2/7/2020   Do you ever feel afraid of your partner? N   Are you in a relationship with someone who physically or mentally threatens you? N   Is it safe for you to go home?  Y       ADL Assessment 2/7/2020   Feeding yourself No Help Needed   Getting from bed to chair No Help Needed   Getting dressed No Help Needed   Bathing or showering No Help Needed   Walk across the room (includes cane/walker) No Help Needed   Using the telphone No Help Needed   Taking your medications No Help Needed   Preparing meals No Help Needed   Managing money (expenses/bills) No Help Needed   Moderately strenuous housework (laundry) No Help Needed   Shopping for personal items (toiletries/medicines) No Help Needed   Shopping for groceries No Help Needed   Driving No Help Needed   Climbing a flight of stairs No Help Needed   Getting to places beyond walking distances No Help Needed

## 2020-05-04 DIAGNOSIS — Z79.4 TYPE 2 DIABETES MELLITUS WITH HYPERGLYCEMIA, WITH LONG-TERM CURRENT USE OF INSULIN (HCC): ICD-10-CM

## 2020-05-04 DIAGNOSIS — E11.65 TYPE 2 DIABETES MELLITUS WITH HYPERGLYCEMIA, WITH LONG-TERM CURRENT USE OF INSULIN (HCC): ICD-10-CM

## 2020-05-04 RX ORDER — BACLOFEN 10 MG/1
10 TABLET ORAL 3 TIMES DAILY
Qty: 30 TAB | Refills: 2 | Status: SHIPPED | OUTPATIENT
Start: 2020-05-04 | End: 2020-06-23 | Stop reason: SDUPTHER

## 2020-05-04 RX ORDER — INSULIN ASPART 100 [IU]/ML
25 INJECTION, SUSPENSION SUBCUTANEOUS
Qty: 5 PEN | Refills: 5 | Status: SHIPPED | OUTPATIENT
Start: 2020-05-04 | End: 2020-06-23

## 2020-05-04 NOTE — TELEPHONE ENCOUNTER
Requested Prescriptions     Pending Prescriptions Disp Refills    baclofen (LIORESAL) 10 mg tablet 30 Tab 2     Sig: Take 1 Tab by mouth three (3) times daily.  insulin aspart protamine/insulin aspart (NOVOLOG MIX 70/30) 100 unit/mL (70-30) inpn 5 Pen 5     Si Units by SubCUTAneous route ACB/HS.

## 2020-06-15 LAB
BUN SERPL-MCNC: 20 MG/DL (ref 8–27)
BUN/CREAT SERPL: 18 (ref 12–28)
CALCIUM SERPL-MCNC: 10.6 MG/DL (ref 8.7–10.3)
CHLORIDE SERPL-SCNC: 105 MMOL/L (ref 96–106)
CHOLEST SERPL-MCNC: 110 MG/DL (ref 100–199)
CO2 SERPL-SCNC: 22 MMOL/L (ref 20–29)
CREAT SERPL-MCNC: 1.09 MG/DL (ref 0.57–1)
EST. AVERAGE GLUCOSE BLD GHB EST-MCNC: 160 MG/DL
GLUCOSE SERPL-MCNC: 93 MG/DL (ref 65–99)
HBA1C MFR BLD: 7.2 % (ref 4.8–5.6)
HDLC SERPL-MCNC: 23 MG/DL
INTERPRETATION, 910389: NORMAL
INTERPRETATION: NORMAL
LDLC SERPL CALC-MCNC: ABNORMAL MG/DL (ref 0–99)
Lab: NORMAL
PDF IMAGE, 910387: NORMAL
POTASSIUM SERPL-SCNC: 4.6 MMOL/L (ref 3.5–5.2)
SODIUM SERPL-SCNC: 144 MMOL/L (ref 134–144)
TRIGL SERPL-MCNC: 433 MG/DL (ref 0–149)
VLDLC SERPL CALC-MCNC: ABNORMAL MG/DL (ref 5–40)

## 2020-06-23 ENCOUNTER — VIRTUAL VISIT (OUTPATIENT)
Dept: INTERNAL MEDICINE CLINIC | Age: 65
End: 2020-06-23

## 2020-06-23 DIAGNOSIS — K21.9 GASTROESOPHAGEAL REFLUX DISEASE, ESOPHAGITIS PRESENCE NOT SPECIFIED: ICD-10-CM

## 2020-06-23 DIAGNOSIS — E08.21 DIABETES MELLITUS DUE TO UNDERLYING CONDITION WITH DIABETIC NEPHROPATHY, WITH LONG-TERM CURRENT USE OF INSULIN (HCC): ICD-10-CM

## 2020-06-23 DIAGNOSIS — Z79.4 TYPE 2 DIABETES MELLITUS WITH HYPERGLYCEMIA, WITH LONG-TERM CURRENT USE OF INSULIN (HCC): Primary | ICD-10-CM

## 2020-06-23 DIAGNOSIS — G56.00 CARPAL TUNNEL SYNDROME, UNSPECIFIED LATERALITY: ICD-10-CM

## 2020-06-23 DIAGNOSIS — Z79.4 DIABETES MELLITUS DUE TO UNDERLYING CONDITION WITH DIABETIC NEPHROPATHY, WITH LONG-TERM CURRENT USE OF INSULIN (HCC): ICD-10-CM

## 2020-06-23 DIAGNOSIS — E08.42 DIABETIC POLYNEUROPATHY ASSOCIATED WITH DIABETES MELLITUS DUE TO UNDERLYING CONDITION (HCC): ICD-10-CM

## 2020-06-23 DIAGNOSIS — E11.65 TYPE 2 DIABETES MELLITUS WITH HYPERGLYCEMIA, WITH LONG-TERM CURRENT USE OF INSULIN (HCC): Primary | ICD-10-CM

## 2020-06-23 RX ORDER — OMEPRAZOLE 20 MG/1
20 CAPSULE, DELAYED RELEASE ORAL DAILY
Qty: 90 CAP | Refills: 1 | Status: SHIPPED | OUTPATIENT
Start: 2020-06-23 | End: 2020-08-24 | Stop reason: SDUPTHER

## 2020-06-23 RX ORDER — INSULIN ASPART 100 [IU]/ML
20 INJECTION, SUSPENSION SUBCUTANEOUS
Qty: 5 PEN | Refills: 5 | Status: SHIPPED | OUTPATIENT
Start: 2020-06-23 | End: 2020-08-24

## 2020-06-23 RX ORDER — BACLOFEN 10 MG/1
10 TABLET ORAL 3 TIMES DAILY
Qty: 60 TAB | Refills: 5 | Status: SHIPPED | OUTPATIENT
Start: 2020-06-23 | End: 2020-12-24 | Stop reason: SDUPTHER

## 2020-06-23 NOTE — PROGRESS NOTES
Chief Complaint   Patient presents with    Tingling     hands and fingers follow up   0/10 pain at this moment     Health Maintenance reviewed. I have reviewed the patient's medical history in detail and updated the computerized patient record. 1. Have you been to the ER, urgent care clinic since your last visit? Hospitalized since your last visit?no    2. Have you seen or consulted any other health care providers outside of the [address] [place] [address] [place] since your last visit? Include any pap smears or colon screening. No    Encouraged pt to discuss pt's wishes with spouse/partner/family and bring them in the next appt to follow thru with the Advanced Directive    Fall Risk Assessment, last 12 mths 2/7/2020   Able to walk? Yes   Fall in past 12 months? Yes   Fall with injury? No   Number of falls in past 12 months 8 or more   Fall Risk Score 8       3 most recent PHQ Screens 6/23/2020   Little interest or pleasure in doing things Several days   Feeling down, depressed, irritable, or hopeless Several days   Total Score PHQ 2 2       Abuse Screening Questionnaire 2/7/2020   Do you ever feel afraid of your partner? N   Are you in a relationship with someone who physically or mentally threatens you? N   Is it safe for you to go home?  Y       ADL Assessment 2/7/2020   Feeding yourself No Help Needed   Getting from bed to chair No Help Needed   Getting dressed No Help Needed   Bathing or showering No Help Needed   Walk across the room (includes cane/walker) No Help Needed   Using the telphone No Help Needed   Taking your medications No Help Needed   Preparing meals No Help Needed   Managing money (expenses/bills) No Help Needed   Moderately strenuous housework (laundry) No Help Needed   Shopping for personal items (toiletries/medicines) No Help Needed   Shopping for groceries No Help Needed   Driving No Help Needed   Climbing a flight of stairs No Help Needed   Getting to places beyond walking distances No Help Needed

## 2020-06-23 NOTE — PROGRESS NOTES
Joanna Leon is a 60 y.o. female who was phone evaluated on 2020. Consent:  She and/or her healthcare decision maker is aware that this patient-initiated Telehealth encounter is a billable service, with coverage as determined by her insurance carrier. She is aware that she may receive a bill and has provided verbal consent to proceed: Yes    I was in the office while conducting this encounter. About 25 min spent with her      Assessment & Plan:       ICD-10-CM ICD-9-CM    1. Type 2 diabetes mellitus with hyperglycemia, with long-term current use of insulin ([facility]) E11.65 250.00 insulin aspart protamine/insulin aspart (NOVOLOG MIX 70/30) 100 unit/mL (70-30) inpn    Z79.4 790.29      V58.67    2. Diabetic polyneuropathy associated with diabetes mellitus due to underlying condition ([facility]) E08.42 249.60 baclofen (LIORESAL) 10 mg tablet     357.2    3. Carpal tunnel syndrome, unspecified laterality G56.00 354.0 AMB SUPPLY ORDER   4. Diabetes mellitus due to underlying condition with diabetic nephropathy, with long-term current use of insulin ([facility]) E08.21 249.40 empagliflozin (JARDIANCE) 10 mg tablet    Z79.4 583.81      V58.67    5. Gastroesophageal reflux disease, esophagitis presence not specified K21.9 530.81 omeprazole (PRILOSEC) 20 mg capsule     Orders Placed This Encounter    AMB SUPPLY ORDER     Carpal tunnel wrist splint Bilateral    insulin aspart protamine/insulin aspart (NOVOLOG MIX 70/30) 100 unit/mL (70-30) inpn     Si Units by SubCUTAneous route ACB/HS. Dispense:  5 Pen     Refill:  5    baclofen (LIORESAL) 10 mg tablet     Sig: Take 1 Tab by mouth three (3) times daily. Dispense:  60 Tab     Refill:  5    empagliflozin (JARDIANCE) 10 mg tablet     Sig: Take 1 Tab by mouth daily. Dispense:  30 Tab     Refill:  5    omeprazole (PRILOSEC) 20 mg capsule     Sig: Take 1 Cap by mouth daily.      Dispense:  90 Cap     Refill:  1     Start CTS if no better will send to ortho/hand  With Dm improvement dec her insulin. May try and wean her off and change her to orals. 712  Subjective:        N/T [place] PM, now more consistent. Bi hands. Sx > 1 mo. Works on computers a lot. Leg/foot not affected. Using a glove, which is helping minimally. No trauma or injury. Her diabetes has been improved lately. She is strictly following the diet. No hypoglycemia.  MIn HB c/o    Lab Results   Component Value Date/Time    Hemoglobin A1c 7.2 (H) 06/12/2020 11:26 AM    Hemoglobin A1c 8.0 (H) 02/24/2020 10:17 AM    Hemoglobin A1c 10.1 (H) 11/06/2019 08:16 AM    Glucose 93 06/12/2020 11:26 AM    Glucose (POC) 146 (H) 02/18/2018 11:22 AM    Microalb/Creat ratio (ug/mg creat.) 82.2 (H) 08/16/2019 09:35 AM    LDL, calculated Comment 06/12/2020 11:26 AM    Creatinine 1.09 (H) 06/12/2020 11:26 AM     Patient Active Problem List   Diagnosis Code    Phantom limb pain ([facility] G54.6    Type 2 diabetes mellitus with hyperglycemia ([facility]) E11.65    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    Essential hypertension I10    Diabetes mellitus due to underlying condition with diabetic nephropathy, with long-term current use of insulin ([facility]) E08.21, Z79.4    Post-menopausal bleeding N95.0    Type 2 diabetes mellitus with diabetic neuropathy ([facility]) E11.40    Severe obesity ([facility]) E66.01    Gout M10.9     Social History     Socioeconomic History    Marital status:      Spouse name: Not on file    Number of children: 5    Years of education: Not on file    Highest education level: Not on file   Occupational History    Occupation: community health worker   Social Needs    Financial resource strain: Not on file    Food insecurity     Worry: Not on file     Inability: Not on file   [employer] needs     Medical: Not on file     Non-medical: Not on file   Tobacco Use    Smoking status: Former Smoker     Packs/day: 0.50     Years: 15.00     Pack years: 7.50     Last attempt to quit: 3/30/2009     Years since quittin.2    Smokeless tobacco: Never Used   Substance and Sexual Activity    Alcohol use: No     Alcohol/week: 0.0 standard drinks    Drug use: No    Sexual activity: Not Currently   Lifestyle    Physical activity     Days per week: Not on file     Minutes per session: Not on file    Stress: Not on file   Relationships    Social connections     Talks on phone: Not on file     Gets together: Not on file     Attends [employer]ism service: Not on file     Active member of club or organization: Not on file     Attends meetings of clubs or organizations: Not on file     Relationship status: Not on file    Intimate partner violence     Fear of current or ex partner: Not on file     Emotionally abused: Not on file     Physically abused: Not on file     Forced sexual activity: Not on file   Other Topics Concern     Service Not Asked    Blood Transfusions Not Asked    Caffeine Concern Not Asked    Occupational Exposure Not Asked    Hobby Hazards Not Asked    Sleep Concern Yes     Comment: Pain, hot flashes    Stress Concern Not Asked    Weight Concern Not Asked    Special Diet Not Asked    Back Care Not Asked    Exercise Not Asked    Bike Helmet Not Asked   [address] Not Asked    Self-Exams Not Asked   Social History Narrative    Working as community health worker. Lives with  only. Continues to work. We discussed the expected course, resolution and complications of the diagnosis(es) in detail. Medication risks, benefits, costs, interactions, and alternatives were discussed as indicated. I advised her to contact the office if her condition worsens, changes or fails to improve as anticipated. She expressed understanding with the diagnosis(es) and plan.      Pursuant to the emergency declaration under the [facility] authority and the [facility] and [employer], this Virtual  Visit was conducted, with patient's consent, to reduce the patient's risk of exposure to COVID-19 and provide continuity of care for an established patient. Services were provided through a video synchronous discussion virtually to substitute for in-person clinic visit.     Osei Kim MD

## 2020-06-29 DIAGNOSIS — J45.909 ASTHMA: ICD-10-CM

## 2020-06-29 RX ORDER — ALBUTEROL SULFATE 90 UG/1
2 AEROSOL, METERED RESPIRATORY (INHALATION)
Qty: 1 INHALER | Refills: 3 | Status: SHIPPED | OUTPATIENT
Start: 2020-06-29 | End: 2022-06-29 | Stop reason: ALTCHOICE

## 2020-07-14 ENCOUNTER — TELEPHONE (OUTPATIENT)
Dept: INTERNAL MEDICINE CLINIC | Age: 65
End: 2020-07-14

## 2020-07-14 DIAGNOSIS — S78.112D TRAUMATIC ABOVE-KNEE AMPUTATION OF LEFT LOWER EXTREMITY, SUBSEQUENT ENCOUNTER (HCC): Primary | Chronic | ICD-10-CM

## 2020-07-14 NOTE — TELEPHONE ENCOUNTER
Pt called in reference to needing an order for a mammo to be sent to [facility]. Seh would also like to be referred to a gastro for a colonoscopy.  Please call her at [phone]

## 2020-07-14 NOTE — TELEPHONE ENCOUNTER
Pt called back in reference to [employer] needing orders for pts supplies needed for her prosthetic leg. She needs it adjusted because she is starting to get sores again. Pt needs a , liner, and socket. Please fax to [phone].

## 2020-07-31 ENCOUNTER — VIRTUAL VISIT (OUTPATIENT)
Dept: INTERNAL MEDICINE CLINIC | Age: 65
End: 2020-07-31

## 2020-07-31 DIAGNOSIS — J45.909 ASTHMA: ICD-10-CM

## 2020-07-31 DIAGNOSIS — J20.9 BRONCHITIS WITH BRONCHOSPASM: Primary | ICD-10-CM

## 2020-07-31 DIAGNOSIS — N39.3 STRESS INCONTINENCE: ICD-10-CM

## 2020-07-31 RX ORDER — PREDNISONE 20 MG/1
40 TABLET ORAL
Qty: 10 TAB | Refills: 0 | Status: SHIPPED | OUTPATIENT
Start: 2020-07-31 | End: 2020-08-05

## 2020-07-31 RX ORDER — BENZONATATE 100 MG/1
100 CAPSULE ORAL
Qty: 42 CAP | Refills: 0 | Status: SHIPPED | OUTPATIENT
Start: 2020-07-31 | End: 2020-08-14

## 2020-07-31 RX ORDER — BUDESONIDE AND FORMOTEROL FUMARATE DIHYDRATE 80; 4.5 UG/1; UG/1
2 AEROSOL RESPIRATORY (INHALATION) 2 TIMES DAILY
Qty: 1 INHALER | Refills: 2 | Status: SHIPPED | OUTPATIENT
Start: 2020-07-31 | End: 2021-03-10 | Stop reason: SDUPTHER

## 2020-07-31 RX ORDER — GUAIFENESIN 600 MG/1
600 TABLET, EXTENDED RELEASE ORAL 2 TIMES DAILY
Qty: 14 TAB | Refills: 0 | Status: SHIPPED | OUTPATIENT
Start: 2020-07-31 | End: 2020-08-07

## 2020-07-31 RX ORDER — AZITHROMYCIN 250 MG/1
250 TABLET, FILM COATED ORAL SEE ADMIN INSTRUCTIONS
Qty: 6 TAB | Refills: 0 | Status: SHIPPED | OUTPATIENT
Start: 2020-07-31 | End: 2020-08-05

## 2020-07-31 NOTE — PROGRESS NOTES
Chief Complaint   Patient presents with    Cough     coughing at night x2 weeks, tickle back of the throat (wetting on herself and making teeth hurt)  pain level 7/10 leg     Health Maintenance reviewed. I have reviewed the patient's medical history in detail and updated the computerized patient record. Patient has not been out of the country in (3-4 months) 90 -120 days, NO diarrhea, NO cough, NO chest conjestion, NO temp. Pt has not been around anyone with these symptoms. 1. Have you been to the ER, urgent care clinic since your last visit? Hospitalized since your last visit?no    2. Have you seen or consulted any other health care providers outside of the [address] [place] [address] since your last visit? Include any pap smears or colon screening. No    Encouraged pt to discuss pt's wishes with spouse/partner/family and bring them in the next appt to follow thru with the Advanced Directive    Fall Risk Assessment, last 12 mths 7/31/2020   Able to walk? Yes   Fall in past 12 months? Yes   Fall with injury? Yes   Number of falls in past 12 months 3   Fall Risk Score 4       3 most recent PHQ Screens 7/31/2020   Little interest or pleasure in doing things Several days   Feeling down, depressed, irritable, or hopeless Several days   Total Score PHQ 2 2       Abuse Screening Questionnaire 7/31/2020   Do you ever feel afraid of your partner? N   Are you in a relationship with someone who physically or mentally threatens you?  N   Is it safe for you to go home? -       ADL Assessment 7/31/2020   Feeding yourself No Help Needed   Getting from bed to chair No Help Needed   Getting dressed No Help Needed   Bathing or showering No Help Needed   Walk across the room (includes cane/walker) No Help Needed   Using the telphone No Help Needed   Taking your medications No Help Needed   Preparing meals No Help Needed   Managing money (expenses/bills) No Help Needed   Moderately strenuous housework (laundry) No Help Needed   Shopping for personal items (toiletries/medicines) No Help Needed   Shopping for groceries No Help Needed   Driving No Help Needed   Climbing a flight of stairs No Help Needed   Getting to places beyond walking distances No Help Needed
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
## 2020-08-04 ENCOUNTER — TELEPHONE (OUTPATIENT)
Dept: INTERNAL MEDICINE CLINIC | Age: 65
End: 2020-08-04

## 2020-08-04 NOTE — TELEPHONE ENCOUNTER
Per Xavier Sparks, informed Kirsty Lindsey (pharmacist) to add additional instructions to hold colchicine until completion of Z-Pac. Understanding vocalized.

## 2020-08-04 NOTE — TELEPHONE ENCOUNTER
----- Message from Keven Mayfield sent at 8/3/2020  9:06 AM EDT -----  Regarding: Dr. Harini Meek: [phone]  Caller's first and last name: Hugh Pena, [address] required yes/no and why: yes  Best contact number(s): [phone]  Details to clarify the request: She is calling in regards to an interaction with the patient's \"Colchicine\" and \"Azithromycin (z-pack)\" medications. She would like a call back as soon as possible.

## 2020-08-24 ENCOUNTER — OFFICE VISIT (OUTPATIENT)
Dept: INTERNAL MEDICINE CLINIC | Age: 65
End: 2020-08-24
Payer: COMMERCIAL

## 2020-08-24 VITALS
WEIGHT: 180 LBS | DIASTOLIC BLOOD PRESSURE: 82 MMHG | HEIGHT: 62 IN | BODY MASS INDEX: 33.13 KG/M2 | OXYGEN SATURATION: 96 % | RESPIRATION RATE: 16 BRPM | SYSTOLIC BLOOD PRESSURE: 132 MMHG | HEART RATE: 95 BPM | TEMPERATURE: 97.8 F

## 2020-08-24 DIAGNOSIS — S78.112D TRAUMATIC ABOVE-KNEE AMPUTATION OF LEFT LOWER EXTREMITY, SUBSEQUENT ENCOUNTER (HCC): Chronic | ICD-10-CM

## 2020-08-24 DIAGNOSIS — E08.21 DIABETES MELLITUS DUE TO UNDERLYING CONDITION WITH DIABETIC NEPHROPATHY, WITH LONG-TERM CURRENT USE OF INSULIN (HCC): ICD-10-CM

## 2020-08-24 DIAGNOSIS — I10 ESSENTIAL HYPERTENSION: ICD-10-CM

## 2020-08-24 DIAGNOSIS — E11.65 TYPE 2 DIABETES MELLITUS WITH HYPERGLYCEMIA, WITH LONG-TERM CURRENT USE OF INSULIN (HCC): ICD-10-CM

## 2020-08-24 DIAGNOSIS — Z79.4 DIABETES MELLITUS DUE TO UNDERLYING CONDITION WITH DIABETIC NEPHROPATHY, WITH LONG-TERM CURRENT USE OF INSULIN (HCC): ICD-10-CM

## 2020-08-24 DIAGNOSIS — Z79.4 TYPE 2 DIABETES MELLITUS WITH DIABETIC NEUROPATHY, WITH LONG-TERM CURRENT USE OF INSULIN (HCC): ICD-10-CM

## 2020-08-24 DIAGNOSIS — M54.50 ACUTE LOW BACK PAIN WITHOUT SCIATICA, UNSPECIFIED BACK PAIN LATERALITY: Primary | ICD-10-CM

## 2020-08-24 DIAGNOSIS — E11.40 TYPE 2 DIABETES MELLITUS WITH DIABETIC NEUROPATHY, WITH LONG-TERM CURRENT USE OF INSULIN (HCC): ICD-10-CM

## 2020-08-24 DIAGNOSIS — Z79.4 TYPE 2 DIABETES MELLITUS WITH HYPERGLYCEMIA, WITH LONG-TERM CURRENT USE OF INSULIN (HCC): ICD-10-CM

## 2020-08-24 DIAGNOSIS — M10.00 IDIOPATHIC GOUT, UNSPECIFIED CHRONICITY, UNSPECIFIED SITE: ICD-10-CM

## 2020-08-24 DIAGNOSIS — G56.03 CARPAL TUNNEL SYNDROME ON BOTH SIDES: ICD-10-CM

## 2020-08-24 DIAGNOSIS — K21.9 GASTROESOPHAGEAL REFLUX DISEASE, ESOPHAGITIS PRESENCE NOT SPECIFIED: ICD-10-CM

## 2020-08-24 LAB
BILIRUB UR QL STRIP: NEGATIVE
GLUCOSE UR-MCNC: NORMAL MG/DL
KETONES P FAST UR STRIP-MCNC: NEGATIVE MG/DL
PH UR STRIP: 6 [PH] (ref 4.6–8)
PROT UR QL STRIP: NORMAL
SP GR UR STRIP: 1.01 (ref 1–1.03)
UA UROBILINOGEN AMB POC: NORMAL (ref 0.2–1)
URINALYSIS CLARITY POC: CLEAR
URINALYSIS COLOR POC: YELLOW
URINE BLOOD POC: NEGATIVE
URINE LEUKOCYTES POC: NEGATIVE
URINE NITRITES POC: NEGATIVE

## 2020-08-24 PROCEDURE — 3051F HG A1C>EQUAL 7.0%<8.0%: CPT | Performed by: FAMILY MEDICINE

## 2020-08-24 PROCEDURE — 99214 OFFICE O/P EST MOD 30 MIN: CPT | Performed by: FAMILY MEDICINE

## 2020-08-24 PROCEDURE — 81003 URINALYSIS AUTO W/O SCOPE: CPT | Performed by: FAMILY MEDICINE

## 2020-08-24 RX ORDER — ALLOPURINOL 300 MG/1
300 TABLET ORAL DAILY
Qty: 90 TAB | Refills: 3 | Status: SHIPPED | OUTPATIENT
Start: 2020-08-24 | End: 2021-06-07 | Stop reason: SDUPTHER

## 2020-08-24 RX ORDER — INSULIN ASPART 100 [IU]/ML
15 INJECTION, SUSPENSION SUBCUTANEOUS
Qty: 5 PEN | Refills: 5 | Status: SHIPPED | OUTPATIENT
Start: 2020-08-24 | End: 2021-03-10

## 2020-08-24 RX ORDER — ASCORBIC ACID 500 MG
TABLET ORAL
COMMUNITY

## 2020-08-24 RX ORDER — OMEPRAZOLE 20 MG/1
20 CAPSULE, DELAYED RELEASE ORAL DAILY
Qty: 90 CAP | Refills: 3 | Status: SHIPPED | OUTPATIENT
Start: 2020-08-24 | End: 2021-06-07 | Stop reason: SDUPTHER

## 2020-08-24 NOTE — PROGRESS NOTES
Chief Complaint   Patient presents with    Diabetes    LOW BACK PAIN     L/flank pain x2 weeks     Health Maintenance reviewed. I have reviewed the patient's medical history in detail and updated the computerized patient record. Patient has not been out of the country in (5-6 months), NO diarrhea, NO cough, NO chest conjestion, NO temp. Pt has not been around anyone with these symptoms. 1. Have you been to the ER, urgent care clinic since your last visit? Hospitalized since your last visit? yes    2. Have you seen or consulted any other health care providers outside of the [address] [place] [address] since your last visit? Include any pap smears or colon screening. Yes      Encouraged pt to discuss pt's wishes with spouse/partner/family and bring them in the next appt to follow thru with the Advanced Directive    Fall Risk Assessment, last 12 mths 8/24/2020   Able to walk? Yes   Fall in past 12 months? Yes   Fall with injury? Yes   Number of falls in past 12 months 8 or more   Fall Risk Score 9       3 most recent PHQ Screens 8/24/2020   Little interest or pleasure in doing things Several days   Feeling down, depressed, irritable, or hopeless Several days   Total Score PHQ 2 2       Abuse Screening Questionnaire 8/24/2020   Do you ever feel afraid of your partner? N   Are you in a relationship with someone who physically or mentally threatens you? N   Is it safe for you to go home?  Y       ADL Assessment 8/24/2020   Feeding yourself No Help Needed   Getting from bed to chair No Help Needed   Getting dressed No Help Needed   Bathing or showering No Help Needed   Walk across the room (includes cane/walker) No Help Needed   Using the telphone No Help Needed   Taking your medications No Help Needed   Preparing meals No Help Needed   Managing money (expenses/bills) No Help Needed   Moderately strenuous housework (laundry) No Help Needed   Shopping for personal items (toiletries/medicines) No Help Needed Shopping for groceries No Help Needed   Driving No Help Needed   Climbing a flight of stairs Help Needed   Getting to places beyond walking distances No Help Needed

## 2020-08-24 NOTE — PROGRESS NOTES
Subjective:     Amisha Marquis is a 60 y.o. female seen for follow-up of diabetes. She has had hypoglycemic attacks. .yes and went as low as 59  Blood sugar control has been usu 140 to 160  She has diabetes, hypertension and hyperlipidemia. Amisha Marquis has the additional concern of back pain and prosthehesis issues, c/o N/T in hands [place] right      Diabetic Review of Systems: no polyuria or polydipsia, no chest pain, dyspnea or TIA's, no numbness, tingling or pain in extremities, has dysesthesias in the feet, hands. Not foot    Allergies   Allergen Reactions    Keflex [Cephalexin] Itching    Lortab [Hydrocodone-Acetaminophen] Hives and Itching     Patient states she can take tylenol #3 without problem. Diet and Lifestyle: follows a diabetic diet regularly, sedentary, nonsmoker. 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  ascorbic acid, vitamin C, (Vitamin C) 500 mg tablet Take  by mouth.  budesonide-formoteroL (SYMBICORT) 80-4.5 mcg/actuation HFAA Take 2 Puffs by inhalation two (2) times a day. Rinse mouth out with warm water after each use.   Indications: controller medication for asthma 1 Inhaler 2    Jardiance 10 mg tablet Take 1 tablet by mouth once daily 30 Tab 5    albuterol (PROVENTIL HFA, VENTOLIN HFA, PROAIR HFA) 90 mcg/actuation inhaler Take 2 Puffs by inhalation every four (4) hours as needed for Wheezing. 1 Inhaler 3    insulin aspart protamine/insulin aspart (NOVOLOG MIX 70/30) 100 unit/mL (70-30) inpn 20 Units by SubCUTAneous route ACB/HS. 5 Pen 5    baclofen (LIORESAL) 10 mg tablet Take 1 Tab by mouth three (3) times daily. 60 Tab 5    omeprazole (PRILOSEC) 20 mg capsule Take 1 Cap by mouth daily. 90 Cap 1    metFORMIN (GLUCOPHAGE) 500 mg tablet Take 1 Tab by mouth two (2) times daily (with meals). 180 Tab 3    simvastatin (ZOCOR) 80 mg tablet Take 1 Tab by mouth nightly. 90 Tab 3    allopurinoL (ZYLOPRIM) 300 mg tablet Take 1 Tab by mouth daily. 90 Tab 3    glucose blood VI test strips (Ascensia CONTOUR) strip USE 1 TEST STRIP EACH TIME TO TEST BLOOD SUGAR UP TO 3 TIMES A  Strip 5    lisinopril (PRINIVIL, ZESTRIL) 10 mg tablet TAKE 1 TABLET BY MOUTH ONCE DAILY 90 Tab 3    acetaminophen (TYLENOL) 500 mg tablet Take 1 Tab by mouth every four (4) hours (while awake). Indications: Pain (Patient taking differently: Take 650 mg by mouth every four (4) hours (while awake). Indications: Pain) 100 Tab 0     Allergies   Allergen Reactions    Keflex [Cephalexin] Itching    Lortab [Hydrocodone-Acetaminophen] Hives and Itching     Patient states she can take tylenol #3 without problem.      Social History     Tobacco Use    Smoking status: Former Smoker     Packs/day: 0.50     Years: 15.00     Pack years: 7.50     Last attempt to quit: 3/30/2009     Years since quittin.4    Smokeless tobacco: Never Used   Substance Use Topics    Alcohol use: No     Alcohol/week: 0.0 standard drinks        Lab Results   Component Value Date/Time    WBC 6.7 2020 10:17 AM    HGB 14.0 2020 10:17 AM    HCT 43.5 2020 10:17 AM    PLATELET 392  10:17 AM    MCV 82 2020 10:17 AM     Lab Results   Component Value Date/Time    Hemoglobin A1c 7.2 (H) 2020 11:26 AM    Hemoglobin A1c 8.0 (H) 02/24/2020 10:17 AM    Hemoglobin A1c 10.1 (H) 11/06/2019 08:16 AM    Glucose 93 06/12/2020 11:26 AM    Glucose (POC) 146 (H) 02/18/2018 11:22 AM    Microalb/Creat ratio (ug/mg creat.) 82.2 (H) 08/16/2019 09:35 AM    LDL, calculated Comment 06/12/2020 11:26 AM    Creatinine 1.09 (H) 06/12/2020 11:26 AM      Lab Results   Component Value Date/Time    Cholesterol, total 110 06/12/2020 11:26 AM    HDL Cholesterol 23 (L) 06/12/2020 11:26 AM    LDL, calculated Comment 06/12/2020 11:26 AM    Triglyceride 433 (H) 06/12/2020 11:26 AM     Lab Results   Component Value Date/Time    ALT (SGPT) 19 04/23/2019 01:41 PM    Alk. phosphatase 92 04/23/2019 01:41 PM    Bilirubin, total 0.2 04/23/2019 01:41 PM    Albumin 4.4 04/23/2019 01:41 PM    Protein, total 6.5 04/23/2019 01:41 PM    INR 1.7 (H) 02/18/2018 02:46 AM    INR POC 1.3 03/05/2018 12:09 PM    Prothrombin time 17.0 (H) 02/18/2018 02:46 AM    PLATELET 489 03/02/4019 10:17 AM     Lab Results   Component Value Date/Time    GFR est non-AA 54 (L) 06/12/2020 11:26 AM    GFR est AA 62 06/12/2020 11:26 AM    Creatinine 1.09 (H) 06/12/2020 11:26 AM    BUN 20 06/12/2020 11:26 AM    Sodium 144 06/12/2020 11:26 AM    Potassium 4.6 06/12/2020 11:26 AM    Chloride 105 06/12/2020 11:26 AM    CO2 22 06/12/2020 11:26 AM     Lab Results   Component Value Date/Time    Glucose 93 06/12/2020 11:26 AM    Glucose (POC) 146 (H) 02/18/2018 11:22 AM         Review of Systems  Pertinent items are noted in HPI. Objective:     Significant for the following:     Visit Vitals  /82 (BP 1 Location: Left arm, BP Patient Position: Sitting)   Pulse 95   Temp 97.8 °F (36.6 °C) (Temporal)   Resp 16   Ht 5' 2\" (1.575 m)   Wt 180 lb (81.6 kg)   LMP 08/23/2003   SpO2 96%   BMI 32.92 kg/m²     Appearance: alert, well appearing, and in no distress.   Exam: heart sounds normal rate, regular rhythm, normal S1, S2, no murmurs, rubs, clicks or gallops, chest clear, no hepatosplenomegaly  Foot exam: def  Right hand + tinnels    Lab review: labs reviewed, I note that glycosylated hemoglobin too low for her. Assessment/Plan:     Follow-up diabetes well controlled but given the hypoglycemia will turn her insulin down a bit. .  Diabetic issues reviewed with her: use and side effects of insulin is taught. Chronic Conditions Addressed Today     1. Diabetes mellitus due to underlying condition with diabetic nephropathy, with long-term current use of insulin (HCC)     Relevant Medications     insulin aspart protamine/insulin aspart (NOVOLOG MIX 70/30) 100 unit/mL (70-30) inpn     Other Relevant Orders     MICROALBUMIN, UR, RAND W/ MICROALB/CREAT RATIO (Completed)    2. Essential hypertension    3. Gout     Relevant Medications     allopurinoL (ZYLOPRIM) 300 mg tablet    4. 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 Type 2 diabetes mellitus with diabetic neuropathy ([facility])     Relevant Medications     insulin aspart protamine/insulin aspart (NOVOLOG MIX 70/30) 100 unit/mL (70-30) inpn    6.  Type 2 diabetes mellitus with hyperglycemia ([facility])     Relevant Medications     insulin aspart protamine/insulin aspart (NOVOLOG MIX 70/30) 100 unit/mL (70-30) inpn      Acute Diagnoses Addressed Today     Acute low back pain without sciatica, unspecified back pain laterality    -  Primary        Relevant Medications        allopurinoL (ZYLOPRIM) 300 mg tablet        Other Relevant Orders        AMB POC URINALYSIS DIP STICK AUTO W/O MICRO (Completed)        AMB SUPPLY ORDER    Carpal tunnel syndrome on both sides            Relevant Orders        AMB SUPPLY ORDER    Gastroesophageal reflux disease, esophagitis presence not specified            Relevant Medications        omeprazole (PRILOSEC) 20 mg capsule        Current Outpatient Medications   Medication Sig Dispense Refill    zinc oxide-cod liver oil (DESITIN) 40 % ointment Apply  to affected area as needed for Skin Irritation.  ascorbic acid, vitamin C, (Vitamin C) 500 mg tablet Take  by mouth.  insulin aspart protamine/insulin aspart (NOVOLOG MIX 70/30) 100 unit/mL (70-30) inpn 15 Units by SubCUTAneous route ACB/HS. 5 Pen 5    omeprazole (PRILOSEC) 20 mg capsule Take 1 Cap by mouth daily. 90 Cap 3    allopurinoL (ZYLOPRIM) 300 mg tablet Take 1 Tab by mouth daily. 90 Tab 3    budesonide-formoteroL (SYMBICORT) 80-4.5 mcg/actuation HFAA Take 2 Puffs by inhalation two (2) times a day. Rinse mouth out with warm water after each use. Indications: controller medication for asthma 1 Inhaler 2    Jardiance 10 mg tablet Take 1 tablet by mouth once daily 30 Tab 5    albuterol (PROVENTIL HFA, VENTOLIN HFA, PROAIR HFA) 90 mcg/actuation inhaler Take 2 Puffs by inhalation every four (4) hours as needed for Wheezing. 1 Inhaler 3    baclofen (LIORESAL) 10 mg tablet Take 1 Tab by mouth three (3) times daily. 60 Tab 5    metFORMIN (GLUCOPHAGE) 500 mg tablet Take 1 Tab by mouth two (2) times daily (with meals). 180 Tab 3    simvastatin (ZOCOR) 80 mg tablet Take 1 Tab by mouth nightly. 90 Tab 3    glucose blood VI test strips (Asc[employer]ia CONTOUR) strip USE 1 TEST STRIP EACH TIME TO TEST BLOOD SUGAR UP TO 3 TIMES A  Strip 5    lisinopril (PRINIVIL, ZESTRIL) 10 mg tablet TAKE 1 TABLET BY MOUTH ONCE DAILY 90 Tab 3    acetaminophen (TYLENOL) 500 mg tablet Take 1 Tab by mouth every four (4) hours (while awake). Indications: Pain (Patient taking differently: Take 650 mg by mouth every four (4) hours (while awake). Indications: Pain) 100 Tab 0       Follow-up and Dispositions    · Return in about 6 weeks (around 10/5/2020) for routine follow up.

## 2020-08-25 LAB
ALBUMIN/CREAT UR: 449 MG/G CREAT (ref 0–29)
CREAT UR-MCNC: 40.7 MG/DL
MICROALBUMIN UR-MCNC: 182.9 UG/ML

## 2020-08-25 NOTE — PROGRESS NOTES
Send normal/stable results letter. Your results are normal/stable. If not signed up, consider getting my chart to get your results on-line. We can help you to sign up.   Continue the lisinopril

## 2020-09-02 ENCOUNTER — VIRTUAL VISIT (OUTPATIENT)
Dept: INTERNAL MEDICINE CLINIC | Age: 65
End: 2020-09-02
Payer: COMMERCIAL

## 2020-09-02 DIAGNOSIS — Z79.4 DIABETES MELLITUS DUE TO UNDERLYING CONDITION WITH DIABETIC NEPHROPATHY, WITH LONG-TERM CURRENT USE OF INSULIN (HCC): ICD-10-CM

## 2020-09-02 DIAGNOSIS — J40 BRONCHITIS: ICD-10-CM

## 2020-09-02 DIAGNOSIS — J45.20 MILD INTERMITTENT ASTHMA, UNSPECIFIED WHETHER COMPLICATED: Primary | ICD-10-CM

## 2020-09-02 DIAGNOSIS — E08.21 DIABETES MELLITUS DUE TO UNDERLYING CONDITION WITH DIABETIC NEPHROPATHY, WITH LONG-TERM CURRENT USE OF INSULIN (HCC): ICD-10-CM

## 2020-09-02 DIAGNOSIS — I10 ESSENTIAL HYPERTENSION: ICD-10-CM

## 2020-09-02 DIAGNOSIS — E66.01 SEVERE OBESITY (HCC): ICD-10-CM

## 2020-09-02 PROBLEM — J45.909 ASTHMA: Status: ACTIVE | Noted: 2020-09-02

## 2020-09-02 PROCEDURE — 3051F HG A1C>EQUAL 7.0%<8.0%: CPT | Performed by: FAMILY MEDICINE

## 2020-09-02 PROCEDURE — 99214 OFFICE O/P EST MOD 30 MIN: CPT | Performed by: FAMILY MEDICINE

## 2020-09-02 RX ORDER — DOXYCYCLINE 100 MG/1
100 TABLET ORAL 2 TIMES DAILY
Qty: 14 TAB | Refills: 0 | Status: SHIPPED | OUTPATIENT
Start: 2020-09-02 | End: 2020-09-16 | Stop reason: ALTCHOICE

## 2020-09-02 NOTE — PROGRESS NOTES
Subjective:   Meghna Boles is a 60 y.o. female who complains of cough described as productive of clear sputum and diarrhea, some dyspnea and wheezing for 4 days, stable since that time. She denies a history of fevers and rash on body loss of taste or smell. Patient has maybe exposures to someone with coronavirus infection. There has been no travel to the infected countries of [place], [place], [place], [place], or [facility], recently. Patient does not live in assisted living or nursing facility. No hypoglycemia  Evaluation to date: none. Treatment to date: OTC products. Patient does not smoke cigarettes. Relevant PMH: Asthma. Lab Results   Component Value Date/Time    Hemoglobin A1c 7.2 (H) 06/12/2020 11:26 AM    Hemoglobin A1c 8.0 (H) 02/24/2020 10:17 AM    Hemoglobin A1c 10.1 (H) 11/06/2019 08:16 AM    Glucose 93 06/12/2020 11:26 AM    Glucose (POC) 146 (H) 02/18/2018 11:22 AM    Microalb/Creat ratio (ug/mg creat.) 449 (H) 08/24/2020 05:05 PM    LDL, calculated Comment 06/12/2020 11:26 AM    Creatinine 1.09 (H) 06/12/2020 11:26 AM       Allergies   Allergen Reactions    Keflex [Cephalexin] Itching    Lortab [Hydrocodone-Acetaminophen] Hives and Itching     Patient states she can take tylenol #3 without problem.          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 14 Tab 0    zinc oxide-cod liver oil (DESITIN) 40 % ointment Apply  to affected area as needed for Skin Irritation.  ascorbic acid, vitamin C, (Vitamin C) 500 mg tablet Take  by mouth.  insulin aspart protamine/insulin aspart (NOVOLOG MIX 70/30) 100 unit/mL (70-30) inpn 15 Units by SubCUTAneous route ACB/HS. 5 Pen 5    omeprazole (PRILOSEC) 20 mg capsule Take 1 Cap by mouth daily. 90 Cap 3    allopurinoL (ZYLOPRIM) 300 mg tablet Take 1 Tab by mouth daily. 90 Tab 3    budesonide-formoteroL (SYMBICORT) 80-4.5 mcg/actuation HFAA Take 2 Puffs by inhalation two (2) times a day. Rinse mouth out with warm water after each use. Indications: controller medication for asthma 1 Inhaler 2    Jardiance 10 mg tablet Take 1 tablet by mouth once daily 30 Tab 5    albuterol (PROVENTIL HFA, VENTOLIN HFA, PROAIR HFA) 90 mcg/actuation inhaler Take 2 Puffs by inhalation every four (4) hours as needed for Wheezing. 1 Inhaler 3    baclofen (LIORESAL) 10 mg tablet Take 1 Tab by mouth three (3) times daily. 60 Tab 5    metFORMIN (GLUCOPHAGE) 500 mg tablet Take 1 Tab by mouth two (2) times daily (with meals). 180 Tab 3    simvastatin (ZOCOR) 80 mg tablet Take 1 Tab by mouth nightly. 90 Tab 3    glucose blood VI test strips ([employer] CONTOUR) strip USE 1 TEST STRIP EACH TIME TO TEST BLOOD SUGAR UP TO 3 TIMES A  Strip 5    lisinopril (PRINIVIL, ZESTRIL) 10 mg tablet TAKE 1 TABLET BY MOUTH ONCE DAILY 90 Tab 3    acetaminophen (TYLENOL) 500 mg tablet Take 1 Tab by mouth every four (4) hours (while awake). Indications: Pain (Patient taking differently: Take 650 mg by mouth every four (4) hours (while awake). Indications: Pain) 100 Tab 0     Allergies   Allergen Reactions    Keflex [Cephalexin] Itching    Lortab [Hydrocodone-Acetaminophen] Hives and Itching     Patient states she can take tylenol #3 without problem.      Social History     Tobacco Use    Smoking status: Former Smoker     Packs/day: 0.50     Years: 15.00     Pack years: 7.50     Last attempt to quit: 3/30/2009     Years since quittin.4    Smokeless tobacco: Never Used   Substance Use Topics    Alcohol use: No     Alcohol/week: 0.0 standard drinks        Review of Systems  Pertinent items are noted in HPI. Objective:     Visit Vitals  LMP 2003     General:  alert, cooperative, no distress   Eyes: negative   Ears:    Sinuses:    Mouth:     Neck:    Heart:    Lungs:    Abdomen:       Assessment/Plan:   viral upper respiratory illness, sinusitis, nasopharyngitis and asthma  Suggested symptomatic OTC remedies. Antibiotics per orders. RTC prn. Discussed diagnosis and treatment of viral URIs. Discussed the importance of avoiding unnecessary antibiotic therapy. ICD-10-CM ICD-9-CM    1. Mild intermittent asthma, unspecified whether complicated  L78.78 469.41    2. Bronchitis  J40 490    3. Severe obesity ([address]  E66.01 278.01    4. Diabetes mellitus due to underlying condition with diabetic nephropathy, with long-term current use of insulin ([facility])  E08.21 249.40     Z79.4 583.81      V58.67    5. Essential hypertension  I10 401.9      Orders Placed This Encounter    doxycycline (ADOXA) 100 mg tablet     Sig: Take 1 Tab by mouth two (2) times a day. Dispense:  14 Tab     Refill:  0     Recommend she go get tested for Regina disease, she will go to our [facility] to have this done. Quarantine until results are back.       Follow-up PRN

## 2020-09-02 NOTE — PROGRESS NOTES
Chief Complaint   Patient presents with    Cough     follow up     Health Maintenance reviewed. I have reviewed the patient's medical history in detail and updated the computerized patient record. 1. Have you been to the ER, urgent care clinic since your last visit? Hospitalized since your last visit?no    2. Have you seen or consulted any other health care providers outside of the [address] [place] [address] since your last visit? Include any pap smears or colon screening. no        Encouraged pt to discuss pt's wishes with spouse/partner/family and bring them in the next appt to follow thru with the Advanced Directive    Fall Risk Assessment, last 12 mths 9/2/2020   Able to walk? Yes   Fall in past 12 months? Yes   Fall with injury? Yes   Number of falls in past 12 months 8 or more   Fall Risk Score 9       3 most recent PHQ Screens 9/2/2020   Little interest or pleasure in doing things More than half the days   Feeling down, depressed, irritable, or hopeless More than half the days   Total Score PHQ 2 4       Abuse Screening Questionnaire 9/2/2020   Do you ever feel afraid of your partner? N   Are you in a relationship with someone who physically or mentally threatens you? N   Is it safe for you to go home?  Y       ADL Assessment 9/2/2020   Feeding yourself No Help Needed   Getting from bed to chair No Help Needed   Getting dressed No Help Needed   Bathing or showering No Help Needed   Walk across the room (includes cane/walker) No Help Needed   Using the telphone No Help Needed   Taking your medications No Help Needed   Preparing meals No Help Needed   Managing money (expenses/bills) No Help Needed   Moderately strenuous housework (laundry) No Help Needed   Shopping for personal items (toiletries/medicines) No Help Needed   Shopping for groceries No Help Needed   Driving No Help Needed   Climbing a flight of stairs Help Needed   Getting to places beyond walking distances No Help Needed

## 2020-09-03 ENCOUNTER — OFFICE VISIT (OUTPATIENT)
Dept: PRIMARY CARE CLINIC | Age: 65
End: 2020-09-03

## 2020-09-03 ENCOUNTER — TELEPHONE (OUTPATIENT)
Dept: INTERNAL MEDICINE CLINIC | Age: 65
End: 2020-09-03

## 2020-09-03 DIAGNOSIS — Z20.828 EXPOSURE TO SARS-ASSOCIATED CORONAVIRUS: Primary | ICD-10-CM

## 2020-09-03 DIAGNOSIS — R09.89 CHEST RALES: Primary | ICD-10-CM

## 2020-09-03 NOTE — TELEPHONE ENCOUNTER
Pt wants an XR ordered due to crackles R/lung  She can go to [facility] in [place], [place]

## 2020-09-03 NOTE — TELEPHONE ENCOUNTER
Pt called in reference to wanting to get a chest xray done at [facility]. She said the nurse heard some cracklin on her rt side of lung. Please call her at .

## 2020-09-03 NOTE — PROGRESS NOTES
Pt presents to the flu clinic for covid testing. She had a virtual visit with her PCP and was recommended that she get covid tested. Pt declined to see a doctor today.  KT

## 2020-09-05 LAB — SARS-COV-2, NAA: NOT DETECTED

## 2020-09-09 ENCOUNTER — TELEPHONE (OUTPATIENT)
Dept: INTERNAL MEDICINE CLINIC | Age: 65
End: 2020-09-09

## 2020-09-09 DIAGNOSIS — R05.9 COUGH: Primary | ICD-10-CM

## 2020-09-09 NOTE — TELEPHONE ENCOUNTER
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  She said it doesn't matter what hospital. [phone]

## 2020-10-13 ENCOUNTER — TELEPHONE (OUTPATIENT)
Dept: INTERNAL MEDICINE CLINIC | Age: 65
End: 2020-10-13

## 2020-10-13 DIAGNOSIS — R09.82 POSTNASAL DRIP: ICD-10-CM

## 2020-10-13 RX ORDER — BENZONATATE 200 MG/1
200 CAPSULE ORAL
Qty: 90 CAP | Refills: 2 | Status: SHIPPED | OUTPATIENT
Start: 2020-10-13 | End: 2021-03-10 | Stop reason: SDUPTHER

## 2020-11-05 ENCOUNTER — NURSE TRIAGE (OUTPATIENT)
Dept: OTHER | Facility: CLINIC | Age: 65
End: 2020-11-05

## 2020-11-05 NOTE — TELEPHONE ENCOUNTER
Received call from [facility]. Call soft transferred to [facility] to schedule appointment. Attention Provider: Thank you for allowing me to participate in the care of your patient. The  patient was connected to triage in response to information provided to the [facility]. Please do not respond through this encounter as the response is not directed to a shared pool. Reason for Disposition   Taking an ACE Inhibitor medication  (e.g., benazepril/LOTENSIN, captopril/CAPOTEN, enalapril/VASOTEC, lisinopril/ZESTRIL)    Answer Assessment - Initial Assessment Questions  1. ONSET: \"When did the cough begin? \"       10/31/20    2. SEVERITY: \"How bad is the cough today? \"       Constant     3. RESPIRATORY DISTRESS: \"Describe your breathing. \"       Short of breath when coughing     4. FEVER: \"Do you have a fever? \" If so, ask: \"What is your temperature, how was it measured, and when did it start? \"      Denies     5. SPUTUM: \"Describe the color of your sputum\" (e.g., clear, white, yellow, green), \"Has there been any change recently? \"      Clear     6. HEMOPTYSIS: \"Are you coughing up any blood? \" If so ask: \"How much, flecks, streaks, tablespoons, etc.?\"      Denies     7. CARDIAC HISTORY: \"Do you have any history of heart disease? \" (e.g., heart attack, congestive heart failure)       Htn,     8. LUNG HISTORY: \"Do you have any history of lung disease? \"  (e.g., pulmonary embolus, asthma, emphysema/COPD)      Asthma     9. OTHER SYMPTOMS: \"Do you have any other symptoms? (e.g., runny nose, wheezing, chest pain)      Runny nose, sob with coughing     10. PREGNANCY: \"Is there any chance you are pregnant? \" \"When was your last menstrual period? \"        N/a     11. TRAVEL: \"Have you traveled out of the country in the last month? \" (e.g., travel history, exposures)        Denies    Protocols used: COUGH - CHRONIC-ADULT-AH

## 2020-11-09 ENCOUNTER — TELEPHONE (OUTPATIENT)
Dept: INTERNAL MEDICINE CLINIC | Age: 65
End: 2020-11-09

## 2020-11-09 ENCOUNTER — VIRTUAL VISIT (OUTPATIENT)
Dept: INTERNAL MEDICINE CLINIC | Age: 65
End: 2020-11-09
Payer: COMMERCIAL

## 2020-11-09 DIAGNOSIS — J45.20 INTERMITTENT ASTHMA WITHOUT COMPLICATION, UNSPECIFIED ASTHMA SEVERITY: Primary | ICD-10-CM

## 2020-11-09 DIAGNOSIS — I10 ESSENTIAL HYPERTENSION: ICD-10-CM

## 2020-11-09 DIAGNOSIS — R09.82 POSTNASAL DRIP: ICD-10-CM

## 2020-11-09 DIAGNOSIS — Z79.4 TYPE 2 DIABETES MELLITUS WITH HYPERGLYCEMIA, WITH LONG-TERM CURRENT USE OF INSULIN (HCC): ICD-10-CM

## 2020-11-09 DIAGNOSIS — E11.65 TYPE 2 DIABETES MELLITUS WITH HYPERGLYCEMIA, WITH LONG-TERM CURRENT USE OF INSULIN (HCC): ICD-10-CM

## 2020-11-09 PROCEDURE — 3051F HG A1C>EQUAL 7.0%<8.0%: CPT | Performed by: FAMILY MEDICINE

## 2020-11-09 PROCEDURE — 99214 OFFICE O/P EST MOD 30 MIN: CPT | Performed by: FAMILY MEDICINE

## 2020-11-09 RX ORDER — AZITHROMYCIN 250 MG/1
250 TABLET, FILM COATED ORAL SEE ADMIN INSTRUCTIONS
Qty: 6 TAB | Refills: 0 | Status: SHIPPED | OUTPATIENT
Start: 2020-11-09 | End: 2021-02-10 | Stop reason: ALTCHOICE

## 2020-11-09 NOTE — PROGRESS NOTES
Subjective:   Beth Maher is a 60 y.o. female who complains of congestion, post nasal drip and cough described as productive of clear sputum for 14 days, stable since that time. Thinks it is not getting better. She denies a history of fevers, shortness of breath and wheezing. Patient has no known exposures to anyone with coronavirus infection. There has been no travel to the infected countries of [place], [place], [place], [place], or [facility], recently. Patient does not live in assisted living or nursing facility. She has been tested for Covid x2 -  Evaluation to date: seen previously and thought to have a viral URI. And/or allergy  Treatment to date: ipratropium. Patient does not smoke cigarettes. Relevant PMH: Asthma. She has some diabetes. No hypoglycemia  Lab Results   Component Value Date/Time    Hemoglobin A1c 7.9 (H) 09/16/2020 02:51 PM    Hemoglobin A1c 7.2 (H) 06/12/2020 11:26 AM    Hemoglobin A1c 8.0 (H) 02/24/2020 10:17 AM    Glucose 203 (H) 09/16/2020 02:51 PM    Glucose (POC) 146 (H) 02/18/2018 11:22 AM    Microalb/Creat ratio (ug/mg creat.) 449 (H) 08/24/2020 05:05 PM    LDL, calculated Comment 06/12/2020 11:26 AM    LDL Chol Calc ([facility]) 15 09/16/2020 02:51 PM    Creatinine 1.17 (H) 09/16/2020 02:51 PM         Allergies   Allergen Reactions    Hydrocodone-Acetaminophen Hives, Itching and Nausea Only     Patient states she can take tylenol #3 without problem.    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 Objective:     Visit Vitals  [facility] 2003     General:  alert, cooperative, no distress   Eyes: negative   Ears:    Sinuses:    Mouth:     Neck:    Heart:    Lungs:    Abdomen:       Assessment/Plan:   viral upper respiratory illness and asthma  Antibiotics per orders. RTC prn. Encounter Diagnoses   Name Primary?  Intermittent asthma without complication, unspecified asthma severity Yes    Essential hypertension     Postnasal drip     Type 2 diabetes mellitus with hyperglycemia, with long-term current use of insulin ([facility])      Orders Placed This Encounter    azithromycin (ZITHROMAX) 250 mg tablet   .     f/u prn  F/U prn

## 2020-11-09 NOTE — PROGRESS NOTES
Chief Complaint   Patient presents with    Cough     Health Maintenance reviewed. I have reviewed the patient's medical history in detail and updated the computerized patient record. Patient has not been out of the country in (6-7 months), NO diarrhea, NO cough, NO chest conjestion, NO temp. Pt has not been around anyone with these symptoms. 1. Have you been to the ER, urgent care clinic since your last visit? Hospitalized since your last visit?no    2. Have you seen or consulted any other health care providers outside of the [address] [place] [address] since your last visit? Include any pap smears or colon screening. No      Encouraged pt to discuss pt's wishes with spouse/partner/family and bring them in the next appt to follow thru with the Advanced Directive    Fall Risk Assessment, last 12 mths 11/9/2020   Able to walk? Yes   Fall in past 12 months? No   Fall with injury? -   Number of falls in past 12 months -   Fall Risk Score -       3 most recent PHQ Screens 11/9/2020   Little interest or pleasure in doing things More than half the days   Feeling down, depressed, irritable, or hopeless More than half the days   Total Score PHQ 2 4       Abuse Screening Questionnaire 11/9/2020   Do you ever feel afraid of your partner? N   Are you in a relationship with someone who physically or mentally threatens you? N   Is it safe for you to go home?  Y       ADL Assessment 11/9/2020   Feeding yourself No Help Needed   Getting from bed to chair No Help Needed   Getting dressed No Help Needed   Bathing or showering No Help Needed   Walk across the room (includes cane/walker) No Help Needed   Using the telphone No Help Needed   Taking your medications No Help Needed   Preparing meals No Help Needed   Managing money (expenses/bills) No Help Needed   Moderately strenuous housework (laundry) No Help Needed   Shopping for personal items (toiletries/medicines) No Help Needed   Shopping for groceries No Help Needed Driving No Help Needed   Climbing a flight of stairs No Help Needed   Getting to places beyond walking distances No Help Needed

## 2020-11-17 ENCOUNTER — TELEPHONE (OUTPATIENT)
Dept: INTERNAL MEDICINE CLINIC | Age: 65
End: 2020-11-17

## 2020-11-17 DIAGNOSIS — S78.112S TRAUMATIC ABOVE-KNEE AMPUTATION OF LEFT LOWER EXTREMITY, SEQUELA (HCC): Primary | Chronic | ICD-10-CM

## 2020-11-17 NOTE — TELEPHONE ENCOUNTER
Patient says her order ends on November 28, so she needs new phys therapy order sent to [facility] in [facility].  She can be reached at [phone]

## 2020-12-24 DIAGNOSIS — E08.42 DIABETIC POLYNEUROPATHY ASSOCIATED WITH DIABETES MELLITUS DUE TO UNDERLYING CONDITION (HCC): ICD-10-CM

## 2020-12-25 RX ORDER — BACLOFEN 10 MG/1
10 TABLET ORAL 3 TIMES DAILY
Qty: 60 TAB | Refills: 5 | Status: SHIPPED | OUTPATIENT
Start: 2020-12-25 | End: 2021-06-07 | Stop reason: SDUPTHER

## 2020-12-28 ENCOUNTER — OFFICE VISIT (OUTPATIENT)
Dept: PRIMARY CARE CLINIC | Age: 65
End: 2020-12-28

## 2020-12-28 DIAGNOSIS — Z20.822 ENCOUNTER FOR LABORATORY TESTING FOR COVID-19 VIRUS: Primary | ICD-10-CM

## 2020-12-28 NOTE — PROGRESS NOTES
Pt presents to the flu clinic today requesting a covid test. Pt denies symptoms but needs to be screened d/t travel. Patient declined to be seen by a doctor today.  SHIRLENE

## 2020-12-30 LAB — SARS-COV-2, NAA: NOT DETECTED

## 2021-01-04 DIAGNOSIS — Z79.4 DIABETES MELLITUS DUE TO UNDERLYING CONDITION WITH DIABETIC NEPHROPATHY, WITH LONG-TERM CURRENT USE OF INSULIN (HCC): ICD-10-CM

## 2021-01-04 DIAGNOSIS — E08.21 DIABETES MELLITUS DUE TO UNDERLYING CONDITION WITH DIABETIC NEPHROPATHY, WITH LONG-TERM CURRENT USE OF INSULIN (HCC): ICD-10-CM

## 2021-01-19 ENCOUNTER — TELEPHONE (OUTPATIENT)
Dept: INTERNAL MEDICINE CLINIC | Age: 66
End: 2021-01-19

## 2021-01-19 NOTE — TELEPHONE ENCOUNTER
[address] [facility] [place]   Phone Number:  [phone] (Call me)             General Message/Vendor Calls     Caller's first and last name:pt       Reason for call:pt update/ cough       Callback required yes/no and why:ys to discuss next steps/ med call in       Best contact number(s):[phone]       Details to clarify the request:pt has received first dose of Moderna COVID vaccine on 1/7, ever since then pt is having a persistent cough, OTC meds are not helping, please advise       [employer]

## 2021-01-19 NOTE — TELEPHONE ENCOUNTER
Called cough x 1 week, try honey and if no better, call for a VV. No fever  Patient has no known exposures to anyone with coronavirus infection. There has been no travel to the infected countries of [place], [place], [place], [place], or [facility], recently. Patient does not live in assisted living or nursing facility.

## 2021-02-02 ENCOUNTER — TELEPHONE (OUTPATIENT)
Dept: INTERNAL MEDICINE CLINIC | Age: 66
End: 2021-02-02

## 2021-02-02 NOTE — TELEPHONE ENCOUNTER
2/2/21 Plan called @ [phone] PA completed for Contour test strips with PA rep Brandie LABOY .Ref # [id number]. Claim, pending review. Outcome will be fax to office within 72 hours. Please follow up as needed.

## 2021-02-03 ENCOUNTER — TELEPHONE (OUTPATIENT)
Dept: INTERNAL MEDICINE CLINIC | Age: 66
End: 2021-02-03

## 2021-02-03 DIAGNOSIS — S78.112S TRAUMATIC ABOVE-KNEE AMPUTATION OF LEFT LOWER EXTREMITY, SEQUELA (HCC): Primary | Chronic | ICD-10-CM

## 2021-02-03 NOTE — TELEPHONE ENCOUNTER
Message fr Lydia Hightower; forwarding to nurse pool        General Message/Vendor Calls     Caller's first and last name: n/a     Reason for call: needs a script sent over to her insurance for liners for her L leg prothesis     Callback required yes/no and why: yes     Best contact number(s):[phone]     Details to clarify the request: Georgia Akbar

## 2021-02-09 ENCOUNTER — NURSE TRIAGE (OUTPATIENT)
Dept: OTHER | Facility: CLINIC | Age: 66
End: 2021-02-09

## 2021-02-09 NOTE — TELEPHONE ENCOUNTER
Reason for Disposition   Information only question and nurse able to answer    Answer Assessment - Initial Assessment Questions  1. REASON FOR CALL or QUESTION: \"What is your reason for calling today? \" or \"How can I best help you? \" or \"What question do you have that I can help answer? \"       calling to make an appointment for his wife for cough x 2 weeks, patient is at work and is not present for me to complete triage. Transferred back to [facility] for scheduling. Protocols used: INFORMATION ONLY CALL - NO TRIAGE-ADULT-[place]    Patient called [employer] with red flag complaint. Call received from patient's . Brief description of triage: No triage completed because patient is not there. Triage indicates for patient to N/A    Care advice provided, patient verbalizes understanding; denies any other questions or concerns; instructed to call back for any new or worsening symptoms. Writer provided warm transfer to Maryam at [facility] for appointment scheduling. Attention Provider: Thank you for allowing me to participate in the care of your patient. The patient was connected to triage in response to information from calling the [employer]. Please do not respond through this encounter as the response is not directed to a shared pool.

## 2021-02-10 ENCOUNTER — TELEPHONE (OUTPATIENT)
Dept: INTERNAL MEDICINE CLINIC | Age: 66
End: 2021-02-10

## 2021-02-10 ENCOUNTER — VIRTUAL VISIT (OUTPATIENT)
Dept: INTERNAL MEDICINE CLINIC | Age: 66
End: 2021-02-10
Payer: COMMERCIAL

## 2021-02-10 DIAGNOSIS — J18.9 ATYPICAL PNEUMONIA: Primary | ICD-10-CM

## 2021-02-10 DIAGNOSIS — Z79.4 DIABETES MELLITUS DUE TO UNDERLYING CONDITION WITH DIABETIC NEPHROPATHY, WITH LONG-TERM CURRENT USE OF INSULIN (HCC): ICD-10-CM

## 2021-02-10 DIAGNOSIS — J45.40 MODERATE PERSISTENT ASTHMA, UNSPECIFIED WHETHER COMPLICATED: ICD-10-CM

## 2021-02-10 DIAGNOSIS — E08.21 DIABETES MELLITUS DUE TO UNDERLYING CONDITION WITH DIABETIC NEPHROPATHY, WITH LONG-TERM CURRENT USE OF INSULIN (HCC): ICD-10-CM

## 2021-02-10 DIAGNOSIS — I10 ESSENTIAL HYPERTENSION: ICD-10-CM

## 2021-02-10 PROCEDURE — 99214 OFFICE O/P EST MOD 30 MIN: CPT | Performed by: FAMILY MEDICINE

## 2021-02-10 RX ORDER — AZITHROMYCIN 250 MG/1
250 TABLET, FILM COATED ORAL SEE ADMIN INSTRUCTIONS
Qty: 6 TAB | Refills: 0 | Status: SHIPPED | OUTPATIENT
Start: 2021-02-10 | End: 2021-03-10 | Stop reason: ALTCHOICE

## 2021-02-10 RX ORDER — PREDNISONE 10 MG/1
TABLET ORAL
Qty: 20 TAB | Refills: 0 | Status: SHIPPED | OUTPATIENT
Start: 2021-02-10 | End: 2021-03-10 | Stop reason: ALTCHOICE

## 2021-02-10 RX ORDER — CODEINE PHOSPHATE AND GUAIFENESIN 10; 100 MG/5ML; MG/5ML
5 SOLUTION ORAL
Qty: 120 ML | Refills: 0 | Status: SHIPPED | OUTPATIENT
Start: 2021-02-10 | End: 2021-02-15

## 2021-02-10 NOTE — PROGRESS NOTES
Chief Complaint   Patient presents with    Cough     Health Maintenance reviewed. I have reviewed the patient's medical history in detail and updated the computerized patient record. Patient has not been out of the country in (12 months), NO diarrhea, NO cough, NO chest conjestion, NO temp. Pt has not been around anyone with these symptoms. 1. Have you been to the ER, urgent care clinic since your last visit? Hospitalized since your last visit?no    2. Have you seen or consulted any other health care providers outside of the [address] [place] [address] since your last visit? Include any pap smears or colon screening. no      Encouraged pt to discuss pt's wishes with spouse/partner/family and bring them in the next appt to follow thru with the Advanced Directive    Fall Risk Assessment, last 12 mths 2/10/2021   Able to walk? Yes   Fall in past 12 months? 1   Do you feel unsteady? 1   Are you worried about falling 1   Is the gait abnormal? 1   Number of falls in past 12 months 2   Fall with injury? 1       3 most recent PHQ Screens 2/10/2021   Little interest or pleasure in doing things More than half the days   Feeling down, depressed, irritable, or hopeless More than half the days   Total Score PHQ 2 4       Abuse Screening Questionnaire 2/10/2021   Do you ever feel afraid of your partner? N   Are you in a relationship with someone who physically or mentally threatens you? N   Is it safe for you to go home?  Y       ADL Assessment 2/10/2021   Feeding yourself No Help Needed   Getting from bed to chair No Help Needed   Getting dressed No Help Needed   Bathing or showering No Help Needed   Walk across the room (includes cane/walker) Help Needed   Using the telphone No Help Needed   Taking your medications No Help Needed   Preparing meals No Help Needed   Managing money (expenses/bills) No Help Needed   Moderately strenuous housework (laundry) No Help Needed   Shopping for personal items (toiletries/medicines) No Help Needed   Shopping for groceries No Help Needed   Driving No Help Needed   Climbing a flight of stairs Help Needed   Getting to places beyond walking distances No Help Needed none

## 2021-02-10 NOTE — TELEPHONE ENCOUNTER
Message fr envera        General Message/Vendor Calls     Caller's first and last name: Chato Casey, Pharmacist at [address] in [place]     Reason for call: States he needs to speak with nurse in regards to a drug interaction     Callback required yes/no and why: yes, to discuss     Best contact number(s): [phone]     Details to clarify the request: Would like a call back today for pt's rx     Judy Scott

## 2021-02-10 NOTE — PROGRESS NOTES
Subjective:     Chance Andrews is a 60 y.o. female seen for follow-up of diabetes. And URI Sx since Jan  She has had hypoglycemic attacks. .no  Blood sugar control has been good < 200    Lab Results   Component Value Date/Time    Hemoglobin A1c 7.9 (H) 09/16/2020 02:51 PM    Hemoglobin A1c 7.2 (H) 06/12/2020 11:26 AM    Hemoglobin A1c 8.0 (H) 02/24/2020 10:17 AM    Glucose 203 (H) 09/16/2020 02:51 PM    Glucose (POC) 146 (H) 02/18/2018 11:22 AM    Microalb/Creat ratio (ug/mg creat.) 449 (H) 08/24/2020 05:05 PM    LDL, calculated 15 09/16/2020 02:51 PM    LDL, calculated Comment 06/12/2020 11:26 AM    Creatinine 1.17 (H) 09/16/2020 02:51 PM       She has diabetes, hypertension and hyperlipidemia. Chance Andrews has the additional concern of cough again, Coughing x 6 days worse  Got 2nd covid vaccine 6 days ago, coughing since Jan.off on  No fever, coughing cl. No ER or UC  Cough syrup and honey. Some wheezing, no ear or throat pain. Reports taking blood pressure medications without side affects. No complaints of exertional chest pain, excessive shortness of breath or focal weakness. Minimal swelling in lower legs or dizziness with standing. Diet and Lifestyle: nonsmoker. 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  Keflex [Cephalexin] Itching     Social History     Tobacco Use    Smoking status: Former Smoker     Packs/day: 0.50     Years: 15.00     Pack years: 7.50     Quit date: 3/30/2009     Years since quittin.8    Smokeless tobacco: Never Used   Substance Use Topics    Alcohol use: No     Alcohol/week: 0.0 standard drinks             Review of Systems  Pertinent items are noted in HPI. Objective:     Significant for the following: hoarse  Visit Vitals  LMP 2003     WD WN female NAD      Lab review: labs reviewed, I note that glycosylated hemoglobin mildly abnormal but acceptable. Assessment/Plan:     Follow-up diabetes stable. Diabetic issues reviewed with her: labs immediately prior to next visit. Chronic Conditions Addressed Today     1. Asthma     Relevant Medications     azithromycin (ZITHROMAX) 250 mg tablet     predniSONE (DELTASONE) 10 mg tablet    2. Diabetes mellitus due to underlying condition with diabetic nephropathy, with long-term current use of insulin (HCC)     Relevant Medications     predniSONE (DELTASONE) 10 mg tablet     Other Relevant Orders     HEMOGLOBIN A1C WITH EAG    3. Essential hypertension     Relevant Orders     METABOLIC PANEL, BASIC      Acute Diagnoses Addressed Today     Atypical pneumonia    -  Primary        Relevant Medications        azithromycin (ZITHROMAX) 250 mg tablet        predniSONE (DELTASONE) 10 mg tablet        guaiFENesin-codeine (ROBITUSSIN AC) 100-10 mg/5 mL solution        Orders Placed This Encounter    METABOLIC PANEL, BASIC     Standing Status:   Future     Standing Expiration Date:   2021    HEMOGLOBIN A1C WITH EAG     Standing Status:   Future     Standing Expiration Date:   8/10/2021    azithromycin (ZITHROMAX) 250 mg tablet     Sig: Take 1 Tab by mouth See Admin Instructions.  Take two tablets today then one tablet daily for next 4 days     Dispense:  6 Tab     Refill:  0    predniSONE (DELTASONE) 10 mg tablet     Si tabs for 2 day, 3 tabs for 2 days, 2 tabs for 2 days, 1tab for 2 days     Dispense:  20 Tab     Refill:  0    guaiFENesin-codeine (ROBITUSSIN AC) 100-10 mg/5 mL solution     Sig: Take 5 mL by mouth three (3) times daily as needed for Cough for up to 5 days. Max Daily Amount: 15 mL.      Dispense:  120 mL     Refill:  0       f/up 1mo

## 2021-02-10 NOTE — TELEPHONE ENCOUNTER
Called pharmacist - she states that there is a drug interaction listed between Colcrys and Chika Born - increased renal toxicity - ok to fill?  - discussed with Dr Karen Hou - patient is not currently taking the Colcrys because she is not having pain, but advised that she should take one or the other , not both together - pharmacist will make patient aware - okay to fill prescription for Zpack per Dr Sandor Burrows, MANNIE  5/36/5381  1:71 PM

## 2021-02-16 ENCOUNTER — TELEPHONE (OUTPATIENT)
Dept: INTERNAL MEDICINE CLINIC | Age: 66
End: 2021-02-16

## 2021-02-16 DIAGNOSIS — S78.112D TRAUMATIC ABOVE-KNEE AMPUTATION OF LEFT LOWER EXTREMITY, SUBSEQUENT ENCOUNTER (HCC): Primary | Chronic | ICD-10-CM

## 2021-02-16 NOTE — TELEPHONE ENCOUNTER
----- Message from Adolph Yusuf sent at 2/16/2021  3:36 PM EST -----  Regarding: Dr. Romie Kee: [phone]  General Message/Vendor Calls    Caller's first and last name: Pt.      Reason for call: Needs new Rx for liner, as it does not fit well now. Callback required yes/no and why: Yes, confirm Rx sent. Best contact number(s): [phone]      Details to clarify the request: Needs Rx faxed for prosthesis, pt has lost some weight and needs new Rx to file for new liner on insurance. [employer] Fax: [phone].       Adolph Yusuf

## 2021-02-19 NOTE — TELEPHONE ENCOUNTER
Sent to Dr. Sahara Chen - Please give an order for a new prothesis, pt has lost weight and needs a RX for a new liner

## 2021-02-22 ENCOUNTER — TELEPHONE (OUTPATIENT)
Dept: INTERNAL MEDICINE CLINIC | Age: 66
End: 2021-02-22

## 2021-03-04 LAB
BUN SERPL-MCNC: 26 MG/DL (ref 8–27)
BUN/CREAT SERPL: 20 (ref 12–28)
CALCIUM SERPL-MCNC: 10.1 MG/DL (ref 8.7–10.3)
CHLORIDE SERPL-SCNC: 103 MMOL/L (ref 96–106)
CO2 SERPL-SCNC: 24 MMOL/L (ref 20–29)
CREAT SERPL-MCNC: 1.27 MG/DL (ref 0.57–1)
EST. AVERAGE GLUCOSE BLD GHB EST-MCNC: 194 MG/DL
GLUCOSE SERPL-MCNC: 108 MG/DL (ref 65–99)
HBA1C MFR BLD: 8.4 % (ref 4.8–5.6)
INTERPRETATION: NORMAL
Lab: NORMAL
POTASSIUM SERPL-SCNC: 4.6 MMOL/L (ref 3.5–5.2)
SODIUM SERPL-SCNC: 141 MMOL/L (ref 134–144)

## 2021-03-10 ENCOUNTER — VIRTUAL VISIT (OUTPATIENT)
Dept: INTERNAL MEDICINE CLINIC | Age: 66
End: 2021-03-10
Payer: COMMERCIAL

## 2021-03-10 DIAGNOSIS — T46.4X5A COUGH DUE TO ACE INHIBITOR: ICD-10-CM

## 2021-03-10 DIAGNOSIS — R09.82 POSTNASAL DRIP: ICD-10-CM

## 2021-03-10 DIAGNOSIS — J20.9 BRONCHITIS WITH BRONCHOSPASM: ICD-10-CM

## 2021-03-10 DIAGNOSIS — I10 ESSENTIAL HYPERTENSION: Primary | ICD-10-CM

## 2021-03-10 DIAGNOSIS — E78.2 MIXED DYSLIPIDEMIA: ICD-10-CM

## 2021-03-10 DIAGNOSIS — Z79.4 TYPE 2 DIABETES MELLITUS WITH DIABETIC NEUROPATHY, WITH LONG-TERM CURRENT USE OF INSULIN (HCC): ICD-10-CM

## 2021-03-10 DIAGNOSIS — E11.40 TYPE 2 DIABETES MELLITUS WITH DIABETIC NEUROPATHY, WITH LONG-TERM CURRENT USE OF INSULIN (HCC): ICD-10-CM

## 2021-03-10 DIAGNOSIS — J45.20 MILD INTERMITTENT ASTHMA WITHOUT COMPLICATION: ICD-10-CM

## 2021-03-10 DIAGNOSIS — Z79.4 TYPE 2 DIABETES MELLITUS WITH HYPERGLYCEMIA, WITH LONG-TERM CURRENT USE OF INSULIN (HCC): ICD-10-CM

## 2021-03-10 DIAGNOSIS — R00.0 TACHYCARDIA: ICD-10-CM

## 2021-03-10 DIAGNOSIS — Z79.4 DIABETES MELLITUS DUE TO UNDERLYING CONDITION WITH DIABETIC NEPHROPATHY, WITH LONG-TERM CURRENT USE OF INSULIN (HCC): ICD-10-CM

## 2021-03-10 DIAGNOSIS — R06.09 DYSPNEA ON EXERTION: ICD-10-CM

## 2021-03-10 DIAGNOSIS — E08.21 DIABETES MELLITUS DUE TO UNDERLYING CONDITION WITH DIABETIC NEPHROPATHY, WITH LONG-TERM CURRENT USE OF INSULIN (HCC): ICD-10-CM

## 2021-03-10 DIAGNOSIS — R05.8 COUGH DUE TO ACE INHIBITOR: ICD-10-CM

## 2021-03-10 DIAGNOSIS — R05.9 COUGH: ICD-10-CM

## 2021-03-10 DIAGNOSIS — E11.65 TYPE 2 DIABETES MELLITUS WITH HYPERGLYCEMIA, WITH LONG-TERM CURRENT USE OF INSULIN (HCC): ICD-10-CM

## 2021-03-10 DIAGNOSIS — S78.112D TRAUMATIC ABOVE-KNEE AMPUTATION OF LEFT LOWER EXTREMITY, SUBSEQUENT ENCOUNTER (HCC): Chronic | ICD-10-CM

## 2021-03-10 PROBLEM — N18.30 CRI (CHRONIC RENAL INSUFFICIENCY), STAGE 3 (MODERATE) (HCC): Status: ACTIVE | Noted: 2021-03-10

## 2021-03-10 PROCEDURE — 3052F HG A1C>EQUAL 8.0%<EQUAL 9.0%: CPT | Performed by: FAMILY MEDICINE

## 2021-03-10 PROCEDURE — 99214 OFFICE O/P EST MOD 30 MIN: CPT | Performed by: FAMILY MEDICINE

## 2021-03-10 RX ORDER — FENOFIBRATE 145 MG/1
145 TABLET, COATED ORAL DAILY
Qty: 90 TAB | Refills: 3 | Status: SHIPPED | OUTPATIENT
Start: 2021-03-10 | End: 2022-02-16 | Stop reason: SDUPTHER

## 2021-03-10 RX ORDER — INSULIN ASPART 100 [IU]/ML
15 INJECTION, SUSPENSION SUBCUTANEOUS
Qty: 5 PEN | Refills: 5 | Status: SHIPPED | OUTPATIENT
Start: 2021-03-10 | End: 2021-05-12 | Stop reason: SDUPTHER

## 2021-03-10 RX ORDER — INSULIN PUMP SYRINGE, 3 ML
EACH MISCELLANEOUS
Qty: 1 KIT | Refills: 0 | Status: SHIPPED | OUTPATIENT
Start: 2021-03-10 | End: 2021-03-14 | Stop reason: SDUPTHER

## 2021-03-10 RX ORDER — CETIRIZINE HCL 10 MG
10 TABLET ORAL DAILY
Qty: 90 TAB | Refills: 3 | Status: SHIPPED | OUTPATIENT
Start: 2021-03-10 | End: 2022-06-29 | Stop reason: SDUPTHER

## 2021-03-10 RX ORDER — BENZONATATE 200 MG/1
200 CAPSULE ORAL
Qty: 30 CAP | Refills: 1 | Status: SHIPPED | OUTPATIENT
Start: 2021-03-10 | End: 2021-04-02 | Stop reason: SDUPTHER

## 2021-03-10 RX ORDER — BUDESONIDE AND FORMOTEROL FUMARATE DIHYDRATE 80; 4.5 UG/1; UG/1
2 AEROSOL RESPIRATORY (INHALATION) 2 TIMES DAILY
Qty: 1 INHALER | Refills: 5 | Status: SHIPPED | OUTPATIENT
Start: 2021-03-10 | End: 2022-02-16 | Stop reason: ALTCHOICE

## 2021-03-10 RX ORDER — LOSARTAN POTASSIUM 100 MG/1
100 TABLET ORAL DAILY
Qty: 90 TAB | Refills: 3 | Status: SHIPPED | OUTPATIENT
Start: 2021-03-10 | End: 2021-06-07 | Stop reason: ALTCHOICE

## 2021-03-10 NOTE — PROGRESS NOTES
Chief Complaint   Patient presents with    Shoulder Pain     R/shoulder pain                    BP as per patient is 132/81  -  84    Diabetes    Cough       Health Maintenance reviewed. I have reviewed the patient's medical history in detail and updated the computerized patient record. Patient is aware that this is a Virtual Visit or Phone Call Only doctor's visit. Patient has not been out of the country in (12 months), NO diarrhea, NO cough, NO chest conjestion, NO temp. Pt has not been around anyone with these symptoms. 1. Have you been to the ER, urgent care clinic since your last visit? Hospitalized since your last visit?no    2. Have you seen or consulted any other health care providers outside of the [address] [place] [address] since your last visit? Include any pap smears or colon screening. No      Encouraged pt to discuss pt's wishes with spouse/partner/family and bring them in the next appt to follow thru with the Advanced Directive    Fall Risk Assessment, last 12 mths 3/10/2021   Able to walk? Yes   Fall in past 12 months? 1   Do you feel unsteady? 1   Are you worried about falling 1   Is the gait abnormal? 1   Number of falls in past 12 months 2   Fall with injury? 1       3 most recent PHQ Screens 3/10/2021   Little interest or pleasure in doing things More than half the days   Feeling down, depressed, irritable, or hopeless More than half the days   Total Score PHQ 2 4       Abuse Screening Questionnaire 3/10/2021   Do you ever feel afraid of your partner? N   Are you in a relationship with someone who physically or mentally threatens you? N   Is it safe for you to go home?  Y       ADL Assessment 3/10/2021   Feeding yourself No Help Needed   Getting from bed to chair No Help Needed   Getting dressed No Help Needed   Bathing or showering No Help Needed   Walk across the room (includes cane/walker) No Help Needed   Using the telphone No Help Needed   Taking your medications No Help Needed   Preparing meals No Help Needed   Managing money (expenses/bills) No Help Needed   Moderately strenuous housework (laundry) No Help Needed   Shopping for personal items (toiletries/medicines) No Help Needed   Shopping for groceries No Help Needed   Driving No Help Needed   Climbing a flight of stairs No Help Needed   Getting to places beyond walking distances No Help Needed

## 2021-03-10 NOTE — PROGRESS NOTES
Subjective:     Fani Turner is a 60 y.o. female seen for follow-up of diabetes. Coughing again x few days no fever  She has had hypoglycemic attacks. .no  Blood sugar control has been good    Lab Results   Component Value Date/Time    Hemoglobin A1c 8.4 (H) 2021 11:34 AM    Hemoglobin A1c 7.9 (H) 2020 02:51 PM    Hemoglobin A1c 7.2 (H) 2020 11:26 AM    Glucose 108 (H) 2021 11:34 AM    Glucose (POC) 146 (H) 2018 11:22 AM    Microalb/Creat ratio (ug/mg creat.) 449 (H) 2020 05:05 PM    LDL, calculated 15 2020 02:51 PM    LDL, calculated Comment 2020 11:26 AM    Creatinine 1.27 (H) 2021 11:34 AM       She has diabetes and hypertension. Fani Turner has the additional concern of coughing again. my friend  of MI heart beats fast.    Reports taking blood pressure medications without side affects. No complaints of exertional chest pain, does have inc shortness of breath no focal weakness. Minimal swelling in lower legs or dizziness with standing. Diet and Lifestyle: follows a diabetic diet regularly, nonsmoker. 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     Keflex [Cephalexin] Itching     Past Medical History:   Diagnosis Date    Asthma     PATIENT DENIES    Bone spur of ankle 3/30/12    right ankle    Chronic pain     DM [facility]di[facility] mellitus) ([facility]     GERD (gastroesophageal reflux disease)     Gout     Hypertension     OA (osteoarthritis) of knee 3/30/12    right knee    Sleep apnea     Unilateral AKA ([facility] 1993    left     Social History     Tobacco Use    Smoking status: Former Smoker     Packs/day: 0.50     Years: 15.00     Pack years: 7.50     Quit date: 3/30/2009     Years since quittin.9    Smokeless tobacco: Never Used   Substance Use Topics    Alcohol use: No     Alcohol/week: 0.0 standard drinks        Lab Results   Component Value Date/Time    WBC 6.7 2020 10:17 AM    HGB 14.0 2020 10:17 AM    HCT 43.5 2020 10:17 AM    PLATELET 327  10:17 AM    MCV 82 2020 10:17 AM     Lab Results   Component Value Date/Time    Hemoglobin A1c 8.4 (H) 2021 11:34 AM    Hemoglobin A1c 7.9 (H) 2020 02:51 PM    Hemoglobin A1c 7.2 (H) 2020 11:26 AM    Glucose 108 (H) 2021 11:34 AM    Glucose (POC) 146 (H) 2018 11:22 AM    Microalb/Creat ratio (ug/mg creat.) 449 (H) 2020 05:05 PM    LDL, calculated 15 2020 02:51 PM    LDL, calculated Comment 2020 11:26 AM    Creatinine 1.27 (H) 2021 11:34 AM      Lab Results   Component Value Date/Time    Cholesterol, total 107 2020 02:51 PM    HDL Cholesterol 20 (L) 2020 02:51 PM    LDL, calculated 15 2020 02:51 PM    LDL, calculated Comment 2020 11:26 AM    Triglyceride 549 (H) 2020 02:51 PM     Lab Results   Component Value Date/Time    ALT (SGPT) 20 2020 02:51 PM    Alk.  phosphatase 126 (H) 2020 02:51 PM    Bilirubin, total <0.2 2020 02:51 PM    Albumin 4.7 2020 02:51 PM    Protein, total 6.7 2020 02:51 PM    INR 1.7 (H) 2018 02:46 AM    INR POC 1.3 2018 12:09 PM    Prothrombin time 17.0 (H) 2018 02:46 AM    PLATELET 935 78/24/1861 10:17 AM     Lab Results   Component Value Date/Time    GFR est non-AA 45 (L) 03/03/2021 11:34 AM    GFR est AA 52 (L) 03/03/2021 11:34 AM    Creatinine 1.27 (H) 03/03/2021 11:34 AM    BUN 26 03/03/2021 11:34 AM    Sodium 141 03/03/2021 11:34 AM    Potassium 4.6 03/03/2021 11:34 AM    Chloride 103 03/03/2021 11:34 AM    CO2 24 03/03/2021 11:34 AM     Lab Results   Component Value Date/Time    Glucose 108 (H) 03/03/2021 11:34 AM    Glucose (POC) 146 (H) 02/18/2018 11:22 AM         Review of Systems  Pertinent items are noted in HPI. Objective:     Significant for the following: appears NAD  Visit Vitals  LMP 08/23/2003     WD WN female NAD      Lab review: orders written for new lab studies as appropriate; see orders. Assessment/Plan:     Follow-up diabetes borderline controlled, needs to follow diet more regularly, recent steroids. Diabetic issues reviewed with her: glucose meter dispensed to patient, all medications, side effects and compliance discussed carefully, use and side effects of insulin is taught, glycohemoglobin and other lab monitoring discussed, long term diabetic complications discussed and labs immediately prior to next visit. Chronic Conditions Addressed Today     1. Asthma     Relevant Medications     cetirizine (ZYRTEC) 10 mg tablet     budesonide-formoteroL (SYMBICORT) 80-4.5 mcg/actuation HFAA    2. Diabetes mellitus due to underlying condition with diabetic nephropathy, with long-term current use of insulin (HCC)     Relevant Medications     fenofibrate nanocrystallized (TRICOR) 145 mg tablet     losartan (COZAAR) 100 mg tablet     insulin aspart protamine/insulin aspart (NOVOLOG MIX 70/30) 100 unit/mL (70-30) inpn    3. Essential hypertension - Primary     Relevant Medications     fenofibrate nanocrystallized (TRICOR) 145 mg tablet     losartan (COZAAR) 100 mg tablet     Other Relevant Orders     METABOLIC PANEL, BASIC    4.  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 Type 2 diabetes mellitus with diabetic neuropathy (HCC)     Relevant Medications     Blood-Glucose Meter monitoring kit     fenofibrate nanocrystallized (TRICOR) 145 mg tablet     losartan (COZAAR) 100 mg tablet     insulin aspart protamine/insulin aspart (NOVOLOG MIX 70/30) 100 unit/mL (70-30) inpn    6. Type 2 diabetes mellitus with hyperglycemia (HCC)     Relevant Medications     fenofibrate nanocrystallized (TRICOR) 145 mg tablet     losartan (COZAAR) 100 mg tablet     insulin aspart protamine/insulin aspart (NOVOLOG MIX 70/30) 100 unit/mL (70-30) inpn      Acute Diagnoses Addressed Today     Postnasal drip            Relevant Medications        benzonatate (TESSALON) 200 mg capsule        cetirizine (ZYRTEC) 10 mg tablet    Cough            Relevant Medications        budesonide-formoteroL (SYMBICORT) 80-4.5 mcg/actuation HFAA        Other Relevant Orders        XR CHEST PA LAT    Tachycardia            Relevant Medications        fenofibrate nanocrystallized (TRICOR) 145 mg tablet        losartan (COZAAR) 100 mg tablet        Other Relevant Orders        EKG, 12 LEAD, INITIAL    Dyspnea on exertion            Relevant Medications        budesonide-formoteroL (SYMBICORT) 80-4.5 mcg/actuation HFAA    Mixed dyslipidemia            Relevant Medications        fenofibrate nanocrystallized (TRICOR) 145 mg tablet    Cough due to ACE inhibitor            Relevant Medications        losartan (COZAAR) 100 mg tablet        budesonide-formoteroL (SYMBICORT) 80-4.5 mcg/actuation HFAA        Other Relevant Orders        XR CHEST PA LAT    Bronchitis with bronchospasm            Relevant Medications        budesonide-formoteroL (SYMBICORT) 80-4.5 mcg/actuation HFAA          Current Outpatient Medications   Medication Sig Dispense Refill    benzonatate (TESSALON) 200 mg capsule Take 1 Cap by mouth three (3) times daily as needed for Cough.  30 Cap 1    Blood-Glucose Meter monitoring kit Up to 3 times daily 1 Kit 0    fenofibrate nanocrystallized (TRICOR) 145 mg tablet Take 1 Tab by mouth daily. 90 Tab 3    losartan (COZAAR) 100 mg tablet Take 1 Tab by mouth daily. 90 Tab 3    cetirizine (ZYRTEC) 10 mg tablet Take 1 Tab by mouth daily. Indications: inflammation of the nose due to an allergy 90 Tab 3    insulin aspart protamine/insulin aspart (NOVOLOG MIX 70/30) 100 unit/mL (70-30) inpn 15 Units by SubCUTAneous route ACB/HS. 5 Pen 5    budesonide-formoteroL (SYMBICORT) 80-4.5 mcg/actuation HFAA Take 2 Puffs by inhalation two (2) times a day. Rinse mouth out with warm water after each use. Indications: controller medication for asthma 1 Inhaler 5    empagliflozin (Jardiance) 10 mg tablet Take 1 tablet by mouth once daily 30 Tab 5    baclofen (LIORESAL) 10 mg tablet Take 1 Tab by mouth three (3) times daily. 60 Tab 5    ipratropium (ATROVENT) 42 mcg (0.06 %) nasal spray 1 [place] by Both Nostrils route four (4) times daily. Indications: runny nose 15 mL 5    ascorbic acid, vitamin C, (Vitamin C) 500 mg tablet Take  by mouth.  omeprazole (PRILOSEC) 20 mg capsule Take 1 Cap by mouth daily. 90 Cap 3    allopurinoL (ZYLOPRIM) 300 mg tablet Take 1 Tab by mouth daily. 90 Tab 3    albuterol (PROVENTIL HFA, VENTOLIN HFA, PROAIR HFA) 90 mcg/actuation inhaler Take 2 Puffs by inhalation every four (4) hours as needed for Wheezing. 1 Inhaler 3    metFORMIN (GLUCOPHAGE) 500 mg tablet Take 1 Tab by mouth two (2) times daily (with meals). 180 Tab 3    simvastatin (ZOCOR) 80 mg tablet Take 1 Tab by mouth nightly. 90 Tab 3    glucose blood VI test strips (Ascensia CONTOUR) strip USE 1 TEST STRIP EACH TIME TO TEST BLOOD SUGAR UP TO 3 TIMES A  Strip 5    acetaminophen (TYLENOL) 500 mg tablet Take 1 Tab by mouth every four (4) hours (while awake). Indications: Pain (Patient taking differently: Take 650 mg by mouth every four (4) hours (while awake).  Indications: Pain) 100 Tab 0     ?lisinopril, told her to stop    Orders Placed This Encounter    XR CHEST PA LAT     Standing Status:   Future     Standing Expiration Date:   4/11/2022     Order Specific Question:   Reason for Exam     Answer:   cough     Order Specific Question:   Which facility to perform procedure? Answer:   [facility]    METABOLIC PANEL, BASIC     Standing Status:   Future     Standing Expiration Date:   9/10/2021    EKG, 12 LEAD, INITIAL     Standing Status:   Future     Standing Expiration Date:   9/10/2021     Scheduling Instructions:      [facility]     Order Specific Question:   Reason for Exam:     Answer:   tachycardia    benzonatate (TESSALON) 200 mg capsule     Sig: Take 1 Cap by mouth three (3) times daily as needed for Cough. Dispense:  30 Cap     Refill:  1    Blood-Glucose Meter monitoring kit     Sig: Up to 3 times daily     Dispense:  1 Kit     Refill:  0     Which ever Ins will cover    fenofibrate nanocrystallized (TRICOR) 145 mg tablet     Sig: Take 1 Tab by mouth daily. Dispense:  90 Tab     Refill:  3    losartan (COZAAR) 100 mg tablet     Sig: Take 1 Tab by mouth daily. Dispense:  90 Tab     Refill:  3    cetirizine (ZYRTEC) 10 mg tablet     Sig: Take 1 Tab by mouth daily. Indications: inflammation of the nose due to an allergy     Dispense:  90 Tab     Refill:  3    insulin aspart protamine/insulin aspart (NOVOLOG MIX 70/30) 100 unit/mL (70-30) inpn     Sig: 15 Units by SubCUTAneous route ACB/HS. Dispense:  5 Pen     Refill:  5    budesonide-formoteroL (SYMBICORT) 80-4.5 mcg/actuation HFAA     Sig: Take 2 Puffs by inhalation two (2) times a day. Rinse mouth out with warm water after each use. Indications: controller medication for asthma     Dispense:  1 Inhaler     Refill:  5     Please inform pt ordered this as maintenance inhaler. Follow-up and Dispositions    · Return in about 4 weeks (around 4/7/2021) for routine follow up.

## 2021-03-11 ENCOUNTER — TELEPHONE (OUTPATIENT)
Dept: INTERNAL MEDICINE CLINIC | Age: 66
End: 2021-03-11

## 2021-03-11 NOTE — TELEPHONE ENCOUNTER
Called patient - advised that EKG, lab test, and chest Xray are all done as walk in outpatient at [facility] - can arrive at 830 until 330 to get these done at Outpatient registration - confirmaed with [facility] that these can be performed at their location  Pratima Knapp LPN  2/62/0492  89:18 AM

## 2021-03-11 NOTE — TELEPHONE ENCOUNTER
Message fr vigil        Reason for call: She is suppose to go to the hospital tomorrow for some testing but she has not heard anything regarding an appt.       Callback required yes/no and why: Yes with update       Best contact number(s):[phone]       Details to clarify the request: N/A       Dilia Spain

## 2021-03-12 ENCOUNTER — HOSPITAL ENCOUNTER (OUTPATIENT)
Dept: GENERAL RADIOLOGY | Age: 66
Discharge: HOME | End: 2021-03-12
Payer: COMMERCIAL

## 2021-03-12 ENCOUNTER — HOSPITAL ENCOUNTER (OUTPATIENT)
Dept: NON INVASIVE DIAGNOSTICS | Age: 66
Discharge: HOME | End: 2021-03-12
Payer: COMMERCIAL

## 2021-03-12 ENCOUNTER — TELEPHONE (OUTPATIENT)
Dept: INTERNAL MEDICINE CLINIC | Age: 66
End: 2021-03-12

## 2021-03-12 DIAGNOSIS — R05.9 COUGH: ICD-10-CM

## 2021-03-12 DIAGNOSIS — Z79.4 TYPE 2 DIABETES MELLITUS WITH DIABETIC NEUROPATHY, WITH LONG-TERM CURRENT USE OF INSULIN (HCC): ICD-10-CM

## 2021-03-12 DIAGNOSIS — R05.8 COUGH DUE TO ACE INHIBITOR: ICD-10-CM

## 2021-03-12 DIAGNOSIS — E11.40 TYPE 2 DIABETES MELLITUS WITH DIABETIC NEUROPATHY, WITH LONG-TERM CURRENT USE OF INSULIN (HCC): ICD-10-CM

## 2021-03-12 DIAGNOSIS — T46.4X5A COUGH DUE TO ACE INHIBITOR: ICD-10-CM

## 2021-03-12 DIAGNOSIS — R00.0 TACHYCARDIA: ICD-10-CM

## 2021-03-12 PROCEDURE — 71046 X-RAY EXAM CHEST 2 VIEWS: CPT

## 2021-03-12 PROCEDURE — 93005 ELECTROCARDIOGRAM TRACING: CPT

## 2021-03-12 NOTE — TELEPHONE ENCOUNTER
Patient needs the strips for her meter sent to her pharmacy please to [facility]!

## 2021-03-14 RX ORDER — INSULIN PUMP SYRINGE, 3 ML
EACH MISCELLANEOUS
Qty: 1 KIT | Refills: 0 | Status: SHIPPED | OUTPATIENT
Start: 2021-03-14

## 2021-03-14 RX ORDER — IBUPROFEN 200 MG
CAPSULE ORAL
Qty: 100 STRIP | Refills: 3 | Status: SHIPPED | OUTPATIENT
Start: 2021-03-14 | End: 2022-03-30 | Stop reason: SDUPTHER

## 2021-03-23 LAB
ATRIAL RATE: 94 BPM
CALCULATED P AXIS, ECG09: 76 DEGREES
CALCULATED R AXIS, ECG10: -2 DEGREES
CALCULATED T AXIS, ECG11: 40 DEGREES
DIAGNOSIS, 93000: NORMAL
P-R INTERVAL, ECG05: 152 MS
Q-T INTERVAL, ECG07: 344 MS
QRS DURATION, ECG06: 86 MS
QTC CALCULATION (BEZET), ECG08: 430 MS
VENTRICULAR RATE, ECG03: 94 BPM

## 2021-03-24 ENCOUNTER — TELEPHONE (OUTPATIENT)
Dept: INTERNAL MEDICINE CLINIC | Age: 66
End: 2021-03-24

## 2021-03-24 DIAGNOSIS — M81.0 AGE RELATED OSTEOPOROSIS, UNSPECIFIED PATHOLOGICAL FRACTURE PRESENCE: Primary | ICD-10-CM

## 2021-03-24 NOTE — TELEPHONE ENCOUNTER
Message fr envera;         Referral     Name of caller (if calling from a practice): Pt     Name of practice: [address] [place] [address] [facility]     Office Phone Number: [phone] ask for bone density      Date and time of appointment: cannot schedule until referral sent     Reason for appointment: DEXA Bone density     Details to clarify the request:  N/A       Melvi Arechiga

## 2021-03-29 ENCOUNTER — HOSPITAL ENCOUNTER (OUTPATIENT)
Dept: GENERAL RADIOLOGY | Age: 66
Discharge: HOME | End: 2021-03-29
Attending: FAMILY MEDICINE
Payer: COMMERCIAL

## 2021-03-29 DIAGNOSIS — M81.0 AGE RELATED OSTEOPOROSIS, UNSPECIFIED PATHOLOGICAL FRACTURE PRESENCE: ICD-10-CM

## 2021-03-29 PROCEDURE — 77080 DXA BONE DENSITY AXIAL: CPT

## 2021-04-02 ENCOUNTER — OFFICE VISIT (OUTPATIENT)
Dept: INTERNAL MEDICINE CLINIC | Age: 66
End: 2021-04-02
Payer: COMMERCIAL

## 2021-04-02 VITALS
BODY MASS INDEX: 31.89 KG/M2 | OXYGEN SATURATION: 98 % | RESPIRATION RATE: 16 BRPM | HEART RATE: 96 BPM | WEIGHT: 180 LBS | HEIGHT: 63 IN | SYSTOLIC BLOOD PRESSURE: 107 MMHG | DIASTOLIC BLOOD PRESSURE: 70 MMHG | TEMPERATURE: 98.4 F

## 2021-04-02 DIAGNOSIS — R09.82 POSTNASAL DRIP: ICD-10-CM

## 2021-04-02 DIAGNOSIS — A37.90 PERTUSSIS: ICD-10-CM

## 2021-04-02 DIAGNOSIS — R05.3 COUGH, PERSISTENT: Primary | ICD-10-CM

## 2021-04-02 DIAGNOSIS — E11.65 TYPE 2 DIABETES MELLITUS WITH HYPERGLYCEMIA, WITHOUT LONG-TERM CURRENT USE OF INSULIN (HCC): ICD-10-CM

## 2021-04-02 DIAGNOSIS — I10 ESSENTIAL HYPERTENSION: ICD-10-CM

## 2021-04-02 PROCEDURE — 99214 OFFICE O/P EST MOD 30 MIN: CPT | Performed by: FAMILY MEDICINE

## 2021-04-02 RX ORDER — BENZONATATE 200 MG/1
200 CAPSULE ORAL
Qty: 30 CAP | Refills: 0 | Status: SHIPPED | OUTPATIENT
Start: 2021-04-02 | End: 2021-06-07 | Stop reason: ALTCHOICE

## 2021-04-02 NOTE — PROGRESS NOTES
Subjective:     Stalin Lloyd is a 73 y.o. female seen for follow-up of diabetes. Continues to cough, been 6 mo, w/u not shown a reason, she has lost some weight but that is intentional.  No fevers chills. She has had hypoglycemic attacks. .no  Blood sugar control has been sl high    Lab Results   Component Value Date/Time    Hemoglobin A1c 8.4 (H) 03/03/2021 11:34 AM    Hemoglobin A1c 7.9 (H) 09/16/2020 02:51 PM    Hemoglobin A1c 7.2 (H) 06/12/2020 11:26 AM    Glucose 108 (H) 03/03/2021 11:34 AM    Glucose (POC) 146 (H) 02/18/2018 11:22 AM    Microalb/Creat ratio (ug/mg creat.) 449 (H) 08/24/2020 05:05 PM    LDL, calculated 15 09/16/2020 02:51 PM    LDL, calculated Comment 06/12/2020 11:26 AM    Creatinine 1.27 (H) 03/03/2021 11:34 AM       She has diabetes and hypertension. Stalin Lloyd has the additional concern of coughing seeing ENT, started in Aug comes and goes. Rx with some ab in [place]. COughing all night long    Reports taking blood pressure medications without side affects. No complaints of exertional chest pain, excessive shortness of breath or focal weakness. Minimal swelling in lower legs or dizziness with standing. Diet and Lifestyle: follows a diabetic diet regularly, nonsmoker.     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 30 Cap 0    Blood-Glucose Meter monitoring kit Up to 3 times daily 1 Kit 0    glucose blood VI test strips (blood glucose test) strip Up to 3 times daily 100 Strip 3    fenofibrate nanocrystallized (TRICOR) 145 mg tablet Take 1 Tab by mouth daily. 90 Tab 3    losartan (COZAAR) 100 mg tablet Take 1 Tab by mouth daily. 90 Tab 3    cetirizine (ZYRTEC) 10 mg tablet Take 1 Tab by mouth daily. Indications: inflammation of the nose due to an allergy 90 Tab 3    insulin aspart protamine/insulin aspart (NOVOLOG MIX 70/30) 100 unit/mL (70-30) inpn 15 Units by SubCUTAneous route ACB/HS. 5 Pen 5    budesonide-formoteroL (SYMBICORT) 80-4.5 mcg/actuation HFAA Take 2 Puffs by inhalation two (2) times a day. Rinse mouth out with warm water after each use. Indications: controller medication for asthma 1 Inhaler 5    empagliflozin (Jardiance) 10 mg tablet Take 1 tablet by mouth once daily 30 Tab 5    baclofen (LIORESAL) 10 mg tablet Take 1 Tab by mouth three (3) times daily. 60 Tab 5    ipratropium (ATROVENT) 42 mcg (0.06 %) nasal spray 1 [place] by Both Nostrils route four (4) times daily. Indications: runny nose 15 mL 5    ascorbic acid, vitamin C, (Vitamin C) 500 mg tablet Take  by mouth.  omeprazole (PRILOSEC) 20 mg capsule Take 1 Cap by mouth daily. 90 Cap 3    allopurinoL (ZYLOPRIM) 300 mg tablet Take 1 Tab by mouth daily. 90 Tab 3    albuterol (PROVENTIL HFA, VENTOLIN HFA, PROAIR HFA) 90 mcg/actuation inhaler Take 2 Puffs by inhalation every four (4) hours as needed for Wheezing. 1 Inhaler 3    metFORMIN (GLUCOPHAGE) 500 mg tablet Take 1 Tab by mouth two (2) times daily (with meals). 180 Tab 3    simvastatin (ZOCOR) 80 mg tablet Take 1 Tab by mouth nightly.  90 Tab 3    glucose blood VI test strips (Ascensia CONTOUR) strip USE 1 TEST STRIP EACH TIME TO TEST BLOOD SUGAR UP TO 3 TIMES A  Strip 5    acetaminophen (TYLENOL) 500 mg tablet Take 1 Tab by mouth every four (4) hours (while awake). Indications: Pain (Patient taking differently: Take 650 mg by mouth every four (4) hours (while awake). Indications: Pain) 100 Tab 0     Allergies   Allergen Reactions    Hydrocodone-Acetaminophen Hives, Itching and Nausea Only     Patient states she can take tylenol #3 without problem.  Keflex [Cephalexin] Itching     Social History     Tobacco Use    Smoking status: Former Smoker     Packs/day: 0.50     Years: 15.00     Pack years: 7.50     Quit date: 3/30/2009     Years since quittin.0    Smokeless tobacco: Never Used   Substance Use Topics    Alcohol use: No     Alcohol/week: 0.0 standard drinks             Review of Systems  Pertinent items are noted in HPI. Objective:     Significant for the following: WNL not coughing  Visit Vitals  /70 (BP 1 Location: Left arm, BP Patient Position: Sitting, BP Cuff Size: Adult)   Pulse 96   Temp 98.4 °F (36.9 °C) (Temporal)   Resp 16   Ht 5' 3\" (1.6 m)   Wt 180 lb (81.6 kg)   LMP 2003   SpO2 98%   BMI 31.89 kg/m²     WD WN female NAD  Heart RRR without murmers clicks or rubs  Lungs CTA  Abdo soft nontender  Ext no edema      Lab review: orders written for new lab studies as appropriate; see orders. Assessment/Plan:     Follow-up diabetes stable. Diabetic issues reviewed with her: all medications, side effects and compliance discussed carefully, glycohemoglobin and other lab monitoring discussed and labs immediately prior to next visit. ICD-10-CM ICD-9-CM    1. Cough, persistent  R05 786.2 benzonatate (TESSALON) 200 mg capsule   2. Type 2 diabetes mellitus with hyperglycemia, without long-term current use of insulin (HCC)  E11.65 250.00 HEMOGLOBIN A1C WITH EAG     790.29    3. Postnasal drip  R09.82 784.91    4. Pertussis  A37.90 033.9    5.  Essential hypertension  B24 936.7 METABOLIC PANEL, BASIC     Orders Placed This Encounter    METABOLIC PANEL, BASIC     Standing Status:   Future     Standing Expiration Date:   10/3/2021    HEMOGLOBIN A1C WITH EAG     Standing Status:   Future     Standing Expiration Date:   4/2/2022    benzonatate (TESSALON) 200 mg capsule     Sig: Take 1 Cap by mouth three (3) times daily as needed for Cough. Dispense:  30 Cap     Refill:  0         Suspect pertussis if coughing continues and ENT cannot find a reason may send to GI.   Follow-up with ENT  Diabetes stable

## 2021-04-02 NOTE — PROGRESS NOTES
Chief Complaint   Patient presents with    Cough     FU     Patient has not been out of the country in (14 months), NO diarrhea, NO cough, NO chest conjestion, NO temp. Pt has not been around anyone with these symptoms. Health Maintenance reviewed. I have reviewed the patient's medical history in detail and updated the computerized patient record. 1. Have you been to the ER, urgent care clinic since your last visit? Hospitalized since your last visit? yes    2. Have you seen or consulted any other health care providers outside of the [address] [place] [address] since your last visit? Include any pap smears or colon screening. yes    Encouraged pt to discuss pt's wishes with spouse/partner/family and bring them in the next appt to follow thru with the Advanced Directive    @  [address] [facility] [address], last 12 mths 3/10/2021   Able to walk? Yes   Fall in past 12 months? 1   Do you feel unsteady? 1   Are you worried about falling 1   Is the gait abnormal? 1   Number of falls in past 12 months 2   Fall with injury? 1       3 most recent PHQ Screens 4/2/2021   Little interest or pleasure in doing things Nearly every day   Feeling down, depressed, irritable, or hopeless Nearly every day   Total Score PHQ 2 6       Abuse Screening Questionnaire 3/10/2021   Do you ever feel afraid of your partner? N   Are you in a relationship with someone who physically or mentally threatens you? N   Is it safe for you to go home?  Y       ADL Assessment 3/10/2021   Feeding yourself No Help Needed   Getting from bed to chair No Help Needed   Getting dressed No Help Needed   Bathing or showering No Help Needed   Walk across the room (includes cane/walker) No Help Needed   Using the telphone No Help Needed   Taking your medications No Help Needed   Preparing meals No Help Needed   Managing money (expenses/bills) No Help Needed   Moderately strenuous housework (laundry) No Help Needed   Shopping for personal items (toiletries/medicines) No Help Needed   Shopping for groceries No Help Needed   Driving No Help Needed   Climbing a flight of stairs No Help Needed   Getting to places beyond walking distances No Help Needed

## 2021-05-12 ENCOUNTER — VIRTUAL VISIT (OUTPATIENT)
Dept: INTERNAL MEDICINE CLINIC | Age: 66
End: 2021-05-12

## 2021-05-12 DIAGNOSIS — Z79.4 TYPE 2 DIABETES MELLITUS WITH HYPERGLYCEMIA, WITH LONG-TERM CURRENT USE OF INSULIN (HCC): ICD-10-CM

## 2021-05-12 DIAGNOSIS — E11.65 TYPE 2 DIABETES MELLITUS WITH HYPERGLYCEMIA, WITH LONG-TERM CURRENT USE OF INSULIN (HCC): ICD-10-CM

## 2021-05-12 RX ORDER — INSULIN ASPART 100 [IU]/ML
15 INJECTION, SUSPENSION SUBCUTANEOUS
Qty: 10 PEN | Refills: 3 | Status: SHIPPED | OUTPATIENT
Start: 2021-05-12 | End: 2021-11-01 | Stop reason: SDUPTHER

## 2021-05-12 NOTE — PROGRESS NOTES
Chief Complaint   Patient presents with    Diabetes     med refill     Refill of insulin pen to [facility] in [facility], LPN  7/73/9732  7:48 PM  Reports blood sugar level have been int he range of 110 - 115

## 2021-05-25 RX ORDER — METFORMIN HYDROCHLORIDE 500 MG/1
500 TABLET ORAL 2 TIMES DAILY WITH MEALS
Qty: 180 TABLET | Refills: 3 | Status: SHIPPED | OUTPATIENT
Start: 2021-05-25 | End: 2021-06-04 | Stop reason: SDUPTHER

## 2021-05-25 NOTE — TELEPHONE ENCOUNTER
Requested Prescriptions     Pending Prescriptions Disp Refills    metFORMIN (GLUCOPHAGE) 500 mg tablet 180 Tablet 3     Sig: Take 1 Tablet by mouth two (2) times daily (with meals).

## 2021-06-04 NOTE — TELEPHONE ENCOUNTER
Pharmacy refill request      Requested Prescriptions     Pending Prescriptions Disp Refills    metFORMIN (GLUCOPHAGE) 500 mg tablet 180 Tablet 3     Sig: Take 1 Tablet by mouth two (2) times daily (with meals).

## 2021-06-05 RX ORDER — METFORMIN HYDROCHLORIDE 500 MG/1
500 TABLET ORAL 2 TIMES DAILY WITH MEALS
Qty: 180 TABLET | Refills: 3 | Status: SHIPPED | OUTPATIENT
Start: 2021-06-05 | End: 2022-02-16 | Stop reason: SDUPTHER

## 2021-06-07 ENCOUNTER — VIRTUAL VISIT (OUTPATIENT)
Dept: INTERNAL MEDICINE CLINIC | Age: 66
End: 2021-06-07
Payer: COMMERCIAL

## 2021-06-07 DIAGNOSIS — E78.2 MIXED HYPERLIPIDEMIA: ICD-10-CM

## 2021-06-07 DIAGNOSIS — E11.40 TYPE 2 DIABETES MELLITUS WITH DIABETIC NEUROPATHY, WITH LONG-TERM CURRENT USE OF INSULIN (HCC): ICD-10-CM

## 2021-06-07 DIAGNOSIS — J45.20 MILD INTERMITTENT ASTHMA WITHOUT COMPLICATION: ICD-10-CM

## 2021-06-07 DIAGNOSIS — Z79.4 TYPE 2 DIABETES MELLITUS WITH HYPERGLYCEMIA, WITH LONG-TERM CURRENT USE OF INSULIN (HCC): Primary | ICD-10-CM

## 2021-06-07 DIAGNOSIS — E66.01 SEVERE OBESITY (HCC): ICD-10-CM

## 2021-06-07 DIAGNOSIS — K21.9 GASTROESOPHAGEAL REFLUX DISEASE: ICD-10-CM

## 2021-06-07 DIAGNOSIS — Z79.4 TYPE 2 DIABETES MELLITUS WITH DIABETIC NEUROPATHY, WITH LONG-TERM CURRENT USE OF INSULIN (HCC): ICD-10-CM

## 2021-06-07 DIAGNOSIS — S78.112S TRAUMATIC ABOVE-KNEE AMPUTATION OF LEFT LOWER EXTREMITY, SEQUELA (HCC): Chronic | ICD-10-CM

## 2021-06-07 DIAGNOSIS — E08.42 DIABETIC POLYNEUROPATHY ASSOCIATED WITH DIABETES MELLITUS DUE TO UNDERLYING CONDITION (HCC): ICD-10-CM

## 2021-06-07 DIAGNOSIS — Z11.59 ENCOUNTER FOR HEPATITIS C SCREENING TEST FOR LOW RISK PATIENT: ICD-10-CM

## 2021-06-07 DIAGNOSIS — G54.7 PHANTOM LIMB (HCC): ICD-10-CM

## 2021-06-07 DIAGNOSIS — E11.65 TYPE 2 DIABETES MELLITUS WITH HYPERGLYCEMIA, WITH LONG-TERM CURRENT USE OF INSULIN (HCC): Primary | ICD-10-CM

## 2021-06-07 DIAGNOSIS — I10 ESSENTIAL HYPERTENSION: ICD-10-CM

## 2021-06-07 PROCEDURE — 99214 OFFICE O/P EST MOD 30 MIN: CPT | Performed by: FAMILY MEDICINE

## 2021-06-07 RX ORDER — ALLOPURINOL 300 MG/1
300 TABLET ORAL DAILY
Qty: 90 TABLET | Refills: 3 | Status: SHIPPED | OUTPATIENT
Start: 2021-06-07 | End: 2021-07-02

## 2021-06-07 RX ORDER — OMEPRAZOLE 20 MG/1
20 CAPSULE, DELAYED RELEASE ORAL DAILY
Qty: 90 CAPSULE | Refills: 3 | Status: SHIPPED | OUTPATIENT
Start: 2021-06-07 | End: 2022-06-29 | Stop reason: SDUPTHER

## 2021-06-07 RX ORDER — GABAPENTIN 100 MG/1
100 CAPSULE ORAL 3 TIMES DAILY
Qty: 90 CAPSULE | Refills: 2 | Status: SHIPPED | OUTPATIENT
Start: 2021-06-07 | End: 2021-07-08 | Stop reason: SINTOL

## 2021-06-07 RX ORDER — BACLOFEN 10 MG/1
10 TABLET ORAL 3 TIMES DAILY
Qty: 270 TABLET | Refills: 2 | Status: SHIPPED | OUTPATIENT
Start: 2021-06-07 | End: 2021-08-16 | Stop reason: ALTCHOICE

## 2021-06-07 RX ORDER — SIMVASTATIN 80 MG/1
80 TABLET, FILM COATED ORAL
Qty: 90 TABLET | Refills: 3 | Status: SHIPPED | OUTPATIENT
Start: 2021-06-07 | End: 2021-07-02

## 2021-06-07 NOTE — PROGRESS NOTES
Subjective:     Nedra Velasco is a 73 y.o. female seen for follow-up of diabetes. Agree with comments, see chief complaint. No breathing issues or gout issues, GERD reasonably well-controlled  She has had hypoglycemic attacks. .no  Blood sugar control has been needs rf 100's    Lab Results   Component Value Date/Time    Hemoglobin A1c 8.4 (H) 03/03/2021 11:34 AM    Hemoglobin A1c 7.9 (H) 09/16/2020 02:51 PM    Hemoglobin A1c 7.2 (H) 06/12/2020 11:26 AM    Glucose 108 (H) 03/03/2021 11:34 AM    Glucose (POC) 146 (H) 02/18/2018 11:22 AM    Microalb/Creat ratio (ug/mg creat.) 449 (H) 08/24/2020 05:05 PM    LDL, calculated 15 09/16/2020 02:51 PM    LDL, calculated Comment 06/12/2020 11:26 AM    Creatinine 1.27 (H) 03/03/2021 11:34 AM       She has diabetes and hypertension. Nedra Velasco has the additional concern of doing well  Following diet, taking her insulin. Complains of her phantom limb pain not adequately controlled with the baclofen. Topical capsaicin burning her some. Diet and Lifestyle: follows a diabetic diet regularly. 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     Keflex [Cephalexin] Itching     Social History     Tobacco Use    Smoking status: Former Smoker     Packs/day: 0.50     Years: 15.00     Pack years: 7.50     Quit date: 3/30/2009     Years since quittin.1    Smokeless tobacco: Never Used   Substance Use Topics    Alcohol use: No     Alcohol/week: 0.0 standard drinks             Review of Systems  Pertinent items are noted in HPI. Objective:     Significant for the following: Looks no acute distress  Visit Vitals  LMP 2003     WD WN female NAD      Lab review: labs reviewed, I note that glycosylated hemoglobin mildly abnormal but acceptable. Assessment/Plan:     Follow-up diabetes stable. Diabetic issues reviewed with her: all medications, side effects and compliance discussed carefully, use and side effects of insulin is taught, glycohemoglobin and other lab monitoring discussed and labs immediately prior to next visit. Chronic Conditions Addressed Today     1. Type 2 diabetes mellitus with hyperglycemia (HCC) - Primary     Relevant Medications     simvastatin (ZOCOR) 80 mg tablet     empagliflozin (Jardiance) 10 mg tablet     gabapentin (NEURONTIN) 100 mg capsule     Other Relevant Orders     HEMOGLOBIN A1C WITH EAG    2. Type 2 diabetes mellitus with diabetic neuropathy (HCC)     Relevant Medications     baclofen (LIORESAL) 10 mg tablet     simvastatin (ZOCOR) 80 mg tablet     empagliflozin (Jardiance) 10 mg tablet     gabapentin (NEURONTIN) 100 mg capsule    3. 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 Severe obesity ([address]    5. Essential hypertension     Relevant Medications     simvastatin (ZOCOR) 80 mg tablet     Other Relevant Orders     METABOLIC PANEL, BASIC    6.  Asthma      Acute Diagnoses Addressed Today     Encounter for hepatitis C screening test for low risk patient            Relevant Orders        HEP B SURFACE AG        RPR        HEPATITIS C AB, RFLX TO QT BY PCR        HIV 1/2 AG/AB, 4TH GENERATION,W RFLX CONFIRM    Diabetic polyneuropathy associated with diabetes mellitus due to underlying condition (HCC)            Relevant Medications        baclofen (LIORESAL) 10 mg tablet        simvastatin (ZOCOR) 80 mg tablet        empagliflozin (Jardiance) 10 mg tablet        gabapentin (NEURONTIN) 100 mg capsule    Mixed hyperlipidemia            Relevant Medications        simvastatin (ZOCOR) 80 mg tablet    Gastroesophageal reflux disease            Relevant Medications        omeprazole (PRILOSEC) 20 mg capsule    Phantom limb (HCC)            Relevant Medications        baclofen (LIORESAL) 10 mg tablet        gabapentin (NEURONTIN) 100 mg capsule        Current Outpatient Medications   Medication Sig Dispense Refill    baclofen (LIORESAL) 10 mg tablet Take 1 Tablet by mouth three (3) times daily. 270 Tablet 2    simvastatin (ZOCOR) 80 mg tablet Take 1 Tablet by mouth nightly. 90 Tablet 3    empagliflozin (Jardiance) 10 mg tablet Take 1 Tablet by mouth daily. 90 Tablet 3    allopurinoL (ZYLOPRIM) 300 mg tablet Take 1 Tablet by mouth daily. 90 Tablet 3    omeprazole (PRILOSEC) 20 mg capsule Take 1 Capsule by mouth daily. 90 Capsule 3    gabapentin (NEURONTIN) 100 mg capsule Take 1 Capsule by mouth three (3) times daily. Max Daily Amount: 300 mg. 90 Capsule 2    metFORMIN (GLUCOPHAGE) 500 mg tablet Take 1 Tablet by mouth two (2) times daily (with meals). 180 Tablet 3    insulin aspart protamine/insulin aspart (NOVOLOG MIX 70/30) 100 unit/mL (70-30) inpn 15 Units by SubCUTAneous route ACB/HS. 10 Pen 3    Blood-Glucose Meter monitoring kit Up to 3 times daily 1 Kit 0    glucose blood VI test strips (blood glucose test) strip Up to 3 times daily 100 Strip 3    fenofibrate nanocrystallized (TRICOR) 145 mg tablet Take 1 Tab by mouth daily. 90 Tab 3    cetirizine (ZYRTEC) 10 mg tablet Take 1 Tab by mouth daily.  Indications: inflammation of the nose due to an allergy 90 Tab 3    budesonide-formoteroL (SYMBICORT) 80-4.5 mcg/actuation HFAA Take 2 Puffs by inhalation two (2) times a day. Rinse mouth out with warm water after each use. Indications: controller medication for asthma 1 Inhaler 5    ipratropium (ATROVENT) 42 mcg (0.06 %) nasal spray 1 [place] by Both Nostrils route four (4) times daily. Indications: runny nose 15 mL 5    ascorbic acid, vitamin C, (Vitamin C) 500 mg tablet Take  by mouth.  albuterol (PROVENTIL HFA, VENTOLIN HFA, PROAIR HFA) 90 mcg/actuation inhaler Take 2 Puffs by inhalation every four (4) hours as needed for Wheezing. 1 Inhaler 3    glucose blood VI test strips (Ascensia CONTOUR) strip USE 1 TEST STRIP EACH TIME TO TEST BLOOD SUGAR UP TO 3 TIMES A  Strip 5    acetaminophen (TYLENOL) 500 mg tablet Take 1 Tab by mouth every four (4) hours (while awake). Indications: Pain (Patient taking differently: Take 650 mg by mouth every four (4) hours (while awake). Indications: Pain) 100 Tab 0       Follow-up next month as scheduled  Refills done    Oralia Murillo, who was evaluated through a synchronous (real-time) audio-video encounter, and/or her healthcare decision maker, is aware that it is a billable service, with coverage as determined by her insurance carrier. She provided verbal consent to proceed: Yes, and patient identification was verified. This visit was conducted pursuant to the emergency declaration under the [facility], [address] [place] [address] authority and the [employer] Act. A caregiver was present when appropriate. Ability to conduct physical exam was limited. The patient was located in a state where the provider was credentialed to provide care.      --Demetri Reed MD on 6/7/2021 at 9:04 AM

## 2021-06-07 NOTE — PROGRESS NOTES
Chief Complaint   Patient presents with    Medication Refill     Patient is aware that this is a Virtual Visit or Phone Call Only doctor's visit. Patient has not been out of the country in (14 months), NO diarrhea, NO cough, NO chest conjestion, NO temp. Pt has not been around anyone with these symptoms. Health Maintenance reviewed. I have reviewed the patient's medical history in detail and updated the computerized patient record. 1. Have you been to the ER, urgent care clinic since your last visit? no Hospitalized since your last visit?  no    2. Have you seen or consulted any other health care providers outside of the [address] [place] [address] since your last visit? no  Include any pap smears or colon screening. Encouraged pt to discuss pt's wishes with spouse/partner/family and bring them in the next appt to follow thru with the Advanced Directive      Fall Risk Assessment, last 12 mths 6/7/2021   Able to walk? Yes   Fall in past 12 months? 1   Do you feel unsteady? 1   Are you worried about falling 1   Is the gait abnormal? 1   Number of falls in past 12 months 2   Fall with injury? 1       3 most recent PHQ Screens 6/7/2021   Little interest or pleasure in doing things Several days   Feeling down, depressed, irritable, or hopeless Several days   Total Score PHQ 2 2       Abuse Screening Questionnaire 6/7/2021   Do you ever feel afraid of your partner? N   Are you in a relationship with someone who physically or mentally threatens you? N   Is it safe for you to go home?  Y       ADL Assessment 6/7/2021   Feeding yourself No Help Needed   Getting from bed to chair No Help Needed   Getting dressed No Help Needed   Bathing or showering No Help Needed   Walk across the room (includes cane/walker) No Help Needed   Using the telphone No Help Needed   Taking your medications No Help Needed   Preparing meals No Help Needed   Managing money (expenses/bills) No Help Needed   Moderately strenuous housework (laundry) No Help Needed   Shopping for personal items (toiletries/medicines) No Help Needed   Shopping for groceries No Help Needed   Driving No Help Needed   Climbing a flight of stairs No Help Needed   Getting to places beyond walking distances No Help Needed

## 2021-06-24 ENCOUNTER — TELEPHONE (OUTPATIENT)
Dept: INTERNAL MEDICINE CLINIC | Age: 66
End: 2021-06-24

## 2021-06-24 NOTE — TELEPHONE ENCOUNTER
Yesterday patient fell because her lining busted. The phillip at the supply company usually cant give any supplies unless its ordered, but he went ahead and gave her one. He needs Dr Selene Rose to state that she got a lining on 6/23/2021 for insurance purposes. So it will be covered. Call patient back to advise;     Anytime before 4, call her at work [phone], ext [phone]    After [address], [phone]

## 2021-06-25 ENCOUNTER — TELEPHONE (OUTPATIENT)
Dept: INTERNAL MEDICINE CLINIC | Age: 66
End: 2021-06-25

## 2021-06-25 DIAGNOSIS — S78.112S TRAUMATIC ABOVE-KNEE AMPUTATION OF LEFT LOWER EXTREMITY, SEQUELA (HCC): Primary | ICD-10-CM

## 2021-06-25 NOTE — TELEPHONE ENCOUNTER
Message fr vigil        Reason for call:  went to get a new Lining for her Prosthesis at [facility] in [facility] [place] and pt would like it documented in her chart so that they can be paid for it. Patient went there herself due to her lining would not allow her Prosthesis to fit correctly.        Callback required yes/no and why: yes       Best contact number(s): [phone]

## 2021-06-30 NOTE — TELEPHONE ENCOUNTER
Pt's next appt with Guerrero Lewis is July 8th and Dr. Guerrero Lewis notes should include reason and the need for pt's prothesis lining.   Due to pain, fit and amb

## 2021-07-01 DIAGNOSIS — E78.2 MIXED HYPERLIPIDEMIA: ICD-10-CM

## 2021-07-02 LAB
BUN SERPL-MCNC: 24 MG/DL (ref 8–27)
BUN/CREAT SERPL: 18 (ref 12–28)
CALCIUM SERPL-MCNC: 10.1 MG/DL (ref 8.7–10.3)
CHLORIDE SERPL-SCNC: 103 MMOL/L (ref 96–106)
CO2 SERPL-SCNC: 21 MMOL/L (ref 20–29)
CREAT SERPL-MCNC: 1.3 MG/DL (ref 0.57–1)
EST. AVERAGE GLUCOSE BLD GHB EST-MCNC: 186 MG/DL
GLUCOSE SERPL-MCNC: 148 MG/DL (ref 65–99)
HBA1C MFR BLD: 8.1 % (ref 4.8–5.6)
HBV SURFACE AB SER QL: NON REACTIVE
HCV AB S/CO SERPL IA: <0.1 S/CO RATIO (ref 0–0.9)
HCV AB SERPL QL IA: NORMAL
HIV 1+2 AB+HIV1 P24 AG SERPL QL IA: NON REACTIVE
INTERPRETATION: NORMAL
POTASSIUM SERPL-SCNC: 4.5 MMOL/L (ref 3.5–5.2)
RPR SER QL: NON REACTIVE
SODIUM SERPL-SCNC: 141 MMOL/L (ref 134–144)

## 2021-07-02 RX ORDER — SIMVASTATIN 80 MG/1
80 TABLET, FILM COATED ORAL
Qty: 90 TABLET | Refills: 0 | Status: SHIPPED | OUTPATIENT
Start: 2021-07-02 | End: 2021-07-08 | Stop reason: SDUPTHER

## 2021-07-02 RX ORDER — ALLOPURINOL 300 MG/1
TABLET ORAL
Qty: 90 TABLET | Refills: 0 | Status: SHIPPED | OUTPATIENT
Start: 2021-07-02 | End: 2021-07-08 | Stop reason: SDUPTHER

## 2021-07-08 ENCOUNTER — OFFICE VISIT (OUTPATIENT)
Dept: INTERNAL MEDICINE CLINIC | Age: 66
End: 2021-07-08
Payer: COMMERCIAL

## 2021-07-08 VITALS
TEMPERATURE: 97.8 F | WEIGHT: 178 LBS | DIASTOLIC BLOOD PRESSURE: 73 MMHG | RESPIRATION RATE: 18 BRPM | HEIGHT: 63 IN | OXYGEN SATURATION: 96 % | HEART RATE: 95 BPM | SYSTOLIC BLOOD PRESSURE: 116 MMHG | BODY MASS INDEX: 31.54 KG/M2

## 2021-07-08 DIAGNOSIS — E78.2 MIXED HYPERLIPIDEMIA: ICD-10-CM

## 2021-07-08 DIAGNOSIS — K21.9 GASTROESOPHAGEAL REFLUX DISEASE: ICD-10-CM

## 2021-07-08 DIAGNOSIS — E08.21 DIABETES MELLITUS DUE TO UNDERLYING CONDITION WITH DIABETIC NEPHROPATHY, WITH LONG-TERM CURRENT USE OF INSULIN (HCC): Primary | ICD-10-CM

## 2021-07-08 DIAGNOSIS — Z79.4 DIABETES MELLITUS DUE TO UNDERLYING CONDITION WITH DIABETIC NEPHROPATHY, WITH LONG-TERM CURRENT USE OF INSULIN (HCC): Primary | ICD-10-CM

## 2021-07-08 DIAGNOSIS — G54.6 PHANTOM LIMB PAIN (HCC): ICD-10-CM

## 2021-07-08 DIAGNOSIS — S78.112S TRAUMATIC ABOVE-KNEE AMPUTATION OF LEFT LOWER EXTREMITY, SEQUELA (HCC): ICD-10-CM

## 2021-07-08 DIAGNOSIS — G62.9 NEUROPATHY: ICD-10-CM

## 2021-07-08 DIAGNOSIS — M10.00 ACUTE IDIOPATHIC GOUT, UNSPECIFIED SITE: ICD-10-CM

## 2021-07-08 PROCEDURE — 99214 OFFICE O/P EST MOD 30 MIN: CPT | Performed by: FAMILY MEDICINE

## 2021-07-08 RX ORDER — CYCLOBENZAPRINE HCL 5 MG
5 TABLET ORAL
Qty: 30 TABLET | Refills: 5 | Status: SHIPPED | OUTPATIENT
Start: 2021-07-08 | End: 2021-08-16

## 2021-07-08 RX ORDER — SIMVASTATIN 80 MG/1
80 TABLET, FILM COATED ORAL
Qty: 90 TABLET | Refills: 1 | Status: SHIPPED | OUTPATIENT
Start: 2021-07-08 | End: 2022-03-03

## 2021-07-08 RX ORDER — ALLOPURINOL 300 MG/1
TABLET ORAL
Qty: 90 TABLET | Refills: 1 | Status: SHIPPED | OUTPATIENT
Start: 2021-07-08 | End: 2021-08-16 | Stop reason: ALTCHOICE

## 2021-07-08 NOTE — LETTER
7/8/2021    Ms. [address] [facility] [address]      Dear Sheri Chinchilla:    Please find your most recent results below.       Resulted Orders   METABOLIC PANEL, BASIC   Result Value Ref Range    Glucose 148 (H) 65 - 99 mg/dL    BUN 24 8 - 27 mg/dL    Creatinine 1.30 (H) 0.57 - 1.00 mg/dL    GFR est non-AA 43 (L) >59 mL/min/1.73    GFR est AA 50 (L) >59 mL/min/1.73    BUN/Creatinine ratio 18 12 - 28    Sodium 141 134 - 144 mmol/L    Potassium 4.5 3.5 - 5.2 mmol/L    Chloride 103 96 - 106 mmol/L    CO2 21 20 - 29 mmol/L    Calcium 10.1 8.7 - 10.3 mg/dL    Narrative    Performed at:  [address]  [id number]  : Daria Mondragon MD, Phone:  [phone]   CKD REPORT   Result Value Ref Range    Interpretation Note     Narrative    Performed at:  Latrice Izquierdo [address] [place] [address]  [id number]  : Leanna Nuñez MD, Phone:  [phone]   HEMOGLOBIN A1C WITH EAG   Result Value Ref Range    Hemoglobin A1c 8.1 (H) 4.8 - 5.6 %    Estimated average glucose 186 mg/dL    Narrative    Performed at:  [address]  [id number]  : Daria Mondragon MD, Phone:  [phone]   HEPATITIS C AB, RFLX TO QT BY PCR   Result Value Ref Range    HCV Ab <0.1 0.0 - 0.9 s/co ratio    Narrative    Performed at:  [address]  [id number]  : Daria Mondragon MD, Phone:  [phone]   HCV INTERPRETATION   Result Value Ref Range    HCV Interpretation Comment     Narrative    Performed at:  [address]  [id number]  : Daria Mondragon MD, Phone:  [phone]   [address] AB, QUAL Non Reactive     Narrative    Performed at:  [address]  [id number]  : Daria Mondragon MD, Phone: [phone]   HIV 1/2 AG/AB, 4TH GENERATION,W RFLX CONFIRM   Result Value Ref Range    HIV SCREEN 4TH GENERATION WRFX Non Reactive Non Reactive    Narrative    Performed at:  [address]  [id number]  : Chela Swartz MD, Phone:  [phone]   RPR   Result Value Ref Range    RPR Non Reactive Non Reactive    Narrative    Performed at:  [address]  [id number]  : Chela Swartz MD, Phone:  [phone]       RECOMMENDATIONS:  Work on diet and exercise. Recheck this test: A1C in  3 months.     Please call me if you have any questions: [phone]    Sincerely,      Deirdre Avina MD

## 2021-07-08 NOTE — PROGRESS NOTES
Chief Complaint   Patient presents with    Diabetes     FU    Documentation     pt need new prothesis lining - pain, prothesis fit properly, and pt needs prothesis to ambulate       Patient has not been out of the country in (14 months), NO diarrhea, NO cough, NO chest conjestion, NO temp. Pt has not been around anyone with these symptoms. Health Maintenance reviewed. I have reviewed the patient's medical history in detail and updated the computerized patient record. 1. Have you been to the ER, urgent care clinic since your last visit? yes Hospitalized since your last visit? yes    2. Have you seen or consulted any other health care providers outside of the [address] [place] [place] [address] since your last visit? Yes  Include any pap smears or colon screening. Encouraged pt to discuss pt's wishes with spouse/partner/family and bring them in the next appt to follow thru with the Advanced Directive    @  [facility], last 12 mths 7/8/2021   Able to walk? Yes   Fall in past 12 months? 1   Do you feel unsteady? 1   Are you worried about falling 1   Is the gait abnormal? 1   Number of falls in past 12 months 2   Fall with injury? 1       3 most recent PHQ Screens 7/8/2021   Little interest or pleasure in doing things Several days   Feeling down, depressed, irritable, or hopeless Several days   Total Score PHQ 2 2       Abuse Screening Questionnaire 7/8/2021   Do you ever feel afraid of your partner? N   Are you in a relationship with someone who physically or mentally threatens you? N   Is it safe for you to go home?  Y       ADL Assessment 7/8/2021   Feeding yourself No Help Needed   Getting from bed to chair No Help Needed   Getting dressed No Help Needed   Bathing or showering Help Needed   Walk across the room (includes cane/walker) Help Needed   Using the telphone No Help Needed   Taking your medications No Help Needed   Preparing meals No Help Needed   Managing money (expenses/bills) No Help Needed   Moderately strenuous housework (laundry) Help Needed   Shopping for personal items (toiletries/medicines) No Help Needed   Shopping for groceries No Help Needed   Driving No Help Needed   Climbing a flight of stairs Help Needed   Getting to places beyond walking distances No Help Needed

## 2021-07-08 NOTE — PROGRESS NOTES
Subjective:     Kylie Bradshaw is a 73 y.o. female seen for follow-up of diabetes. She has had hypoglycemic attacks. .no  Blood sugar control has been 200's    Lab Results   Component Value Date/Time    Hemoglobin A1c 8.1 (H) 06/28/2021 09:54 AM    Hemoglobin A1c 8.4 (H) 03/03/2021 11:34 AM    Hemoglobin A1c 7.9 (H) 09/16/2020 02:51 PM    Glucose 148 (H) 06/28/2021 09:54 AM    Glucose (POC) 146 (H) 02/18/2018 11:22 AM    Microalb/Creat ratio (ug/mg creat.) 449 (H) 08/24/2020 05:05 PM    LDL, calculated 15 09/16/2020 02:51 PM    LDL, calculated Comment 06/12/2020 11:26 AM    Creatinine 1.30 (H) 06/28/2021 09:54 AM       She has diabetes, hypertension and hyperlipidemia. Kylie Bradshaw has the additional concern of need prosthesis which is leaking and loose, evidently there is a pump that needs to work right to keep the prosthesis in place. Reports taking blood pressure medications without side affects. No complaints of exertional chest pain, excessive shortness of breath or focal weakness. Minimal swelling in lower legs or dizziness with standing. Diet and Lifestyle: does not rigorously follow a diabetic diet, nonsmoker. 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 90 Tablet 1    allopurinoL (ZYLOPRIM) 300 mg tablet Take 1 tablet by mouth once daily 90 Tablet 1    cyclobenzaprine (FLEXERIL) 5 mg tablet Take 1 Tablet by mouth nightly. 30 Tablet 5    baclofen (LIORESAL) 10 mg tablet Take 1 Tablet by mouth three (3) times daily. 270 Tablet 2    empagliflozin (Jardiance) 10 mg tablet Take 1 Tablet by mouth daily. 90 Tablet 3    omeprazole (PRILOSEC) 20 mg capsule Take 1 Capsule by mouth daily. 90 Capsule 3    metFORMIN (GLUCOPHAGE) 500 mg tablet Take 1 Tablet by mouth two (2) times daily (with meals). 180 Tablet 3    insulin aspart protamine/insulin aspart (NOVOLOG MIX 70/30) 100 unit/mL (70-30) inpn 15 Units by SubCUTAneous route ACB/HS. 10 Pen 3    Blood-Glucose Meter monitoring kit Up to 3 times daily 1 Kit 0    glucose blood VI test strips (blood glucose test) strip Up to 3 times daily 100 Strip 3    fenofibrate nanocrystallized (TRICOR) 145 mg tablet Take 1 Tab by mouth daily. 90 Tab 3    cetirizine (ZYRTEC) 10 mg tablet Take 1 Tab by mouth daily. Indications: inflammation of the nose due to an allergy 90 Tab 3    budesonide-formoteroL (SYMBICORT) 80-4.5 mcg/actuation HFAA Take 2 Puffs by inhalation two (2) times a day. Rinse mouth out with warm water after each use. Indications: controller medication for asthma 1 Inhaler 5    ipratropium (ATROVENT) 42 mcg (0.06 %) nasal spray 1 [place] by Both Nostrils route four (4) times daily. Indications: runny nose 15 mL 5    ascorbic acid, vitamin C, (Vitamin C) 500 mg tablet Take  by mouth.  albuterol (PROVENTIL HFA, VENTOLIN HFA, PROAIR HFA) 90 mcg/actuation inhaler Take 2 Puffs by inhalation every four (4) hours as needed for Wheezing. 1 Inhaler 3    glucose blood VI test strips (Ascensia CONTOUR) strip USE 1 TEST STRIP EACH TIME TO TEST BLOOD SUGAR UP TO 3 TIMES A  Strip 5    acetaminophen (TYLENOL) 500 mg tablet Take 1 Tab by mouth every four (4) hours (while awake).  Indications: Pain (Patient taking differently: Take 650 mg by mouth every four (4) hours (while awake). Indications: Pain) 100 Tab 0     Allergies   Allergen Reactions    Hydrocodone-Acetaminophen Hives, Itching and Nausea Only     Patient states she can take tylenol #3 without problem.  Keflex [Cephalexin] Itching     Past Medical History:   Diagnosis Date    Asthma     PATIENT DENIES    Bone spur of ankle 3/30/12    right ankle    Chronic pain     [facility]     GERD (gastroesophageal reflux disease)     Gout     Hypertension     OA (osteoarthritis) of knee 3/30/12    right knee    Sleep apnea     Unilateral AKA ([facility]     left     Social History     Tobacco Use    Smoking status: Former Smoker     Packs/day: 0.50     Years: 15.00     Pack years: 7.50     Quit date: 3/30/2009     Years since quittin.2    Smokeless tobacco: Never Used   Substance Use Topics    Alcohol use: No     Alcohol/week: 0.0 standard drinks             Review of Systems  Pertinent items are noted in HPI. Objective:     Significant for the following: Prosthesis present  Visit Vitals  /73 (BP 1 Location: Left arm, BP Patient Position: Sitting, BP Cuff Size: Adult)   Pulse 95   Temp 97.8 °F (36.6 °C)   Resp 18   Ht 5' 3\" (1.6 m)   Wt 178 lb (80.7 kg) Comment: 186lbs - 8 lbs for prothesis =178lbs   LMP 2003   SpO2 96%   BMI 31.53 kg/m²     WD WN female NAD  Heart RRR without murmers clicks or rubs  Lungs CTA  Abdo soft nontender  Ext no edema       Lab review: labs reviewed, I note that glycosylated hemoglobin stable, mildly abnormal but acceptable. Assessment/Plan:     Follow-up diabetes stable, reasonably well controlled. Diabetic issues reviewed with her: diabetic diet discussed in detail, written exchange diet given, all medications, side effects and compliance discussed carefully, use and side effects of insulin is taught and glycohemoglobin and other lab monitoring discussed.          Chronic Conditions Addressed Today     1. 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 Phantom limb pain ([address] - Primary     Relevant Medications     cyclobenzaprine (FLEXERIL) 5 mg tablet    3. Gout     Relevant Medications     allopurinoL (ZYLOPRIM) 300 mg tablet    4. Diabetes mellitus due to underlying condition with diabetic nephropathy, with long-term current use of insulin (HCC)     Relevant Medications     simvastatin (ZOCOR) 80 mg tablet     Other Relevant Orders     METABOLIC PANEL, BASIC     HEMOGLOBIN A1C WITH EAG      Acute Diagnoses Addressed Today     Mixed hyperlipidemia            Relevant Medications        simvastatin (ZOCOR) 80 mg tablet    Neuropathy            Relevant Medications        cyclobenzaprine (FLEXERIL) 5 mg tablet    Gastroesophageal reflux disease            Orders Placed This Encounter    AMB SUPPLY ORDER     Repair of left leg prosthesis    METABOLIC PANEL, BASIC     Standing Status:   Future     Standing Expiration Date:   1/8/2022    HEMOGLOBIN A1C WITH EAG     Standing Status:   Future     Standing Expiration Date:   7/8/2022    simvastatin (ZOCOR) 80 mg tablet     Sig: Take 1 Tablet by mouth nightly. Dispense:  90 Tablet     Refill:  1    allopurinoL (ZYLOPRIM) 300 mg tablet     Sig: Take 1 tablet by mouth once daily     Dispense:  90 Tablet     Refill:  1    cyclobenzaprine (FLEXERIL) 5 mg tablet     Sig: Take 1 Tablet by mouth nightly.      Dispense:  30 Tablet     Refill:  5       F/up 3mo

## 2021-07-13 ENCOUNTER — TELEPHONE (OUTPATIENT)
Dept: INTERNAL MEDICINE CLINIC | Age: 66
End: 2021-07-13

## 2021-07-13 DIAGNOSIS — S78.112D TRAUMATIC ABOVE-KNEE AMPUTATION OF LEFT LOWER EXTREMITY, SUBSEQUENT ENCOUNTER (HCC): Primary | ICD-10-CM

## 2021-07-13 NOTE — TELEPHONE ENCOUNTER
Message fr [facility]        Caller's first and last name: Pt       Reason for call: Pt needs an order for prosthesis re-fitting with [facility] [place], does an appt need to be made to see Dr. Danna Clinton to have this done?        Callback required yes/no and why: Yes       Best contact number(s): [phone]

## 2021-07-19 ENCOUNTER — VIRTUAL VISIT (OUTPATIENT)
Dept: INTERNAL MEDICINE CLINIC | Age: 66
End: 2021-07-19
Payer: COMMERCIAL

## 2021-07-19 DIAGNOSIS — Z79.4 TYPE 2 DIABETES MELLITUS WITH HYPERGLYCEMIA, WITH LONG-TERM CURRENT USE OF INSULIN (HCC): ICD-10-CM

## 2021-07-19 DIAGNOSIS — E11.65 TYPE 2 DIABETES MELLITUS WITH HYPERGLYCEMIA, WITH LONG-TERM CURRENT USE OF INSULIN (HCC): ICD-10-CM

## 2021-07-19 DIAGNOSIS — S78.112S TRAUMATIC ABOVE-KNEE AMPUTATION OF LEFT LOWER EXTREMITY, SEQUELA (HCC): Primary | ICD-10-CM

## 2021-07-19 PROCEDURE — 99213 OFFICE O/P EST LOW 20 MIN: CPT | Performed by: FAMILY MEDICINE

## 2021-07-19 NOTE — PROGRESS NOTES
Chief Complaint   Patient presents with    Leg Pain     pt's leg has shrunk and needs new order for a leg prothesis     Patient is aware that this is a Virtual Visit or Phone Call Only doctor's visit. Patient has not been out of the country in (14 months), NO diarrhea, NO cough, NO chest conjestion, NO temp. Pt has not been around anyone with these symptoms. Health Maintenance reviewed. I have reviewed the patient's medical history in detail and updated the computerized patient record. 1. Have you been to the ER, urgent care clinic since your last visit? No  Hospitalized since your last visit?  no    2. Have you seen or consulted any other health care providers outside of the [address] [place] [address] since your last visit? no  Include any pap smears or colon screening. Encouraged pt to discuss pt's wishes with spouse/partner/family and bring them in the next appt to follow thru with the Advanced Directive      Fall Risk Assessment, last 12 mths 7/19/2021   Able to walk? Yes   Fall in past 12 months? 1   Do you feel unsteady? 1   Are you worried about falling 1   Is the gait abnormal? 1   Number of falls in past 12 months 2   Fall with injury? 1       3 most recent PHQ Screens 7/19/2021   Little interest or pleasure in doing things Several days   Feeling down, depressed, irritable, or hopeless Several days   Total Score PHQ 2 2       Abuse Screening Questionnaire 7/19/2021   Do you ever feel afraid of your partner? N   Are you in a relationship with someone who physically or mentally threatens you? N   Is it safe for you to go home?  Y       ADL Assessment 7/19/2021   Feeding yourself No Help Needed   Getting from bed to chair No Help Needed   Getting dressed No Help Needed   Bathing or showering Help Needed   Walk across the room (includes cane/walker) Help Needed   Using the telphone No Help Needed   Taking your medications No Help Needed   Preparing meals No Help Needed   Managing money (expenses/bills) No Help Needed   Moderately strenuous housework (laundry) Help Needed   Shopping for personal items (toiletries/medicines) Help Needed   Shopping for groceries Help Needed   Driving Help Needed   Climbing a flight of stairs No Help Needed   Getting to places beyond walking distances Help Needed

## 2021-07-20 NOTE — PROGRESS NOTES
Subjective:     Bing Boston is a 73 y.o. female seen for follow-up of diabetes. She has had hypoglycemic attacks. .no  Blood sugar control has been reasonable, watches her diet, takes her insulin and has lost some weight just why her prosthetic no longer fits correctly. Lab Results   Component Value Date/Time    Hemoglobin A1c 8.1 (H) 06/28/2021 09:54 AM    Hemoglobin A1c 8.4 (H) 03/03/2021 11:34 AM    Hemoglobin A1c 7.9 (H) 09/16/2020 02:51 PM    Glucose 148 (H) 06/28/2021 09:54 AM    Glucose (POC) 146 (H) 02/18/2018 11:22 AM    Microalb/Creat ratio (ug/mg creat.) 449 (H) 08/24/2020 05:05 PM    LDL, calculated 15 09/16/2020 02:51 PM    LDL, calculated Comment 06/12/2020 11:26 AM    Creatinine 1.30 (H) 06/28/2021 09:54 AM       She has diabetes and hypertension. Bing Boston has the additional concern of been to see the prosthetic technologist at [facility]. She says that he reports that she needs a new prosthetic and that her insurance should pay for it as long as I put the reason in my note. Unsteady on her current prosthesis and hurts to walk. Sounds like she needs a new one. Repair probably will not do. Reports taking blood pressure medications without side affects. No complaints of exertional chest pain, excessive shortness of breath or focal weakness. Minimal swelling in lower legs or dizziness with standing. Diet and Lifestyle: follows a diabetic diet regularly, nonsmoker.     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 90 Tablet 1    allopurinoL (ZYLOPRIM) 300 mg tablet Take 1 tablet by mouth once daily 90 Tablet 1    cyclobenzaprine (FLEXERIL) 5 mg tablet Take 1 Tablet by mouth nightly. 30 Tablet 5    baclofen (LIORESAL) 10 mg tablet Take 1 Tablet by mouth three (3) times daily. 270 Tablet 2    empagliflozin (Jardiance) 10 mg tablet Take 1 Tablet by mouth daily. 90 Tablet 3    omeprazole (PRILOSEC) 20 mg capsule Take 1 Capsule by mouth daily. 90 Capsule 3    metFORMIN (GLUCOPHAGE) 500 mg tablet Take 1 Tablet by mouth two (2) times daily (with meals). 180 Tablet 3    insulin aspart protamine/insulin aspart (NOVOLOG MIX 70/30) 100 unit/mL (70-30) inpn 15 Units by SubCUTAneous route ACB/HS. 10 Pen 3    Blood-Glucose Meter monitoring kit Up to 3 times daily 1 Kit 0    glucose blood VI test strips (blood glucose test) strip Up to 3 times daily 100 Strip 3    fenofibrate nanocrystallized (TRICOR) 145 mg tablet Take 1 Tab by mouth daily. 90 Tab 3    cetirizine (ZYRTEC) 10 mg tablet Take 1 Tab by mouth daily. Indications: inflammation of the nose due to an allergy 90 Tab 3    budesonide-formoteroL (SYMBICORT) 80-4.5 mcg/actuation HFAA Take 2 Puffs by inhalation two (2) times a day. Rinse mouth out with warm water after each use. Indications: controller medication for asthma 1 Inhaler 5    ipratropium (ATROVENT) 42 mcg (0.06 %) nasal spray 1 [place] by Both Nostrils route four (4) times daily. Indications: runny nose 15 mL 5    ascorbic acid, vitamin C, (Vitamin C) 500 mg tablet Take  by mouth.  albuterol (PROVENTIL HFA, VENTOLIN HFA, PROAIR HFA) 90 mcg/actuation inhaler Take 2 Puffs by inhalation every four (4) hours as needed for Wheezing.  1 Inhaler 3    glucose blood VI test strips (Ascensia CONTOUR) strip USE 1 TEST STRIP EACH TIME TO TEST BLOOD SUGAR UP TO 3 TIMES A  Strip 5    acetaminophen (TYLENOL) 500 mg tablet Take 1 Tab by mouth every four (4) hours (while awake). Indications: Pain (Patient taking differently: Take 650 mg by mouth every four (4) hours (while awake). Indications: Pain) 100 Tab 0     Allergies   Allergen Reactions    Hydrocodone-Acetaminophen Hives, Itching and Nausea Only     Patient states she can take tylenol #3 without problem.  Keflex [Cephalexin] Itching     Past Medical History:   Diagnosis Date    Asthma     PATIENT DENIES    Bone spur of ankle 3/30/12    right ankle    Chronic pain     [facility]) [facility]     GERD (gastroesophageal reflux disease)     Gout     Hypertension     OA (osteoarthritis) of knee 3/30/12    right knee    Sleep apnea     Unilateral AKA ([facility]     left     Social History     Tobacco Use    Smoking status: Former Smoker     Packs/day: 0.50     Years: 15.00     Pack years: 7.50     Quit date: 3/30/2009     Years since quittin.3    Smokeless tobacco: Never Used   Substance Use Topics    Alcohol use: No     Alcohol/week: 0.0 standard drinks             Review of Systems  Pertinent items are noted in HPI. Objective:     Significant for the following: NAD  Visit Vitals  LMP 2003     WD WN female NAD  Heart RRR without murmers clicks or rubs  Lungs CTA  Abdo soft nontender  Ext no edema      Lab review: labs reviewed, I note that glycosylated hemoglobin mildly abnormal but acceptable. Assessment/Plan:     Follow-up diabetes well controlled, stable. Diabetic issues reviewed with her: all medications, side effects and compliance discussed carefully, glycohemoglobin and other lab monitoring discussed and labs immediately prior to next visit. ICD-10-CM ICD-9-CM    1.  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  V62.843B 905.9 AMB SUPPLY ORDER   2. Type 2 diabetes mellitus with hyperglycemia, with long-term current use of insulin (HCC)  E11.65 250.00     Z79.4 790.29      V58.67        Orders Placed This Encounter    AMB SUPPLY ORDER     Needs new prosthesis AKA prosthesis, current one is ill fitting since lost some weight. Follow-up and Dispositions    · Return in about 3 months (around 10/19/2021) for routine follow up. Chronic Conditions Addressed Today     1. Type 2 diabetes mellitus with hyperglycemia (HCC)    2. 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 She provided verbal consent to proceed: Yes, and patient identification was verified. This visit was conducted pursuant to the emergency declaration under the [address] [place] [address] and the [employer] General Act. A caregiver was present when appropriate. Ability to conduct physical exam was limited. The patient was located in a state where the provider was credentialed to provide care.      --Tova An MD on 7/20/2021 at 8:58 AM

## 2021-07-22 ENCOUNTER — TELEPHONE (OUTPATIENT)
Dept: INTERNAL MEDICINE CLINIC | Age: 66
End: 2021-07-22

## 2021-08-09 ENCOUNTER — DOCUMENTATION ONLY (OUTPATIENT)
Dept: INTERNAL MEDICINE CLINIC | Age: 66
End: 2021-08-09

## 2021-08-16 ENCOUNTER — VIRTUAL VISIT (OUTPATIENT)
Dept: INTERNAL MEDICINE CLINIC | Age: 66
End: 2021-08-16
Payer: COMMERCIAL

## 2021-08-16 DIAGNOSIS — M79.645 THUMB PAIN, LEFT: ICD-10-CM

## 2021-08-16 DIAGNOSIS — Z79.4 TYPE 2 DIABETES MELLITUS WITH HYPERGLYCEMIA, WITH LONG-TERM CURRENT USE OF INSULIN (HCC): Primary | ICD-10-CM

## 2021-08-16 DIAGNOSIS — E11.65 TYPE 2 DIABETES MELLITUS WITH HYPERGLYCEMIA, WITH LONG-TERM CURRENT USE OF INSULIN (HCC): Primary | ICD-10-CM

## 2021-08-16 DIAGNOSIS — M10.00 ACUTE IDIOPATHIC GOUT, UNSPECIFIED SITE: ICD-10-CM

## 2021-08-16 DIAGNOSIS — E11.40 TYPE 2 DIABETES MELLITUS WITH DIABETIC NEUROPATHY, WITH LONG-TERM CURRENT USE OF INSULIN (HCC): ICD-10-CM

## 2021-08-16 DIAGNOSIS — Z79.4 TYPE 2 DIABETES MELLITUS WITH DIABETIC NEUROPATHY, WITH LONG-TERM CURRENT USE OF INSULIN (HCC): ICD-10-CM

## 2021-08-16 DIAGNOSIS — G62.9 NEUROPATHY: ICD-10-CM

## 2021-08-16 PROCEDURE — 99214 OFFICE O/P EST MOD 30 MIN: CPT | Performed by: FAMILY MEDICINE

## 2021-08-16 RX ORDER — CYCLOBENZAPRINE HCL 10 MG
10 TABLET ORAL 2 TIMES DAILY
Qty: 120 TABLET | Refills: 2 | Status: SHIPPED | OUTPATIENT
Start: 2021-08-16 | End: 2021-10-26 | Stop reason: DRUGHIGH

## 2021-08-16 RX ORDER — ALLOPURINOL 100 MG/1
100 TABLET ORAL DAILY
Qty: 90 TABLET | Refills: 2 | Status: SHIPPED | OUTPATIENT
Start: 2021-08-16 | End: 2022-02-16 | Stop reason: SDUPTHER

## 2021-08-16 RX ORDER — COLCHICINE 0.6 MG/1
0.6 TABLET ORAL DAILY
Qty: 30 TABLET | Refills: 2 | Status: SHIPPED | OUTPATIENT
Start: 2021-08-16 | End: 2021-11-23 | Stop reason: SDUPTHER

## 2021-08-16 NOTE — PROGRESS NOTES
Chief Complaint   Patient presents with    Gout     med refill     Patient is aware that this is a Virtual Visit or Phone Call Only doctor's visit. Patient has not been out of the country in (14 months), NO diarrhea, NO cough, NO chest conjestion, NO temp. Pt has not been around anyone with these symptoms. Health Maintenance reviewed. I have reviewed the patient's medical history in detail and updated the computerized patient record. 1. Have you been to the ER, urgent care clinic since your last visit? no Hospitalized since your last visit?  no    2. Have you seen or consulted any other health care providers outside of the [address] [place] [address] since your last visit? No  Include any pap smears or colon screening. Encouraged pt to discuss pt's wishes with spouse/partner/family and bring them in the next appt to follow thru with the Advanced Directive      Fall Risk Assessment, last 12 mths 7/19/2021   Able to walk? Yes   Fall in past 12 months? 1   Do you feel unsteady? 1   Are you worried about falling 1   Is the gait abnormal? 1   Number of falls in past 12 months 2   Fall with injury? 1       3 most recent PHQ Screens 7/19/2021   Little interest or pleasure in doing things Several days   Feeling down, depressed, irritable, or hopeless Several days   Total Score PHQ 2 2       Abuse Screening Questionnaire 7/19/2021   Do you ever feel afraid of your partner? N   Are you in a relationship with someone who physically or mentally threatens you? N   Is it safe for you to go home?  Y       ADL Assessment 7/19/2021   Feeding yourself No Help Needed   Getting from bed to chair No Help Needed   Getting dressed No Help Needed   Bathing or showering Help Needed   Walk across the room (includes cane/walker) Help Needed   Using the telphone No Help Needed   Taking your medications No Help Needed   Preparing meals No Help Needed   Managing money (expenses/bills) No Help Needed   Moderately strenuous housework (laundry) Help Needed   Shopping for personal items (toiletries/medicines) Help Needed   Shopping for groceries Help Needed   Driving Help Needed   Climbing a flight of stairs No Help Needed   Getting to places beyond walking distances Help Needed

## 2021-08-16 NOTE — PROGRESS NOTES
Subjective:     Fidel Marlow is a 73 y.o. female seen for follow-up of diabetes. She has had hypoglycemic attacks. .no  Blood sugar control has been good    Lab Results   Component Value Date/Time    Hemoglobin A1c 8.1 (H) 06/28/2021 09:54 AM    Hemoglobin A1c 8.4 (H) 03/03/2021 11:34 AM    Hemoglobin A1c 7.9 (H) 09/16/2020 02:51 PM    Glucose 148 (H) 06/28/2021 09:54 AM    Glucose (POC) 146 (H) 02/18/2018 11:22 AM    Microalb/Creat ratio (ug/mg creat.) 449 (H) 08/24/2020 05:05 PM    LDL, calculated 15 09/16/2020 02:51 PM    LDL, calculated Comment 06/12/2020 11:26 AM    Creatinine 1.30 (H) 06/28/2021 09:54 AM       She has diabetes and hypertension. Fidel Marlow has the additional concern of left thumb pain x a month. Inc sciatica pain    Reports taking blood pressure medications without side affects. No complaints of exertional chest pain, excessive shortness of breath or focal weakness. Minimal swelling in lower legs or dizziness with standing. Diet and Lifestyle: follows a diabetic diet regularly, nonsmoker. 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 90 Tablet 1    cyclobenzaprine (FLEXERIL) 5 mg tablet Take 1 Tablet by mouth nightly.  30 Tablet 5    empagliflozin (Jardiance) 10 mg tablet Take 1 Tablet by mouth daily. 90 Tablet 3    omeprazole (PRILOSEC) 20 mg capsule Take 1 Capsule by mouth daily. 90 Capsule 3    metFORMIN (GLUCOPHAGE) 500 mg tablet Take 1 Tablet by mouth two (2) times daily (with meals). 180 Tablet 3    insulin aspart protamine/insulin aspart (NOVOLOG MIX 70/30) 100 unit/mL (70-30) inpn 15 Units by SubCUTAneous route ACB/HS. 10 Pen 3    Blood-Glucose Meter monitoring kit Up to 3 times daily 1 Kit 0    glucose blood VI test strips (blood glucose test) strip Up to 3 times daily 100 Strip 3    fenofibrate nanocrystallized (TRICOR) 145 mg tablet Take 1 Tab by mouth daily. 90 Tab 3    cetirizine (ZYRTEC) 10 mg tablet Take 1 Tab by mouth daily. Indications: inflammation of the nose due to an allergy 90 Tab 3    budesonide-formoteroL (SYMBICORT) 80-4.5 mcg/actuation HFAA Take 2 Puffs by inhalation two (2) times a day. Rinse mouth out with warm water after each use. Indications: controller medication for asthma 1 Inhaler 5    ipratropium (ATROVENT) 42 mcg (0.06 %) nasal spray 1 [place] by Both Nostrils route four (4) times daily. Indications: runny nose 15 mL 5    ascorbic acid, vitamin C, (Vitamin C) 500 mg tablet Take  by mouth.  albuterol (PROVENTIL HFA, VENTOLIN HFA, PROAIR HFA) 90 mcg/actuation inhaler Take 2 Puffs by inhalation every four (4) hours as needed for Wheezing. 1 Inhaler 3    glucose blood VI test strips (Ascensia CONTOUR) strip USE 1 TEST STRIP EACH TIME TO TEST BLOOD SUGAR UP TO 3 TIMES A  Strip 5    acetaminophen (TYLENOL) 500 mg tablet Take 1 Tab by mouth every four (4) hours (while awake). Indications: Pain (Patient taking differently: Take 650 mg by mouth every four (4) hours (while awake). Indications: Pain) 100 Tab 0     Allergies   Allergen Reactions    Hydrocodone-Acetaminophen Hives, Itching and Nausea Only     Patient states she can take tylenol #3 without problem.     Keflex [Cephalexin] Itching Past Medical History:   Diagnosis Date    Asthma     PATIENT DENIES    Bone spur of ankle 3/30/12    right ankle    Chronic pain     [facility]     GERD (gastroesophageal reflux disease)     Gout     Hypertension     OA (osteoarthritis) of knee 3/30/12    right knee    Sleep apnea     Unilateral AKA ([facility]     left     Social History     Tobacco Use    Smoking status: Former Smoker     Packs/day: 0.50     Years: 15.00     Pack years: 7.50     Quit date: 3/30/2009     Years since quittin.3    Smokeless tobacco: Never Used   Substance Use Topics    Alcohol use: No     Alcohol/week: 0.0 standard drinks             Review of Systems  Pertinent items are noted in HPI. Objective:     Significant for the following: No acute distress  Visit Vitals  LMP 2003     WD WN female NAD      Lab review: labs reviewed, I note that glycosylated hemoglobin mildly abnormal but acceptable. Assessment/Plan:     Follow-up diabetes stable, borderline controlled. Diabetic issues reviewed with her: all medications, side effects and compliance discussed carefully and glycohemoglobin and other lab monitoring discussed. ICD-10-CM ICD-9-CM    1. Type 2 diabetes mellitus with hyperglycemia, with long-term current use of insulin ([facility])  E11.65 250.00     Z79.4 790.29      V58.67    2. Type 2 diabetes mellitus with diabetic neuropathy, with long-term current use of insulin ([facility])  E11.40 250.60     Z79.4 357.2      V58.67    3. Neuropathy  G62.9 355.9 cyclobenzaprine (FLEXERIL) 10 mg tablet   4. Acute idiopathic gout, unspecified site  M10.00 274.01 colchicine 0.6 mg tablet      allopurinoL (ZYLOPRIM) 100 mg tablet   5. Thumb pain, left  M79.645 729.5 AMB SUPPLY ORDER     Orders Placed This Encounter    AMB SUPPLY ORDER     Thumb spica splint    colchicine 0.6 mg tablet     Sig: Take 1 Tablet by mouth daily.      Dispense:  30 Tablet     Refill:  2    allopurinoL (ZYLOPRIM) 100 mg tablet Sig: Take 1 Tablet by mouth daily. Dispense:  90 Tablet     Refill:  2    cyclobenzaprine (FLEXERIL) 10 mg tablet     Sig: Take 1 Tablet by mouth two (2) times a day. Dispense:  120 Tablet     Refill:  2         Try muscle relaxer for sciatica  Okay refill of gout medicines  Thumb spica splint   Discussed possible side affects, precautions, and drug interactions and possible benefits of the medication(s). Chronic Conditions Addressed Today     1. Type 2 diabetes mellitus with hyperglycemia (HCC) - Primary    2. Type 2 diabetes mellitus with diabetic neuropathy (HCC)     Relevant Medications     cyclobenzaprine (FLEXERIL) 10 mg tablet    3. Gout     Relevant Medications     colchicine 0.6 mg tablet     allopurinoL (ZYLOPRIM) 100 mg tablet      Other Problems Addressed Today     Neuropathy            Relevant Medications        cyclobenzaprine (FLEXERIL) 10 mg tablet    Thumb pain, left            Relevant Orders        AMB SUPPLY ORDER          Sheryle Jaffe, who was evaluated through a synchronous (real-time) audio-video encounter, and/or her healthcare decision maker, is aware that it is a billable service, with coverage as determined by her insurance carrier. She provided verbal consent to proceed: Yes, and patient identification was verified. This visit was conducted pursuant to the emergency declaration under the [address] [place] [address] authority and the [employer] General Act. A caregiver was present when appropriate. Ability to conduct physical exam was limited. The patient was located in a state where the provider was credentialed to provide care.      --Myesha Aranda MD on 8/16/2021 at 4:50 PM

## 2021-09-08 ENCOUNTER — TRANSCRIBE ORDER (OUTPATIENT)
Dept: SCHEDULING | Age: 66
End: 2021-09-08

## 2021-09-08 DIAGNOSIS — Z12.31 VISIT FOR SCREENING MAMMOGRAM: Primary | ICD-10-CM

## 2021-09-21 NOTE — PROGRESS NOTES
Patient in the office today for BP Check and EKG. Two patient identifiers confirmed. Allergies reviewed and Medication reconciliation conducted. EKG Performed and Printed. EKG Reviewed By Dr Shania Lisa. Patient informed of Normal EKG Report Per Dr. Shania Lisa. BP Reading noted below. Patient was advised to bring home BP Cuff to next scheduled OV.   Patient verbalized understanding    EKG Results     Procedure 720 Value Units Date/Time    AMB POC EKG ROUTINE W/ 12 LEADS, INTER & REP [[id number]] Resulted: 09/21/21 1452    Order Status: Completed Updated: 09/21/21 1453        Visit Vitals  /71 (BP 1 Location: Left arm, BP Patient Position: Sitting, BP Cuff Size: Adult)   Pulse 78   Temp 98.1 °F (36.7 °C) (Temporal)   Resp 16   SpO2 98% Pt aware of the results

## 2021-09-24 ENCOUNTER — HOSPITAL ENCOUNTER (OUTPATIENT)
Dept: LAB | Age: 66
Discharge: HOME | End: 2021-09-24

## 2021-09-24 LAB
ANION GAP SERPL CALC-SCNC: 5 MMOL/L (ref 5–15)
BUN SERPL-MCNC: 28 MG/DL (ref 6–20)
BUN/CREAT SERPL: 20 (ref 12–20)
CALCIUM SERPL-MCNC: 9.1 MG/DL (ref 8.5–10.1)
CHLORIDE SERPL-SCNC: 107 MMOL/L (ref 97–108)
CO2 SERPL-SCNC: 25 MMOL/L (ref 21–32)
CREAT SERPL-MCNC: 1.39 MG/DL (ref 0.55–1.02)
EST. AVERAGE GLUCOSE BLD GHB EST-MCNC: 169 MG/DL
GLUCOSE SERPL-MCNC: 126 MG/DL (ref 65–100)
HBA1C MFR BLD: 7.5 % (ref 4–5.6)
POTASSIUM SERPL-SCNC: 4.3 MMOL/L (ref 3.5–5.1)
SODIUM SERPL-SCNC: 137 MMOL/L (ref 136–145)

## 2021-09-28 ENCOUNTER — HOSPITAL ENCOUNTER (OUTPATIENT)
Dept: MAMMOGRAPHY | Age: 66
Discharge: HOME | End: 2021-09-28
Attending: FAMILY MEDICINE
Payer: COMMERCIAL

## 2021-09-28 DIAGNOSIS — Z12.31 VISIT FOR SCREENING MAMMOGRAM: ICD-10-CM

## 2021-09-28 PROCEDURE — 77067 SCR MAMMO BI INCL CAD: CPT

## 2021-10-08 ENCOUNTER — OFFICE VISIT (OUTPATIENT)
Dept: INTERNAL MEDICINE CLINIC | Age: 66
End: 2021-10-08
Payer: COMMERCIAL

## 2021-10-08 VITALS
SYSTOLIC BLOOD PRESSURE: 139 MMHG | WEIGHT: 178 LBS | OXYGEN SATURATION: 97 % | HEART RATE: 97 BPM | BODY MASS INDEX: 31.54 KG/M2 | DIASTOLIC BLOOD PRESSURE: 84 MMHG | HEIGHT: 63 IN | RESPIRATION RATE: 16 BRPM | TEMPERATURE: 98.6 F

## 2021-10-08 DIAGNOSIS — E08.21 DIABETES MELLITUS DUE TO UNDERLYING CONDITION WITH DIABETIC NEPHROPATHY, WITH LONG-TERM CURRENT USE OF INSULIN (HCC): Primary | ICD-10-CM

## 2021-10-08 DIAGNOSIS — G62.9 NEUROPATHY: ICD-10-CM

## 2021-10-08 DIAGNOSIS — S78.112S TRAUMATIC ABOVE-KNEE AMPUTATION OF LEFT LOWER EXTREMITY, SEQUELA (HCC): ICD-10-CM

## 2021-10-08 DIAGNOSIS — Z79.4 DIABETES MELLITUS DUE TO UNDERLYING CONDITION WITH DIABETIC NEPHROPATHY, WITH LONG-TERM CURRENT USE OF INSULIN (HCC): Primary | ICD-10-CM

## 2021-10-08 PROCEDURE — 99214 OFFICE O/P EST MOD 30 MIN: CPT | Performed by: FAMILY MEDICINE

## 2021-10-08 NOTE — PROGRESS NOTES
Subjective:     Liyah White is a 73 y.o. female seen for follow-up of diabetes. She has had hypoglycemic attacks. .no  Blood sugar control has been good < 200    Lab Results   Component Value Date/Time    Hemoglobin A1c 7.5 (H) 09/24/2021 11:41 AM    Hemoglobin A1c 8.1 (H) 06/28/2021 09:54 AM    Hemoglobin A1c 8.4 (H) 03/03/2021 11:34 AM    Glucose 126 (H) 09/24/2021 11:41 AM    Glucose (POC) 146 (H) 02/18/2018 11:22 AM    Microalb/Creat ratio (ug/mg creat.) 449 (H) 08/24/2020 05:05 PM    LDL, calculated 15 09/16/2020 02:51 PM    LDL, calculated Comment 06/12/2020 11:26 AM    Creatinine 1.39 (H) 09/24/2021 11:41 AM       She has diabetes, hypertension and hyperlipidemia. Liyah White has the additional concern of routine F/u feels ok, learning to use new prosthetic    Reports taking blood pressure medications without side affects. No complaints of exertional chest pain, excessive shortness of breath or focal weakness. Minimal swelling in lower legs or dizziness with standing. Diet and Lifestyle: follows a diabetic diet regularly, nonsmoker. 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 30 Tablet 2    allopurinoL (ZYLOPRIM) 100 mg tablet Take 1 Tablet by mouth daily.  80 Tablet 2    cyclobenzaprine (FLEXERIL) 10 mg tablet Take 1 Tablet by mouth two (2) times a day. 120 Tablet 2    simvastatin (ZOCOR) 80 mg tablet Take 1 Tablet by mouth nightly. 90 Tablet 1    empagliflozin (Jardiance) 10 mg tablet Take 1 Tablet by mouth daily. 90 Tablet 3    omeprazole (PRILOSEC) 20 mg capsule Take 1 Capsule by mouth daily. 90 Capsule 3    metFORMIN (GLUCOPHAGE) 500 mg tablet Take 1 Tablet by mouth two (2) times daily (with meals). 180 Tablet 3    insulin aspart protamine/insulin aspart (NOVOLOG MIX 70/30) 100 unit/mL (70-30) inpn 15 Units by SubCUTAneous route ACB/HS. 10 Pen 3    Blood-Glucose Meter monitoring kit Up to 3 times daily 1 Kit 0    glucose blood VI test strips (blood glucose test) strip Up to 3 times daily 100 Strip 3    fenofibrate nanocrystallized (TRICOR) 145 mg tablet Take 1 Tab by mouth daily. 90 Tab 3    cetirizine (ZYRTEC) 10 mg tablet Take 1 Tab by mouth daily. Indications: inflammation of the nose due to an allergy 90 Tab 3    budesonide-formoteroL (SYMBICORT) 80-4.5 mcg/actuation HFAA Take 2 Puffs by inhalation two (2) times a day. Rinse mouth out with warm water after each use. Indications: controller medication for asthma 1 Inhaler 5    ipratropium (ATROVENT) 42 mcg (0.06 %) nasal spray 1 [place] by Both Nostrils route four (4) times daily. Indications: runny nose 15 mL 5    ascorbic acid, vitamin C, (Vitamin C) 500 mg tablet Take  by mouth.  albuterol (PROVENTIL HFA, VENTOLIN HFA, PROAIR HFA) 90 mcg/actuation inhaler Take 2 Puffs by inhalation every four (4) hours as needed for Wheezing. 1 Inhaler 3    glucose blood VI test strips (Ascensia CONTOUR) strip USE 1 TEST STRIP EACH TIME TO TEST BLOOD SUGAR UP TO 3 TIMES A  Strip 5    acetaminophen (TYLENOL) 500 mg tablet Take 1 Tab by mouth every four (4) hours (while awake). Indications: Pain (Patient taking differently: Take 650 mg by mouth every four (4) hours (while awake).  Indications: Pain) 100 Tab 0     Allergies   Allergen Reactions    Hydrocodone-Acetaminophen Hives, Itching and Nausea Only     Patient states she can take tylenol #3 without problem.  Keflex [Cephalexin] Itching     Past Medical History:   Diagnosis Date    Asthma     PATIENT DENIES    Bone spur of ankle 3/30/12    right ankle    Chronic pain     [facility]di[facility]) [facility]     GERD (gastroesophageal reflux disease)     Gout     Hypertension     OA (osteoarthritis) of knee 3/30/12    right knee    Sleep apnea     Unilateral AKA ([facility]     left     Social History     Tobacco Use    Smoking status: Former Smoker     Packs/day: 0.50     Years: 15.00     Pack years: 7.50     Quit date: 3/30/2009     Years since quittin.5    Smokeless tobacco: Never Used   Substance Use Topics    Alcohol use: No     Alcohol/week: 0.0 standard drinks             Review of Systems  Pertinent items are noted in HPI. Objective:     Significant for the following: walks with prosthesis to BR  Visit Vitals  /84 (BP 1 Location: Left arm, BP Patient Position: Sitting, BP Cuff Size: Adult)   Pulse 97   Temp 98.6 °F (37 °C) (Temporal)   Resp 16   Ht 5' 3\" (1.6 m)   Wt 178 lb (80.7 kg)   LMP 2003   SpO2 97%   BMI 31.53 kg/m²     WD WN female NAD  Heart RRR without murmers clicks or rubs  Lungs CTA  Abdo soft nontender  Ext no edema  Diabetic foot exam was performed. No obvious sores or red lesions. Sensation checked by monofilament exam which was normal.  Dorsalis pedis pulse was present. Lab review: labs reviewed, I note that glycosylated hemoglobin normal, mildly abnormal but acceptable. Assessment/Plan:     Follow-up diabetes well controlled, stable, improved. Diabetic issues reviewed with her: all medications, side effects and compliance discussed carefully, foot care discussed and Podiatry visits discussed, glycohemoglobin and other lab monitoring discussed and labs immediately prior to next visit.        ICD-10-CM ICD-9-CM    1. Diabetes mellitus due to underlying condition with diabetic nephropathy, with long-term current use of insulin ([facility])  E08.21 249.40 MICROALBUMIN, UR, RAND W/ MICROALB/CREAT RATIO    Z79.4 583.81      V58.67    2. Neuropathy  G62.9 355.9    3. 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, sequela ([facility]  S78.112S 905.9        Orders Placed This Encounter    MICROALBUMIN, UR, RAND W/ MICROALB/CREAT RATIO     Standing Status:   Future     Standing Expiration Date:   10/8/2022             Chronic Conditions Addressed Today     1. 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  Diabetes mellitus due to underlying condition with diabetic nephropathy, with long-term current use of insulin ([facility] - Primary     Relevant Orders     MICROALBUMIN, UR, RAND W/ MICROALB/CREAT RATIO      Acute Diagnoses Addressed Today     Neuropathy              Follow-up 4 months

## 2021-10-08 NOTE — PROGRESS NOTES
Chief Complaint   Patient presents with    Labs    Insomnia     wakes up multi times during the night     Patient has not been out of the country in (14 months), NO diarrhea, NO cough, NO chest conjestion, NO temp. Pt has not been around anyone with these symptoms. Health Maintenance reviewed. I have reviewed the patient's medical history in detail and updated the computerized patient record. 1. Have you been to the ER, urgent care clinic since your last visit? No Hospitalized since your last visit?  no    2. Have you seen or consulted any other health care providers outside of the [address] [place] [address] since your last visit? No  Include any pap smears or colon screening. Encouraged pt to discuss pt's wishes with spouse/partner/family and bring them in the next appt to follow thru with the Advanced Directive    @  [address], last 12 mths 10/8/2021   Able to walk? Yes   Fall in past 12 months? 1   Do you feel unsteady? 1   Are you worried about falling 1   Is the gait abnormal? 1   Number of falls in past 12 months 2   Fall with injury? 1       3 most recent PHQ Screens 10/8/2021   Little interest or pleasure in doing things Several days   Feeling down, depressed, irritable, or hopeless Several days   Total Score PHQ 2 2       Abuse Screening Questionnaire 10/8/2021   Do you ever feel afraid of your partner? N   Are you in a relationship with someone who physically or mentally threatens you? N   Is it safe for you to go home?  Y       ADL Assessment 10/8/2021   Feeding yourself No Help Needed   Getting from bed to chair No Help Needed   Getting dressed -   Bathing or showering Help Needed   Walk across the room (includes cane/walker) Help Needed   Using the telphone No Help Needed   Taking your medications No Help Needed   Preparing meals No Help Needed   Managing money (expenses/bills) No Help Needed   Moderately strenuous housework (laundry) Help Needed   Shopping for personal items (toiletries/medicines) No Help Needed   Shopping for groceries No Help Needed   Driving No Help Needed   Climbing a flight of stairs Help Needed   Getting to places beyond walking distances No Help Needed

## 2021-10-09 LAB
CREAT UR-MCNC: 41.8 MG/DL
MICROALBUMIN UR-MCNC: 6.39 MG/DL
MICROALBUMIN/CREAT UR-RTO: 153 MG/G (ref 0–30)

## 2021-10-14 ENCOUNTER — VIRTUAL VISIT (OUTPATIENT)
Dept: INTERNAL MEDICINE CLINIC | Age: 66
End: 2021-10-14
Payer: COMMERCIAL

## 2021-10-14 DIAGNOSIS — R80.9 DIABETES MELLITUS WITH PROTEINURIA (HCC): Primary | ICD-10-CM

## 2021-10-14 DIAGNOSIS — Z79.4 TYPE 2 DIABETES MELLITUS WITH HYPERGLYCEMIA, WITH LONG-TERM CURRENT USE OF INSULIN (HCC): ICD-10-CM

## 2021-10-14 DIAGNOSIS — G56.01 CARPAL TUNNEL SYNDROME, RIGHT: ICD-10-CM

## 2021-10-14 DIAGNOSIS — E11.29 DIABETES MELLITUS WITH PROTEINURIA (HCC): Primary | ICD-10-CM

## 2021-10-14 DIAGNOSIS — E11.65 TYPE 2 DIABETES MELLITUS WITH HYPERGLYCEMIA, WITH LONG-TERM CURRENT USE OF INSULIN (HCC): ICD-10-CM

## 2021-10-14 PROCEDURE — 99214 OFFICE O/P EST MOD 30 MIN: CPT | Performed by: FAMILY MEDICINE

## 2021-10-14 RX ORDER — LOSARTAN POTASSIUM 25 MG/1
25 TABLET ORAL DAILY
Qty: 30 TABLET | Refills: 5 | Status: SHIPPED | OUTPATIENT
Start: 2021-10-14 | End: 2022-03-30 | Stop reason: SDUPTHER

## 2021-10-14 NOTE — PROGRESS NOTES
Subjective:     Maurice Smith is a 73 y.o. female seen for follow-up of diabetes. Recent urine shows microalbuminuria. Previously on lisinopril but it caused a cough. She has had hypoglycemic attacks. .no  Blood sugar control has been ok    Lab Results   Component Value Date/Time    Hemoglobin A1c 7.5 (H) 09/24/2021 11:41 AM    Hemoglobin A1c 8.1 (H) 06/28/2021 09:54 AM    Hemoglobin A1c 8.4 (H) 03/03/2021 11:34 AM    Glucose 126 (H) 09/24/2021 11:41 AM    Glucose (POC) 146 (H) 02/18/2018 11:22 AM    Microalbumin/Creat ratio (mg/g creat) 153 (H) 10/08/2021 08:48 PM    Microalbumin,urine random 6.39 10/08/2021 08:48 PM    LDL, calculated 15 09/16/2020 02:51 PM    LDL, calculated Comment 06/12/2020 11:26 AM    Creatinine 1.39 (H) 09/24/2021 11:41 AM       She has diabetes, hypertension and hyperlipidemia. Maurice Smith has the additional concern of complains of numbness and tingling right hand. Symptoms times months used a splint not really effective complains of some left finger pain as well. No trauma. Reports taking blood pressure medications without side affects. No complaints of exertional chest pain, excessive shortness of breath or focal weakness. Minimal swelling in lower legs or dizziness with standing. Diet and Lifestyle: follows a diabetic diet regularly, nonsmoker.     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  Keflex [Cephalexin] Itching     Past Medical History:   Diagnosis Date    Asthma     PATIENT DENIES    Bone spur of ankle 3/30/12    right ankle    Chronic pain     DM (diabetes mellitus) [facility]     GERD (gastroesophageal reflux disease)     Gout     Hypertension     OA (osteoarthritis) of knee 3/30/12    right knee    Sleep apnea     Unilateral AKA ([facility]     left     Social History     Tobacco Use    Smoking status: Former Smoker     Packs/day: 0.50     Years: 15.00     Pack years: 7.50     Quit date: 3/30/2009     Years since quittin.5    Smokeless tobacco: Never Used   Substance Use Topics    Alcohol use: No     Alcohol/week: 0.0 standard drinks             Review of Systems  Pertinent items are noted in HPI. Objective:     Significant for the following: Sounds no acute distress  Visit Vitals  LMP 2003         Lab review: labs reviewed, I note that glycosylated hemoglobin mildly abnormal but acceptable. Assessment/Plan:     Follow-up diabetes well controlled, stable. Diabetic issues reviewed with her: all medications, side effects and compliance discussed carefully and long term diabetic complications discussed. Chronic Conditions Addressed Today     1. Type 2 diabetes mellitus with hyperglycemia (HCC)     Relevant Medications     losartan (COZAAR) 25 mg tablet    2. Diabetes mellitus with proteinuria (HCC) - Primary     Relevant Medications     losartan (COZAAR) 25 mg tablet     Other Relevant Orders     METABOLIC PANEL, BASIC      Acute Diagnoses Addressed Today     Carpal tunnel syndrome, right            Relevant Orders        REFERRAL TO ORTHOPEDIC SURGERY            Blu Butler is a 73 y.o. female who was phone evaluated on 10/14/2021.       Consent:  She and/or her healthcare decision maker is aware that this patient-initiated Telehealth encounter is a billable service, with coverage as determined by her insurance carrier. She is aware that she may receive a bill and has provided verbal consent to proceed: Yes    I was at home while conducting this encounter. Assessment & Plan:       ICD-10-CM ICD-9-CM    1. Diabetes mellitus with proteinuria (HCC)  E11.29 250.40 losartan (COZAAR) 25 mg tablet    S71.7 151.3 METABOLIC PANEL, BASIC   2. Carpal tunnel syndrome, right  G56.01 354.0 REFERRAL TO ORTHOPEDIC SURGERY   3. Type 2 diabetes mellitus with hyperglycemia, with long-term current use of insulin (HCC)  E11.65 250.00     Z79.4 790.29      V58.67      Orders Placed This Encounter    METABOLIC PANEL, BASIC     Standing Status:   Future     Standing Expiration Date:   4/16/2022    REFERRAL TO ORTHOPEDIC SURGERY     Referral Priority:   Routine     Referral Type:   Consultation     Referral Reason:   Specialty Services Required     Referred to Provider:   Lorena Shrestha MD     Number of Visits Requested:   1    losartan (COZAAR) 25 mg tablet     Sig: Take 1 Tablet by mouth daily. Dispense:  30 Tablet     Refill:  5       Discussed possible side affects, precautions, and drug interactions and possible benefits of the medication(s).  '      712  Subjective:      As above      We discussed the expected course, resolution and complications of the diagnosis(es) in detail. Medication risks, benefits, costs, interactions, and alternatives were discussed as indicated. I advised her to contact the office if her condition worsens, changes or fails to improve as anticipated. She expressed understanding with the diagnosis(es) and plan.      Pursuant to the emergency declaration under the [facility], [facility] waiver authority and the [employer] Act, this Virtual  Visit was conducted, with patient's consent, to reduce the patient's risk of exposure to COVID-19 and provide continuity of care for an established patient. Services were provided through a video synchronous discussion virtually to substitute for in-person clinic visit.     Javier Monreal MD

## 2021-10-14 NOTE — PROGRESS NOTES
Chief Complaint   Patient presents with    Labs     Patient is aware that this is a Virtual Visit or Phone Call Only doctor's visit. Patient has not been out of the country in (14 months), NO diarrhea, NO cough, NO chest conjestion, NO temp. Pt has not been around anyone with these symptoms. Health Maintenance reviewed. I have reviewed the patient's medical history in detail and updated the computerized patient record. 1. Have you been to the ER, urgent care clinic since your last visit? No  Hospitalized since your last visit? No     2. Have you seen or consulted any other health care providers outside of the [address] [place] [address] since your last visit? No  Include any pap smears or colon screening. Encouraged pt to discuss pt's wishes with spouse/partner/family and bring them in the next appt to follow thru with the Advanced Directive      Fall Risk Assessment, last 12 mths 10/14/2021   Able to walk? Yes   Fall in past 12 months? 1   Do you feel unsteady? 1   Are you worried about falling 1   Is the gait abnormal? 1   Number of falls in past 12 months 2   Fall with injury? 1       3 most recent PHQ Screens 10/14/2021   Little interest or pleasure in doing things Several days   Feeling down, depressed, irritable, or hopeless Several days   Total Score PHQ 2 2       Abuse Screening Questionnaire 10/14/2021   Do you ever feel afraid of your partner? N   Are you in a relationship with someone who physically or mentally threatens you? N   Is it safe for you to go home?  Y       ADL Assessment 10/14/2021   Feeding yourself No Help Needed   Getting from bed to chair No Help Needed   Getting dressed No Help Needed   Bathing or showering No Help Needed   Walk across the room (includes cane/walker) No Help Needed   Using the telphone No Help Needed   Taking your medications No Help Needed   Preparing meals No Help Needed   Managing money (expenses/bills) No Help Needed   Moderately strenuous housework (laundry) No Help Needed   Shopping for personal items (toiletries/medicines) No Help Needed   Shopping for groceries No Help Needed   Driving No Help Needed   Climbing a flight of stairs Help Needed   Getting to places beyond walking distances No Help Needed

## 2021-10-25 ENCOUNTER — NURSE TRIAGE (OUTPATIENT)
Dept: OTHER | Facility: CLINIC | Age: 66
End: 2021-10-25

## 2021-10-25 NOTE — TELEPHONE ENCOUNTER
Reason for Disposition   Injury interferes with work or school    Answer Assessment - Initial Assessment Questions  1. MECHANISM: \"How did the injury happen? \" (e.g., twisting injury, direct blow)       Slipped and fell onto hardwood floors yesterday--feels like muscle pulled in right inner thigh. 2. ONSET: \"When did the injury happen? \" (Minutes or hours ago)       Yesterday    3. LOCATION: \"Where is the injury located? \"       Right inner thigh, right shoulder and right knee. 4. APPEARANCE of INJURY: \"What does the injury look like? \"  (e.g., deformity of leg)      No deformity    5. SEVERITY: \"Can you put weight on that leg? \" \"Can you walk? \"       Has left leg amputation--walking on right leg with limp    6. SIZE: For cuts, bruises, or swelling, ask: \"How large is it? \" (e.g., inches or centimeters)       N/a    7. PAIN: \"Is there pain? \" If so, ask: \"How bad is the pain? \"  (Scale 1-10; or mild, moderate, severe)      10/10 right inner thigh and right shoulder. Slight improvement with tylenol    8. TETANUS: For any breaks in the skin, ask: \"When was the last tetanus booster? \"      N/a    9. OTHER SYMPTOMS: \"Do you have any other symptoms? \"       Ears were ringing briefly after fall-- denies hitting head. 10. PREGNANCY: \"Is there any chance you are pregnant? \" \"When was your last menstrual period? \"        n/a    Protocols used: LEG INJURY-ADULT-[place]    Received call from  at [facility] with Red Flag Complaint. Brief description of triage: slipped and fell on hardwood floor yesterday-- was on crutches without left leg prosthesis on at the time. Today with pain right inner thigh, right shoulder and right knee (which has been replaced). No obvious deformity, walking with pain and limping today. Triage indicates for patient to be seen today or tomorrow in office. Pt agrees.     Care advice provided, patient verbalizes understanding; denies any other questions or concerns; instructed to call back for any new or worsening symptoms. Writer provided warm transfer to [address] [place] [address] at [facility] for appointment scheduling. Attention Provider: Thank you for allowing me to participate in the care of your patient. The patient was connected to triage in response to information provided to the ECC. Please do not respond through this encounter as the response is not directed to a shared pool.

## 2021-10-26 ENCOUNTER — OFFICE VISIT (OUTPATIENT)
Dept: INTERNAL MEDICINE CLINIC | Age: 66
End: 2021-10-26
Payer: COMMERCIAL

## 2021-10-26 VITALS
BODY MASS INDEX: 31.54 KG/M2 | HEIGHT: 63 IN | DIASTOLIC BLOOD PRESSURE: 87 MMHG | RESPIRATION RATE: 16 BRPM | SYSTOLIC BLOOD PRESSURE: 129 MMHG | OXYGEN SATURATION: 96 % | TEMPERATURE: 98.6 F | HEART RATE: 102 BPM | WEIGHT: 178 LBS
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
## 2021-10-26 DIAGNOSIS — S43.401A SPRAIN OF RIGHT SHOULDER, UNSPECIFIED SHOULDER SPRAIN TYPE, INITIAL ENCOUNTER: ICD-10-CM

## 2021-10-26 DIAGNOSIS — Z79.4 TYPE 2 DIABETES MELLITUS WITH HYPERGLYCEMIA, WITH LONG-TERM CURRENT USE OF INSULIN (HCC): ICD-10-CM

## 2021-10-26 DIAGNOSIS — S83.91XS: Primary | ICD-10-CM

## 2021-10-26 DIAGNOSIS — E11.65 TYPE 2 DIABETES MELLITUS WITH HYPERGLYCEMIA, WITH LONG-TERM CURRENT USE OF INSULIN (HCC): ICD-10-CM

## 2021-10-26 DIAGNOSIS — S78.112S TRAUMATIC ABOVE-KNEE AMPUTATION OF LEFT LOWER EXTREMITY, SEQUELA (HCC): Chronic | ICD-10-CM

## 2021-10-26 PROCEDURE — 99214 OFFICE O/P EST MOD 30 MIN: CPT | Performed by: FAMILY MEDICINE

## 2021-10-26 RX ORDER — CYCLOBENZAPRINE HCL 5 MG
5 TABLET ORAL
Qty: 60 TABLET | Refills: 0 | Status: SHIPPED | OUTPATIENT
Start: 2021-10-26 | End: 2021-11-19 | Stop reason: SDUPTHER

## 2021-10-26 NOTE — PROGRESS NOTES
Chief Complaint   Patient presents with    Shoulder Pain     Fall 2 days ago on R/shoulder abd R/knee, pain to shoulder, R/knee swollen and pain travels up to R/groin     Patient has not been out of the country in (14 months), NO diarrhea, NO cough, NO chest conjestion, NO temp. Pt has not been around anyone with these symptoms. Health Maintenance reviewed. I have reviewed the patient's medical history in detail and updated the computerized patient record. 1. Have you been to the ER, urgent care clinic since your last visit? no  Hospitalized since your last visit?  no    2. Have you seen or consulted any other health care providers outside of the [address] [place] [address] since your last visit? No  Include any pap smears or colon screening. Encouraged pt to discuss pt's wishes with spouse/partner/family and bring them in the next appt to follow thru with the Advanced Directive    @  [facility], last 12 mths 10/26/2021   Able to walk? Yes   Fall in past 12 months? 1   Do you feel unsteady? 1   Are you worried about falling 1   Is the gait abnormal? 1   Number of falls in past 12 months 2   Fall with injury? 1       3 most recent PHQ Screens 10/26/2021   Little interest or pleasure in doing things Several days   Feeling down, depressed, irritable, or hopeless Several days   Total Score PHQ 2 2       Abuse Screening Questionnaire 10/26/2021   Do you ever feel afraid of your partner? N   Are you in a relationship with someone who physically or mentally threatens you? N   Is it safe for you to go home?  Y       ADL Assessment 10/26/2021   Feeding yourself No Help Needed   Getting from bed to chair No Help Needed   Getting dressed No Help Needed   Bathing or showering Help Needed   Walk across the room (includes cane/walker) Help Needed   Using the telphone No Help Needed   Taking your medications No Help Needed   Preparing meals No Help Needed   Managing money (expenses/bills) No Help Needed Moderately strenuous housework (laundry) Help Needed   Shopping for personal items (toiletries/medicines) No Help Needed   Shopping for groceries No Help Needed   Driving No Help Needed   Climbing a flight of stairs Help Needed   Getting to places beyond walking distances No Help Needed
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
## 2021-11-01 DIAGNOSIS — E11.65 TYPE 2 DIABETES MELLITUS WITH HYPERGLYCEMIA, WITH LONG-TERM CURRENT USE OF INSULIN (HCC): ICD-10-CM

## 2021-11-01 DIAGNOSIS — Z79.4 TYPE 2 DIABETES MELLITUS WITH HYPERGLYCEMIA, WITH LONG-TERM CURRENT USE OF INSULIN (HCC): ICD-10-CM

## 2021-11-01 RX ORDER — INSULIN ASPART 100 [IU]/ML
15 INJECTION, SUSPENSION SUBCUTANEOUS
Qty: 10 PEN | Refills: 3 | Status: SHIPPED | OUTPATIENT
Start: 2021-11-01 | End: 2022-03-30 | Stop reason: SDUPTHER

## 2021-11-01 NOTE — TELEPHONE ENCOUNTER
Patient has about two more injections until she runs out, she will be going out of town tomorrow! Please refill today! Thankyou. Patient just had appt on Oct 26th. Requested Prescriptions     Pending Prescriptions Disp Refills    insulin aspart protamine/insulin aspart (NOVOLOG MIX 70/30) 100 unit/mL (70-30) inpn 10 Pen 3     Sig: 15 Units by SubCUTAneous route ACB/HS.

## 2021-11-10 ENCOUNTER — OFFICE VISIT (OUTPATIENT)
Dept: ORTHOPEDIC SURGERY | Age: 66
End: 2021-11-10
Payer: COMMERCIAL

## 2021-11-10 VITALS — HEIGHT: 63 IN | BODY MASS INDEX: 31.01 KG/M2 | WEIGHT: 175 LBS

## 2021-11-10 DIAGNOSIS — G56.03 BILATERAL CARPAL TUNNEL SYNDROME: ICD-10-CM

## 2021-11-10 DIAGNOSIS — M19.021 ARTHRITIS OF ELBOW, RIGHT: ICD-10-CM

## 2021-11-10 DIAGNOSIS — M19.042 PRIMARY OSTEOARTHRITIS OF BOTH HANDS: Primary | ICD-10-CM

## 2021-11-10 DIAGNOSIS — M19.041 PRIMARY OSTEOARTHRITIS OF BOTH HANDS: Primary | ICD-10-CM

## 2021-11-10 PROCEDURE — 99203 OFFICE O/P NEW LOW 30 MIN: CPT | Performed by: ORTHOPAEDIC SURGERY

## 2021-11-10 RX ORDER — BACLOFEN 10 MG/1
TABLET ORAL
COMMUNITY
End: 2022-02-16 | Stop reason: ALTCHOICE

## 2021-11-10 NOTE — PATIENT INSTRUCTIONS
Hand Arthritis: Exercises  Introduction  Here are some examples of exercises for you to try. The exercises may be suggested for a condition or for rehabilitation. Start each exercise slowly. Ease off the exercises if you start to have pain. You will be told when to start these exercises and which ones will work best for you. How to do the exercises  Tendon glides    1. In this exercise, the steps follow one another to a make a continuous movement. 2. With your affected hand, point your fingers and thumb straight up. Your wrist should be relaxed, following the line of your fingers and thumb. 3. Curl your fingers so that the top two joints in them are bent, and your fingers wrap down. Your fingertips should touch or be near the base of your fingers. Your fingers will look like a hook. 4. Make a fist by bending your knuckles. Your thumb can gently rest against your index (pointing) finger. 5. Unwind your fingers slightly so that your fingertips can touch the base of your palm. Your thumb can rest against your index finger. 6. Move back to your starting position, with your fingers and thumb pointing up. 7. Repeat the series of motions 8 to 12 times. 8. Switch hands and repeat steps 1 through 6, even if only one hand is sore. Intrinsic flexion    1. Rest your affected hand on a table and bend the large joints where your fingers connect to your hand. Keep your thumb and the other joints in your fingers straight. 2. Slowly straighten your fingers. Your wrist should be relaxed, following the line of your fingers and thumb. 3. Move back to your starting position, with your hand bent. 4. Repeat 8 to 12 times. 5. Switch hands and repeat steps 1 through 4, even if only one hand is sore. Finger extension    1. Place your affected hand flat on a table. 2. Lift and then lower one finger at a time off the table. 3. Repeat 8 to 12 times.   4. Switch hands and repeat steps 1 through 3, even if only one hand is sore.  MP extension    1. Place your good hand on a table, palm up. Put your affected hand on top of your good hand with your fingers wrapped around the thumb of your good hand like you are making a fist.  2. Slowly uncurl the joints of your affected hand where your fingers connect to your hand so that only the top two joints of your fingers are bent. Your fingers will look like a hook. 3. Move back to your starting position, with your fingers wrapped around your good thumb. 4. Repeat 8 to 12 times. 5. Switch hands and repeat steps 1 through 4, even if only one hand is sore. PIP extension (with MP extension)    1. Place your good hand on a table, palm up. Put your affected hand on top of your good hand, palm up. 2. Use the thumb and fingers of your good hand to grasp below the middle joint of one finger of your affected hand. 3. Straighten the last two joints of that finger. 4. Repeat 8 to 12 times. 5. Repeat steps 1 through 4 with each finger. 6. Switch hands and repeat steps 1 through 5, even if only one hand is sore. DIP flexion    1. With your good hand, grasp one finger of your affected hand. Your thumb will be on the top side of your finger just below the joint that is closest to your fingernail. 2. Slowly bend your affected finger only at the joint closest to your fingernail. 3. Repeat 8 to 12 times. 4. Repeat steps 1 through 3 with each finger. 5. Switch hands and repeat steps 1 through 4, even if only one hand is sore. Follow-up care is a key part of your treatment and safety. Be sure to make and go to all appointments, and call your doctor if you are having problems. It's also a good idea to know your test results and keep a list of the medicines you take. Where can you learn more? Go to http://www.gray.com/  Enter E040 in the search box to learn more about \"Hand Arthritis: Exercises. \"  Current as of: July 1, 2021               Content Version: 13.0  © 2499-3330 [employer]. Care instructions adapted under license by [employer] (which disclaims liability or warranty for this information). If you have questions about a medical condition or this instruction, always ask your healthcare professional. [address] any warranty or liability for your use of this information.
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
## 2021-11-15 ENCOUNTER — OFFICE VISIT (OUTPATIENT)
Dept: ORTHOPEDIC SURGERY | Age: 66
End: 2021-11-15
Payer: COMMERCIAL

## 2021-11-15 VITALS — HEIGHT: 63 IN | BODY MASS INDEX: 31.01 KG/M2 | WEIGHT: 175 LBS

## 2021-11-15 DIAGNOSIS — S80.01XA CONTUSION OF RIGHT KNEE AND LOWER LEG, INITIAL ENCOUNTER: Primary | ICD-10-CM

## 2021-11-15 DIAGNOSIS — Z96.651 STATUS POST RIGHT KNEE REPLACEMENT: ICD-10-CM

## 2021-11-15 DIAGNOSIS — S80.11XA CONTUSION OF RIGHT KNEE AND LOWER LEG, INITIAL ENCOUNTER: Primary | ICD-10-CM

## 2021-11-15 DIAGNOSIS — M16.11 PRIMARY OSTEOARTHRITIS OF RIGHT HIP: ICD-10-CM

## 2021-11-15 DIAGNOSIS — R10.31 GROIN PAIN, RIGHT: ICD-10-CM

## 2021-11-15 PROCEDURE — 99214 OFFICE O/P EST MOD 30 MIN: CPT | Performed by: ORTHOPAEDIC SURGERY

## 2021-11-15 RX ORDER — INDOMETHACIN 25 MG/1
CAPSULE ORAL DAILY
COMMUNITY
End: 2022-06-29 | Stop reason: ALTCHOICE

## 2021-11-15 RX ORDER — RANITIDINE 150 MG/1
150 CAPSULE ORAL DAILY
COMMUNITY
End: 2022-06-29 | Stop reason: ALTCHOICE

## 2021-11-15 RX ORDER — ALLOPURINOL 300 MG/1
300 TABLET ORAL DAILY
COMMUNITY
End: 2022-02-16 | Stop reason: ALTCHOICE
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
## 2021-11-15 NOTE — PROGRESS NOTES
Chief Complaint   Patient presents with    Knee Pain     right knee pain post fall in kitchen 2 weeks ago    Leg Pain     right groin pain       Visit Vitals  Ht 5' 3\" (1.6 m)   Wt 175 lb (79.4 kg)   BMI 31.00 kg/m²       1. Have you been to the ER, urgent care clinic since your last visit? Hospitalized since your last visit? No    2. Have you seen or consulted any other health care providers outside of the [address] [place] [address] since your last visit? Include any pap smears or colon screening.  Yes When: Tuesday after her fall Where: PCP office, Dr. Dudley Presume Reason for visit: sore from fall

## 2021-11-19 DIAGNOSIS — S83.91XS: ICD-10-CM

## 2021-11-19 DIAGNOSIS — S43.401A SPRAIN OF RIGHT SHOULDER, UNSPECIFIED SHOULDER SPRAIN TYPE, INITIAL ENCOUNTER: ICD-10-CM

## 2021-11-19 NOTE — TELEPHONE ENCOUNTER
----- Message from Paz Milian sent at 45/14/1482 10:03 AM EST -----  Subject: Refill Request    QUESTIONS  Name of Medication? cyclobenzaprine (FLEXERIL) 5 mg tablet  Patient-reported dosage and instructions? 3x daily as needed  How many days do you have left? 0  Preferred Pharmacy? [address]  Pharmacy phone number (if available)? [phone]  Additional Information for Provider? PT REQUESTING INCREASING TO 10MG, SHE   HAS BEEN TAKING 2 AT A TIME.  ---------------------------------------------------------------------------  --------------  CALL BACK INFO  What is the best way for the office to contact you? OK to leave message on   voicemail  Preferred Call Back Phone Number?  [phone]

## 2021-11-20 RX ORDER — CYCLOBENZAPRINE HCL 5 MG
5 TABLET ORAL
Qty: 60 TABLET | Refills: 1 | Status: SHIPPED | OUTPATIENT
Start: 2021-11-20 | End: 2022-02-16 | Stop reason: ALTCHOICE

## 2021-11-23 DIAGNOSIS — M10.00 ACUTE IDIOPATHIC GOUT, UNSPECIFIED SITE: ICD-10-CM

## 2021-11-23 RX ORDER — COLCHICINE 0.6 MG/1
0.6 TABLET ORAL DAILY
Qty: 30 TABLET | Refills: 2 | Status: SHIPPED | OUTPATIENT
Start: 2021-11-23 | End: 2022-03-03 | Stop reason: SDUPTHER

## 2021-11-23 NOTE — TELEPHONE ENCOUNTER
PHARMACY REFILL REQUEST        Requested Prescriptions     Pending Prescriptions Disp Refills    colchicine 0.6 mg tablet 30 Tablet 2     Sig: Take 1 Tablet by mouth daily.

## 2022-02-04 NOTE — TELEPHONE ENCOUNTER
Send results letter as below:  Your recent lab showed the following abnomality high triglycerides  Discussed cholesterol diet. Increase simvastatin to 80 mg daily. We need you to return to re-check the laboratory value again in 3 months. Rosalia Carreon Orbicularis Oris Muscle Flap Text: The defect edges were debeveled with a #15 scalpel blade.  Given that the defect affected the competency of the oral sphincter an obicularis oris muscle flap was deemed most appropriate to restore this competency and normal muscle function.  Using a sterile surgical marker, an appropriate flap was drawn incorporating the defect. The area thus outlined was incised with a #15 scalpel blade.

## 2022-02-16 ENCOUNTER — OFFICE VISIT (OUTPATIENT)
Dept: INTERNAL MEDICINE CLINIC | Age: 67
End: 2022-02-16
Payer: COMMERCIAL

## 2022-02-16 VITALS
TEMPERATURE: 98.4 F | BODY MASS INDEX: 32.43 KG/M2 | RESPIRATION RATE: 16 BRPM | OXYGEN SATURATION: 98 % | WEIGHT: 183 LBS | SYSTOLIC BLOOD PRESSURE: 152 MMHG | DIASTOLIC BLOOD PRESSURE: 86 MMHG | HEART RATE: 89 BPM | HEIGHT: 63 IN

## 2022-02-16 DIAGNOSIS — M10.00 ACUTE IDIOPATHIC GOUT, UNSPECIFIED SITE: ICD-10-CM

## 2022-02-16 DIAGNOSIS — G54.6 PHANTOM LIMB PAIN (HCC): ICD-10-CM

## 2022-02-16 DIAGNOSIS — Z79.4 TYPE 2 DIABETES MELLITUS WITH HYPERGLYCEMIA, WITH LONG-TERM CURRENT USE OF INSULIN (HCC): Primary | ICD-10-CM

## 2022-02-16 DIAGNOSIS — E78.2 MIXED DYSLIPIDEMIA: ICD-10-CM

## 2022-02-16 DIAGNOSIS — E11.65 TYPE 2 DIABETES MELLITUS WITH HYPERGLYCEMIA, WITH LONG-TERM CURRENT USE OF INSULIN (HCC): Primary | ICD-10-CM

## 2022-02-16 DIAGNOSIS — I10 ESSENTIAL HYPERTENSION: ICD-10-CM

## 2022-02-16 DIAGNOSIS — E78.2 MIXED HYPERLIPIDEMIA: ICD-10-CM

## 2022-02-16 DIAGNOSIS — S78.112S TRAUMATIC ABOVE-KNEE AMPUTATION OF LEFT LOWER EXTREMITY, SEQUELA (HCC): ICD-10-CM

## 2022-02-16 DIAGNOSIS — Z79.4 TYPE 2 DIABETES MELLITUS WITH DIABETIC NEUROPATHY, WITH LONG-TERM CURRENT USE OF INSULIN (HCC): ICD-10-CM

## 2022-02-16 DIAGNOSIS — N18.30 CRI (CHRONIC RENAL INSUFFICIENCY), STAGE 3 (MODERATE) (HCC): ICD-10-CM

## 2022-02-16 DIAGNOSIS — E11.40 TYPE 2 DIABETES MELLITUS WITH DIABETIC NEUROPATHY, WITH LONG-TERM CURRENT USE OF INSULIN (HCC): ICD-10-CM

## 2022-02-16 PROCEDURE — 99214 OFFICE O/P EST MOD 30 MIN: CPT | Performed by: FAMILY MEDICINE

## 2022-02-16 RX ORDER — FENOFIBRATE 145 MG/1
145 TABLET, COATED ORAL DAILY
Qty: 90 TABLET | Refills: 3 | Status: SHIPPED | OUTPATIENT
Start: 2022-02-16 | End: 2022-03-01 | Stop reason: SDUPTHER

## 2022-02-16 RX ORDER — ALLOPURINOL 100 MG/1
100 TABLET ORAL DAILY
Qty: 90 TABLET | Refills: 3 | Status: SHIPPED | OUTPATIENT
Start: 2022-02-16 | End: 2022-05-23 | Stop reason: SDUPTHER

## 2022-02-16 RX ORDER — AMITRIPTYLINE HYDROCHLORIDE 10 MG/1
TABLET, FILM COATED ORAL
Qty: 120 TABLET | Refills: 2 | Status: SHIPPED | OUTPATIENT
Start: 2022-02-16 | End: 2022-03-30

## 2022-02-16 RX ORDER — METFORMIN HYDROCHLORIDE 500 MG/1
500 TABLET ORAL 2 TIMES DAILY WITH MEALS
Qty: 180 TABLET | Refills: 3 | Status: SHIPPED | OUTPATIENT
Start: 2022-02-16

## 2022-02-16 NOTE — PROGRESS NOTES
Subjective:     Evelin Harris is a 73 y.o. female seen for follow-up of diabetes. She has had hypoglycemic attacks. .no  Blood sugar control has been ok    Lab Results   Component Value Date/Time    Hemoglobin A1c 7.5 (H) 09/24/2021 11:41 AM    Hemoglobin A1c 8.1 (H) 06/28/2021 09:54 AM    Hemoglobin A1c 8.4 (H) 03/03/2021 11:34 AM    Glucose 126 (H) 09/24/2021 11:41 AM    Glucose (POC) 146 (H) 02/18/2018 11:22 AM    Microalbumin/Creat ratio (mg/g creat) 153 (H) 10/08/2021 08:48 PM    Microalbumin,urine random 6.39 10/08/2021 08:48 PM    LDL, calculated 15 09/16/2020 02:51 PM    LDL, calculated Comment 06/12/2020 11:26 AM    Creatinine 1.39 (H) 09/24/2021 11:41 AM       She has diabetes, hypertension and hyperlipidemia. Evelin Harris has the additional concern of frequent phatom limb pain 10/10 freq, sciatica ibuprofen helping    Reports taking blood pressure medications without side affects. No complaints of exertional chest pain, excessive shortness of breath or focal weakness. Minimal swelling in lower legs or dizziness with standing. Diet and Lifestyle: follows a diabetic diet regularly, nonsmoker. 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     Keflex [Cephalexin] Itching    Lisinopril Cough     Past Medical History:   Diagnosis Date    Asthma     PATIENT DENIES    Bone spur of ankle 3/30/12    right ankle    Chronic pain     DM (diabetes mellitus) ([facility]     GERD (gastroesophageal reflux disease)     Gout     Hypertension     OA (osteoarthritis) of knee 3/30/12    right knee    Sleep apnea     Unilateral AKA ([facility]     left     Social History     Tobacco Use    Smoking status: Former Smoker     Packs/day: 0.50     Years: 15.00     Pack years: 7.50     Quit date: 3/30/2009     Years since quittin.8    Smokeless tobacco: Never Used   Substance Use Topics    Alcohol use: No     Alcohol/week: 0.0 standard drinks          Review of Systems  Pertinent items are noted in HPI. Objective:     Significant for the following: inc bp  Visit Vitals  BP (!) 160/103   Pulse 89   Temp 98.4 °F (36.9 °C) (Temporal)   Resp 16   Ht 5' 3\" (1.6 m)   Wt 183 lb (83 kg)   LMP 2003   SpO2 98%   BMI 32.42 kg/m²     WD WN female NAD      Lab review: labs reviewed, I note that glycosylated hemoglobin mildly abnormal but acceptable. Assessment/Plan:     Follow-up diabetes stable. Diabetic issues reviewed with her: all medications, side effects and compliance discussed carefully and glycohemoglobin and other lab monitoring discussed. ICD-10-CM ICD-9-CM    1. Type 2 diabetes mellitus with hyperglycemia, with long-term current use of insulin ([facility])  E11.65 250.00     Z79.4 790.29      V58.67    2. Type 2 diabetes mellitus with diabetic neuropathy, with long-term current use of insulin ([facility])  E11.40 250.60 HEMOGLOBIN A1C WITH EAG    Z79.4 357.2      V58.67    3. 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 Phantom limb pain ([facility])  G54.6 353.6    5. Essential hypertension  M98 776.9 METABOLIC PANEL, BASIC   6. Mixed hyperlipidemia  E78.2 272.2 LIPID PANEL   7. CRI (chronic renal insufficiency), stage 3 (moderate) ([facility])  N18.30 585.3    8.  Mixed dyslipidemia  E78.2 272.2 fenofibrate nanocrystallized (TRICOR) 145 mg tablet   9. Acute idiopathic gout, unspecified site  M10.00 274.01 allopurinoL (ZYLOPRIM) 100 mg tablet     Orders Placed This Encounter    METABOLIC PANEL, BASIC     Standing Status:   Future     Standing Expiration Date:   8/19/2022    LIPID PANEL     Standing Status:   Future     Standing Expiration Date:   8/19/2022    HEMOGLOBIN A1C WITH EAG     Standing Status:   Future     Standing Expiration Date:   2/16/2023    amitriptyline (ELAVIL) 10 mg tablet     Sig: Up to 4 at night     Dispense:  120 Tablet     Refill:  2    metFORMIN (GLUCOPHAGE) 500 mg tablet     Sig: Take 1 Tablet by mouth two (2) times daily (with meals). Dispense:  180 Tablet     Refill:  3    fenofibrate nanocrystallized (TRICOR) 145 mg tablet     Sig: Take 1 Tablet by mouth daily. Dispense:  90 Tablet     Refill:  3    allopurinoL (ZYLOPRIM) 100 mg tablet     Sig: Take 1 Tablet by mouth daily. Dispense:  90 Tablet     Refill:  3       Discussed possible side affects, precautions, and drug interactions and possible benefits of the medication(s). Chronic Conditions Addressed Today     1. Type 2 diabetes mellitus with hyperglycemia (HCC) - Primary     Relevant Medications     metFORMIN (GLUCOPHAGE) 500 mg tablet     fenofibrate nanocrystallized (TRICOR) 145 mg tablet    2. Type 2 diabetes mellitus with diabetic neuropathy (HCC)     Relevant Medications     amitriptyline (ELAVIL) 10 mg tablet     metFORMIN (GLUCOPHAGE) 500 mg tablet     fenofibrate nanocrystallized (TRICOR) 145 mg tablet     Other Relevant Orders     HEMOGLOBIN A1C WITH EAG    3. 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 Phantom limb pain (HCC)     Relevant Medications     amitriptyline (ELAVIL) 10 mg tablet    5. Gout     Relevant Medications     allopurinoL (ZYLOPRIM) 100 mg tablet    6.  Essential hypertension     Relevant Medications     fenofibrate nanocrystallized (TRICOR) 145 mg tablet Other Relevant Orders     METABOLIC PANEL, BASIC    7. CRI (chronic renal insufficiency), stage 3 (moderate) (HCC)      Acute Diagnoses Addressed Today     Mixed hyperlipidemia            Relevant Medications        fenofibrate nanocrystallized (TRICOR) 145 mg tablet        Other Relevant Orders        LIPID PANEL    Mixed dyslipidemia            Relevant Medications        fenofibrate nanocrystallized (TRICOR) 145 mg tablet          Follow-up and Dispositions    · Return in about 6 weeks (around 3/30/2022).

## 2022-02-16 NOTE — PROGRESS NOTES
Chief Complaint   Patient presents with    Diabetes     FU     Patient has not been out of the country in (14 months), NO diarrhea, NO cough, NO chest conjestion, NO temp. Pt has not been around anyone with these symptoms. Health Maintenance reviewed. I have reviewed the patient's medical history in detail and updated the computerized patient record. 1. Have you been to the ER, urgent care clinic since your last visit? No   Hospitalized since your last visit?  no    2. Have you seen or consulted any other health care providers outside of the [address] [place] [address] since your last visit? No  Include any pap smears or colon screening. Encouraged pt to discuss pt's wishes with spouse/partner/family and bring them in the next appt to follow thru with the Advanced Directive    @  [address], last 12 mths 11/15/2021   Able to walk? Yes   Fall in past 12 months? 1   Do you feel unsteady? 1   Are you worried about falling 1   Is the gait abnormal? 1   Number of falls in past 12 months 2   Fall with injury? 1       3 most recent PHQ Screens 2/16/2022   Little interest or pleasure in doing things Several days   Feeling down, depressed, irritable, or hopeless Several days   Total Score PHQ 2 2       Abuse Screening Questionnaire 10/26/2021   Do you ever feel afraid of your partner? N   Are you in a relationship with someone who physically or mentally threatens you? N   Is it safe for you to go home?  Y       ADL Assessment 10/26/2021   Feeding yourself No Help Needed   Getting from bed to chair No Help Needed   Getting dressed No Help Needed   Bathing or showering Help Needed   Walk across the room (includes cane/walker) Help Needed   Using the telphone No Help Needed   Taking your medications No Help Needed   Preparing meals No Help Needed   Managing money (expenses/bills) No Help Needed   Moderately strenuous housework (laundry) Help Needed   Shopping for personal items (toiletries/medicines) No Help Needed   Shopping for groceries No Help Needed   Driving No Help Needed   Climbing a flight of stairs Help Needed   Getting to places beyond walking distances No Help Needed

## 2022-02-21 ENCOUNTER — VIRTUAL VISIT (OUTPATIENT)
Dept: INTERNAL MEDICINE CLINIC | Age: 67
End: 2022-02-21
Payer: COMMERCIAL

## 2022-02-21 DIAGNOSIS — G54.6 PHANTOM LIMB PAIN (HCC): ICD-10-CM

## 2022-02-21 DIAGNOSIS — S78.112S TRAUMATIC ABOVE-KNEE AMPUTATION OF LEFT LOWER EXTREMITY, SEQUELA (HCC): Primary | ICD-10-CM

## 2022-02-21 PROCEDURE — 99212 OFFICE O/P EST SF 10 MIN: CPT | Performed by: FAMILY MEDICINE

## 2022-02-21 NOTE — PROGRESS NOTES
Chief Complaint   Patient presents with    Personal Problem     Pt wants a referral     Patient is aware that this is a Virtual Visit or Phone Call Only doctor's visit. Patient has not been out of the country in (14 months), NO diarrhea, NO cough, NO chest conjestion, NO temp. Pt has not been around anyone with these symptoms. Health Maintenance reviewed. I have reviewed the patient's medical history in detail and updated the computerized patient record. 1. Have you been to the ER, urgent care clinic since your last visit? no Hospitalized since your last visit?  no    2. Have you seen or consulted any other health care providers outside of the [address] [place] [address] since your last visit? No  Include any pap smears or colon screening. Encouraged pt to discuss pt's wishes with spouse/partner/family and bring them in the next appt to follow thru with the Advanced Directive      Fall Risk Assessment, last 12 mths 11/15/2021   Able to walk? Yes   Fall in past 12 months? 1   Do you feel unsteady? 1   Are you worried about falling 1   Is the gait abnormal? 1   Number of falls in past 12 months 2   Fall with injury? 1       3 most recent PHQ Screens 2/21/2022   Little interest or pleasure in doing things Several days   Feeling down, depressed, irritable, or hopeless Several days   Total Score PHQ 2 2       Abuse Screening Questionnaire 10/26/2021   Do you ever feel afraid of your partner? N   Are you in a relationship with someone who physically or mentally threatens you? N   Is it safe for you to go home?  Y       ADL Assessment 10/26/2021   Feeding yourself No Help Needed   Getting from bed to chair No Help Needed   Getting dressed No Help Needed   Bathing or showering Help Needed   Walk across the room (includes cane/walker) Help Needed   Using the telphone No Help Needed   Taking your medications No Help Needed   Preparing meals No Help Needed   Managing money (expenses/bills) No Help Needed Moderately strenuous housework (laundry) Help Needed   Shopping for personal items (toiletries/medicines) No Help Needed   Shopping for groceries No Help Needed   Driving No Help Needed   Climbing a flight of stairs Help Needed   Getting to places beyond walking distances No Help Needed

## 2022-02-21 NOTE — PROGRESS NOTES
Jennifer Kim is a 73 y.o. female who was phone evaluated on 2/21/2022. Consent:  She and/or her healthcare decision maker is aware that this patient-initiated Telehealth encounter is a billable service, with coverage as determined by her insurance carrier. She is aware that she may receive a bill and has provided verbal consent to proceed: Yes    I was in the office while conducting this encounter. Assessment & Plan:   Diagnoses and all orders for this visit:    1. 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 Phantom limb pain (HCC)  -     AMB SUPPLY ORDER    8 min  F/up prn    Orders Placed This Encounter    AMB SUPPLY ORDER     New liner for her prosthesis         712  Subjective:      Just needs a new prosthesis lining , current 1 loose, relates improvement of her phatom limb pain with just 2 elavil daily. Rx up to 4/day, less sharp and less often. Current Outpatient Medications   Medication Sig Dispense Refill    amitriptyline (ELAVIL) 10 mg tablet Up to 4 at night 120 Tablet 2    metFORMIN (GLUCOPHAGE) 500 mg tablet Take 1 Tablet by mouth two (2) times daily (with meals). 180 Tablet 3    fenofibrate nanocrystallized (TRICOR) 145 mg tablet Take 1 Tablet by mouth daily. 90 Tablet 3    allopurinoL (ZYLOPRIM) 100 mg tablet Take 1 Tablet by mouth daily. 90 Tablet 3    colchicine 0.6 mg tablet Take 1 Tablet by mouth daily. 30 Tablet 2    raNITIdine hcl 150 mg capsule Take 150 mg by mouth daily.  indomethacin (INDOCIN) 25 mg capsule Take  by mouth daily.  insulin aspart protamine/insulin aspart (NOVOLOG MIX 70/30) 100 unit/mL (70-30) inpn 15 Units by SubCUTAneous route ACB/HS. 10 Pen 3    losartan (COZAAR) 25 mg tablet Take 1 Tablet by mouth daily. 30 Tablet 5    simvastatin (ZOCOR) 80 mg tablet Take 1 Tablet by mouth nightly. 90 Tablet 1    empagliflozin (Jardiance) 10 mg tablet Take 1 Tablet by mouth daily.  90 Tablet 3    omeprazole (PRILOSEC) 20 mg capsule Take 1 Capsule by mouth daily. 90 Capsule 3    Blood-Glucose Meter monitoring kit Up to 3 times daily 1 Kit 0    glucose blood VI test strips (blood glucose test) strip Up to 3 times daily 100 Strip 3    cetirizine (ZYRTEC) 10 mg tablet Take 1 Tab by mouth daily. Indications: inflammation of the nose due to an allergy 90 Tab 3    ipratropium (ATROVENT) 42 mcg (0.06 %) nasal spray 1 [place] by Both Nostrils route four (4) times daily. Indications: runny nose 15 mL 5    ascorbic acid, vitamin C, (Vitamin C) 500 mg tablet Take  by mouth.  albuterol (PROVENTIL HFA, VENTOLIN HFA, PROAIR HFA) 90 mcg/actuation inhaler Take 2 Puffs by inhalation every four (4) hours as needed for Wheezing. 1 Inhaler 3    glucose blood VI test strips ([employer]ensia CONTOUR) strip USE 1 TEST STRIP EACH TIME TO TEST BLOOD SUGAR UP TO 3 TIMES A  Strip 5    acetaminophen (TYLENOL) 500 mg tablet Take 1 Tab by mouth every four (4) hours (while awake). Indications: Pain 100 Tab 0           We discussed the expected course, resolution and complications of the diagnosis(es) in detail. Medication risks, benefits, costs, interactions, and alternatives were discussed as indicated. I advised her to contact the office if her condition worsens, changes or fails to improve as anticipated. She expressed understanding with the diagnosis(es) and plan. Pursuant to the emergency declaration under the [facility], [facility] waiver authority and the [employer] Act, this Virtual  Visit was conducted, with patient's consent, to reduce the patient's risk of exposure to COVID-19 and provide continuity of care for an established patient. Services were provided through a video synchronous discussion virtually to substitute for in-person clinic visit.     Lan Oquendo MD

## 2022-02-25 LAB
BUN SERPL-MCNC: 25 MG/DL (ref 8–27)
BUN/CREAT SERPL: 19 (ref 12–28)
CALCIUM SERPL-MCNC: 10.5 MG/DL (ref 8.7–10.3)
CHLORIDE SERPL-SCNC: 100 MMOL/L (ref 96–106)
CHOLEST SERPL-MCNC: 133 MG/DL (ref 100–199)
CO2 SERPL-SCNC: 19 MMOL/L (ref 20–29)
CREAT SERPL-MCNC: 1.32 MG/DL (ref 0.57–1)
EST. AVERAGE GLUCOSE BLD GHB EST-MCNC: 180 MG/DL
GLUCOSE SERPL-MCNC: 141 MG/DL (ref 65–99)
HBA1C MFR BLD: 7.9 % (ref 4.8–5.6)
HDLC SERPL-MCNC: 16 MG/DL
IMP & REVIEW OF LAB RESULTS: NORMAL
INTERPRETATION: NORMAL
LDLC SERPL CALC-MCNC: ABNORMAL MG/DL (ref 0–99)
POTASSIUM SERPL-SCNC: 4.6 MMOL/L (ref 3.5–5.2)
SODIUM SERPL-SCNC: 141 MMOL/L (ref 134–144)
TRIGL SERPL-MCNC: 1057 MG/DL (ref 0–149)
VLDLC SERPL CALC-MCNC: ABNORMAL MG/DL (ref 5–40)

## 2022-03-01 ENCOUNTER — TELEPHONE (OUTPATIENT)
Dept: INTERNAL MEDICINE CLINIC | Age: 67
End: 2022-03-01

## 2022-03-01 DIAGNOSIS — E78.2 MIXED DYSLIPIDEMIA: ICD-10-CM

## 2022-03-01 RX ORDER — FENOFIBRATE 145 MG/1
145 TABLET, COATED ORAL DAILY
Qty: 90 TABLET | Refills: 1 | Status: SHIPPED | OUTPATIENT
Start: 2022-03-01 | End: 2022-03-03

## 2022-03-03 ENCOUNTER — OFFICE VISIT (OUTPATIENT)
Dept: INTERNAL MEDICINE CLINIC | Age: 67
End: 2022-03-03
Payer: COMMERCIAL

## 2022-03-03 VITALS
DIASTOLIC BLOOD PRESSURE: 81 MMHG | HEART RATE: 108 BPM | HEIGHT: 63 IN | SYSTOLIC BLOOD PRESSURE: 139 MMHG | BODY MASS INDEX: 32.25 KG/M2 | WEIGHT: 182 LBS | RESPIRATION RATE: 16 BRPM | OXYGEN SATURATION: 98 % | TEMPERATURE: 98.4 F

## 2022-03-03 DIAGNOSIS — E78.1 HYPERTRIGLYCERIDEMIA, FAMILIAL: Primary | ICD-10-CM

## 2022-03-03 DIAGNOSIS — R10.13 EPIGASTRIC ABDOMINAL PAIN: ICD-10-CM

## 2022-03-03 DIAGNOSIS — G54.6 PHANTOM LIMB PAIN (HCC): ICD-10-CM

## 2022-03-03 DIAGNOSIS — E11.40 TYPE 2 DIABETES MELLITUS WITH DIABETIC NEUROPATHY, WITH LONG-TERM CURRENT USE OF INSULIN (HCC): ICD-10-CM

## 2022-03-03 DIAGNOSIS — Z79.4 TYPE 2 DIABETES MELLITUS WITH DIABETIC NEUROPATHY, WITH LONG-TERM CURRENT USE OF INSULIN (HCC): ICD-10-CM

## 2022-03-03 DIAGNOSIS — M10.00 ACUTE IDIOPATHIC GOUT, UNSPECIFIED SITE: ICD-10-CM

## 2022-03-03 PROCEDURE — 99214 OFFICE O/P EST MOD 30 MIN: CPT | Performed by: FAMILY MEDICINE

## 2022-03-03 RX ORDER — ROSUVASTATIN CALCIUM 40 MG/1
40 TABLET, COATED ORAL
Qty: 30 TABLET | Refills: 5 | Status: SHIPPED | OUTPATIENT
Start: 2022-03-03 | End: 2022-03-30 | Stop reason: SDUPTHER

## 2022-03-03 RX ORDER — FENOFIBRATE 160 MG/1
160 TABLET ORAL DAILY
Qty: 30 TABLET | Refills: 5 | Status: SHIPPED | OUTPATIENT
Start: 2022-03-03 | End: 2022-03-30 | Stop reason: SDUPTHER

## 2022-03-03 NOTE — PROGRESS NOTES
Chief Complaint   Patient presents with    Labs     labs FU    Medication Evaluation     Patient has not been out of the country in (14 months), NO diarrhea, NO cough, NO chest conjestion, NO temp. Pt has not been around anyone with these symptoms. Health Maintenance reviewed. I have reviewed the patient's medical history in detail and updated the computerized patient record. 1. Have you been to the ER, urgent care clinic since your last visit? No  Hospitalized since your last visit?  no    2. Have you seen or consulted any other health care providers outside of the [address] [place] [address] since your last visit? No  Include any pap smears or colon screening. Encouraged pt to discuss pt's wishes with spouse/partner/family and bring them in the next appt to follow thru with the Advanced Directive    @  [address], last 12 mths 11/15/2021   Able to walk? Yes   Fall in past 12 months? 1   Do you feel unsteady? 1   Are you worried about falling 1   Is the gait abnormal? 1   Number of falls in past 12 months 2   Fall with injury? 1       3 most recent PHQ Screens 3/3/2022   Little interest or pleasure in doing things Several days   Feeling down, depressed, irritable, or hopeless Several days   Total Score PHQ 2 2       Abuse Screening Questionnaire 10/26/2021   Do you ever feel afraid of your partner? N   Are you in a relationship with someone who physically or mentally threatens you? N   Is it safe for you to go home?  Y       ADL Assessment 10/26/2021   Feeding yourself No Help Needed   Getting from bed to chair No Help Needed   Getting dressed No Help Needed   Bathing or showering Help Needed   Walk across the room (includes cane/walker) Help Needed   Using the telphone No Help Needed   Taking your medications No Help Needed   Preparing meals No Help Needed   Managing money (expenses/bills) No Help Needed   Moderately strenuous housework (laundry) Help Needed   Shopping for personal items (toiletries/medicines) No Help Needed   Shopping for groceries No Help Needed   Driving No Help Needed   Climbing a flight of stairs Help Needed   Getting to places beyond walking distances No Help Needed

## 2022-03-03 NOTE — PROGRESS NOTES
Subjective:     Estefania Denton is a 73 y.o. female seen for follow-up of diabetes. Triglcyrides very hi    She has had hypoglycemic attacks. .no  Blood sugar control has been ok    Lab Results   Component Value Date/Time    Hemoglobin A1c 7.9 (H) 02/24/2022 10:17 AM    Hemoglobin A1c 7.5 (H) 09/24/2021 11:41 AM    Hemoglobin A1c 8.1 (H) 06/28/2021 09:54 AM    Glucose 141 (H) 02/24/2022 10:17 AM    Glucose (POC) 146 (H) 02/18/2018 11:22 AM    Microalbumin/Creat ratio (mg/g creat) 153 (H) 10/08/2021 08:48 PM    Microalbumin,urine random 6.39 10/08/2021 08:48 PM    LDL, calculated Comment (A) 02/24/2022 10:17 AM    LDL, calculated Comment 06/12/2020 11:26 AM    Creatinine 1.32 (H) 02/24/2022 10:17 AM       She has diabetes, hypertension and hyperlipidemia. Estefania Denton has the additional concern of labs taking meds for it    Reports taking blood pressure medications without side affects. No complaints of exertional chest pain, excessive shortness of breath or focal weakness. Minimal swelling in lower legs or dizziness with standing. Diet and Lifestyle: follows a diabetic diet regularly, nonsmoker. 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     Keflex [Cephalexin] Itching    Lisinopril Cough     Past Medical History: Diagnosis Date    Asthma     PATIENT DENIES    Bone spur of ankle 3/30/12    right ankle    Chronic pain     DM [facility]di[facility] mellitus) [facility]     GERD (gastroesophageal reflux disease)     Gout     Hypertension     OA (osteoarthritis) of knee 3/30/12    right knee    Sleep apnea     Unilateral AKA ([facility]     left     Social History     Tobacco Use    Smoking status: Former Smoker     Packs/day: 0.50     Years: 15.00     Pack years: 7.50     Quit date: 3/30/2009     Years since quittin.9    Smokeless tobacco: Never Used   Substance Use Topics    Alcohol use: No     Alcohol/week: 0.0 standard drinks        Lab Results   Component Value Date/Time    Cholesterol, total 133 2022 10:17 AM    HDL Cholesterol 16 (L) 2022 10:17 AM    LDL, calculated Comment (A) 2022 10:17 AM    LDL, calculated Comment 2020 11:26 AM    Triglyceride 1,057 ([facility]) 2022 10:17 AM        Review of Systems  Pertinent items are noted in HPI. Objective:     Significant for the following: WNL amputee  Visit Vitals  /81 (BP 1 Location: Left arm, BP Patient Position: Sitting, BP Cuff Size: Adult)   Pulse (!) 108   Temp 98.4 °F (36.9 °C) (Temporal)   Resp 16   Ht 5' 3\" (1.6 m)   Wt 182 lb (82.6 kg) Comment: with leg prothesis on   LMP 2003   SpO2 98%   BMI 32.24 kg/m²     WD WN female NAD      Lab review: labs reviewed, I note that triglycerides hi. Assessment/Plan:     Follow-up diabetes well controlled, stable. Diabetic issues reviewed with her: labs immediately prior to next visit. ICD-10-CM ICD-9-CM    1. Hypertriglyceridemia, familial  E78.1 272.1 rosuvastatin (CRESTOR) 40 mg tablet      fenofibrate (LOFIBRA) 160 mg tablet      LIPID PANEL      METABOLIC PANEL, COMPREHENSIVE      AMB SUPPLY ORDER   2. Phantom limb pain (HCC)  G54.6 353.6    3.  Type 2 diabetes mellitus with diabetic neuropathy, with long-term current use of insulin (HCC)  E11.40 250.60     Z79.4 357.2      V58.67 4. Epigastric abdominal pain  R10.13 789.06 LIPASE       Orders Placed This Encounter    AMB SUPPLY ORDER     Diet for hypertriglyceridimia of > 1000 despite statin and  fenofibrate  Nutritionist    LIPID PANEL     Standing Status:   Future     Standing Expiration Date:   5/1/6934    METABOLIC PANEL, COMPREHENSIVE     Standing Status:   Future     Standing Expiration Date:   9/3/2022    LIPASE     Standing Status:   Future     Standing Expiration Date:   9/3/2022    rosuvastatin (CRESTOR) 40 mg tablet     Sig: Take 1 Tablet by mouth nightly. Dispense:  30 Tablet     Refill:  5    fenofibrate (LOFIBRA) 160 mg tablet     Sig: Take 1 Tablet by mouth daily. Dispense:  30 Tablet     Refill:  5             Chronic Conditions Addressed Today     1. Type 2 diabetes mellitus with diabetic neuropathy (HCC)     Relevant Medications     rosuvastatin (CRESTOR) 40 mg tablet     fenofibrate (LOFIBRA) 160 mg tablet    2. Phantom limb pain (HCC)      Acute Diagnoses Addressed Today     Hypertriglyceridemia, familial    -  Primary        Relevant Medications        rosuvastatin (CRESTOR) 40 mg tablet        fenofibrate (LOFIBRA) 160 mg tablet        Other Relevant Orders        LIPID PANEL        METABOLIC PANEL, COMPREHENSIVE        AMB SUPPLY ORDER    Epigastric abdominal pain            Relevant Orders        LIPASE        Orders Placed This Encounter    AMB SUPPLY ORDER     Diet for hypertriglyceridimia of > 1000 despite statin and  fenofibrate  Nutritionist    LIPID PANEL     Standing Status:   Future     Standing Expiration Date:   6/8/8017    METABOLIC PANEL, COMPREHENSIVE     Standing Status:   Future     Standing Expiration Date:   9/3/2022    LIPASE     Standing Status:   Future     Standing Expiration Date:   9/3/2022    rosuvastatin (CRESTOR) 40 mg tablet     Sig: Take 1 Tablet by mouth nightly.      Dispense:  30 Tablet     Refill:  5    fenofibrate (LOFIBRA) 160 mg tablet     Sig: Take 1 Tablet by mouth daily. Dispense:  30 Tablet     Refill:  5     Current Outpatient Medications   Medication Sig Dispense Refill    rosuvastatin (CRESTOR) 40 mg tablet Take 1 Tablet by mouth nightly. 30 Tablet 5    fenofibrate (LOFIBRA) 160 mg tablet Take 1 Tablet by mouth daily. 30 Tablet 5    amitriptyline (ELAVIL) 10 mg tablet Up to 4 at night 120 Tablet 2    metFORMIN (GLUCOPHAGE) 500 mg tablet Take 1 Tablet by mouth two (2) times daily (with meals). 180 Tablet 3    allopurinoL (ZYLOPRIM) 100 mg tablet Take 1 Tablet by mouth daily. 90 Tablet 3    raNITIdine hcl 150 mg capsule Take 150 mg by mouth daily.  indomethacin (INDOCIN) 25 mg capsule Take  by mouth daily.  insulin aspart protamine/insulin aspart (NOVOLOG MIX 70/30) 100 unit/mL (70-30) inpn 15 Units by SubCUTAneous route ACB/HS. 10 Pen 3    losartan (COZAAR) 25 mg tablet Take 1 Tablet by mouth daily. 30 Tablet 5    empagliflozin (Jardiance) 10 mg tablet Take 1 Tablet by mouth daily. 90 Tablet 3    omeprazole (PRILOSEC) 20 mg capsule Take 1 Capsule by mouth daily. 90 Capsule 3    Blood-Glucose Meter monitoring kit Up to 3 times daily 1 Kit 0    glucose blood VI test strips (blood glucose test) strip Up to 3 times daily 100 Strip 3    cetirizine (ZYRTEC) 10 mg tablet Take 1 Tab by mouth daily. Indications: inflammation of the nose due to an allergy 90 Tab 3    ipratropium (ATROVENT) 42 mcg (0.06 %) nasal spray 1 [place] by Both Nostrils route four (4) times daily. Indications: runny nose 15 mL 5    ascorbic acid, vitamin C, (Vitamin C) 500 mg tablet Take  by mouth.  albuterol (PROVENTIL HFA, VENTOLIN HFA, PROAIR HFA) 90 mcg/actuation inhaler Take 2 Puffs by inhalation every four (4) hours as needed for Wheezing.  1 Inhaler 3    glucose blood VI test strips (Ascensia CONTOUR) strip USE 1 TEST STRIP EACH TIME TO TEST BLOOD SUGAR UP TO 3 TIMES A  Strip 5    acetaminophen (TYLENOL) 500 mg tablet Take 1 Tab by mouth every four (4) hours (while awake). Indications: Pain 100 Tab 0    colchicine 0.6 mg tablet Take 1 Tablet by mouth daily. 30 Tablet 2     Follow-up and Dispositions    · Return if symptoms worsen or fail to improve.        As scheduled

## 2022-03-04 RX ORDER — COLCHICINE 0.6 MG/1
0.6 TABLET ORAL DAILY
Qty: 30 TABLET | Refills: 2 | Status: SHIPPED | OUTPATIENT
Start: 2022-03-04 | End: 2022-03-30

## 2022-03-18 PROBLEM — E11.40 TYPE 2 DIABETES MELLITUS WITH DIABETIC NEUROPATHY (HCC): Status: ACTIVE | Noted: 2018-03-05

## 2022-03-18 PROBLEM — E11.29 DIABETES MELLITUS WITH PROTEINURIA (HCC): Status: ACTIVE | Noted: 2017-05-04

## 2022-03-18 PROBLEM — R80.9 DIABETES MELLITUS WITH PROTEINURIA (HCC): Status: ACTIVE | Noted: 2017-05-04

## 2022-03-19 PROBLEM — N95.0 POST-MENOPAUSAL BLEEDING: Status: ACTIVE | Noted: 2017-11-01

## 2022-03-19 PROBLEM — I10 ESSENTIAL HYPERTENSION: Status: ACTIVE | Noted: 2017-05-04

## 2022-03-19 PROBLEM — E66.01 SEVERE OBESITY (HCC): Status: ACTIVE | Noted: 2018-12-17

## 2022-03-19 PROBLEM — M10.9 GOUT: Status: ACTIVE | Noted: 2019-05-01

## 2022-03-19 PROBLEM — N18.30 CRI (CHRONIC RENAL INSUFFICIENCY), STAGE 3 (MODERATE) (HCC): Status: ACTIVE | Noted: 2021-03-10

## 2022-03-20 PROBLEM — J45.909 ASTHMA: Status: ACTIVE | Noted: 2020-09-02

## 2022-03-21 ENCOUNTER — HOSPITAL ENCOUNTER (OUTPATIENT)
Dept: NUTRITION | Age: 67
Discharge: HOME | End: 2022-03-21
Payer: COMMERCIAL

## 2022-03-21 PROCEDURE — 97802 MEDICAL NUTRITION INDIV IN: CPT | Performed by: DIETITIAN, REGISTERED
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
## 2022-03-24 ENCOUNTER — HOSPITAL ENCOUNTER (OUTPATIENT)
Dept: LAB | Age: 67
Discharge: HOME | End: 2022-03-24

## 2022-03-24 LAB
ALBUMIN SERPL-MCNC: 4.4 G/DL (ref 3.5–5)
ALBUMIN/GLOB SERPL: 1.3 {RATIO} (ref 1.1–2.2)
ALP SERPL-CCNC: 105 U/L (ref 45–117)
ALT SERPL-CCNC: 36 U/L (ref 12–78)
ANION GAP SERPL CALC-SCNC: 2 MMOL/L (ref 5–15)
AST SERPL-CCNC: 17 U/L (ref 15–37)
BILIRUB SERPL-MCNC: 0.3 MG/DL (ref 0.2–1)
BUN SERPL-MCNC: 20 MG/DL (ref 6–20)
BUN/CREAT SERPL: 15 (ref 12–20)
CALCIUM SERPL-MCNC: 9.4 MG/DL (ref 8.5–10.1)
CHLORIDE SERPL-SCNC: 107 MMOL/L (ref 97–108)
CHOLEST SERPL-MCNC: 97 MG/DL
CO2 SERPL-SCNC: 28 MMOL/L (ref 21–32)
CREAT SERPL-MCNC: 1.32 MG/DL (ref 0.55–1.02)
GLOBULIN SER CALC-MCNC: 3.3 G/DL (ref 2–4)
GLUCOSE SERPL-MCNC: 85 MG/DL (ref 65–100)
HDLC SERPL-MCNC: 21 MG/DL
HDLC SERPL: 4.6 {RATIO} (ref 0–5)
LDLC SERPL CALC-MCNC: 10 MG/DL (ref 0–100)
LIPASE SERPL-CCNC: 209 U/L (ref 73–393)
POTASSIUM SERPL-SCNC: 3.9 MMOL/L (ref 3.5–5.1)
PROT SERPL-MCNC: 7.7 G/DL (ref 6.4–8.2)
SODIUM SERPL-SCNC: 137 MMOL/L (ref 136–145)
TRIGL SERPL-MCNC: 330 MG/DL (ref ?–150)
VLDLC SERPL CALC-MCNC: 66 MG/DL

## 2022-03-29 ENCOUNTER — OFFICE VISIT (OUTPATIENT)
Dept: NEUROLOGY | Age: 67
End: 2022-03-29
Payer: COMMERCIAL

## 2022-03-29 VITALS
SYSTOLIC BLOOD PRESSURE: 141 MMHG | BODY MASS INDEX: 30.47 KG/M2 | WEIGHT: 172 LBS | HEART RATE: 107 BPM | DIASTOLIC BLOOD PRESSURE: 74 MMHG

## 2022-03-29 DIAGNOSIS — G54.6 PHANTOM LIMB PAIN (HCC): Primary | ICD-10-CM

## 2022-03-29 DIAGNOSIS — R00.0 TACHYCARDIA: ICD-10-CM

## 2022-03-29 DIAGNOSIS — S78.112S TRAUMATIC ABOVE-KNEE AMPUTATION OF LEFT LOWER EXTREMITY, SEQUELA (HCC): Chronic | ICD-10-CM

## 2022-03-29 DIAGNOSIS — G56.03 BILATERAL CARPAL TUNNEL SYNDROME: ICD-10-CM

## 2022-03-29 PROCEDURE — 99205 OFFICE O/P NEW HI 60 MIN: CPT | Performed by: PSYCHIATRY & NEUROLOGY

## 2022-03-29 RX ORDER — GABAPENTIN 100 MG/1
CAPSULE ORAL
Qty: 180 CAPSULE | Refills: 2 | Status: SHIPPED | OUTPATIENT
Start: 2022-03-29 | End: 2022-06-29 | Stop reason: SDUPTHER

## 2022-03-29 NOTE — ASSESSMENT & PLAN NOTE
Positive by exam  We will check EMGs    She cannot take NSAIDs due to her kidney issues  Recommend Tylenol as needed  Icing to her wrists bilaterally 10 minutes each day  Neutral position wrist splints nightly    Pending results of EMG we may need to refer her to hand specialist

## 2022-03-29 NOTE — PROGRESS NOTES
Identified pt with two pt identifiers(name and ). Reviewed record in preparation for visit and have obtained necessary documentation. All patient medications has been reviewed. Chief Complaint   Patient presents with   [address] Patient     Patient stated she has Phantom pain onset 30 years ago when her left leg was amputation she also has tingling and numbness in the hands onset 2 years ago       3 most recent [facility] Screens 3/29/2022   Little interest or pleasure in doing things Not at all   Feeling down, depressed, irritable, or hopeless Not at all   Total Score PHQ 2 0     Abuse Screening Questionnaire 10/26/2021   Do you ever feel afraid of your partner? N   Are you in a relationship with someone who physically or mentally threatens you? N   Is it safe for you to go home? Y       Health Maintenance Due   Topic    Shingrix Vaccine Age 49> (2 of 2)     Health Maintenance Review: Patient reminded of \"due or due soon\" health maintenance. I have asked the patient to contact his/her primary care provider (PCP) for follow-up on his/her health maintenance. There were no vitals filed for this visit. Wt Readings from Last 3 Encounters:   22 182 lb (82.6 kg)   22 183 lb (83 kg)   11/15/21 175 lb (79.4 kg)     Temp Readings from Last 3 Encounters:   22 98.4 °F (36.9 °C) (Temporal)   22 98.4 °F (36.9 °C) (Temporal)   10/26/21 98.6 °F (37 °C) (Temporal)     BP Readings from Last 3 Encounters:   22 139/81   22 (!) 152/86   10/26/21 129/87     Pulse Readings from Last 3 Encounters:   22 (!) 108   22 89   10/26/21 (!) 102       1. \"Have you been to the ER, urgent care clinic since your last visit? Hospitalized since your last visit? \" No    2. \"Have you seen or consulted any other health care providers outside of the [address] [place] [address] since your last visit? \" No

## 2022-03-29 NOTE — ASSESSMENT & PLAN NOTE
Chronic since amputation greater than 10 years ago  Agree with amitriptyline continue for nightly she is at least able to sleep through the night    Needs some treatment for daytime pain  We discussed realistic expectations regarding the pain management of medications prescribed to include reduction in frequency intensity and/or duration but is unrealistic to think that we can make the pain go away    After this discussion we have elected to restart gabapentin low-dose 100 mg 1-2 tabs 3 times daily as needed

## 2022-03-29 NOTE — PATIENT INSTRUCTIONS
As per our discussion continue on the amitriptyline at night to help you sleep through the pain see get a good nights rest    We are going to add in gabapentin 100 mg 1 to 2 tablets 3 times a day as needed the goal here is to reduce the pain in terms of frequency intensity and/or duration    Doubtful will be able to take this pain completely away you may also find that this is helpful for your carpal tunnel pain    I have scheduled you with Dr. Arlet Go to get the EMGs to determine the extensive severity for the carpal tunnel  But for now you can buy neutral position wrist splints at your local pharmacy wear them at night  Make sure you ice your wrist down at the end of the day for about 10 minutes  And use Tylenol as needed  Again I am hopeful that the gabapentin will also give you some relief related to the carpal tunnel    And happy early birthday :-)        Office Policies    o Phone calls/patient messages:  Please allow up to 24 hours for someone in the office to contact you about your call or message. Be mindful your provider may be out of the office or your message may require further review. We encourage you to use [employer] for your messages as this is a faster, more efficient way to communicate with our office    o Medication Refills:  Prescription medications require up to 48 business hours to process. We encourage you to use [employer] for your refills. For controlled medications: Please allow up to 72 business hours to process. Certain medications may require you to  a written prescription at our office. NO narcotic/controlled medications will be prescribed after 4pm Monday through Friday or on weekends    o Form/Paperwork Completion:  We ask that you allow 7-14 business days. You may also download your forms to [employer] to have your doctor print off.

## 2022-03-29 NOTE — PROGRESS NOTES
[facility] 84  In Office NEW Pt VISIT         Rhona Chavira is a 73 y.o. female who presents today for the following:  Chief Complaint   Patient presents with    New Patient     Patient stated she has Phantom pain onset 30 years ago when her left leg was amputation she also has tingling and numbness in the hands onset 2 years ago         ASSESSMENT AND PLAN    1. Phantom limb pain (HCC)  Assessment & Plan:  Chronic since amputation greater than 10 years ago  Agree with amitriptyline continue for nightly she is at least able to sleep through the night    Needs some treatment for daytime pain  We discussed realistic expectations regarding the pain management of medications prescribed to include reduction in frequency intensity and/or duration but is unrealistic to think that we can make the pain go away    After this discussion we have elected to restart gabapentin low-dose 100 mg 1-2 tabs 3 times daily as needed  Orders:  -     gabapentin (NEURONTIN) 100 mg capsule; 1 to 2 tablets 3 times daily as needed headache  Indications: neuropathic pain, Normal, Disp-180 Capsule, R-2  2. Bilateral carpal tunnel syndrome  Assessment & Plan:   Positive by exam  We will check EMGs    She cannot take NSAIDs due to her kidney issues  Recommend Tylenol as needed  Icing to her wrists bilaterally 10 minutes each day  Neutral position wrist splints nightly    Pending results of EMG we may need to refer her to hand specialist  Orders:  -     gabapentin (NEURONTIN) 100 mg capsule; 1 to 2 tablets 3 times daily as needed headache  Indications: neuropathic pain, Normal, Disp-180 Capsule, R-2  -     REFERRAL TO NEUROLOGY IN CLINIC PROCEDURES  3. Tachycardia  Assessment & Plan:   Patient states that this is been a chronic ongoing problem  We will refer to cardiology for further evaluation  Orders:  -     REFERRAL TO CARDIOLOGY  4.  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 ICD-10-CM ICD-9-CM    1. Phantom limb pain (HCC)  G54.6 353.6 gabapentin (NEURONTIN) 100 mg capsule   2. Bilateral carpal tunnel syndrome  G56.03 354.0 gabapentin (NEURONTIN) 100 mg capsule      REFERRAL TO NEUROLOGY IN CLINIC PROCEDURES   3. Tachycardia  R00.0 785.0 REFERRAL TO CARDIOLOGY   4. 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 [address]  S78.112S 905.9          I attest that 60 minutes was spent on today's visit reviewing medical records and diagnostic testing deemed pertinent to this patient's care, along with direct time spent at patient's visit including the history, physical assessment and plan, discussing diagnosis and management along with documentation. HPI    Patient was referred to the practice for the following reason[s]:  Phantom leg pain as well as numbness in her hands    Patient is right-handed    Patient is accompanied by: Self  History is obtained predominantly by: Self and medical records    Numb and tingling in both hands   Also complaining of stiffness   difficulty with typing at work   Worse in Rt hand over left but clearly in both    Left phantom pain   Started on Elavil 10mg now up to 40mg: Able to sleep through the night with the amitriptyline   Failed gabapentin and lyrica in the past     Quality: \"Like a bad labor cramp\"   Frequency: Multiple times a day   Intensity: Severe     Patient reports other medical conditions are under good control  She does not use NSAIDs due to her kidney issues    Other significant comorbid conditions/concerns  Diabetes  Elevated triglycerides  Chronic renal insufficiency stage III  Asthma  Obesity  Hypertension  Above-the-knee left leg amputation: Traumatic      Pertinent diagnostic data      No results found for this or any previous visit.             Allergies   Allergen Reactions    Hydrocodone-Acetaminophen Hives, Itching and Nausea Only     Patient states she can take tylenol #3 without problem.  Keflex [Cephalexin] Itching    Lisinopril Cough       Current Outpatient Medications   Medication Sig    gabapentin (NEURONTIN) 100 mg capsule 1 to 2 tablets 3 times daily as needed headache  Indications: neuropathic pain    colchicine 0.6 mg tablet Take 1 Tablet by mouth daily.  rosuvastatin (CRESTOR) 40 mg tablet Take 1 Tablet by mouth nightly.  fenofibrate (LOFIBRA) 160 mg tablet Take 1 Tablet by mouth daily.  amitriptyline (ELAVIL) 10 mg tablet Up to 4 at night    metFORMIN (GLUCOPHAGE) 500 mg tablet Take 1 Tablet by mouth two (2) times daily (with meals).  allopurinoL (ZYLOPRIM) 100 mg tablet Take 1 Tablet by mouth daily.  insulin aspart protamine/insulin aspart (NOVOLOG MIX 70/30) 100 unit/mL (70-30) inpn 15 Units by SubCUTAneous route ACB/HS.  empagliflozin (Jardiance) 10 mg tablet Take 1 Tablet by mouth daily.  omeprazole (PRILOSEC) 20 mg capsule Take 1 Capsule by mouth daily.  Blood-Glucose Meter monitoring kit Up to 3 times daily    glucose blood VI test strips (blood glucose test) strip Up to 3 times daily    ascorbic acid, vitamin C, (Vitamin C) 500 mg tablet Take  by mouth.  glucose blood VI test strips (Ascensia CONTOUR) strip USE 1 TEST STRIP EACH TIME TO TEST BLOOD SUGAR UP TO 3 TIMES A DAY    raNITIdine hcl 150 mg capsule Take 150 mg by mouth daily. (Patient not taking: Reported on 3/29/2022)    indomethacin (INDOCIN) 25 mg capsule Take  by mouth daily. (Patient not taking: Reported on 3/29/2022)    losartan (COZAAR) 25 mg tablet Take 1 Tablet by mouth daily.  cetirizine (ZYRTEC) 10 mg tablet Take 1 Tab by mouth daily. Indications: inflammation of the nose due to an allergy (Patient not taking: Reported on 3/29/2022)    ipratropium (ATROVENT) 42 mcg (0.06 %) nasal spray 1 [place] by Both Nostrils route four (4) times daily.  Indications: runny nose (Patient not taking: Reported on 3/29/2022)    albuterol (PROVENTIL HFA, VENTOLIN HFA, PROAIR HFA) 90 mcg/actuation inhaler Take 2 Puffs by inhalation every four (4) hours as needed for Wheezing. (Patient not taking: Reported on 3/29/2022)    acetaminophen (TYLENOL) 500 mg tablet Take 1 Tab by mouth every four (4) hours (while awake). Indications: Pain (Patient not taking: Reported on 3/29/2022)     No current facility-administered medications for this visit. Past medical history/surgical history, family history, and social history have been reviewed for today's visit      ROS    A ten system review of constitutional, cardiovascular, respiratory, musculoskeletal, endocrine, skin, SHEENT, genitourinary, psychiatric and neurologic systems was obtained and is unremarkable except as mentioned under HPI          EXAMINATION:     Visit Vitals  BP (!) 141/74 (BP 1 Location: Left upper arm, BP Patient Position: Sitting, BP Cuff Size: Adult)   Pulse (!) 107   Wt 172 lb (78 kg)   LMP 08/23/2003   BMI 30.47 kg/m²     Pulse Readings from Last 3 Encounters:   03/29/22 (!) 107   03/03/22 (!) 108   02/16/22 89       General appearance: Patient is well-developed and well-nourished in no apparent distress and well groomed.     Psych/mental health:  Affect: Appropriate    PHQ  3 most recent PHQ Screens 3/29/2022   Little interest or pleasure in doing things Not at all   Feeling down, depressed, irritable, or hopeless Not at all   Total Score PHQ 2 0       HEENT:   Normocephalic  With evidence of trauma: No  Full range of motion head neck: Yes  Tenderness to palpation of the head neck region: No      Cardiovascular:     Extremities warm to touch:Yes  Extremity swelling: No  Discoloration: No  Evidence of PVD: No    Respiratory:   Dyspnea on exertion: No   Abnormal effort on casual observation: No   Use of portable oxygen: No   Evidence of cyanosis: No     Musculoskeletal:   Evidence of significant bone deformities: No Spinal curvature: No     Integumentary:    Obvious bruising: No   Lacerations or discoloration on casual observation: No       Neurological Examination:   Mental Status:        MMSE  No flowsheet data found. Formal testing was not completed    there was nothing concerning on general observation and discussion. Alert oriented and appropriate to general conversation  Normal processing on general observation  Followed conversation and responded seemingly appropriate throughout the office visit  No word finding difficulties noted on casual observation  Able to follow directions without difficulty     Cranial Nerves:    Grossly intact    Motor:     No tremor appreciated on today's exam  No abnormal movements appreciated on today's exam  Moves extremities spontaneously and with purpose  5/5 x 3  Left lower proximal strength 5/5; AKA prosthesis    + Tinel's bilaterally  Negative Phalen's  Right hand with thenar and hypothenar atrophy mild      Sensation: Intact to light touch    Coordination/Cerebellar:   Grossly intact    Gait: Ambulates independently    Reflexes: Unremarkable      Fall risk assessment  Fall Risk Assessment, last 12 mths 11/15/2021   Able to walk? Yes   Fall in past 12 months? 1   Do you feel unsteady? 1   Are you worried about falling 1   Is the gait abnormal? 1   Number of falls in past 12 months 2   Fall with injury?  1               Emmie Motta, MS, ANP-BC, [facility]

## 2022-03-29 NOTE — ASSESSMENT & PLAN NOTE
Patient states that this is been a chronic ongoing problem  We will refer to cardiology for further evaluation

## 2022-03-29 NOTE — LETTER
3/29/2022    Patient: Linda Nielsen   YOB: 1955   Date of Visit: 3/29/2022     Brii Patel MD  [address]  [facility]    Dear Brii Patel MD,      Thank you for referring Ms. Sbele Yates to [address] [place] [address] for evaluation. My notes for this consultation are attached. If you have questions, please do not hesitate to call me. I look forward to following your patient along with you.       Sincerely,    Eboni Cavazos NP

## 2022-03-29 NOTE — PROGRESS NOTES
Identified pt with two pt identifiers(name and ). Reviewed record in preparation for visit and have obtained necessary documentation. All patient medications has been reviewed. 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 N   Are you in a relationship with someone who physically or mentally threatens you? N   Is it safe for you to go home? Y       Health Maintenance Due   Topic    Shingrix Vaccine Age 49> (2 of 2)     Health Maintenance Review: Patient reminded of \"due or due soon\" health maintenance. I have asked the patient to contact his/her primary care provider (PCP) for follow-up on his/her health maintenance. Vitals:    22 1027   BP: (!) 141/74   Pulse: (!) 107   Weight: 172 lb (78 kg)   LMP: 2003       Wt Readings from Last 3 Encounters:   22 172 lb (78 kg)   22 182 lb (82.6 kg)   22 183 lb (83 kg)     Temp Readings from Last 3 Encounters:   22 98.4 °F (36.9 °C) (Temporal)   22 98.4 °F (36.9 °C) (Temporal)   10/26/21 98.6 °F (37 °C) (Temporal)     BP Readings from Last 3 Encounters:   22 (!) 141/74   22 139/81   22 (!) 152/86     Pulse Readings from Last 3 Encounters:   22 (!) 107   22 (!) 108   22 89       1. \"Have you been to the ER, urgent care clinic since your last visit? Hospitalized since your last visit? \" No    2. \"Have you seen or consulted any other health care providers outside of the [address] [place] [address] since your last visit? \" No

## 2022-03-30 ENCOUNTER — OFFICE VISIT (OUTPATIENT)
Dept: INTERNAL MEDICINE CLINIC | Age: 67
End: 2022-03-30

## 2022-03-30 ENCOUNTER — OFFICE VISIT (OUTPATIENT)
Dept: NEUROLOGY | Age: 67
End: 2022-03-30
Payer: COMMERCIAL

## 2022-03-30 VITALS
RESPIRATION RATE: 18 BRPM | TEMPERATURE: 98.6 F | WEIGHT: 174 LBS | SYSTOLIC BLOOD PRESSURE: 138 MMHG | OXYGEN SATURATION: 98 % | HEART RATE: 103 BPM | DIASTOLIC BLOOD PRESSURE: 83 MMHG | BODY MASS INDEX: 30.83 KG/M2 | HEIGHT: 63 IN

## 2022-03-30 DIAGNOSIS — E11.65 TYPE 2 DIABETES MELLITUS WITH HYPERGLYCEMIA, WITH LONG-TERM CURRENT USE OF INSULIN (HCC): ICD-10-CM

## 2022-03-30 DIAGNOSIS — I10 ESSENTIAL HYPERTENSION: ICD-10-CM

## 2022-03-30 DIAGNOSIS — E11.29 DIABETES MELLITUS WITH PROTEINURIA (HCC): ICD-10-CM

## 2022-03-30 DIAGNOSIS — G56.03 BILATERAL CARPAL TUNNEL SYNDROME: Primary | ICD-10-CM

## 2022-03-30 DIAGNOSIS — E78.1 HYPERTRIGLYCERIDEMIA, FAMILIAL: ICD-10-CM

## 2022-03-30 DIAGNOSIS — E11.42 DIABETIC PERIPHERAL NEUROPATHY ASSOCIATED WITH TYPE 2 DIABETES MELLITUS (HCC): ICD-10-CM

## 2022-03-30 DIAGNOSIS — R80.9 DIABETES MELLITUS WITH PROTEINURIA (HCC): ICD-10-CM

## 2022-03-30 DIAGNOSIS — M10.00 ACUTE IDIOPATHIC GOUT, UNSPECIFIED SITE: ICD-10-CM

## 2022-03-30 DIAGNOSIS — Z79.4 TYPE 2 DIABETES MELLITUS WITH HYPERGLYCEMIA, WITH LONG-TERM CURRENT USE OF INSULIN (HCC): ICD-10-CM

## 2022-03-30 DIAGNOSIS — Z79.4 TYPE 2 DIABETES MELLITUS WITH DIABETIC NEUROPATHY, WITH LONG-TERM CURRENT USE OF INSULIN (HCC): Primary | ICD-10-CM

## 2022-03-30 DIAGNOSIS — G54.6 PHANTOM LIMB PAIN (HCC): ICD-10-CM

## 2022-03-30 DIAGNOSIS — M47.22 CERVICAL RADICULOPATHY DUE TO DEGENERATIVE JOINT DISEASE OF SPINE: ICD-10-CM

## 2022-03-30 DIAGNOSIS — E11.40 TYPE 2 DIABETES MELLITUS WITH DIABETIC NEUROPATHY, WITH LONG-TERM CURRENT USE OF INSULIN (HCC): Primary | ICD-10-CM

## 2022-03-30 PROCEDURE — 95911 NRV CNDJ TEST 9-10 STUDIES: CPT | Performed by: PSYCHIATRY & NEUROLOGY

## 2022-03-30 PROCEDURE — 95886 MUSC TEST DONE W/N TEST COMP: CPT | Performed by: PSYCHIATRY & NEUROLOGY

## 2022-03-30 PROCEDURE — 99214 OFFICE O/P EST MOD 30 MIN: CPT | Performed by: FAMILY MEDICINE

## 2022-03-30 RX ORDER — ROSUVASTATIN CALCIUM 40 MG/1
40 TABLET, COATED ORAL
Qty: 90 TABLET | Refills: 1 | Status: SHIPPED | OUTPATIENT
Start: 2022-03-30 | End: 2022-06-29 | Stop reason: SDUPTHER

## 2022-03-30 RX ORDER — FENOFIBRATE 160 MG/1
160 TABLET ORAL DAILY
Qty: 90 TABLET | Refills: 1 | Status: SHIPPED | OUTPATIENT
Start: 2022-03-30 | End: 2022-06-29 | Stop reason: SDUPTHER

## 2022-03-30 RX ORDER — IBUPROFEN 200 MG
CAPSULE ORAL
Qty: 100 STRIP | Refills: 3 | Status: SHIPPED | OUTPATIENT
Start: 2022-03-30

## 2022-03-30 RX ORDER — LOSARTAN POTASSIUM 25 MG/1
25 TABLET ORAL DAILY
Qty: 90 TABLET | Refills: 1 | Status: SHIPPED | OUTPATIENT
Start: 2022-03-30 | End: 2022-06-29 | Stop reason: SDUPTHER

## 2022-03-30 RX ORDER — COLCHICINE 0.6 MG/1
0.6 TABLET ORAL DAILY
Qty: 30 TABLET | Refills: 2 | Status: SHIPPED | OUTPATIENT
Start: 2022-03-30 | End: 2022-06-29 | Stop reason: SDUPTHER

## 2022-03-30 RX ORDER — AMITRIPTYLINE HYDROCHLORIDE 50 MG/1
50 TABLET, FILM COATED ORAL
Qty: 90 TABLET | Refills: 1 | Status: SHIPPED | OUTPATIENT
Start: 2022-03-30 | End: 2022-06-29 | Stop reason: SDUPTHER

## 2022-03-30 RX ORDER — INSULIN ASPART 100 [IU]/ML
16 INJECTION, SUSPENSION SUBCUTANEOUS
Qty: 10 PEN | Refills: 5 | Status: SHIPPED | OUTPATIENT
Start: 2022-03-30 | End: 2022-06-29 | Stop reason: SDUPTHER

## 2022-03-30 NOTE — PROCEDURES
ELECTRODIANOSTIC REPORT  Test Date:  3/30/2022    Patient: Tamara Crowell : 1955 Physician: Noemy Hernadez MD   Sex: Female Tech: Donna Quezada, EMG Technician  Ref Phys:      Technician: Donna Quezada    Clinical Indication: Patient 16-year-old female with a history of numbness of bilateral hands, right greater than left, for EMG report evaluate for possible bilateral carpal tunnel syndrome. Neuro Exam: Patient has Tinel's over both median nerves at the wrist, but appears to have normal strength in both hands and normal muscle bulk and tone and has a subjective numbness in her hands, but objectively she appears to have normal sensation to pin and touch throughout. She has hypoactive but symmetric reflexes throughout. 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 This diagnosis was supported by the prolonged distal latencies of the median motor compound action potential, and sensory action potentials and low amplitudes of the compound action potential of the median nerves. There was no clear evidence of motor radiculopathy, polyneuropathy or other neuromuscular disease on the basis of this exam.    Clinical correlation recommended, and repeat studies in 3 to 6 months time may be of further diagnostic benefit if clinically indicated. CHIEF COMPLAINTS:  Patient is a 73year-old female who presents with    EMG & NCV Findings:  Evaluation of the left median motor nerve showed prolonged distal onset latency (5.3 ms) and decreased conduction velocity (Elbow-Wrist, 45 m/s). The right median motor nerve showed prolonged distal onset latency (6.3 ms). The left median sensory nerve showed prolonged distal peak latency (5.2 ms) and reduced amplitude (5.7 µV).   The right median sensory nerve showed no response (Wrist). The left median/ulnar (palm) comparison nerve showed prolonged distal peak latency (Median Palm, 3.2 ms) and reduced amplitude (Median Palm, 10.0 µV). The right median/ulnar (palm) comparison nerve showed no response (Median Palm). All remaining nerves (as indicated in the following tables) were within normal limits. All left vs. right side differences were within normal limits.         Nerve Conduction Studies  Anti Sensory Summary Table     Stim Site NR Peak (ms) Norm Peak (ms) O-P Amp (µV) Norm O-P Amp Site1 Site2 Dist (cm)   Left Median Anti Sensory (2nd Digit)  33.8 °C   Wrist    5.2 <4 5.7 >11 Wrist 2nd Digit 14.0   Right Median Anti Sensory (2nd Digit)  33.8 °C   Wrist NR  <4  >11 Wrist 2nd Digit 14.0   Left Radial Anti Sensory (Base 1st Digit)  33.8 °C   Wrist    2.1 <2.9 21.9 >15 Wrist Base 1st Digit 10.0   Right Radial Anti Sensory (Base 1st Digit)  33.8 °C   Wrist    2.4 <2.9 26.7 >15 Wrist Base 1st Digit 10.0   Left Ulnar Anti Sensory (5th Digit)  33.8 °C   Wrist    2.9 <4.0 14.7 >10 Wrist 5th Digit 14.0   Right Ulnar Anti Sensory (5th Digit)  33.8 °C   Wrist    3.2 <4.0 17.3 >10 Wrist 5th Digit 14.0     Motor Summary Table     Stim Site NR Onset (ms) Norm Onset (ms) O-P* Amp (mV) Norm O-P Amp P-T Amp (mV) Site1 Site2 Dist (cm) Kareem (m/s)   Left Median Motor (Abd Poll Brev)  33.8 °C   Wrist    5.3 <4.5 9.6 >4.1  Wrist Abd Poll Brev 8.0 15   Elbow    9.7  8.3   Elbow Wrist 20.0 45   Right Median Motor (Abd Poll Brev)  33.8 °C   Wrist    6.3 <4.5 7.3 >4.1  Wrist Abd Poll Brev 8.0 13   Elbow    10.6  6.4   Elbow Wrist 21.0 49   Left Ulnar Motor (Abd Dig Minimi)  33.8 °C   Wrist    3.0 <3.1 7.2 >7.0  Wrist Abd Dig Minimi 8.0 27   B Elbow    6.3  7.1   B Elbow Wrist 19.0 58   A Elbow    8.2  6.4   A Elbow B Elbow 10.0 53   Right Ulnar Motor (Abd Dig Minimi)  33.8 °C   Wrist    3.0 <3.1 8.3 >7.0  Wrist Abd Dig Minimi 8.0 27   B Elbow    6.3  7.0   B Elbow Wrist 20.0 61   A Elbow    8.0  6.1   A Elbow B Elbow 10.0 59     Comparison Summary Table     Stim Site NR Peak (ms) P-T Amp (µV) Site1 Site2 Dist (cm) Delta-0 (ms)   Left Median/Ulnar Palm Comparison (Wrist)  33.8 °C   Median Palm    3.2 15.9 Median Palm Ulnar Palm 8.0 1.4   Ulnar Palm    1.8 22.9       Right Median/Ulnar Palm Comparison (Wrist)  33.8 °C   Median Palm NR   Median Palm Ulnar Palm 8.0    Ulnar Palm    2.1 15.5         EMG     Side Muscle Nerve Root Ins Act Fibs Psw Recrt Duration Amp Poly Comment   Right Abd Poll Brev Median C8-T1 Nml Nml Nml Nml Nml Nml Nml    Right Biceps Musculocut C5-6 Nml Nml Nml Nml Nml Nml Nml    Right Triceps Radial C6-7-8 Nml Nml Nml Nml Nml Nml Nml    Right Deltoid Axillary C5-6 Nml Nml Nml Nml Nml Nml Nml    Right FlexPolLong Median (Ant Int) C7-8 Nml Nml Nml Nml Nml Nml Nml    Right Lower Cerv Parasp Rami C7,T1 Nml Nml Nml Nml Nml Nml Nml    Right 1stDorInt Ulnar C8-T1 Nml Nml Nml Nml Nml Nml Nml    Left 1stDorInt Ulnar C8-T1 Nml Nml Nml Nml Nml Nml Nml    Left Abd Poll Brev Median C8-T1 Nml Nml Nml Nml Nml Nml Nml    Left Biceps Musculocut C5-6 Nml Nml Nml Nml Nml Nml Nml    Left Triceps Radial C6-7-8 Nml Nml Nml Nml Nml Nml Nml    Left Deltoid Axillary C5-6 Nml Nml Nml Nml Nml Nml Nml    Left Lower Cerv Parasp Rami C7,T1 Nml Nml Nml Nml Nml Nml Nml        Waveforms:                                          __________________  Stephanie Roberson M.D.

## 2022-03-30 NOTE — PROGRESS NOTES
Chief Complaint   Patient presents with    Diabetes     FU     Patient has not been out of the country in (14 months), NO diarrhea, NO cough, NO chest conjestion, NO temp. Pt has not been around anyone with these symptoms. Health Maintenance reviewed. I have reviewed the patient's medical history in detail and updated the computerized patient record. 1. Have you been to the ER, urgent care clinic since your last visit? no  Hospitalized since your last visit?  no    2. Have you seen or consulted any other health care providers outside of the [address] [place] [address] since your last visit? no  Include any pap smears or colon screening. Encouraged pt to discuss pt's wishes with spouse/partner/family and bring them in the next appt to follow thru with the Advanced Directive    @  [address], last 12 mths 11/15/2021   Able to walk? Yes   Fall in past 12 months? 1   Do you feel unsteady? 1   Are you worried about falling 1   Is the gait abnormal? 1   Number of falls in past 12 months 2   Fall with injury? 1       3 most recent PHQ Screens 3/30/2022   Little interest or pleasure in doing things Several days   Feeling down, depressed, irritable, or hopeless Several days   Total Score PHQ 2 2       Abuse Screening Questionnaire 10/26/2021   Do you ever feel afraid of your partner? N   Are you in a relationship with someone who physically or mentally threatens you? N   Is it safe for you to go home?  Y       ADL Assessment 10/26/2021   Feeding yourself No Help Needed   Getting from bed to chair No Help Needed   Getting dressed No Help Needed   Bathing or showering Help Needed   Walk across the room (includes cane/walker) Help Needed   Using the telphone No Help Needed   Taking your medications No Help Needed   Preparing meals No Help Needed   Managing money (expenses/bills) No Help Needed   Moderately strenuous housework (laundry) Help Needed   Shopping for personal items (toiletries/medicines) No Help Needed   Shopping for groceries No Help Needed   Driving No Help Needed   Climbing a flight of stairs Help Needed   Getting to places beyond walking distances No Help Needed

## 2022-03-30 NOTE — LETTER
3/30/2022 8:20 PM    Patient:  Farhana Sparks   YOB: 1955  Date of Visit: 3/30/2022      Dear   No Recipients: Thank you for referring Ms. Nina Navarrete to me for evaluation/treatment. Below are the relevant portions of my assessment and plan of care. Patient's EMG study did suggest moderate severe bilateral carpal tunnel syndromes, right side greater than left and she is to see Dr. Diana Kline to consider surgical decompression because she is still symptomatic and in the interim she will wear wrist splints. She has had these for several years and progressively getting worse and probably needs decompression. If you have questions, please do not hesitate to call me. I look forward to following Ms. Andrés along with you.         Sincerely,      [facility]

## 2022-03-31 NOTE — PROGRESS NOTES
Patient's EMG study did suggest moderate severe bilateral carpal tunnel syndromes, right side greater than left and she is to see Dr. David Maher to consider surgical decompression because she is still symptomatic and in the interim she will wear wrist splints. She has had these for several years and progressively getting worse and probably needs decompression.

## 2022-04-04 ENCOUNTER — HOSPITAL ENCOUNTER (OUTPATIENT)
Dept: NUTRITION | Age: 67
Discharge: HOME | End: 2022-04-04
Payer: COMMERCIAL

## 2022-04-04 PROCEDURE — 97803 MED NUTRITION INDIV SUBSEQ: CPT | Performed by: DIETITIAN, REGISTERED

## 2022-04-04 NOTE — PROGRESS NOTES
Joellen Brown was informed of the inherent limitations of a virtual visit,  and has consented to a virtual therapy visit on 2022. Information regarding emergency contact information for this patient during this visit is to contact:   at [phone] in addition to calling 911. The patient was informed that at any time during the virtual visit, they can decide to stop the virtual visit. The patient verified that they are physically located in the [facility] for this virtual visit. Audio only due to pt's Internet being out. No access to video calls. NUTRITION - FOLLOW-UP TREATMENT NOTE  Patient Name: Joellen Brown         Date: 2022  : 1955    YES Patient  Verified  Diagnosis: hypertriglyceridemia >1000 despite fenofibrate   In time:  9:00am            Out time:  9:30am   Total Treatment Time (min):   30     SUBJECTIVE/ASSESSMENT  Current Wt: 174 (Nuerology visit) Previous Wt:  183.4 (182.6# without hernandez) Wt Change: -8.6     Initial Wt:  183.4 (182.6# without hernandez) Total Wt change: -8.6 Height: 63     Changes in medication or medical history? Any new allergies, surgeries or procedures? YES/    If yes, update Summary List   Seen by [facility] 3/29/22  Seen by [facility] 3/30/22         Nutrition Diagnosis        Diagnosis Status: Food and nutrition related knowledge deficit R/T lack of prior education for  Carbohydrate sources, food choices for lowering Trglycerides and healthy weight loss AEB questions today about carb sources and request for counseling     [x]  Improved []  No Change    []  Declined   []  Discontinued     Obesity R/T history of excessive energy intake AEB BMI>30     [x]  Improved []  No Change    []  Declined   []  Discontinued     Excessive sodium intake R/T Food and nutrition related knowledge deficit   for sources of sodium AEB report of salting rinsing water for proteins and adding salt to cooking greens. [x]  Improved []  No Change    []  Declined   []  Discontinued        Nutrition Monitoring and Evaluation: SMBG = 190mg/dl today. Last night ate fried chicken, sugar in greens, sugar in tomatoes, mac and cheese. Notes being \"bad\" for her birthday. Blood sugar in the morning 190mg/dl. Has now gotten rid of rest of cake. Kept a food log a few days. Went to [facility]. Ordered a salad with protein and fried mushroom. Drinking more water. Previously pepsi and cokes. Asked for low sodium deli meat at [facility]. Cooked greens with smoked turkey instead of pork. Less salt made by someone else. Used Mrs. Agustin. Making home made oatmeal with grapefruit. Paying more attention to her eating habits with food log. Noticed she is a grazer even when not hungry. Trying to find lower sugar for grazing instead.  can keep snacks in his office. Previous goals:  Improved. - reduce sodium intake by not brining meats, asking for low sodium deli meat, and adding less salt to cooked greens (1tsp per pot instead of 3tsp)  Met. learned she grazes through out the day even if she isn't hungry. - Increase accountability and awareness of food choices by recording food and beverage intake by using a food journal at least 3 days per week. Met.  -reduce carbohydrates at meals towards 1 fist per meal (start with 1.5 fists instead of 2 currently)     Nutrition Prescription and  Intervention Review of past goals. Goal setting. CBT  Self monitoring with food log  Portions reduction and reducing carbohydrate amount at meals using . 1 fist per meal instead of 2+  Reducing carbohydrates from added sugars in drinks and added to cooked vegetables.       Patient Education:  [x]  Review current plan with patient   []  Other:    Handouts/  Information Provided: []  Carbohydrates  []  Protein  []  Fiber  []  Serving Sizes  []  Fluids  []  General guidelines []  Diabetes  []  Cholesterol  []  Sodium  []  SBGM  []  Food Journals  []  Others:      Patient Goals -remove snacking foods from house.  to keep at office. If hungry, choose lower carbohydrate foods (vegetables, chicken salad,  Low sodium olives)  -continue food log 3 days per week for awareness of food choices.   -continue drinking water, no sodas  -reduce sugar added to non-starchy vegetables. Limit carbs at meal towards 1 fist per meal.      PLAN  [x]  Continue on current plan []  Follow-up PRN   []  Discharge due to :    [x]  Next appt: 2 weeks     Dietitian: Charlane Holstein MS, RD, CSSD    Date: 4/4/2022 Time: 9:00 AM     Saintclair Like is a 78 y.o. female being evaluated by a Virtual Visit (video visit) encounter to address concerns as mentioned above. A caregiver was present when appropriate. Due to this being a TeleHealth encounter ([facility]), evaluation of the following areas was limited: Nutrition Focused Physical Exam. Pursuant to the emergency declaration under the [address] [place] [address] authority and the [employer] Act, this Virtual Visit was conducted with patient's (and/or legal guardian's) consent, to reduce the risk of exposure to COVID-19 and provide necessary medical care. Services were provided through a video synchronous discussion virtually to substitute for in-person encounter. --Emerson Libman on 4/4/2022 at 9:00 AM    An electronic signature was used to authenticate this note.

## 2022-04-11 ENCOUNTER — TELEPHONE (OUTPATIENT)
Dept: INTERNAL MEDICINE CLINIC | Age: 67
End: 2022-04-11

## 2022-04-11 DIAGNOSIS — S78.112S TRAUMATIC ABOVE-KNEE AMPUTATION OF LEFT LOWER EXTREMITY, SEQUELA (HCC): Primary | Chronic | ICD-10-CM

## 2022-04-12 ENCOUNTER — OFFICE VISIT (OUTPATIENT)
Dept: INTERNAL MEDICINE CLINIC | Age: 67
End: 2022-04-12
Payer: COMMERCIAL

## 2022-04-12 VITALS
SYSTOLIC BLOOD PRESSURE: 138 MMHG | RESPIRATION RATE: 18 BRPM | OXYGEN SATURATION: 97 % | WEIGHT: 176 LBS | HEIGHT: 63 IN | TEMPERATURE: 98.3 F | DIASTOLIC BLOOD PRESSURE: 89 MMHG | BODY MASS INDEX: 31.18 KG/M2 | HEART RATE: 107 BPM

## 2022-04-12 DIAGNOSIS — N63.24 MASS OF LOWER INNER QUADRANT OF LEFT BREAST: Primary | ICD-10-CM

## 2022-04-12 DIAGNOSIS — Z79.4 TYPE 2 DIABETES MELLITUS WITH HYPERGLYCEMIA, WITH LONG-TERM CURRENT USE OF INSULIN (HCC): ICD-10-CM

## 2022-04-12 DIAGNOSIS — I10 ESSENTIAL HYPERTENSION: ICD-10-CM

## 2022-04-12 DIAGNOSIS — E11.65 TYPE 2 DIABETES MELLITUS WITH HYPERGLYCEMIA, WITH LONG-TERM CURRENT USE OF INSULIN (HCC): ICD-10-CM

## 2022-04-12 PROCEDURE — 99214 OFFICE O/P EST MOD 30 MIN: CPT | Performed by: FAMILY MEDICINE

## 2022-04-12 NOTE — PROGRESS NOTES
HISTORY OF PRESENT ILLNESS  Caterina Lockett is a 78 y.o. female. Breast Mass  The history is provided by the patient. This is a recurrent problem. The current episode started 12 to 24 hours ago. The problem has not changed since onset. Associated symptoms comments: Left breast lump. Last mammogram did not show any evidence of lump last year. Diabetes is been well controlled no hypoglycemia  ROS  Just noted in the last couple of days, had her  also assess and he felt it as well as July. No trauma    Physical Exam  Visit Vitals  /89   Pulse (!) 107   Temp 98.3 °F (36.8 °C) (Temporal)   Resp 18   Ht 5' 3\" (1.6 m)   Wt 176 lb (79.8 kg)   LMP 08/23/2003   SpO2 97%   BMI 31.18 kg/m²     Left breast 2 cm lump 6:00 to 8:00 2 cm from nipple  ASSESSMENT and PLAN  Encounter Diagnoses   Name Primary?     Mass of lower inner quadrant of left breast Yes    Type 2 diabetes mellitus with hyperglycemia, with long-term current use of insulin (HCC)     Essential hypertension      Orders Placed This Encounter    OSVALDO MAMMO LT DX INCL CAD    US BREAST LT COMPLETE 4 QUAD    [facility] EMPL     3 mo f/up

## 2022-04-12 NOTE — PROGRESS NOTES
Chief Complaint   Patient presents with    Breast Cyst     Found bump to L/breast yesterday     Patient has not been out of the country in (14 months), NO diarrhea, NO cough, NO chest conjestion, NO temp. Pt has not been around anyone with these symptoms. Health Maintenance reviewed. I have reviewed the patient's medical history in detail and updated the computerized patient record. 1. Have you been to the ER, urgent care clinic since your last visit? No Hospitalized since your last visit?  no    2. Have you seen or consulted any other health care providers outside of the [address] [place] [address] since your last visit? no  Include any pap smears or colon screening. Encouraged pt to discuss pt's wishes with spouse/partner/family and bring them in the next appt to follow thru with the Advanced Directive    @  [address] [facility] [address], last 12 mths 11/15/2021   Able to walk? Yes   Fall in past 12 months? 1   Do you feel unsteady? 1   Are you worried about falling 1   Is the gait abnormal? 1   Number of falls in past 12 months 2   Fall with injury? 1       3 most recent PHQ Screens 3/30/2022   Little interest or pleasure in doing things Several days   Feeling down, depressed, irritable, or hopeless Several days   Total Score PHQ 2 2       Abuse Screening Questionnaire 10/26/2021   Do you ever feel afraid of your partner? N   Are you in a relationship with someone who physically or mentally threatens you? N   Is it safe for you to go home?  Y       ADL Assessment 10/26/2021   Feeding yourself No Help Needed   Getting from bed to chair No Help Needed   Getting dressed No Help Needed   Bathing or showering Help Needed   Walk across the room (includes cane/walker) Help Needed   Using the telphone No Help Needed   Taking your medications No Help Needed   Preparing meals No Help Needed   Managing money (expenses/bills) No Help Needed   Moderately strenuous housework (laundry) Help Needed   Shopping for personal items (toiletries/medicines) No Help Needed   Shopping for groceries No Help Needed   Driving No Help Needed   Climbing a flight of stairs Help Needed   Getting to places beyond walking distances No Help Needed

## 2022-04-20 ENCOUNTER — HOSPITAL ENCOUNTER (OUTPATIENT)
Dept: NUTRITION | Age: 67
Discharge: HOME | End: 2022-04-20
Payer: COMMERCIAL

## 2022-04-20 PROCEDURE — 97803 MED NUTRITION INDIV SUBSEQ: CPT | Performed by: DIETITIAN, REGISTERED

## 2022-04-20 NOTE — PROGRESS NOTES
Qi Richardson was informed of the inherent limitations of a virtual visit,  and has consented to a virtual therapy visit on 2022. Information regarding emergency contact information for this patient during this visit is to contact:   at [phone] in addition to calling 911. The patient was informed that at any time during the virtual visit, they can decide to stop the virtual visit. The patient verified that they are physically located in the [facility] for this virtual visit. Audio only due to pt's Internet being out. No access to video calls. NUTRITION - FOLLOW-UP TREATMENT NOTE  Patient Name: Qi Richardson         Date: 2022  : 1955    YES Patient  Verified  Diagnosis: hypertriglyceridemia >1000 despite fenofibrate   In time: 2:30pm           Out time:  2:58pm   Total Treatment Time (min):   28     SUBJECTIVE/ASSESSMENT  Current Wt: 176 (PCP) Previous Wt: 174 (Nuerology visit) Wt Change: -8.6     Initial Wt:  183.4 (182.6# without hernandez) Total Wt change: -8.6 Height: 63     Changes in medication or medical history? Any new allergies, surgeries or procedures? YES/    If yes, update Summary List   Pt has been recommended to see a cardiologist  She is scheduled for mamogram due to breast lump. Family history of breast cancer.       Nutrition Diagnosis        Diagnosis Status: Food and nutrition related knowledge deficit R/T lack of prior education for  Carbohydrate sources, food choices for lowering Trglycerides and healthy weight loss AEB questions today about carb sources and request for counseling     [x]  Improved []  No Change    []  Declined   []  Discontinued     Obesity R/T history of excessive energy intake AEB BMI>30     []  Improved [x]  No Change    []  Declined   []  Discontinued     Excessive sodium intake R/T Food and nutrition related knowledge deficit   for sources of sodium AEB report of salting rinsing water for proteins and adding salt to cooking greens. [x]  Improved []  No Change    []  Declined   []  Discontinued        Nutrition Monitoring and Evaluation: SMBG = 125-135mg/dl    Went to [place] and children cooked for her. Smaller portions of her favorite foods. Hungry at night. Using sugar free jello and fruit. Drinking water,. Sugar free soda if any. Some bloating noticed. Worse as the day goes on. Notes peanuts make it worse. Sometimes improved with a bowel movement. Greasy foods make it worse. Notes some burning in her stomach sometimes helped with antiacids. Pt reports it  Feels different than acid reflux. Looks like coffee grounds in her stool. Tried turkey aguillon instead. B- oatmeal + half apple  S- none  L- salad with work mates with ham or chicken  S- none  D- piece of protein, no bread. Sometimes with vegetables with ICBINB. Using Mrs. Agustin to season. Sometimes rice or potatoes but smaller portions than before. S- sugar free jello  Drinks: water or diet soda      Previous goals:  Met. -remove snacking foods from house.  to keep at office. If hungry, choose lower carbohydrate foods (vegetables, chicken salad,  Low sodium olives)  Will return. -continue food log 3 days per week for awareness of food choices. Met. -continue drinking water, no sodas  Not met. -reduce sugar added to non-starchy vegetables. Limit carbs at meal towards 1 fist per meal.      Nutrition Prescription and  Intervention Review of past goals. Goal setting. CBT  Self monitoring with food log  Portions reduction and reducing carbohydrate amount at meals using . 1 fist per meal instead of 2+  Reducing carbohydrates from added sugars in drinks and added to cooked vegetables. Educated on gas producing vegetables. Recommended reducing intake of cruciferous vegetables for next 3-5 days to determine if any improvement in symptoms.       Patient Education:  [x]  Review current plan with patient   []  Other:    Handouts/  Information Provided: [] Carbohydrates  []  Protein  []  Fiber  []  Serving Sizes  []  Fluids  []  General guidelines []  Diabetes  []  Cholesterol  []  Sodium  []  SBGM  []  Food Journals  []  Others:      Patient Goals -remove snacking foods from house.  to keep at office. If hungry, choose lower carbohydrate foods (vegetables, chicken salad,  Low sodium olives)  -continue food log 3 days per week for awareness of food choices.   -continue drinking water, no sodas  -reduce sugar added to non-starchy vegetables. Limit carbs at meal towards 1 fist per meal.   -reduce broccoli intake to see if it is causing gas. PLAN  [x]  Continue on current plan []  Follow-up PRN   []  Discharge due to :    [x]  Next appt: 2-4 weeks     Dietitian: Brandi Hobson MS, RD, CSSD    Date: 4/20/2022 Time: 2:30pm     Katalina Chery is a 78 y.o. female being evaluated by a Virtual Visit (video visit) encounter to address concerns as mentioned above. A caregiver was present when appropriate. Due to this being a TeleHealth encounter (During [facility]- public health emergency), evaluation of the following areas was limited: Nutrition Focused Physical Exam. Pursuant to the emergency declaration under the [address] [place] [address] authority and the [employer] Act, this Virtual Visit was conducted with patient's (and/or legal guardian's) consent, to reduce the risk of exposure to COVID-19 and provide necessary medical care. Services were provided through a video synchronous discussion virtually to substitute for in-person encounter. --Angelina White on 4/20/2022 at 2:30pm    An electronic signature was used to authenticate this note.

## 2022-04-21 ENCOUNTER — HOSPITAL ENCOUNTER (OUTPATIENT)
Dept: MAMMOGRAPHY | Age: 67
Discharge: HOME | End: 2022-04-21
Attending: FAMILY MEDICINE
Payer: COMMERCIAL

## 2022-04-21 ENCOUNTER — HOSPITAL ENCOUNTER (OUTPATIENT)
Dept: ULTRASOUND IMAGING | Age: 67
Discharge: HOME | End: 2022-04-21
Attending: FAMILY MEDICINE
Payer: COMMERCIAL

## 2022-04-21 DIAGNOSIS — N63.24 MASS OF LOWER INNER QUADRANT OF LEFT BREAST: ICD-10-CM

## 2022-04-21 PROCEDURE — 77065 DX MAMMO INCL CAD UNI: CPT

## 2022-04-21 PROCEDURE — 76642 ULTRASOUND BREAST LIMITED: CPT

## 2022-05-03 ENCOUNTER — OFFICE VISIT (OUTPATIENT)
Dept: SURGERY | Age: 67
End: 2022-05-03
Payer: COMMERCIAL

## 2022-05-03 VITALS
BODY MASS INDEX: 31.01 KG/M2 | WEIGHT: 175 LBS | OXYGEN SATURATION: 95 % | SYSTOLIC BLOOD PRESSURE: 138 MMHG | HEIGHT: 63 IN | HEART RATE: 103 BPM | DIASTOLIC BLOOD PRESSURE: 85 MMHG | TEMPERATURE: 97.8 F | RESPIRATION RATE: 20 BRPM

## 2022-05-03 DIAGNOSIS — Z80.3 FH: BREAST CANCER IN FIRST DEGREE RELATIVE WHEN <50 YEARS OLD: Primary | ICD-10-CM

## 2022-05-03 DIAGNOSIS — Z13.79 GENETIC TESTING OF FEMALE: ICD-10-CM

## 2022-05-03 DIAGNOSIS — N63.24 MASS OF LOWER INNER QUADRANT OF LEFT BREAST: ICD-10-CM

## 2022-05-03 PROCEDURE — 99204 OFFICE O/P NEW MOD 45 MIN: CPT | Performed by: SURGERY

## 2022-05-03 NOTE — LETTER
5/3/2022    Patient: Hector Gould   YOB: 1955   Date of Visit: Rosanna Keller MD  [address]  Via In [facility] [address]    Dear Sigrid Mims MD,      Thank you for referring Ms. Jessica Donis to [address] [place], [place] [address]  for evaluation. My notes for this consultation are attached. If you have questions, please do not hesitate to call me. I look forward to following your patient along with you.       Sincerely,    Deepali Fisher MD

## 2022-05-03 NOTE — PROGRESS NOTES
Identified pt with two pt identifiers(name and ). Reviewed record in preparation for visit and have obtained necessary documentation. All patient medications has been reviewed. Chief Complaint   Patient presents with    Breast Problem     seen at the request of Dr. Oswaldo Hendrickson Maintenance Due   Topic    Shingrix Vaccine Age 50> (2 of 2)       Vitals:    22 1009   BP: 138/85   Pulse: (!) 103   Resp: 20   Temp: 97.8 °F (36.6 °C)   SpO2: 95%   Weight: 79.4 kg (175 lb)   Height: 5' 3\" (1.6 m)   PainSc:   6   PainLoc: Leg   LMP: 2003       4. Have you been to the ER, urgent care clinic since your last visit? Hospitalized since your last visit? new patient    5. Have you seen or consulted any other health care providers outside of the [address] [place] [address] since your last visit? Include any pap smears or colon screening. new patient      Patient is accompanied by self I have received verbal consent from Dylan Ortiz to discuss any/all medical information while they are present in the room.
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
## 2022-05-13 ENCOUNTER — HOSPITAL ENCOUNTER (OUTPATIENT)
Dept: NUTRITION | Age: 67
Discharge: HOME | End: 2022-05-13
Payer: COMMERCIAL

## 2022-05-13 PROCEDURE — 97803 MED NUTRITION INDIV SUBSEQ: CPT | Performed by: DIETITIAN, REGISTERED

## 2022-05-13 NOTE — PROGRESS NOTES
Saintclair Like was informed of the inherent limitations of a virtual visit,  and has consented to a virtual therapy visit on 2022. Information regarding emergency contact information for this patient during this visit is to contact:   at [phone] in addition to calling 911. The patient was informed that at any time during the virtual visit, they can decide to stop the virtual visit. The patient verified that they are physically located in the [facility] for this virtual visit. Audio only due to pt's Internet being out. No access to video calls. NUTRITION - FOLLOW-UP TREATMENT NOTE  Patient Name: Saintclair Like         Date: 2022  : 1955    YES Patient  Verified  Diagnosis: hypertriglyceridemia >1000 despite fenofibrate   In time: 9:40am           Out time:  10:10am   Total Treatment Time (min):   30     SUBJECTIVE/ASSESSMENT  Current Wt: 175 (PCP) Previous Wt: 176 (PCP) Wt Change: -1     Initial Wt:  183.4 (182.6# without hernandez) Total Wt change: -9.6 Height: 63     Changes in medication or medical history? Any new allergies, surgeries or procedures? YES/    If yes, update Summary List   carpule tunnel noted  Negative mammogram    Seeing cardiologist      Nutrition Diagnosis        Diagnosis Status: Food and nutrition related knowledge deficit R/T lack of prior education for  Carbohydrate sources, food choices for lowering Trglycerides and healthy weight loss AEB questions today about carb sources and request for counseling     [x]  Improved []  No Change    []  Declined   []  Discontinued     Obesity R/T history of excessive energy intake AEB BMI>30     []  Improved [x]  No Change    []  Declined   []  Discontinued     Excessive sodium intake R/T Food and nutrition related knowledge deficit   for sources of sodium AEB report of salting rinsing water for proteins and adding salt to cooking greens.      [x]  Improved []  No Change    []  Declined   []  Discontinued Nutrition Monitoring and Evaluation: Higher since last week SMBG = 200s mg/dl  Forgot to take her metformin 2nd dose. Pt unsure how long. Giving 15u instead of 16u recently for insulin. Pt notes that since stopping eating peanuts and has not had any burning feeling in her stomach. No more ground coffee in her stool. Took ducolax 2 cap fulls. Resulting in bowel movement and no issues since. Eating more salad and about 1 fist of protein. Sometimes waking up hungry. Eating sugar free jello. Has always woken up in the middle of the night to eat. Now choosing sf jello instead of full plate of food. Drinking more water. Went for 3 days without adding sugar. Didn't add sugar to her frosted flakes or vegetable as she normally would. Drinking some apple juice. Now adding some sugar to coffee again. No longer adding sugar to vegetables. Using sweet n low for lemonade. Ladies at work making a big salad together. Breakfast = coffee with toast OR bagel plain  Lunch = salad with dressing and cottage cheese and fruit  Dinner = paying attention to what she is eating  Snack = sf jello    Previous goals:  Improved. -remove snacking foods from house.  to keep at office. If hungry, choose lower carbohydrate foods (vegetables, chicken salad,  Low sodium olives)  Not met. -continue food log 3 days per week for awareness of food choices. Improved. -continue drinking water, no sodas  Met. -reduce sugar added to non-starchy vegetables. Limit carbs at meal towards 1 fist per meal.   Still gassy will try silviano and if no improvement will try gas-x-reduce broccoli intake to see if it is causing gas. Nutrition Prescription and  Intervention Review of past goals. Goal setting. CBT  Portions reduction and reducing carbohydrate amount at meals using . 1 fist per meal instead of 2+  Reducing carbohydrates from added sugars in drinks and added to cooked vegetables.         Patient Education:  [x] Review current plan with patient   []  Other:    Handouts/  Information Provided: []  Carbohydrates  []  Protein  []  Fiber  []  Serving Sizes  []  Fluids  []  General guidelines []  Diabetes  []  Cholesterol  []  Sodium  []  SBGM  []  Food Journals  []  Others:      Patient Goals -continue to remove snacking foods from house.  to keep at office. If hungry, choose lower carbohydrate foods (vegetables, chicken salad,  Low sodium olives)  -take medications as prescribed (metformin and insulin 16u)  -continue drinking water, no sodas  -continue to reduce sugar added to non-starchy vegetables. Limit carbs at meal towards 1 fist per meal.      PLAN  [x]  Continue on current plan []  Follow-up PRN   []  Discharge due to :    [x]  Next appt: 4-6 weeks     Dietitian: Daija Smith MS, RD, CSSD    Date: 5/13/2022 Time: 11:00am     Parker Molina is a 78 y.o. female being evaluated by a Virtual Visit (video visit) encounter to address concerns as mentioned above. A caregiver was present when appropriate. Due to this being a TeleHealth encounter (During [place] [address] public health emergency), evaluation of the following areas was limited: Nutrition Focused Physical Exam. Pursuant to the emergency declaration under the [address] [place] [address], [address] [place] [address] authority and the [employer], this Virtual Visit was conducted with patient's (and/or legal guardian's) consent, to reduce the risk of exposure to COVID-19 and provide necessary medical care. Services were provided through a video synchronous discussion virtually to substitute for in-person encounter. --Radha Rios on 5/13/2022 at 9:40am    An electronic signature was used to authenticate this note.

## 2022-05-16 ENCOUNTER — TELEPHONE (OUTPATIENT)
Dept: SURGERY | Age: 67
End: 2022-05-16

## 2022-05-16 NOTE — TELEPHONE ENCOUNTER
[employer] testing is negative for a mutation      Breast cancer risk score  Remaining lifetime risk 11.1%      Unable to leave voicemail as mailbox is full    Savana Aaron MD FACS

## 2022-05-23 DIAGNOSIS — M10.00 ACUTE IDIOPATHIC GOUT, UNSPECIFIED SITE: ICD-10-CM

## 2022-05-23 RX ORDER — ALLOPURINOL 100 MG/1
100 TABLET ORAL DAILY
Qty: 90 TABLET | Refills: 3 | Status: SHIPPED | OUTPATIENT
Start: 2022-05-23 | End: 2022-08-18

## 2022-06-02 PROBLEM — Z13.79 GENETIC TESTING OF FEMALE: Status: RESOLVED | Noted: 2022-05-03 | Resolved: 2022-06-02

## 2022-06-22 LAB
BUN SERPL-MCNC: 23 MG/DL (ref 8–27)
BUN/CREAT SERPL: 19 (ref 12–28)
CALCIUM SERPL-MCNC: 10.4 MG/DL (ref 8.7–10.3)
CHLORIDE SERPL-SCNC: 99 MMOL/L (ref 96–106)
CO2 SERPL-SCNC: 21 MMOL/L (ref 20–29)
CREAT SERPL-MCNC: 1.21 MG/DL (ref 0.57–1)
EGFR: 49 ML/MIN/1.73
EST. AVERAGE GLUCOSE BLD GHB EST-MCNC: 223 MG/DL
GLUCOSE SERPL-MCNC: 115 MG/DL (ref 65–99)
HBA1C MFR BLD: 9.4 % (ref 4.8–5.6)
INTERPRETATION: NORMAL
POTASSIUM SERPL-SCNC: 4.3 MMOL/L (ref 3.5–5.2)
SODIUM SERPL-SCNC: 140 MMOL/L (ref 134–144)

## 2022-06-29 ENCOUNTER — OFFICE VISIT (OUTPATIENT)
Dept: INTERNAL MEDICINE CLINIC | Age: 67
End: 2022-06-29
Payer: COMMERCIAL

## 2022-06-29 ENCOUNTER — OFFICE VISIT (OUTPATIENT)
Dept: NEUROLOGY | Age: 67
End: 2022-06-29

## 2022-06-29 VITALS
OXYGEN SATURATION: 97 % | HEIGHT: 63 IN | BODY MASS INDEX: 32.78 KG/M2 | HEART RATE: 106 BPM | RESPIRATION RATE: 16 BRPM | WEIGHT: 185 LBS | TEMPERATURE: 97.6 F | DIASTOLIC BLOOD PRESSURE: 80 MMHG | SYSTOLIC BLOOD PRESSURE: 122 MMHG

## 2022-06-29 VITALS
DIASTOLIC BLOOD PRESSURE: 87 MMHG | BODY MASS INDEX: 32.96 KG/M2 | TEMPERATURE: 98.3 F | SYSTOLIC BLOOD PRESSURE: 132 MMHG | RESPIRATION RATE: 20 BRPM | HEART RATE: 105 BPM | HEIGHT: 63 IN | WEIGHT: 186 LBS | OXYGEN SATURATION: 97 %

## 2022-06-29 DIAGNOSIS — G56.03 BILATERAL CARPAL TUNNEL SYNDROME: Primary | ICD-10-CM

## 2022-06-29 DIAGNOSIS — K21.9 GASTROESOPHAGEAL REFLUX DISEASE: ICD-10-CM

## 2022-06-29 DIAGNOSIS — F33.0 MAJOR DEPRESSIVE DISORDER, RECURRENT, MILD (HCC): ICD-10-CM

## 2022-06-29 DIAGNOSIS — R80.9 DIABETES MELLITUS WITH PROTEINURIA (HCC): ICD-10-CM

## 2022-06-29 DIAGNOSIS — J45.20 MILD INTERMITTENT ASTHMA WITHOUT COMPLICATION: ICD-10-CM

## 2022-06-29 DIAGNOSIS — N18.31 STAGE 3A CHRONIC KIDNEY DISEASE (HCC): ICD-10-CM

## 2022-06-29 DIAGNOSIS — G54.6 PHANTOM LIMB PAIN (HCC): ICD-10-CM

## 2022-06-29 DIAGNOSIS — E11.29 DIABETES MELLITUS WITH PROTEINURIA (HCC): ICD-10-CM

## 2022-06-29 DIAGNOSIS — E78.1 HYPERTRIGLYCERIDEMIA, FAMILIAL: ICD-10-CM

## 2022-06-29 DIAGNOSIS — M10.00 ACUTE IDIOPATHIC GOUT, UNSPECIFIED SITE: ICD-10-CM

## 2022-06-29 DIAGNOSIS — Z79.4 TYPE 2 DIABETES MELLITUS WITH HYPERGLYCEMIA, WITH LONG-TERM CURRENT USE OF INSULIN (HCC): Primary | ICD-10-CM

## 2022-06-29 DIAGNOSIS — R09.82 POSTNASAL DRIP: ICD-10-CM

## 2022-06-29 DIAGNOSIS — G56.03 BILATERAL CARPAL TUNNEL SYNDROME: ICD-10-CM

## 2022-06-29 DIAGNOSIS — E11.65 TYPE 2 DIABETES MELLITUS WITH HYPERGLYCEMIA, WITH LONG-TERM CURRENT USE OF INSULIN (HCC): Primary | ICD-10-CM

## 2022-06-29 DIAGNOSIS — I10 ESSENTIAL HYPERTENSION: ICD-10-CM

## 2022-06-29 DIAGNOSIS — F33.9 RECURRENT MAJOR DEPRESSIVE DISORDER, REMISSION STATUS UNSPECIFIED (HCC): ICD-10-CM

## 2022-06-29 DIAGNOSIS — F33.1 MAJOR DEPRESSIVE DISORDER, RECURRENT, MODERATE (HCC): ICD-10-CM

## 2022-06-29 PROBLEM — N18.30 CHRONIC RENAL DISEASE, STAGE III (HCC): Status: ACTIVE | Noted: 2022-06-29

## 2022-06-29 PROCEDURE — 99213 OFFICE O/P EST LOW 20 MIN: CPT | Performed by: NURSE PRACTITIONER

## 2022-06-29 PROCEDURE — 1123F ACP DISCUSS/DSCN MKR DOCD: CPT | Performed by: NURSE PRACTITIONER

## 2022-06-29 PROCEDURE — 99214 OFFICE O/P EST MOD 30 MIN: CPT | Performed by: FAMILY MEDICINE

## 2022-06-29 RX ORDER — AMITRIPTYLINE HYDROCHLORIDE 50 MG/1
50 TABLET, FILM COATED ORAL
Qty: 90 TABLET | Refills: 1 | Status: SHIPPED | OUTPATIENT
Start: 2022-06-29

## 2022-06-29 RX ORDER — INSULIN ASPART 100 [IU]/ML
16 INJECTION, SUSPENSION SUBCUTANEOUS
Qty: 10 PEN | Refills: 5 | Status: SHIPPED | OUTPATIENT
Start: 2022-06-29

## 2022-06-29 RX ORDER — CETIRIZINE HCL 10 MG
10 TABLET ORAL DAILY
Qty: 90 TABLET | Refills: 3 | Status: SHIPPED | OUTPATIENT
Start: 2022-06-29

## 2022-06-29 RX ORDER — FENOFIBRATE 160 MG/1
160 TABLET ORAL DAILY
Qty: 90 TABLET | Refills: 1 | Status: SHIPPED | OUTPATIENT
Start: 2022-06-29

## 2022-06-29 RX ORDER — OLOPATADINE HYDROCHLORIDE 1 MG/ML
2 SOLUTION/ DROPS OPHTHALMIC 2 TIMES DAILY
Qty: 1 EACH | Refills: 5 | Status: SHIPPED | OUTPATIENT
Start: 2022-06-29 | End: 2022-06-30 | Stop reason: SDUPTHER

## 2022-06-29 RX ORDER — ROSUVASTATIN CALCIUM 40 MG/1
40 TABLET, COATED ORAL
Qty: 90 TABLET | Refills: 1 | Status: SHIPPED | OUTPATIENT
Start: 2022-06-29

## 2022-06-29 RX ORDER — COLCHICINE 0.6 MG/1
0.6 TABLET ORAL DAILY
Qty: 30 TABLET | Refills: 2 | Status: SHIPPED | OUTPATIENT
Start: 2022-06-29 | End: 2022-10-18 | Stop reason: SDUPTHER

## 2022-06-29 RX ORDER — OMEPRAZOLE 20 MG/1
20 CAPSULE, DELAYED RELEASE ORAL DAILY
Qty: 90 CAPSULE | Refills: 3 | Status: SHIPPED | OUTPATIENT
Start: 2022-06-29

## 2022-06-29 RX ORDER — LOSARTAN POTASSIUM 25 MG/1
25 TABLET ORAL DAILY
Qty: 90 TABLET | Refills: 1 | Status: SHIPPED | OUTPATIENT
Start: 2022-06-29

## 2022-06-29 RX ORDER — GABAPENTIN 100 MG/1
CAPSULE ORAL
Qty: 180 CAPSULE | Refills: 0 | Status: SHIPPED | OUTPATIENT
Start: 2022-06-29 | End: 2022-08-08 | Stop reason: SDUPTHER
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
## 2022-06-29 NOTE — PROGRESS NOTES
Chief Complaint   Patient presents with    Diabetes     fasting blood sugar levels at home ranging around 150s - 170s / 3 month check up    Fall     fell off of barstool at home this past weekend / hurt shoulder     Fall Risk Assessment, last 12 mths 6/29/2022   Able to walk? Yes   Fall in past 12 months? 1   Do you feel unsteady? 0   Are you worried about falling 0   Is the gait abnormal? 1   Number of falls in past 12 months 1   Fall with injury? 0       3 most recent PHQ Screens 6/29/2022   Little interest or pleasure in doing things Not at all   Feeling down, depressed, irritable, or hopeless Not at all   Total Score PHQ 2 0       Abuse Screening Questionnaire 6/29/2022   Do you ever feel afraid of your partner? N   Are you in a relationship with someone who physically or mentally threatens you? N   Is it safe for you to go home?  Y       ADL Assessment 6/29/2022   Feeding yourself No Help Needed   Getting from bed to chair Help Needed   Getting dressed No Help Needed   Bathing or showering No Help Needed   Walk across the room (includes cane/walker) Help Needed   Using the telphone No Help Needed   Taking your medications No Help Needed   Preparing meals No Help Needed   Managing money (expenses/bills) No Help Needed   Moderately strenuous housework (laundry) No Help Needed   Shopping for personal items (toiletries/medicines) No Help Needed   Shopping for groceries No Help Needed   Driving No Help Needed   Climbing a flight of stairs Help Needed   Getting to places beyond walking distances No Help Needed

## 2022-06-29 NOTE — PATIENT INSTRUCTIONS
Gabapentin titration:  Start with 100 mg at nighttime, for 1 week  Increase to 100 mg in the morning, 100 mg at bedtime for 1 week. Increase 100 mg in the morning, 100 mg in the afternoon, 100 mg at bedtime for a week  Increase to 100mg in the morning, 100 mg afternoon, 200 mg bedtime for a week  Increase to 100 mg in the morning, 200 mg in the afternoon, 200 bedtime for 1 week  Increase to 200 mg in the morning, 200 mg in the afternoon, 200 mg at bedtime and continue.

## 2022-06-29 NOTE — PROGRESS NOTES
Chief Complaint   Patient presents with    Follow-up     follow up on carpul tunnel.   no changes since last visit with Brandi Keller did have a emg done

## 2022-06-30 ENCOUNTER — TELEPHONE (OUTPATIENT)
Dept: INTERNAL MEDICINE CLINIC | Age: 67
End: 2022-06-30

## 2022-06-30 DIAGNOSIS — H10.13 ALLERGIC CONJUNCTIVITIS OF BOTH EYES: Primary | ICD-10-CM

## 2022-06-30 DIAGNOSIS — S78.112S TRAUMATIC ABOVE-KNEE AMPUTATION OF LEFT LOWER EXTREMITY, SEQUELA (HCC): Primary | ICD-10-CM

## 2022-06-30 RX ORDER — OLOPATADINE HYDROCHLORIDE 1 MG/ML
2 SOLUTION/ DROPS OPHTHALMIC 2 TIMES DAILY
Qty: 1 EACH | Refills: 5 | Status: SHIPPED | OUTPATIENT
Start: 2022-06-30

## 2022-06-30 NOTE — TELEPHONE ENCOUNTER
6/30/22 Plan called [phone]. PA completed for Olopatadine HCL 0.1% solution,with PA rep Tiffany CANTOR Turn around for decision to be faxed to office,is within 72 hours. .Ref: # [id number]. Claim  Pending clinical review. Plan preferred : 1. Azelastine 2. Epinastine. Please follow up as needed.

## 2022-08-08 ENCOUNTER — OFFICE VISIT (OUTPATIENT)
Dept: INTERNAL MEDICINE CLINIC | Age: 67
End: 2022-08-08
Payer: COMMERCIAL

## 2022-08-08 VITALS
RESPIRATION RATE: 16 BRPM | HEART RATE: 99 BPM | SYSTOLIC BLOOD PRESSURE: 139 MMHG | OXYGEN SATURATION: 93 % | TEMPERATURE: 98.3 F | HEIGHT: 63 IN | WEIGHT: 176 LBS | BODY MASS INDEX: 31.18 KG/M2 | DIASTOLIC BLOOD PRESSURE: 86 MMHG

## 2022-08-08 DIAGNOSIS — G56.03 BILATERAL CARPAL TUNNEL SYNDROME: ICD-10-CM

## 2022-08-08 DIAGNOSIS — N63.20 MASS OF LEFT BREAST, UNSPECIFIED QUADRANT: Primary | ICD-10-CM

## 2022-08-08 DIAGNOSIS — H61.22 CERUMEN DEBRIS ON TYMPANIC MEMBRANE OF LEFT EAR: ICD-10-CM

## 2022-08-08 DIAGNOSIS — G54.6 PHANTOM LIMB PAIN (HCC): ICD-10-CM

## 2022-08-08 DIAGNOSIS — E11.29 DIABETES MELLITUS WITH PROTEINURIA (HCC): ICD-10-CM

## 2022-08-08 DIAGNOSIS — R80.9 DIABETES MELLITUS WITH PROTEINURIA (HCC): ICD-10-CM

## 2022-08-08 PROCEDURE — 69209 REMOVE IMPACTED EAR WAX UNI: CPT | Performed by: FAMILY MEDICINE

## 2022-08-08 PROCEDURE — 99214 OFFICE O/P EST MOD 30 MIN: CPT | Performed by: FAMILY MEDICINE

## 2022-08-08 RX ORDER — GABAPENTIN 100 MG/1
CAPSULE ORAL
Qty: 180 CAPSULE | Refills: 1 | Status: SHIPPED | OUTPATIENT
Start: 2022-08-08

## 2022-08-08 NOTE — PROGRESS NOTES
Chief Complaint   Patient presents with    Ear Pain     Patient has not been out of the country in (14 months), NO diarrhea, NO cough, NO chest conjestion, NO temp. Pt has not been around anyone with these symptoms. Health Maintenance reviewed. I have reviewed the patient's medical history in detail and updated the computerized patient record. 1. Have you been to the ER, urgent care clinic since your last visit? No  Hospitalized since your last visit?  no    2. Have you seen or consulted any other health care providers outside of the [address] [place] [address] since your last visit? No Include any pap smears or colon screening. Encouraged pt to discuss pt's wishes with spouse/partner/family and bring them in the next appt to follow thru with the Advanced Directive    @  [address] [facility] [address], last 12 mths 6/29/2022   Able to walk? Yes   Fall in past 12 months? 1   Do you feel unsteady? 0   Are you worried about falling 0   Is the gait abnormal? 1   Number of falls in past 12 months 1   Fall with injury? 0       3 most recent PHQ Screens 6/29/2022   Little interest or pleasure in doing things Not at all   Feeling down, depressed, irritable, or hopeless Not at all   Total Score PHQ 2 0       Abuse Screening Questionnaire 6/29/2022   Do you ever feel afraid of your partner? N   Are you in a relationship with someone who physically or mentally threatens you? N   Is it safe for you to go home?  Y       ADL Assessment 6/29/2022   Feeding yourself No Help Needed   Getting from bed to chair Help Needed   Getting dressed No Help Needed   Bathing or showering No Help Needed   Walk across the room (includes cane/walker) Help Needed   Using the telphone No Help Needed   Taking your medications No Help Needed   Preparing meals No Help Needed   Managing money (expenses/bills) No Help Needed   Moderately strenuous housework (laundry) No Help Needed   Shopping for personal items (toiletries/medicines) No Help Needed Shopping for groceries No Help Needed   Driving No Help Needed   Climbing a flight of stairs Help Needed   Getting to places beyond walking distances No Help Needed       VORB from Dr. Trenton Flanagan to [place] Left ear, obtained a large amount of wax. Pt tolerated well.

## 2022-08-10 NOTE — PROGRESS NOTES
Subjective:     Zaira Fields is a 78 y.o. female seen for follow-up of diabetes. She has had hypoglycemic attacks. .no  Blood sugar control has been borderline    Lab Results   Component Value Date/Time    Hemoglobin A1c 9.4 (H) 06/21/2022 11:03 AM    Hemoglobin A1c 7.9 (H) 02/24/2022 10:17 AM    Hemoglobin A1c 7.5 (H) 09/24/2021 11:41 AM    Glucose 115 (H) 06/21/2022 11:03 AM    Glucose (POC) 146 (H) 02/18/2018 11:22 AM    Microalbumin/Creat ratio (mg/g creat) 153 (H) 10/08/2021 08:48 PM    Microalbumin,urine random 6.39 10/08/2021 08:48 PM    LDL, calculated 10 03/24/2022 11:03 AM    Creatinine 1.21 (H) 06/21/2022 11:03 AM       She has diabetes, hypertension, hyperlipidemia, and peripheral vascular disease. Zaira Fields has the additional concern of needs repeat mammogram for abnormality. Her left ear is clogged. Refills of medicines pain levels reasonable    Reports taking blood pressure medications without side affects. No complaints of exertional chest pain, excessive shortness of breath or focal weakness. Minimal swelling in lower legs or dizziness with standing. Diet and Lifestyle: follows a diabetic diet regularly.     Patient Active Problem List    Diagnosis Date Noted    Chronic renal disease, stage III 06/29/2022    Major depressive disorder, recurrent, mild 06/29/2022    Major depressive disorder, recurrent, moderate 06/29/2022    Major depressive disorder, recurrent, unspecified 06/29/2022    FH: breast cancer in first degree relative when <48years old 05/03/2022    Genetic testing of female 05/03/2022    Mass of lower inner quadrant of left breast 05/03/2022    Cervical radiculopathy due to degenerative joint disease of spine 03/30/2022    Bilateral carpal tunnel syndrome 03/29/2022    Tachycardia 03/29/2022    CRI (chronic renal insufficiency), stage 3 (moderate) ([address] 03/10/2021    Asthma 09/02/2020    Gout 05/01/2019    Severe obesity ([address] 12/17/2018    Diabetic peripheral neuropathy associated with type 2 diabetes mellitus [facility] 2018    Post-menopausal bleeding 2017    Essential hypertension 2017    Diabetes mellitus with proteinuria ([facility] 2017    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 Keflex [Cephalexin] Itching    Lisinopril Cough     Past Medical History:   Diagnosis Date    Asthma     PATIENT DENIES    Bone spur of ankle 3/30/12    right ankle    Chronic pain     DM (diabetes mellitus) (HCC)     GERD (gastroesophageal reflux disease)     Gout     Hypercholesterolemia     Hypertension     OA (osteoarthritis) of knee 3/30/12    right knee    Sleep apnea     Unilateral AKA ([facility]     left     Social History     Tobacco Use    Smoking status: Former     Packs/day: 0.50     Years: 15.00     Pack years: 7.50     Types: Cigarettes     Quit date: 3/30/2009     Years since quittin.3    Smokeless tobacco: Never   Substance Use Topics    Alcohol use: No     Alcohol/week: 0.0 standard drinks             Review of Systems  Pertinent items are noted in HPI. Objective:     Significant for the following: No acute distress  Visit Vitals  /86 (BP 1 Location: Left arm, BP Patient Position: Sitting, BP Cuff Size: Adult)   Pulse 99   Temp 98.3 °F (36.8 °C) (Temporal)   Resp 16   Ht 5' 3\" (1.6 m)   Wt 176 lb (79.8 kg)   LMP 2003   SpO2 93%   BMI 31.18 kg/m²     WD WN female NAD      Lab review: labs reviewed, I note that glycosylated hemoglobin borderline. Assessment/Plan:     Follow-up diabetes borderline controlled, needs further observation.   Diabetic issues reviewed with her: glycohemoglobin and other lab monitoring discussed and labs immediately prior to next visit. ICD-10-CM ICD-9-CM    1. Mass of left breast, unspecified quadrant  N63.20 611.72 OSVALDO MAMMO LT DX INCL CAD      US BREAST AXILLA LT      2. Cerumen debris on tympanic membrane of left ear  H61.22 380.4 REMOVE IMPACTED EAR WAX      carbamide peroxide (DEBROX) 6.5 % otic solution      3. Phantom limb pain (HCC)  G54.6 353.6 gabapentin (NEURONTIN) 100 mg capsule      4. Bilateral carpal tunnel syndrome  G56.03 354.0 gabapentin (NEURONTIN) 100 mg capsule      5. Diabetes mellitus with proteinuria (HCC)  F17.64 619.18 METABOLIC PANEL, BASIC    J44.7 791.0 HEMOGLOBIN A1C WITH EAG          Orders Placed This Encounter    REMOVE IMPACTED EAR WAX    OSVALDO MAMMO LT DX INCL CAD     Standing Status:   Future     Standing Expiration Date:   2023     Scheduling Instructions:      [facility]     Order Specific Question:   Reason for Exam     Answer:   breast lump    US BREAST AXILLA LT     Standing Status:   Future     Standing Expiration Date:   2023     Scheduling Instructions:      [facility]    METABOLIC PANEL, BASIC     Standing Status:   Future     Standing Expiration Date:   2023    HEMOGLOBIN A1C WITH EAG     Standing Status:   Future     Standing Expiration Date:   2023    carbamide peroxide (DEBROX) 6.5 % otic solution     Sig: Administer 5 Drops in left ear two (2) times a day for 4 days. Dispense:  7.5 mL     Refill:  1    gabapentin (NEURONTIN) 100 mg capsule     Si to 2 tablets 3 times daily as needed headache  Indications: neuropathic pain     Dispense:  180 Capsule     Refill:  1       Follow-up and Dispositions    Return in about 3 months (around 2022). Removed quite a bit of wax but there is still some left use Debrox daily washout the ear    Chronic Conditions Addressed Today       1. Phantom limb pain (HCC)     Relevant Medications     gabapentin (NEURONTIN) 100 mg capsule    2.  Bilateral carpal tunnel syndrome Relevant Medications     gabapentin (NEURONTIN) 100 mg capsule    3.  Diabetes mellitus with proteinuria (HCC)     Relevant Medications     gabapentin (NEURONTIN) 100 mg capsule     Other Relevant Orders     METABOLIC PANEL, BASIC     HEMOGLOBIN A1C WITH EAG     Acute Diagnoses Addressed Today       Mass of left breast, unspecified quadrant    -  Primary        Relevant Orders        OSVALDO MAMMO LT DX INCL CAD        US BREAST AXILLA LT    Cerumen debris on tympanic membrane of left ear            Relevant Medications        carbamide peroxide (DEBROX) 6.5 % otic solution        Other Relevant Orders        REMOVE IMPACTED EAR WAX          Follow-up 3 months

## 2022-08-16 DIAGNOSIS — M10.00 ACUTE IDIOPATHIC GOUT, UNSPECIFIED SITE: ICD-10-CM

## 2022-08-18 RX ORDER — ALLOPURINOL 100 MG/1
TABLET ORAL
Qty: 90 TABLET | Refills: 0 | Status: SHIPPED | OUTPATIENT
Start: 2022-08-18

## 2022-08-23 LAB — HBA1C MFR BLD HPLC: 9.4 %

## 2022-09-08 ENCOUNTER — TELEPHONE (OUTPATIENT)
Dept: INTERNAL MEDICINE CLINIC | Age: 67
End: 2022-09-08

## 2022-10-06 ENCOUNTER — HOSPITAL ENCOUNTER (OUTPATIENT)
Dept: MAMMOGRAPHY | Age: 67
Discharge: HOME | End: 2022-10-06
Attending: FAMILY MEDICINE
Payer: COMMERCIAL

## 2022-10-06 ENCOUNTER — HOSPITAL ENCOUNTER (OUTPATIENT)
Dept: ULTRASOUND IMAGING | Age: 67
Discharge: HOME | End: 2022-10-06
Attending: FAMILY MEDICINE
Payer: COMMERCIAL

## 2022-10-06 DIAGNOSIS — R92.8 FOLLOW-UP EXAMINATION OF ABNORMAL MAMMOGRAM: ICD-10-CM

## 2022-10-06 DIAGNOSIS — N63.20 MASS OF LEFT BREAST, UNSPECIFIED QUADRANT: ICD-10-CM

## 2022-10-06 PROCEDURE — 77062 BREAST TOMOSYNTHESIS BI: CPT

## 2022-10-12 LAB — HBA1C MFR BLD HPLC: 9.1 %

## 2022-10-18 DIAGNOSIS — M10.00 ACUTE IDIOPATHIC GOUT, UNSPECIFIED SITE: ICD-10-CM

## 2022-10-20 RX ORDER — COLCHICINE 0.6 MG/1
0.6 TABLET ORAL DAILY
Qty: 30 TABLET | Refills: 2 | Status: SHIPPED | OUTPATIENT
Start: 2022-10-20

## 2022-11-07 ENCOUNTER — OFFICE VISIT (OUTPATIENT)
Dept: INTERNAL MEDICINE CLINIC | Age: 67
End: 2022-11-07
Payer: COMMERCIAL

## 2022-11-07 VITALS
SYSTOLIC BLOOD PRESSURE: 134 MMHG | HEIGHT: 63 IN | RESPIRATION RATE: 16 BRPM | HEART RATE: 95 BPM | WEIGHT: 176 LBS | DIASTOLIC BLOOD PRESSURE: 88 MMHG | BODY MASS INDEX: 31.18 KG/M2 | TEMPERATURE: 98.4 F | OXYGEN SATURATION: 98 %

## 2022-11-07 DIAGNOSIS — F33.0 MAJOR DEPRESSIVE DISORDER, RECURRENT, MILD (HCC): ICD-10-CM

## 2022-11-07 DIAGNOSIS — G54.6 PHANTOM LIMB PAIN (HCC): ICD-10-CM

## 2022-11-07 DIAGNOSIS — S78.112S TRAUMATIC ABOVE-KNEE AMPUTATION OF LEFT LOWER EXTREMITY, SEQUELA (HCC): Primary | ICD-10-CM

## 2022-11-07 DIAGNOSIS — E11.29 DIABETES MELLITUS WITH PROTEINURIA (HCC): ICD-10-CM

## 2022-11-07 DIAGNOSIS — E78.2 MIXED HYPERLIPIDEMIA: ICD-10-CM

## 2022-11-07 DIAGNOSIS — N18.31 STAGE 3A CHRONIC KIDNEY DISEASE (HCC): ICD-10-CM

## 2022-11-07 DIAGNOSIS — I10 ESSENTIAL HYPERTENSION: ICD-10-CM

## 2022-11-07 DIAGNOSIS — R80.9 DIABETES MELLITUS WITH PROTEINURIA (HCC): ICD-10-CM

## 2022-11-07 PROBLEM — F33.1 MAJOR DEPRESSIVE DISORDER, RECURRENT, MODERATE (HCC): Status: RESOLVED | Noted: 2022-06-29 | Resolved: 2022-11-07

## 2022-11-07 PROCEDURE — 99214 OFFICE O/P EST MOD 30 MIN: CPT | Performed by: FAMILY MEDICINE

## 2022-11-07 RX ORDER — ACETAMINOPHEN AND CODEINE PHOSPHATE 300; 30 MG/1; MG/1
1 TABLET ORAL
COMMUNITY
Start: 2022-10-19 | End: 2022-11-15

## 2022-11-07 RX ORDER — ONDANSETRON 4 MG/1
4 TABLET, ORALLY DISINTEGRATING ORAL
COMMUNITY
Start: 2022-10-20

## 2022-11-07 RX ORDER — AMITRIPTYLINE HYDROCHLORIDE 50 MG/1
50 TABLET, FILM COATED ORAL
Qty: 90 TABLET | Refills: 3 | Status: SHIPPED | OUTPATIENT
Start: 2022-11-07
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
## 2022-11-15 ENCOUNTER — APPOINTMENT (OUTPATIENT)
Dept: ULTRASOUND IMAGING | Age: 67
End: 2022-11-15
Attending: EMERGENCY MEDICINE
Payer: COMMERCIAL

## 2022-11-15 ENCOUNTER — HOSPITAL ENCOUNTER (EMERGENCY)
Age: 67
Discharge: HOME | End: 2022-11-15
Attending: EMERGENCY MEDICINE
Payer: COMMERCIAL

## 2022-11-15 ENCOUNTER — NURSE TRIAGE (OUTPATIENT)
Dept: OTHER | Facility: CLINIC | Age: 67
End: 2022-11-15

## 2022-11-15 VITALS
SYSTOLIC BLOOD PRESSURE: 157 MMHG | OXYGEN SATURATION: 99 % | RESPIRATION RATE: 18 BRPM | DIASTOLIC BLOOD PRESSURE: 96 MMHG | HEART RATE: 96 BPM | TEMPERATURE: 97.9 F

## 2022-11-15 DIAGNOSIS — G54.6 PHANTOM LIMB PAIN (HCC): ICD-10-CM

## 2022-11-15 DIAGNOSIS — G56.03 BILATERAL CARPAL TUNNEL SYNDROME: ICD-10-CM

## 2022-11-15 DIAGNOSIS — M54.31 SCIATICA OF RIGHT SIDE: Primary | ICD-10-CM

## 2022-11-15 PROCEDURE — 99284 EMERGENCY DEPT VISIT MOD MDM: CPT

## 2022-11-15 PROCEDURE — 93971 EXTREMITY STUDY: CPT

## 2022-11-15 RX ORDER — CYCLOBENZAPRINE HCL 10 MG
10 TABLET ORAL
Qty: 12 TABLET | Refills: 0 | Status: SHIPPED | OUTPATIENT
Start: 2022-11-15

## 2022-11-15 RX ORDER — IBUPROFEN 600 MG/1
600 TABLET ORAL
Qty: 20 TABLET | Refills: 0 | Status: SHIPPED | OUTPATIENT
Start: 2022-11-15

## 2022-11-15 NOTE — DISCHARGE INSTRUCTIONS
You were seen in the emergency room for symptoms consistent with sciatica. Because of your surgery, we do not want to put you on medications that can affect your healing. Use the medications as prescribed and consider changing your gabapentin from 400 mg once a day to 200 mg (2 tablets) twice a day and see if that better helps her symptoms.

## 2022-11-15 NOTE — ED PROVIDER NOTES
EMERGENCY DEPARTMENT HISTORY AND PHYSICAL EXAM          Date: 11/15/2022  Patient Name: Shelia Juares    History of Presenting Illness     Chief Complaint   Patient presents with    Leg Pain       History Provided By: Patient    HPI: Shelia Juares is a 78 y.o. female, pmhx hypertension, high cholesterol, osteoarthritis with chronic pain, diabetes, gout,  who presents via private auto to the ED c/o leg pain    She explains she had carpal tunnel surgery earlier this month. She states the next day she woke up with pain going down her right leg. She states it starts in her right low back and buttock and radiates all the way down her leg. She is not having any weakness or numbness denies any swelling. She has tried taking her gabapentin as well as her regular medications but is not relieve her symptoms. She is scheduled to have her right carpal tunnel repair tomorrow and the surgeon requested to come to the ER to rule out blood clot. PCP: Fadia Simpson MD    Allergies: Keflex and lisinopril as well as Norco    There are no other complaints, changes, or physical findings at this time. Current Outpatient Medications   Medication Sig Dispense Refill    cyclobenzaprine (FLEXERIL) 10 mg tablet Take 1 Tablet by mouth three (3) times daily as needed for Muscle Spasm(s). 12 Tablet 0    ibuprofen (MOTRIN) 600 mg tablet Take 1 Tablet by mouth every six (6) hours as needed for Pain. 20 Tablet 0    insulin aspart protamine/insulin aspart (NOVOLOG MIX 70/30) 100 unit/mL (70-30) inpn 16 Units by SubCUTAneous route ACB/HS. 10 Pen 1    ondansetron (ZOFRAN ODT) 4 mg disintegrating tablet Take 4 mg by mouth three (3) times daily as needed. amitriptyline (ELAVIL) 50 mg tablet Take 1 Tablet by mouth nightly. 90 Tablet 3    semaglutide (OZEMPIC) 0.25 mg or 0.5 mg/dose (2 mg/1.5 ml) subq pen 0.5 mg by SubCUTAneous route every seven (7) days. 1 Box 3    colchicine 0.6 mg tablet Take 1 Tablet by mouth daily.  As needed for gout 30 Tablet 2    allopurinoL (ZYLOPRIM) 100 mg tablet Take 1 tablet by mouth once daily 90 Tablet 0    gabapentin (NEURONTIN) 100 mg capsule 1 to 2 tablets 3 times daily as needed headache  Indications: neuropathic pain 180 Capsule 1    olopatadine (PATANOL) 0.1 % ophthalmic solution Administer 2 Drops to both eyes two (2) times a day. (Patient not taking: Reported on 11/7/2022) 1 Each 5    cetirizine (ZYRTEC) 10 mg tablet Take 1 Tablet by mouth daily. Indications: inflammation of the nose due to an allergy 90 Tablet 3    omeprazole (PRILOSEC) 20 mg capsule Take 1 Capsule by mouth daily. 90 Capsule 3    losartan (COZAAR) 25 mg tablet Take 1 Tablet by mouth daily. 90 Tablet 1    fenofibrate (LOFIBRA) 160 mg tablet Take 1 Tablet by mouth daily. 90 Tablet 1    rosuvastatin (CRESTOR) 40 mg tablet Take 1 Tablet by mouth nightly. 90 Tablet 1    glucose blood VI test strips (blood glucose test) strip Up to 3 times daily 100 Strip 3    metFORMIN (GLUCOPHAGE) 500 mg tablet Take 1 Tablet by mouth two (2) times daily (with meals). 180 Tablet 3    Blood-Glucose Meter monitoring kit Up to 3 times daily 1 Kit 0    ascorbic acid, vitamin C, (VITAMIN C) 500 mg tablet Take  by mouth.  (Patient not taking: Reported on 11/7/2022)         Past History     Past Medical History:  Past Medical History:   Diagnosis Date    Asthma     PATIENT DENIES    Bone spur of ankle 3/30/12    right ankle    Chronic pain     DM (diabetes mellitus) (HCC)     GERD (gastroesophageal reflux disease)     Gout     Hypercholesterolemia     Hypertension     OA (osteoarthritis) of knee 3/30/12    right knee    Sleep apnea     Unilateral AKA ([address] 1993    left       Past Surgical History:  Past Surgical History:   Procedure Laterality Date    HX ABOVE KNEE AMPUTATION  1994    HX CHOLECYSTECTOMY  1988    HX CYST INCISION AND DRAINAGE Right     HX DILATION AND CURETTAGE  01/02/2018    HX KNEE REPLACEMENT Right 02/13/2018    HX PELVIC FRACTURE [facility] HX TUBAL LIGATION  1986       Family History:  Family History   Problem Relation Age of Onset    Breast Cancer Mother         dx age 52's    Diabetes Father     Alzheimer's Disease Maternal Aunt     Dementia Maternal Aunt     Breast Cancer Maternal Aunt     Dementia Maternal Aunt     Alzheimer's Disease Maternal Aunt     Breast Cancer Maternal Aunt     Alzheimer's Disease Maternal Aunt     Dementia Maternal Aunt     Breast Cancer Maternal Aunt     Dementia Maternal Aunt     Alzheimer's Disease Maternal Aunt     Breast Cancer Maternal Aunt     Breast Cancer Sister         dx age 48    Breast Cancer Maternal Grandmother     Other Brother         JOB ACCIDENT    Anesth Problems Neg Hx        Social History:  Social History     Tobacco Use    Smoking status: Former     Packs/day: 0.50     Years: 15.00     Pack years: 7.50     Types: Cigarettes     Quit date: 3/30/2009     Years since quittin.6    Smokeless tobacco: Never   Vaping Use    Vaping Use: Never used   Substance Use Topics    Alcohol use: No     Alcohol/week: 0.0 standard drinks    Drug use: No       Allergies: Allergies   Allergen Reactions    Hydrocodone-Acetaminophen Hives, Itching and Nausea Only     Patient states she can take tylenol #3 without problem. Keflex [Cephalexin] Itching    Lisinopril Cough         Review of Systems   Review of Systems   Constitutional:  Negative for activity change, appetite change, chills, fever and unexpected weight change. HENT:  Negative for congestion. Eyes:  Negative for pain and visual disturbance. Respiratory:  Negative for cough and shortness of breath. Cardiovascular:  Negative for chest pain. Gastrointestinal:  Negative for abdominal pain, diarrhea, nausea and vomiting. Genitourinary:  Negative for dysuria. Musculoskeletal:  Positive for back pain and gait problem. Skin:  Negative for rash. Neurological:  Negative for headaches.      Physical Exam   Physical Exam  Vitals and nursing note reviewed. Constitutional:       Appearance: She is well-developed. She is not diaphoretic. Comments: Chronically ill-appearing middle-aged female, awake and alert with slightly elevated blood pressure sitting comfortably in a chair   HENT:      Head: Normocephalic and atraumatic. Eyes:      General:         Right eye: No discharge. Left eye: No discharge. Conjunctiva/sclera: Conjunctivae normal.      Pupils: Pupils are equal, round, and reactive to light. Cardiovascular:      Rate and Rhythm: Normal rate and regular rhythm. Heart sounds: Normal heart sounds. No murmur heard. Pulmonary:      Effort: Pulmonary effort is normal. No respiratory distress. Breath sounds: Normal breath sounds. No wheezing or rales. Abdominal:      General: Bowel sounds are normal. There is no distension. Palpations: Abdomen is soft. Tenderness: There is no abdominal tenderness. Musculoskeletal:         General: Normal range of motion. Cervical back: Normal range of motion and neck supple. Skin:     General: Skin is warm and dry. Findings: No rash. Neurological:      Mental Status: She is alert and oriented to person, place, and time. Cranial Nerves: No cranial nerve deficit. Motor: No abnormal muscle tone. Comments: Ambulates with a limp using her cane. No evidence of dorsiflexion weakness bilaterally. Sensation appears grossly intact     Diagnostic Study Results     Labs -   No results found for this or any previous visit (from the past 12 hour(s)). Radiologic Studies -   DUPLEX LOWER EXT VENOUS RIGHT   Final Result        CT Results  (Last 48 hours)      None          CXR Results  (Last 48 hours)      None              Medical Decision Making   I am the first provider for this patient. I reviewed the vital signs, available nursing notes, past medical history, past surgical history, family history and social history.     Vital Signs-Reviewed the patient's vital signs. Patient Vitals for the past 12 hrs:   Temp Pulse Resp BP SpO2   11/15/22 1141 97.9 °F (36.6 °C) 96 18 (!) 157/96 99 %       Pulse Oximetry Analysis - 99% on RA    Records Reviewed: Nursing Notes and Old Medical Records    Provider Notes (Medical Decision Making):   MDM: Middle-aged female with multiple medical problems presenting with slightly elevated blood pressure, slightly elevated heart rate with complaint of right leg pain. Exam and symptoms are consistent with sciatica. She has no evidence of venous or arterial insufficiency or thrombus. However, she was sent by the general surgeons office for rule out of DVT. Discussed with patient options for symptom management as she is not taking her gabapentin as prescribed or documented by her primary care physician. In her scheduled surgery tomorrow we can provide her muscle relaxant today but I recommend she follow-up with primary care postop surgery as they are not can want us to start her on prednisone today. ED Course:   Initial assessment performed. The patients presenting problems have been discussed, and they are in agreement with the care plan formulated and outlined with them. I have encouraged them to ask questions as they arise throughout their visit. Discharge note:  1:50pm  Pt re-evaluated, appears reassured, ready for discharge. Updated pt on all final ultrasound findings. Will follow up as instructed in primary care postop to discuss physical therapy and further management for sciatica. All questions have been answered, pt voiced understanding and agreement with plan. Specific return precautions provided as well as instructions to return to the ED should sx worsen at any time. Vital signs stable for discharge. Critical Care Time:   0      Diagnosis     Clinical Impression:    Sciatica of right side                PLAN:  1.    Discharge Medication List as of 11/15/2022  1:46 PM        START taking these medications Details   cyclobenzaprine (FLEXERIL) 10 mg tablet Take 1 Tablet by mouth three (3) times daily as needed for Muscle Spasm(s). , Normal, Disp-12 Tablet, R-0      ibuprofen (MOTRIN) 600 mg tablet Take 1 Tablet by mouth every six (6) hours as needed for Pain., Normal, Disp-20 Tablet, R-0           CONTINUE these medications which have CHANGED    Details   insulin aspart protamine/insulin aspart (NOVOLOG MIX 70/30) 100 unit/mL (70-30) inpn 16 Units by SubCUTAneous route ACB/HS., Normal, Disp-10 Pen, R-1           CONTINUE these medications which have NOT CHANGED    Details   ondansetron (ZOFRAN ODT) 4 mg disintegrating tablet Take 4 mg by mouth three (3) times daily as needed., Historical Med      amitriptyline (ELAVIL) 50 mg tablet Take 1 Tablet by mouth nightly., Normal, Disp-90 Tablet, R-3      semaglutide (OZEMPIC) 0.25 mg or 0.5 mg/dose (2 mg/1.5 ml) subq pen 0.5 mg by SubCUTAneous route every seven (7) days. , Normal, Disp-1 Box, R-3      colchicine 0.6 mg tablet Take 1 Tablet by mouth daily. As needed for gout, Normal, Disp-30 Tablet, R-2      allopurinoL (ZYLOPRIM) 100 mg tablet Take 1 tablet by mouth once daily, Normal, Disp-90 Tablet, R-0      gabapentin (NEURONTIN) 100 mg capsule 1 to 2 tablets 3 times daily as needed headache  Indications: neuropathic pain, Normal, Disp-180 Capsule, R-1      olopatadine (PATANOL) 0.1 % ophthalmic solution Administer 2 Drops to both eyes two (2) times a day., Normal, Disp-1 Each, R-5      cetirizine (ZYRTEC) 10 mg tablet Take 1 Tablet by mouth daily. Indications: inflammation of the nose due to an allergy, Normal, Disp-90 Tablet, R-3      omeprazole (PRILOSEC) 20 mg capsule Take 1 Capsule by mouth daily. , Normal, Disp-90 Capsule, R-3      losartan (COZAAR) 25 mg tablet Take 1 Tablet by mouth daily. , Normal, Disp-90 Tablet, R-1      fenofibrate (LOFIBRA) 160 mg tablet Take 1 Tablet by mouth daily. , Normal, Disp-90 Tablet, R-1      rosuvastatin (CRESTOR) 40 mg tablet Take 1 Tablet by mouth nightly., Normal, Disp-90 Tablet, R-1      glucose blood VI test strips (blood glucose test) strip Up to 3 times daily, Normal, Disp-100 Strip, R-3accu chek      metFORMIN (GLUCOPHAGE) 500 mg tablet Take 1 Tablet by mouth two (2) times daily (with meals). , Normal, Disp-180 Tablet, R-3      Blood-Glucose Meter monitoring kit Up to 3 times daily, Normal, Disp-1 Kit, R-0Which ever Ins will cover      ascorbic acid, vitamin C, (VITAMIN C) 500 mg tablet Take  by mouth., Historical Med           2. Follow-up Information       Follow up With Specialties Details Why Contact Info    Jamilah Carpio MD Family Medicine Call  for physical therapy referral and symptom recheck after surgery [address] [facility],[address] [place] [address] Emergency Medicine  If symptoms worsen with leg weakness or trouble urinating [address] [phone]          Return to ED if worse     Disposition:  Home       Please note, this dictation was completed with [employer], the computer voice recognition software. Quite often unanticipated grammatical, syntax, homophones, and other interpretive errors are inadvertently transcribed by the computer software. Please disregard these errors. Please excuse any errors that have escaped final proof reading.

## 2022-11-15 NOTE — ED TRIAGE NOTES
Pt arrives with c/o right leg pain. She reports recent sx to left hand, and is d/t have sx on right hand tomorrow. Spoke with surgeon this morning in regards to leg pain and dr requested imaging to rule out blood clot. Pt reports pain in left from shin to back of thigh.

## 2022-11-16 DIAGNOSIS — E11.65 TYPE 2 DIABETES MELLITUS WITH HYPERGLYCEMIA, WITH LONG-TERM CURRENT USE OF INSULIN (HCC): ICD-10-CM

## 2022-11-16 DIAGNOSIS — Z79.4 TYPE 2 DIABETES MELLITUS WITH HYPERGLYCEMIA, WITH LONG-TERM CURRENT USE OF INSULIN (HCC): ICD-10-CM

## 2022-11-17 RX ORDER — INSULIN ASPART 100 [IU]/ML
16 INJECTION, SUSPENSION SUBCUTANEOUS
Qty: 10 PEN | Refills: 1 | Status: SHIPPED | OUTPATIENT
Start: 2022-11-17

## 2022-11-18 ENCOUNTER — TELEPHONE (OUTPATIENT)
Dept: INTERNAL MEDICINE CLINIC | Age: 67
End: 2022-11-18

## 2022-12-12 DIAGNOSIS — G56.03 BILATERAL CARPAL TUNNEL SYNDROME: ICD-10-CM

## 2022-12-12 DIAGNOSIS — R80.9 DIABETES MELLITUS WITH PROTEINURIA (HCC): Primary | ICD-10-CM

## 2022-12-12 DIAGNOSIS — E11.29 DIABETES MELLITUS WITH PROTEINURIA (HCC): Primary | ICD-10-CM

## 2022-12-12 DIAGNOSIS — G54.6 PHANTOM LIMB PAIN (HCC): ICD-10-CM

## 2022-12-15 RX ORDER — TIRZEPATIDE 2.5 MG/.5ML
2.5 INJECTION, SOLUTION SUBCUTANEOUS
Qty: 5 EACH | Refills: 2 | Status: SHIPPED | OUTPATIENT
Start: 2022-12-15

## 2022-12-16 RX ORDER — GABAPENTIN 100 MG/1
CAPSULE ORAL
Qty: 180 CAPSULE | Refills: 1 | Status: SHIPPED | OUTPATIENT
Start: 2022-12-16

## 2023-01-28 LAB
BUN SERPL-MCNC: 14 MG/DL (ref 8–27)
BUN/CREAT SERPL: 11 (ref 12–28)
CALCIUM SERPL-MCNC: 9.8 MG/DL (ref 8.7–10.3)
CHLORIDE SERPL-SCNC: 103 MMOL/L (ref 96–106)
CO2 SERPL-SCNC: 21 MMOL/L (ref 20–29)
CREAT SERPL-MCNC: 1.33 MG/DL (ref 0.57–1)
EGFR: 44 ML/MIN/1.73
EST. AVERAGE GLUCOSE BLD GHB EST-MCNC: 192 MG/DL
GLUCOSE SERPL-MCNC: 141 MG/DL (ref 70–99)
HBA1C MFR BLD: 8.3 % (ref 4.8–5.6)
INTERPRETATION: NORMAL
POTASSIUM SERPL-SCNC: 4.3 MMOL/L (ref 3.5–5.2)
SODIUM SERPL-SCNC: 141 MMOL/L (ref 134–144)
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
## 2023-02-06 ENCOUNTER — OFFICE VISIT (OUTPATIENT)
Dept: INTERNAL MEDICINE CLINIC | Age: 68
End: 2023-02-06
Payer: COMMERCIAL

## 2023-02-06 VITALS
TEMPERATURE: 98.6 F | WEIGHT: 184 LBS | DIASTOLIC BLOOD PRESSURE: 90 MMHG | BODY MASS INDEX: 32.59 KG/M2 | RESPIRATION RATE: 16 BRPM | OXYGEN SATURATION: 98 % | HEART RATE: 95 BPM | SYSTOLIC BLOOD PRESSURE: 145 MMHG

## 2023-02-06 DIAGNOSIS — F33.0 MAJOR DEPRESSIVE DISORDER, RECURRENT, MILD (HCC): ICD-10-CM

## 2023-02-06 DIAGNOSIS — Z12.11 SCREENING FOR COLON CANCER: ICD-10-CM

## 2023-02-06 DIAGNOSIS — G54.6 PHANTOM LIMB PAIN (HCC): ICD-10-CM

## 2023-02-06 DIAGNOSIS — N18.31 STAGE 3A CHRONIC KIDNEY DISEASE (HCC): ICD-10-CM

## 2023-02-06 DIAGNOSIS — E78.1 HYPERTRIGLYCERIDEMIA, FAMILIAL: ICD-10-CM

## 2023-02-06 DIAGNOSIS — R80.9 DIABETES MELLITUS WITH PROTEINURIA (HCC): Primary | ICD-10-CM

## 2023-02-06 DIAGNOSIS — E11.29 DIABETES MELLITUS WITH PROTEINURIA (HCC): Primary | ICD-10-CM

## 2023-02-06 PROCEDURE — 99214 OFFICE O/P EST MOD 30 MIN: CPT | Performed by: FAMILY MEDICINE

## 2023-02-06 RX ORDER — METFORMIN HYDROCHLORIDE 500 MG/1
500 TABLET ORAL 2 TIMES DAILY WITH MEALS
Qty: 180 TABLET | Refills: 3 | Status: SHIPPED | OUTPATIENT
Start: 2023-02-06

## 2023-02-06 RX ORDER — ACETAMINOPHEN AND CODEINE PHOSPHATE 300; 30 MG/1; MG/1
TABLET ORAL
COMMUNITY
Start: 2022-11-16 | End: 2023-02-06 | Stop reason: ALTCHOICE

## 2023-02-06 RX ORDER — LOSARTAN POTASSIUM 25 MG/1
25 TABLET ORAL DAILY
Qty: 90 TABLET | Refills: 3 | Status: SHIPPED | OUTPATIENT
Start: 2023-02-06

## 2023-02-06 RX ORDER — ROSUVASTATIN CALCIUM 40 MG/1
40 TABLET, COATED ORAL
Qty: 90 TABLET | Refills: 3 | Status: SHIPPED | OUTPATIENT
Start: 2023-02-06

## 2023-02-06 RX ORDER — PREGABALIN 50 MG/1
50 CAPSULE ORAL 3 TIMES DAILY
Qty: 90 CAPSULE | Refills: 0 | Status: SHIPPED | OUTPATIENT
Start: 2023-02-06

## 2023-02-06 RX ORDER — FENOFIBRATE 160 MG/1
160 TABLET ORAL DAILY
Qty: 90 TABLET | Refills: 3 | Status: SHIPPED | OUTPATIENT
Start: 2023-02-06

## 2023-02-06 RX ORDER — AMITRIPTYLINE HYDROCHLORIDE 50 MG/1
100 TABLET, FILM COATED ORAL
Qty: 180 TABLET | Refills: 3 | Status: SHIPPED | OUTPATIENT
Start: 2023-02-06

## 2023-02-06 NOTE — PROGRESS NOTES
Chief Complaint   Patient presents with    Diabetes    Wrist Pain     R/wrist pain 10/10     Patient has not been out of the country in (14 months), NO diarrhea, NO cough, NO chest conjestion, NO temp. Pt has not been around anyone with these symptoms. Health Maintenance reviewed. I have reviewed the patient's medical history in detail and updated the computerized patient record. 1. \"Have you been to the ER, urgent care clinic since your last visit? No  Hospitalized since your last visit? \" no    2. \"Have you seen or consulted any other health care providers outside of the [address] [place] [address] since your last visit? \"  no    3. For patients aged 39-70: Has the patient had a colonoscopy / FIT/ Cologuard? no    If the patient is female:    4. For patients aged 41-77: Has the patient had a mammogram within the past 2 years? no      5. For patients aged 21-65: Has the patient had a pap smear?  no    Encouraged pt to discuss pt's wishes with spouse/partner/family and bring them in the next appt to follow thru with the Advanced Directive    @  Fall Risk Assessment, last 12 mths 6/29/2022   Able to walk? Yes   Fall in past 12 months? 1   Do you feel unsteady? 0   Are you worried about falling 0   Is the gait abnormal? 1   Number of falls in past 12 months 1   Fall with injury? 0       3 most recent PHQ Screens 11/15/2022   Little interest or pleasure in doing things Not at all   Feeling down, depressed, irritable, or hopeless Not at all   Total Score PHQ 2 0       Abuse Screening Questionnaire 6/29/2022   Do you ever feel afraid of your partner? N   Are you in a relationship with someone who physically or mentally threatens you? N   Is it safe for you to go home?  Y       ADL Assessment 6/29/2022   Feeding yourself No Help Needed   Getting from bed to chair Help Needed   Getting dressed No Help Needed   Bathing or showering No Help Needed   Walk across the room (includes cane/walker) Help Needed   Using the telphone No Help Needed   Taking your medications No Help Needed   Preparing meals No Help Needed   Managing money (expenses/bills) No Help Needed   Moderately strenuous housework (laundry) No Help Needed   Shopping for personal items (toiletries/medicines) No Help Needed   Shopping for groceries No Help Needed   Driving No Help Needed   Climbing a flight of stairs Help Needed   Getting to places beyond walking distances No Help Needed

## 2023-02-22 ENCOUNTER — CLINICAL SUPPORT (OUTPATIENT)
Dept: DIABETES SERVICES | Age: 68
End: 2023-02-22
Payer: COMMERCIAL

## 2023-02-22 DIAGNOSIS — E11.29 TYPE 2 DIABETES MELLITUS WITH PROTEINURIA (HCC): Primary | ICD-10-CM

## 2023-02-22 DIAGNOSIS — R80.9 TYPE 2 DIABETES MELLITUS WITH PROTEINURIA (HCC): Primary | ICD-10-CM

## 2023-02-22 PROCEDURE — G0108 DIAB MANAGE TRN  PER INDIV: HCPCS

## 2023-02-22 NOTE — PROGRESS NOTES
[facility] Diabetes [facility]  Diabetes Self-Management Education & Support Program  Encounter Note    SUMMARY  Diabetes self-care management training was completed related to [facility] annual follow up. The participant will return on  to continue DSMES related to [facility] annual follow up. The participant  will practice knowledge and skills related to healthy eating and monitoring and being active and medications to improve diabetes self-management. EVALUATION:  Ms. Noble Cordero reported receiving DSMES in 2018. She is able to verbalize her type of diabetes, stated she has had diabetes for about 6 years, and can describe diabetes in basic terms. She reported a family history of diabetes. She understands and can verbalize her A1c test result. Last A1c 8.3 on 2023. She stated that she has experienced blurry vision and has an eye exam scheduled for 2023. She is able to recognize carbohydrates, proteins, and fats and can verbalize reading a food label. She stated that she delays eating until mid-morning most days. Encouraged participant not to delay and/or skip meals, use non-starchy vegetables for snacks, and avoid sugary beverages. She is monitoring and logging BG results 1 time daily. Reported fasting B, 206, 221, 227, 214, 163, 162 mg/dL. Ms. Noble Cordero stated she had symptoms of hypoglycemia this morning including feeling jittery and sleepy, after taking insulin but not eating breakfast, which she treated with sweetened coffee from [employer]. Discussed hypoglycemia protocols and Rule of 15. Reported taking metformin 500 mg 1 tab twice daily with meals, Mounjaro injection every  morning, and Novolog 70/30 insulin 16 units twice daily in the morning and evening. Participant stated she does not miss taking her diabetes medications and denied any side effects from her diabetes medications.  Participant stated that she has a sedentary lifestyle, has lower left limb amputation with prosthetic and ambulates with cane as assistive device. Discussed options to increase physical activity by sitting less and moving more and by practicing chair exercises using resistance bands. RECOMMENDATIONS:  Follow health plate guidelines for most meals, most days. Don't delay or skip meals. Use non-starchy vegetables for snacks and avoid sugary beverages. May benefit from MNT. Continue to monitor and record BG and share results with provider. May benefit from CGM technology. Take all diabetes medications as prescribed. Rotate injection sites. Don't delay or skip insulin dosages. Increase physical activity by sitting less and moving more. Practice chair exercises. May benefit from PT.    TOPICS DISCUSSED TODAY:  What can I eat? 15  How can BG monitoring help me? 15  How do my diabetes medications work? 15  How does physical activity affect my diabetes? 15    Next provider visit is scheduled for 3/6/2023  Diabetes educational materials provided to participant. DATE DSMES TOPIC EVALUATION     2/22/2023 WHAT CAN I EAT? General principles   Determining a healthy weight   Nutritional terms & tools   Healthy Plate method   Carbohydrate Counting   Reading food labels   Free apps   Pregnancy recommendations      The participant   Uses Healthy Plate principles in constructing meals: Yes  Reads food labels in choosing acceptable foods: Yes    The participant needs to address: Follow healthy eating recommendations most meals, most days. Don't delay or skip meals. Use non-starchy vegetables for snacks. Avoid sugary beverages. May benefit from MNT. DATE DSMES TOPIC EVALUATION     2/22/2023 HOW CAN BLOOD GLUCOSE MONITORING HELP ME?    Value of blood glucose monitoring   Realistic expectations   Blood glucose monitoring targets   Target adjustments   Setting a1c & blood glucose targets with provider   Meter selection    Technique for obtaining blood droplet   Blood glucose testing sites   Determining best times to test Pregnancy recommendations   Data sharing with provider        The participant   Can demonstrate their glucometer procedure: Yes  Logs their BG readings:  Yes    The participant needs to address:  Continue to monitor and record BG results to share with provider. May benefit from CGM technology. DATE DSMES TOPIC EVALUATION     2/22/2023 HOW DO MY DIABETES MEDICATIONS WORK? Type 2 medications   Oral agents   GLP-1 agonists     General guidance regarding insulin use   Storage of insulin   Insulin injection techniques  Rotating injection sites   Traveling with medications   Hypoglycemia Protocols/Rule of 15     The participant   Can describe the expected action & side effects of prescribed diabetes medications: Yes  Can demonstrate injection technique (if applicable): Yes    The participant needs to address: Take all diabetes medications as prescribed. Practice proper injection techniques. Rotate injection sites. Store unopened insulin in refrigerator. Don't inject cold insulin. [facility]     2/22/2023 HOW DOES PHYSICAL ACTIVITY AFFECT MY DIABETES? Benefits of physical activity   Beginning a program of physical activity   Sitting less and moving more  Chair Exercises with resistance bands     Safety issues   Barriers to physical activity          The participant has established a regular physical activity plan: No    The participant needs to address:  Increase physical activity by sitting less and moving more. Practice chair exercises using resistance bands. May benefit from PT. Aleksey Ball RN, [facility] on 2/22/2023 at 10:03 AM    I have personally reviewed the health record, including provider notes, laboratory data and current medications before making these care and education recommendations. The time spent in this effort is included in the total time.   Total minutes: 75

## 2023-03-01 DIAGNOSIS — S78.112S TRAUMATIC ABOVE-KNEE AMPUTATION OF LEFT LOWER EXTREMITY, SEQUELA (HCC): Primary | ICD-10-CM
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
## 2023-03-05 NOTE — PATIENT INSTRUCTIONS
Medicare Wellness Visit, Female     The best way to live healthy is to have a lifestyle where you eat a well-balanced diet, exercise regularly, limit alcohol use, and quit all forms of tobacco/nicotine, if applicable. Regular preventive services are another way to keep healthy. Preventive services (vaccines, screening tests, monitoring & exams) can help personalize your care plan, which helps you manage your own care. Screening tests can find health problems at the earliest stages, when they are easiest to treat. [facility] follows the current, evidence-based guidelines published by the [facility] ([facility]) when recommending preventive services for our patients. Because we follow these guidelines, sometimes recommendations change over time as research supports it. (For example, mammograms used to be recommended annually. Even though Medicare will still pay for an annual mammogram, the newer guidelines recommend a mammogram every two years for women of average risk). Of course, you and your doctor may decide to screen more often for some diseases, based on your risk and your co-morbidities (chronic disease you are already diagnosed with). Preventive services for you include:  - Medicare offers their members a free annual wellness visit, which is time for you and your primary care provider to discuss and plan for your preventive service needs.  Take advantage of this benefit every year!    -Over the age of 72 should receive the recommended pneumonia vaccines.    -All adults should have a flu vaccine yearly.  -All adults should have a tetanus vaccine every 10 years.   -Over the age 48 should receive the shingles vaccines.        -All adults should be screened once for Hepatitis C.  -All adults age 38-68 who are overweight should have a diabetes screening test once every three years.   -Other screening tests and preventive services for persons with diabetes include: an eye exam to screen for diabetic retinopathy, a kidney function test, a foot exam, and stricter control over your cholesterol.   -Cardiovascular screening for adults with routine risk involves an electrocardiogram (ECG) at intervals determined by your doctor.     -Colorectal cancer screenings should be done for adults age 39-70 with no increased risk factors for colorectal cancer. There are a number of acceptable methods of screening for this type of cancer. Each test has its own benefits and drawbacks. Discuss with your doctor what is most appropriate for you during your annual wellness visit. The different tests include: colonoscopy (considered the best screening method), a fecal occult blood test, a fecal DNA test, and sigmoidoscopy.    -Lung cancer screening is recommended annually with a low dose CT scan for adults between age 54 and 68, who have smoked at least 30 pack years (equivalent of 1 pack per day for 30 days), and who is a current smoker or quit less than 15 years ago.    -A bone mass density test is recommended when a woman turns 65 to screen for osteoporosis. This test is only recommended one time, as a screening. Some providers will use this same test as a disease monitoring tool if you already have osteoporosis. -Breast cancer screenings are recommended every other year for women of normal risk, age 54-69.    -Cervical cancer screenings for women over age 72 are only recommended with certain risk factors.      Here is a list of your current Health Maintenance items (your personalized list of preventive services) with a due date:  Health Maintenance Due   Topic Date Due    Shingles Vaccine (2 of 2) 01/29/2021    Yearly Flu Vaccine (1) 08/01/2022    COVID-19 Vaccine (5 - Booster for Moderna series) 08/18/2022    URINE CHECK FOR KIDNEY PROBLEMS  10/08/2022    Colorectal Screening  10/12/2022    Pneumococcal Vaccine (3 - PPSV23 if available, else PCV20) 01/23/2023    Annual Well Visit  02/17/2023    Eye Exam  03/03/2023

## 2023-03-06 ENCOUNTER — OFFICE VISIT (OUTPATIENT)
Dept: INTERNAL MEDICINE CLINIC | Age: 68
End: 2023-03-06
Payer: COMMERCIAL

## 2023-03-06 VITALS
BODY MASS INDEX: 34.04 KG/M2 | HEIGHT: 62 IN | OXYGEN SATURATION: 98 % | TEMPERATURE: 98.6 F | RESPIRATION RATE: 18 BRPM | SYSTOLIC BLOOD PRESSURE: 139 MMHG | DIASTOLIC BLOOD PRESSURE: 88 MMHG | HEART RATE: 105 BPM | WEIGHT: 185 LBS

## 2023-03-06 DIAGNOSIS — R80.9 DIABETES MELLITUS WITH PROTEINURIA (HCC): ICD-10-CM

## 2023-03-06 DIAGNOSIS — E11.65 TYPE 2 DIABETES MELLITUS WITH HYPERGLYCEMIA, WITH LONG-TERM CURRENT USE OF INSULIN (HCC): ICD-10-CM

## 2023-03-06 DIAGNOSIS — Z13.39 SCREENING FOR ALCOHOLISM: ICD-10-CM

## 2023-03-06 DIAGNOSIS — Z13.6 SCREENING FOR ISCHEMIC HEART DISEASE: ICD-10-CM

## 2023-03-06 DIAGNOSIS — M19.90 ARTHRITIS: ICD-10-CM

## 2023-03-06 DIAGNOSIS — E11.40 TYPE 2 DIABETES MELLITUS WITH DIABETIC NEUROPATHY, WITH LONG-TERM CURRENT USE OF INSULIN (HCC): ICD-10-CM

## 2023-03-06 DIAGNOSIS — Z79.4 TYPE 2 DIABETES MELLITUS WITH HYPERGLYCEMIA, WITH LONG-TERM CURRENT USE OF INSULIN (HCC): ICD-10-CM

## 2023-03-06 DIAGNOSIS — G62.9 NEUROPATHY: ICD-10-CM

## 2023-03-06 DIAGNOSIS — Z79.4 TYPE 2 DIABETES MELLITUS WITH DIABETIC NEUROPATHY, WITH LONG-TERM CURRENT USE OF INSULIN (HCC): ICD-10-CM

## 2023-03-06 DIAGNOSIS — E11.29 DIABETES MELLITUS WITH PROTEINURIA (HCC): ICD-10-CM

## 2023-03-06 DIAGNOSIS — M10.00 ACUTE IDIOPATHIC GOUT, UNSPECIFIED SITE: ICD-10-CM

## 2023-03-06 DIAGNOSIS — Z00.00 MEDICARE ANNUAL WELLNESS VISIT, SUBSEQUENT: Primary | ICD-10-CM

## 2023-03-06 PROCEDURE — 99214 OFFICE O/P EST MOD 30 MIN: CPT | Performed by: FAMILY MEDICINE

## 2023-03-06 PROCEDURE — G0439 PPPS, SUBSEQ VISIT: HCPCS | Performed by: FAMILY MEDICINE

## 2023-03-06 RX ORDER — INSULIN ASPART 100 [IU]/ML
16 INJECTION, SUSPENSION SUBCUTANEOUS
Qty: 10 PEN | Refills: 1 | Status: SHIPPED | OUTPATIENT
Start: 2023-03-06

## 2023-03-06 RX ORDER — ALLOPURINOL 100 MG/1
100 TABLET ORAL DAILY
Qty: 90 TABLET | Refills: 3 | Status: SHIPPED | OUTPATIENT
Start: 2023-03-06

## 2023-03-06 RX ORDER — DULOXETIN HYDROCHLORIDE 20 MG/1
20 CAPSULE, DELAYED RELEASE ORAL 2 TIMES DAILY
Qty: 60 CAPSULE | Refills: 2 | Status: SHIPPED | OUTPATIENT
Start: 2023-03-06

## 2023-03-06 RX ORDER — TIRZEPATIDE 2.5 MG/.5ML
2.5 INJECTION, SOLUTION SUBCUTANEOUS
Qty: 4 PEN | Refills: 5 | Status: SHIPPED | OUTPATIENT
Start: 2023-03-06

## 2023-03-06 RX ORDER — IBUPROFEN 200 MG
CAPSULE ORAL
Qty: 100 STRIP | Refills: 3 | Status: SHIPPED | OUTPATIENT
Start: 2023-03-06

## 2023-03-06 NOTE — PROGRESS NOTES
Chief Complaint   Patient presents with    Annual Wellness Visit    Leg Pain     Lower R/leg pain off and on every day, keeps pt from sleeping     Clock Draw Test Done     Patient has not been out of the country in (14 months), NO diarrhea, NO cough, NO chest conjestion, NO temp. Pt has not been around anyone with these symptoms. Health Maintenance reviewed. I have reviewed the patient's medical history in detail and updated the computerized patient record. 1. \"Have you been to the ER, urgent care clinic since your last visit? Yes  Hospitalized since your last visit? \" no    2. \"Have you seen or consulted any other health care providers outside of the [address] [place] [address] since your last visit? \"  no    3. For patients aged 39-70: Has the patient had a colonoscopy / FIT/ Cologuard? no      If the patient is female:    4. For patients aged 41-77: Has the patient had a mammogram within the past 2 years? no      5. For patients aged 21-65: Has the patient had a pap smear? no     Encouraged pt to discuss pt's wishes with spouse/partner/family and bring them in the next appt to follow thru with the Advanced Directive    @  Fall Risk Assessment, last 12 mths 3/6/2023   Able to walk? Yes   Fall in past 12 months? 1   Do you feel unsteady? 1   Are you worried about falling 1   Is the gait abnormal? 1   Number of falls in past 12 months 2   Fall with injury? 1       3 most recent PHQ Screens 3/6/2023   Little interest or pleasure in doing things Several days   Feeling down, depressed, irritable, or hopeless Several days   Total Score PHQ 2 2       Abuse Screening Questionnaire 3/6/2023   Do you ever feel afraid of your partner? N   Are you in a relationship with someone who physically or mentally threatens you? N   Is it safe for you to go home?  Y       ADL Assessment 3/6/2023   Feeding yourself No Help Needed   Getting from bed to chair No Help Needed   Getting dressed No Help Needed   Bathing or showering No Help Needed   Walk across the room (includes cane/walker) (No Data)   Using the telphone No Help Needed   Taking your medications No Help Needed   Preparing meals No Help Needed   Managing money (expenses/bills) No Help Needed   Moderately strenuous housework (laundry) No Help Needed   Shopping for personal items (toiletries/medicines) No Help Needed   Shopping for groceries No Help Needed   Driving No Help Needed   Climbing a flight of stairs No Help Needed   Getting to places beyond walking distances No Help Needed

## 2023-03-07 LAB
CREAT UR-MCNC: 77.8 MG/DL
MICROALBUMIN UR-MCNC: 17.8 MG/DL
MICROALBUMIN/CREAT UR-RTO: 229 MG/G (ref 0–30)

## 2023-03-08 ENCOUNTER — VIRTUAL VISIT (OUTPATIENT)
Dept: DIABETES SERVICES | Age: 68
End: 2023-03-08
Payer: COMMERCIAL

## 2023-03-08 ENCOUNTER — TELEPHONE (OUTPATIENT)
Dept: DIABETES SERVICES | Age: 68
End: 2023-03-08

## 2023-03-08 DIAGNOSIS — R80.9 TYPE 2 DIABETES MELLITUS WITH PROTEINURIA (HCC): Primary | ICD-10-CM

## 2023-03-08 DIAGNOSIS — E11.29 TYPE 2 DIABETES MELLITUS WITH PROTEINURIA (HCC): Primary | ICD-10-CM

## 2023-03-08 PROCEDURE — G0108 DIAB MANAGE TRN  PER INDIV: HCPCS

## 2023-03-08 NOTE — PROGRESS NOTES
[facility]  Diabetes Self-Management Education & Support Program  Encounter Note    SUMMARY  Diabetes self-care management training was completed related to annual diabetes follow-up. Mrs. Ayala Garrett reported receiving DSMES in 2018. The participant will call the office for questions/concerns and/or to schedule appointment as needed in future. Contact information including phone number provided to participant. The participant will practice knowledge and skills related to healthy eating and monitoring and being active and medications to improve diabetes self-management. EVALUATION:  Ms. Ayala Garrett stated she is continuing to follow recommendations for healthy eating. She stated she eats out about once a week and is eating fruit for snacks at bedtime. Discussed healthy options, carb counting, and nutrition menu/label reading for eating in restaurants. Encouraged participant to limit snacks to non-starchy vegetables and to include protein at each meal. She is monitoring fasting BG daily and reported B, 222, 238 mg/dL. She stated she ran out of testing supplies but has been able to get them this week. She stated she is limited with physical activity because of her amputation, but is trying to be more active daily as much as possible. She reported taking all medications as prescribed. She stated she is supported by her  and children in her diabetes care and enjoys watching movies for stress management. She does not currently have an emergency plan/kit but is willing to work on it. RECOMMENDATIONS:  If appropriate benefit from medication adjustment, CGM technology, and PT. Ms. Ayala Garrett is willing to continue to follow recommendations for healthy eating, will continue to monitor and record BG results to share with provider, and continue to take all mediations as prescribed. She is willing to establish an emergency plan/kit.      TOPICS DISCUSSED TODAY:  What can I eat? 30  How do I find support to tackle this condition? 15  How do I figure out what's influencing my blood glucoses? 15    Next provider visit is scheduled for 4/12/2023       DATE [facility] TOPIC EVALUATION     3/8/2023 WHAT CAN I EAT? Nutritional terms & tools   Healthy Plate method   Carbohydrate Counting   Reading food labels   Free apps   Restaurants/Menus       The participant   Uses Healthy Plate principles in constructing meals: Yes  Reads food labels in choosing acceptable foods: Yes    The participant needs to address:   Limit snacks to non-starchy vegetables. Add protein to each meal.      DATE DSMES TOPIC EVALUATION     3/8/2023 HOW DO I FIND SUPPORT TO TACKLE THIS CONDITION? Stress   Symptoms   Managing stress   Fill your tool kit        The participant can identify people that support them in caring for their diabetes health: patient, spouse, and children      The participant needs to address Continue to practice stress management options. DATE [facility] TOPIC EVALUATION     3/8/2023 HOW DO I FIGURE OUT WHAT'S INFLUENCING MY BLOOD GLUCOSES? Disaster preparedness plan        The participant has an action plan for   During disasters: No    The participant needs to address Establish an emergency plan/kit. Ana Morin RN, [facility] on 3/8/2023 at 10:41 AM    I have personally reviewed the health record, including provider notes, laboratory data and current medications before making these care and education recommendations. The time spent in this effort is included in the total time. Total minutes: 76    [id number] Emergency Adaptations for Telehealth:  Latrice Mustafa, was evaluated through a synchronous (real-time) audio-video encounter. The patient (or guardian if applicable) is aware that this is a billable service, which includes applicable co-pays. This Virtual Visit was conducted with patient's (and/or legal guardian's) consent.  The visit was conducted pursuant to the emergency declaration under the [address] [place] [address] and the [employer] Act. Patient identification was verified, and a caregiver was present when appropriate. The patient was located in a state where the provider was licensed to provide care. Nupur Flanagan, was evaluated through a synchronous (real-time) audio-video encounter. The patient (or guardian if applicable) is aware that this is a billable service, which includes applicable co-pays. This Virtual Visit was conducted with patient's (and/or legal guardian's) consent. The visit was conducted pursuant to the emergency declaration under the [address], [address] [place] [address] and the [employer] and [employer] Act. Patient identification was verified, and a caregiver was present when appropriate. The patient was located at: Home:  [facility]  [facility] [phone]  The provider was located at:  Facility (Appt Department): [facility] [id number]

## 2023-03-13 ENCOUNTER — VIRTUAL VISIT (OUTPATIENT)
Dept: INTERNAL MEDICINE CLINIC | Age: 68
End: 2023-03-13

## 2023-03-13 NOTE — PROGRESS NOTES
Chief Complaint   Patient presents with    Referral Request     Patient is aware that this is a Virtual Visit or Phone Call Only doctor's visit. Patient has not been out of the country in (14 months), NO diarrhea, NO cough, NO chest conjestion, NO temp. Pt has not been around anyone with these symptoms. Health Maintenance reviewed. I have reviewed the patient's medical history in detail and updated the computerized patient record. Have you been to the ER, urgent care clinic since your last visit? No Hospitalized since your last visit?  no    2. Have you seen or consulted any other health care providers outside of the [address] [place] [address] since your last visit? No  Include any pap smears or colon screening. Encouraged pt to discuss pt's wishes with spouse/partner/family and bring them in the next appt to follow thru with the Advanced Directive      Fall Risk Assessment, last 12 mths 3/6/2023   Able to walk? Yes   Fall in past 12 months? 1   Do you feel unsteady? 1   Are you worried about falling 1   Is the gait abnormal? 1   Number of falls in past 12 months 2   Fall with injury? 1       3 most recent PHQ Screens 3/6/2023   Little interest or pleasure in doing things Several days   Feeling down, depressed, irritable, or hopeless Several days   Total Score PHQ 2 2       Abuse Screening Questionnaire 3/6/2023   Do you ever feel afraid of your partner? N   Are you in a relationship with someone who physically or mentally threatens you? N   Is it safe for you to go home?  Y       ADL Assessment 3/6/2023   Feeding yourself No Help Needed   Getting from bed to chair No Help Needed   Getting dressed No Help Needed   Bathing or showering No Help Needed   Walk across the room (includes cane/walker) (No Data)   Using the telphone No Help Needed   Taking your medications No Help Needed   Preparing meals No Help Needed   Managing money (expenses/bills) No Help Needed   Moderately strenuous housework (laundry) No Help Needed   Shopping for personal items (toiletries/medicines) No Help Needed   Shopping for groceries No Help Needed   Driving No Help Needed   Climbing a flight of stairs No Help Needed   Getting to places beyond walking distances No Help Needed

## 2023-03-16 ENCOUNTER — TELEPHONE (OUTPATIENT)
Dept: INTERNAL MEDICINE CLINIC | Age: 68
End: 2023-03-16

## 2023-03-16 NOTE — TELEPHONE ENCOUNTER
Patient calling and said that hangers is waiting on the prescription fr dr Kalpana Pierre for the lining of her prosthetic. She said to be please have this done by Monday, that's when he appt is.     Fax number; [phone]

## 2023-03-20 ENCOUNTER — OFFICE VISIT (OUTPATIENT)
Dept: ORTHOPEDIC SURGERY | Age: 68
End: 2023-03-20
Payer: COMMERCIAL

## 2023-03-20 VITALS — WEIGHT: 180 LBS | HEIGHT: 63 IN | BODY MASS INDEX: 31.89 KG/M2

## 2023-03-20 DIAGNOSIS — M54.16 RIGHT LUMBAR RADICULITIS: ICD-10-CM

## 2023-03-20 DIAGNOSIS — M51.36 DEGENERATIVE DISC DISEASE, LUMBAR: ICD-10-CM

## 2023-03-20 DIAGNOSIS — Z96.651 STATUS POST TOTAL RIGHT KNEE REPLACEMENT: Primary | ICD-10-CM

## 2023-03-20 PROCEDURE — 1123F ACP DISCUSS/DSCN MKR DOCD: CPT | Performed by: ORTHOPAEDIC SURGERY

## 2023-03-20 PROCEDURE — 99213 OFFICE O/P EST LOW 20 MIN: CPT | Performed by: ORTHOPAEDIC SURGERY

## 2023-03-20 RX ORDER — OMEPRAZOLE 20 MG/1
20 CAPSULE, DELAYED RELEASE ORAL DAILY
COMMUNITY

## 2023-03-20 RX ORDER — METHYLPREDNISOLONE 4 MG/1
TABLET ORAL
Qty: 1 DOSE PACK | Refills: 0 | Status: SHIPPED | OUTPATIENT
Start: 2023-03-20

## 2023-03-20 NOTE — LETTER
3/20/2023    Patient: Abel Franco   YOB: 1955   Date of Visit: 3/20/2023     Judy Fox MD  [address]  [facility]    Dear Judy Fox MD,      Thank you for referring Ms. Peri Holly to [facility] for evaluation. My notes for this consultation are attached. If you have questions, please do not hesitate to call me. I look forward to following your patient along with you.       Sincerely,    Svetlana Mata MD
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
## 2023-04-07 ENCOUNTER — OFFICE VISIT (OUTPATIENT)
Dept: SURGERY | Age: 68
End: 2023-04-07

## 2023-04-18 DIAGNOSIS — Z79.4 TYPE 2 DIABETES MELLITUS WITH HYPERGLYCEMIA, WITH LONG-TERM CURRENT USE OF INSULIN (HCC): ICD-10-CM

## 2023-04-18 DIAGNOSIS — E11.65 TYPE 2 DIABETES MELLITUS WITH HYPERGLYCEMIA, WITH LONG-TERM CURRENT USE OF INSULIN (HCC): ICD-10-CM

## 2023-04-18 RX ORDER — INSULIN ASPART 100 [IU]/ML
16 INJECTION, SUSPENSION SUBCUTANEOUS
Qty: 10 PEN | Refills: 1 | Status: SHIPPED | OUTPATIENT
Start: 2023-04-18

## 2023-04-26 ENCOUNTER — OFFICE VISIT (OUTPATIENT)
Dept: INTERNAL MEDICINE CLINIC | Age: 68
End: 2023-04-26

## 2023-04-26 VITALS
RESPIRATION RATE: 18 BRPM | SYSTOLIC BLOOD PRESSURE: 136 MMHG | WEIGHT: 182 LBS | OXYGEN SATURATION: 98 % | HEART RATE: 100 BPM | DIASTOLIC BLOOD PRESSURE: 88 MMHG | HEIGHT: 63 IN | BODY MASS INDEX: 32.25 KG/M2 | TEMPERATURE: 98.6 F

## 2023-04-26 DIAGNOSIS — R80.9 DIABETES MELLITUS WITH PROTEINURIA (HCC): ICD-10-CM

## 2023-04-26 DIAGNOSIS — E78.2 MIXED HYPERLIPIDEMIA: ICD-10-CM

## 2023-04-26 DIAGNOSIS — E11.29 DIABETES MELLITUS WITH PROTEINURIA (HCC): ICD-10-CM

## 2023-04-26 DIAGNOSIS — G54.7 PHANTOM LIMB (HCC): Primary | ICD-10-CM

## 2023-04-26 DIAGNOSIS — I10 ESSENTIAL HYPERTENSION: ICD-10-CM

## 2023-04-26 DIAGNOSIS — E78.1 HYPERTRIGLYCERIDEMIA, FAMILIAL: ICD-10-CM

## 2023-04-26 RX ORDER — ROSUVASTATIN CALCIUM 40 MG/1
40 TABLET, COATED ORAL
Qty: 90 TABLET | Refills: 3 | Status: SHIPPED | OUTPATIENT
Start: 2023-04-26

## 2023-04-26 RX ORDER — MEMANTINE HYDROCHLORIDE 5 MG/1
10 TABLET ORAL
Qty: 60 TABLET | Refills: 1 | Status: SHIPPED | OUTPATIENT
Start: 2023-04-26

## 2023-04-26 RX ORDER — TIRZEPATIDE 5 MG/.5ML
5 INJECTION, SOLUTION SUBCUTANEOUS
Qty: 4 PEN | Refills: 5 | Status: SHIPPED | OUTPATIENT
Start: 2023-04-26

## 2023-04-26 NOTE — PROGRESS NOTES
Subjective:     Bebeto Pozo is a 80 y.o. female seen for follow-up of diabetes. She has had hypoglycemic attacks. .no  Blood sugar control has been ok from 95 to 277    Lab Results   Component Value Date/Time    Hemoglobin A1c 8.5 (H) 04/07/2023 12:00 PM    Hemoglobin A1c 8.3 (H) 01/27/2023 01:33 PM    Hemoglobin A1c 9.4 (H) 06/21/2022 11:03 AM    Hemoglobin A1c, External 9.1 10/12/2022 12:00 AM    Hemoglobin A1c, External 9.4 08/23/2022 12:00 AM    Glucose 239 (H) 04/07/2023 12:00 PM    Glucose (POC) 146 (H) 02/18/2018 11:22 AM    Microalbumin/Creat ratio (mg/g creat) 229 (H) 03/06/2023 10:00 AM    Microalbumin,urine random 17.80 03/06/2023 10:00 AM    LDL, calculated 10 03/24/2022 11:03 AM    Creatinine 1.27 (H) 04/07/2023 12:00 PM       She has diabetes, hypertension, and hyperlipidemia. Bebeto Pozo has the additional concern of had Dm ed and BS better. Some phatom limb pain. Wants T #3, 10/10 pain informed we do not give chronic narcotics for chronic pain but we can try alternative medicine see list.  Gabapentin has not helped. Pain can be severe, topicals unhelpful. Reports taking blood pressure medications without side affects. No complaints of exertional chest pain, excessive shortness of breath or focal weakness. Minimal swelling in lower legs or dizziness with standing. Diet and Lifestyle: follows a diabetic diet regularly, nonsmoker.     Patient Active Problem List    Diagnosis Date Noted    Chronic renal disease, stage III 06/29/2022    Major depressive disorder, recurrent, mild 06/29/2022    Major depressive disorder, recurrent, unspecified 06/29/2022    FH: breast cancer in first degree relative when <48years old 05/03/2022    Genetic testing of female 05/03/2022    Mass of lower inner quadrant of left breast 05/03/2022    Cervical radiculopathy due to degenerative joint disease of spine 03/30/2022    Bilateral carpal tunnel syndrome 03/29/2022    Tachycardia 03/29/2022    CRI (chronic renal insufficiency), stage 3 (moderate) ([facility]) 03/10/2021    Asthma 2020    Gout 2019    Severe obesity ([address] 2018    Diabetic peripheral neuropathy associated with type 2 diabetes mellitus ([address] 2018    Post-menopausal bleeding 2017    Essential hypertension 2017    Diabetes mellitus with proteinuria ([address] 2017    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 Keflex [Cephalexin] Itching    Lisinopril Cough     Past Medical History:   Diagnosis Date    Asthma     PATIENT DENIES    Bone spur of ankle 3/30/12    right ankle    Chronic pain     DM (diabetes mellitus) ([facility])     GERD (gastroesophageal reflux disease)     Gout     Hypercholesterolemia     Hypertension     OA (osteoarthritis) of knee 3/30/12    right knee    Sleep apnea     Unilateral AKA ([address] 1993    left     Social History     Tobacco Use    Smoking status: Former     Packs/day: 0.50     Years: 15.00     Pack years: 7.50     Types: Cigarettes     Quit date: 3/30/2009     Years since quittin.0    Smokeless tobacco: Never   Substance Use Topics    Alcohol use: No     Alcohol/week: 0.0 standard drinks             Review of Systems  Pertinent items are noted in HPI. Objective:     Significant for the following: nad    WD WN female NAD        Assessment/Plan:     Follow-up diabetes stable. Diabetic issues reviewed with her: glycohemoglobin and other lab monitoring discussed. ICD-10-CM ICD-9-CM    1. Phantom limb (HCC)  G54.7 353.6 memantine (NAMENDA) 5 mg tablet      2.  Hypertriglyceridemia, familial  E78.1 272.1 rosuvastatin (CRESTOR) 40 mg tablet      3. Diabetes mellitus with proteinuria (HCC)  E11.29 250.40 tirzepatide (Mounjaro) 5 mg/0.5 mL pnij    R80.9 791.0       4. Mixed hyperlipidemia  E78.2 272.2       5. Essential hypertension  I10 401.9         Orders Placed This Encounter    rosuvastatin (CRESTOR) 40 mg tablet     Sig: Take 1 Tablet by mouth nightly. Dispense:  90 Tablet     Refill:  3    memantine (NAMENDA) 5 mg tablet     Sig: Take 2 Tablets by mouth nightly. 1 in the PM for a week     Dispense:  60 Tablet     Refill:  1    tirzepatide (Mounjaro) 5 mg/0.5 mL pnij     Si mg by SubCUTAneous route every seven (7) days. Dispense:  4 Pen     Refill:  5     Discussed possible side affects, precautions, and drug interactions and possible benefits of the medication(s).     3-month follow-up

## 2023-05-07 ENCOUNTER — HOSPITAL ENCOUNTER (EMERGENCY)
Facility: HOSPITAL | Age: 68
Discharge: HOME | End: 2023-05-07
Attending: EMERGENCY MEDICINE
Payer: COMMERCIAL

## 2023-05-07 VITALS
BODY MASS INDEX: 31.01 KG/M2 | SYSTOLIC BLOOD PRESSURE: 180 MMHG | OXYGEN SATURATION: 98 % | TEMPERATURE: 98.4 F | HEIGHT: 63 IN | RESPIRATION RATE: 14 BRPM | DIASTOLIC BLOOD PRESSURE: 101 MMHG | WEIGHT: 175 LBS | HEART RATE: 100 BPM

## 2023-05-07 DIAGNOSIS — M54.31 SCIATICA OF RIGHT SIDE: Primary | ICD-10-CM

## 2023-05-07 PROCEDURE — 99283 EMERGENCY DEPT VISIT LOW MDM: CPT | Performed by: EMERGENCY MEDICINE

## 2023-05-07 RX ORDER — METHYLPREDNISOLONE 4 MG/1
TABLET ORAL
Qty: 21 TABLET | Refills: 0 | Status: SHIPPED | OUTPATIENT
Start: 2023-05-07 | End: 2023-05-13

## 2023-05-07 ASSESSMENT — PAIN SCALES - GENERAL: PAINLEVEL_OUTOF10: 6

## 2023-05-07 ASSESSMENT — LIFESTYLE VARIABLES: HOW OFTEN DO YOU HAVE A DRINK CONTAINING ALCOHOL: NEVER

## 2023-05-07 ASSESSMENT — PAIN - FUNCTIONAL ASSESSMENT: PAIN_FUNCTIONAL_ASSESSMENT: 0-10

## 2023-05-07 ASSESSMENT — PAIN DESCRIPTION - ORIENTATION: ORIENTATION: LEFT

## 2023-05-07 ASSESSMENT — PAIN DESCRIPTION - LOCATION: LOCATION: FOOT

## 2023-05-10 ENCOUNTER — OFFICE VISIT (OUTPATIENT)
Facility: CLINIC | Age: 68
End: 2023-05-10
Payer: COMMERCIAL

## 2023-05-10 VITALS
RESPIRATION RATE: 18 BRPM | WEIGHT: 175 LBS | OXYGEN SATURATION: 97 % | TEMPERATURE: 98.4 F | HEIGHT: 63 IN | DIASTOLIC BLOOD PRESSURE: 82 MMHG | HEART RATE: 106 BPM | BODY MASS INDEX: 31.01 KG/M2 | SYSTOLIC BLOOD PRESSURE: 139 MMHG

## 2023-05-10 DIAGNOSIS — M54.31 SCIATICA OF RIGHT SIDE: ICD-10-CM

## 2023-05-10 DIAGNOSIS — R80.9 DIABETES MELLITUS WITH PROTEINURIA (HCC): ICD-10-CM

## 2023-05-10 DIAGNOSIS — E11.29 DIABETES MELLITUS WITH PROTEINURIA (HCC): ICD-10-CM

## 2023-05-10 DIAGNOSIS — G54.7 PHANTOM LIMB (HCC): Primary | ICD-10-CM

## 2023-05-10 PROCEDURE — 3052F HG A1C>EQUAL 8.0%<EQUAL 9.0%: CPT | Performed by: FAMILY MEDICINE

## 2023-05-10 PROCEDURE — 3078F DIAST BP <80 MM HG: CPT | Performed by: FAMILY MEDICINE

## 2023-05-10 PROCEDURE — 99214 OFFICE O/P EST MOD 30 MIN: CPT | Performed by: FAMILY MEDICINE

## 2023-05-10 PROCEDURE — 1123F ACP DISCUSS/DSCN MKR DOCD: CPT | Performed by: FAMILY MEDICINE

## 2023-05-10 PROCEDURE — 3074F SYST BP LT 130 MM HG: CPT | Performed by: FAMILY MEDICINE

## 2023-05-10 RX ORDER — MEMANTINE HYDROCHLORIDE 5 MG/1
10 TABLET ORAL
COMMUNITY
Start: 2023-04-26 | End: 2023-05-10

## 2023-05-10 RX ORDER — DULOXETIN HYDROCHLORIDE 30 MG/1
30 CAPSULE, DELAYED RELEASE ORAL 2 TIMES DAILY
Qty: 60 CAPSULE | Refills: 5 | Status: SHIPPED | OUTPATIENT
Start: 2023-05-10

## 2023-05-10 RX ORDER — MEMANTINE HYDROCHLORIDE 5 MG/1
15 TABLET ORAL
Qty: 90 TABLET | Refills: 5 | Status: SHIPPED | OUTPATIENT
Start: 2023-05-10

## 2023-05-10 ASSESSMENT — PATIENT HEALTH QUESTIONNAIRE - PHQ9
SUM OF ALL RESPONSES TO PHQ QUESTIONS 1-9: 9
2. FEELING DOWN, DEPRESSED OR HOPELESS: 1
3. TROUBLE FALLING OR STAYING ASLEEP: 1
8. MOVING OR SPEAKING SO SLOWLY THAT OTHER PEOPLE COULD HAVE NOTICED. OR THE OPPOSITE, BEING SO FIGETY OR RESTLESS THAT YOU HAVE BEEN MOVING AROUND A LOT MORE THAN USUAL: 0
6. FEELING BAD ABOUT YOURSELF - OR THAT YOU ARE A FAILURE OR HAVE LET YOURSELF OR YOUR FAMILY DOWN: 0
SUM OF ALL RESPONSES TO PHQ QUESTIONS 1-9: 8
4. FEELING TIRED OR HAVING LITTLE ENERGY: 3
9. THOUGHTS THAT YOU WOULD BE BETTER OFF DEAD, OR OF HURTING YOURSELF: 1
10. IF YOU CHECKED OFF ANY PROBLEMS, HOW DIFFICULT HAVE THESE PROBLEMS MADE IT FOR YOU TO DO YOUR WORK, TAKE CARE OF THINGS AT HOME, OR GET ALONG WITH OTHER PEOPLE: 2
7. TROUBLE CONCENTRATING ON THINGS, SUCH AS READING THE NEWSPAPER OR WATCHING TELEVISION: 0
SUM OF ALL RESPONSES TO PHQ QUESTIONS 1-9: 9
5. POOR APPETITE OR OVEREATING: 3
SUM OF ALL RESPONSES TO PHQ QUESTIONS 1-9: 9

## 2023-05-10 ASSESSMENT — COLUMBIA-SUICIDE SEVERITY RATING SCALE - C-SSRS
6. HAVE YOU EVER DONE ANYTHING, STARTED TO DO ANYTHING, OR PREPARED TO DO ANYTHING TO END YOUR LIFE?: NO
2. HAVE YOU ACTUALLY HAD ANY THOUGHTS OF KILLING YOURSELF?: NO
1. WITHIN THE PAST MONTH, HAVE YOU WISHED YOU WERE DEAD OR WISHED YOU COULD GO TO SLEEP AND NOT WAKE UP?: NO

## 2023-05-10 NOTE — PROGRESS NOTES
PROGRESS NOTE        SUBJECTIVE:  Diagnosis/Chief Complaint: Back Injury (ER FU - Sciatica traveling down R/leg /)      Doing well with pain no  Improvement in function: no  Pain levels 9/10   Usage of benzodiazapine or other sedatives no  Symptoms left sciatica and us phatomlimb pain, went to ER with both RE  Activities: Able to do activities of daily living.  yes - still working  Side affects: no  States taking medications per medicine list.yes - inc namenda   queried no  Urine drug screen done no  Opiod Rx exceeds 50 MME/dayno  Hx of depression yes - taking duloxetine  Hx of drug addiction: no  Safe storage of the opiods: NA  Narcotic contract reviewed and discussed: no    Patient Active Problem List    Diagnosis Date Noted    Chronic renal disease, stage III ([address] 06/29/2022    Major depressive disorder, recurrent, mild ([address] 06/29/2022    Major depressive disorder, recurrent, unspecified ([address] 06/29/2022    FH: breast cancer in first degree relative when <48years old 05/03/2022    Mass of lower inner quadrant of left breast 05/03/2022    Cervical radiculopathy due to degenerative joint disease of spine 03/30/2022    Bilateral carpal tunnel syndrome 03/29/2022    Tachycardia 03/29/2022    CRI (chronic renal insufficiency), stage 3 (moderate) ([address] 03/10/2021    Asthma 09/02/2020    Gout 05/01/2019    Severe obesity ([address] 12/17/2018    Diabetic peripheral neuropathy associated with type 2 diabetes mellitus ([address] 03/05/2018    Post-menopausal bleeding 11/01/2017    Diabetes mellitus with proteinuria ([address] 05/04/2017    Essential hypertension 05/04/2017    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

## 2023-05-10 NOTE — PROGRESS NOTES
Chief Complaint   Patient presents with    Back Injury     ER FU - Sciatica traveling down R/leg        Patient has not been out of the country in (14 months), NO diarrhea, NO cough, NO chest conjestion, NO temp. Pt has not been around anyone with these symptoms. Health Maintenance reviewed. I have reviewed the patient's medical history in detail and updated the computerized patient record. 1. \"Have you been to the ER, urgent care clinic since your last visit? Yes Hospitalized since your last visit? \" no    2. \"Have you seen or consulted any other health care providers outside of the [address] [place] [address] since your last visit? \" no     3. For patients aged 39-70: Has the patient had a colonoscopy / FIT/ Cologuard? no      If the patient is female:    4. For patients aged 41-77: Has the patient had a mammogram within the past 2 years? no      5.  For patients aged 21-65: Has the patient had a pap smear? no     Encouraged pt to discuss pt's wishes with spouse/partner/family and bring them in the next appt to follow thru with the Advanced Directive

## 2023-05-23 ENCOUNTER — ANESTHESIA EVENT (OUTPATIENT)
Facility: HOSPITAL | Age: 68
End: 2023-05-23
Payer: COMMERCIAL

## 2023-05-30 ENCOUNTER — TELEPHONE (OUTPATIENT)
Facility: CLINIC | Age: 68
End: 2023-05-30

## 2023-05-31 ENCOUNTER — PREP FOR PROCEDURE (OUTPATIENT)
Age: 68
End: 2023-05-31

## 2023-05-31 RX ORDER — SODIUM CHLORIDE 9 MG/ML
INJECTION, SOLUTION INTRAVENOUS PRN
Status: CANCELLED | OUTPATIENT
Start: 2023-05-31

## 2023-05-31 RX ORDER — SODIUM CHLORIDE 0.9 % (FLUSH) 0.9 %
5-40 SYRINGE (ML) INJECTION PRN
Status: CANCELLED | OUTPATIENT
Start: 2023-05-31

## 2023-05-31 RX ORDER — SODIUM CHLORIDE, SODIUM LACTATE, POTASSIUM CHLORIDE, CALCIUM CHLORIDE 600; 310; 30; 20 MG/100ML; MG/100ML; MG/100ML; MG/100ML
INJECTION, SOLUTION INTRAVENOUS CONTINUOUS
Status: CANCELLED | OUTPATIENT
Start: 2023-05-31

## 2023-05-31 RX ORDER — SODIUM CHLORIDE 0.9 % (FLUSH) 0.9 %
5-40 SYRINGE (ML) INJECTION EVERY 12 HOURS SCHEDULED
Status: CANCELLED | OUTPATIENT
Start: 2023-05-31
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
## 2023-05-31 NOTE — H&P (VIEW-ONLY)
Reported on 3/20/2023)     No current facility-administered medications for this visit. The above histories, medications and allergies have been reviewed. ROS    Visit Vitals  /89 (BP 1 Location: Left arm, BP Patient Position: Sitting, BP Cuff Size: Adult)   Pulse 100   Temp 97.7 °F (36.5 °C) (Temporal)   Resp 18   Ht 5' 3\" (1.6 m)   Wt 182 lb (82.6 kg)   LMP 08/23/2003   SpO2 98%   BMI 32.24 kg/m²     Physical Exam  Constitutional:       Appearance: She is obese. Cardiovascular:      Rate and Rhythm: Normal rate and regular rhythm. Pulmonary:      Effort: No respiratory distress. Breath sounds: Normal breath sounds. No wheezing. Abdominal:      General: There is no distension. Palpations: Abdomen is soft. There is no mass. Tenderness: There is no abdominal tenderness. Comments: Midline scar consistent with exploratory laparotomy   Neurological:      Mental Status: She is alert. 1. Colon cancer screening  Recommend colonoscopy. The procedure was explained in detail including the risks and benefits. Risks shared included risks of missed lesions, incomplete exam, colon injury or perforation. Risks associated with anesthesia were also discussed. The patient wishes to proceed and we will schedule. The patient was counseled at length about the risks of maryan Covid-19 during their perioperative period and any recovery window from their procedure. The patient was made aware that maryan Covid-19  may worsen their prognosis for recovering from their procedure and lend to a higher morbidity and/or mortality risk. All material risks, benefits, and reasonable alternatives including postponing the procedure were discussed. The patient does  wish to proceed with the procedure at this time. Follow-up and Dispositions    Return for post procedure.          Mark Johnson MD

## 2023-06-01 ENCOUNTER — HOSPITAL ENCOUNTER (OUTPATIENT)
Facility: HOSPITAL | Age: 68
Setting detail: SPECIMEN
Discharge: HOME | End: 2023-06-04

## 2023-06-01 ENCOUNTER — HOSPITAL ENCOUNTER (OUTPATIENT)
Facility: HOSPITAL | Age: 68
Setting detail: OUTPATIENT SURGERY
Discharge: HOME | End: 2023-06-01
Attending: SURGERY | Admitting: SURGERY
Payer: COMMERCIAL

## 2023-06-01 ENCOUNTER — ANESTHESIA (OUTPATIENT)
Facility: HOSPITAL | Age: 68
End: 2023-06-01
Payer: COMMERCIAL

## 2023-06-01 VITALS
DIASTOLIC BLOOD PRESSURE: 98 MMHG | SYSTOLIC BLOOD PRESSURE: 131 MMHG | WEIGHT: 149 LBS | RESPIRATION RATE: 17 BRPM | HEART RATE: 92 BPM | OXYGEN SATURATION: 97 % | BODY MASS INDEX: 26.4 KG/M2 | HEIGHT: 63 IN | TEMPERATURE: 97.8 F

## 2023-06-01 PROBLEM — Z12.11 COLON CANCER SCREENING: Status: ACTIVE | Noted: 2023-06-01

## 2023-06-01 LAB
GLUCOSE BLD STRIP.AUTO-MCNC: 165 MG/DL (ref 65–117)
SERVICE CMNT-IMP: ABNORMAL

## 2023-06-01 PROCEDURE — 7100000000 HC PACU RECOVERY - FIRST 15 MIN: Performed by: SURGERY

## 2023-06-01 PROCEDURE — 3700000000 HC ANESTHESIA ATTENDED CARE: Performed by: SURGERY

## 2023-06-01 PROCEDURE — 3600000002 HC SURGERY LEVEL 2 BASE: Performed by: SURGERY

## 2023-06-01 PROCEDURE — 88305 TISSUE EXAM BY PATHOLOGIST: CPT

## 2023-06-01 PROCEDURE — 3600000012 HC SURGERY LEVEL 2 ADDTL 15MIN: Performed by: SURGERY

## 2023-06-01 PROCEDURE — 7100000001 HC PACU RECOVERY - ADDTL 15 MIN: Performed by: SURGERY

## 2023-06-01 PROCEDURE — 45380 COLONOSCOPY AND BIOPSY: CPT | Performed by: SURGERY

## 2023-06-01 PROCEDURE — 3700000001 HC ADD 15 MINUTES (ANESTHESIA): Performed by: SURGERY

## 2023-06-01 PROCEDURE — 2580000003 HC RX 258: Performed by: SURGERY

## 2023-06-01 PROCEDURE — 6360000002 HC RX W HCPCS: Performed by: ANESTHESIOLOGY

## 2023-06-01 PROCEDURE — 82962 GLUCOSE BLOOD TEST: CPT

## 2023-06-01 PROCEDURE — 2709999900 HC NON-CHARGEABLE SUPPLY: Performed by: SURGERY

## 2023-06-01 RX ORDER — MIDAZOLAM HYDROCHLORIDE 1 MG/ML
INJECTION INTRAMUSCULAR; INTRAVENOUS PRN
Status: DISCONTINUED | OUTPATIENT
Start: 2023-06-01 | End: 2023-06-01 | Stop reason: SDUPTHER

## 2023-06-01 RX ORDER — SODIUM CHLORIDE 9 MG/ML
INJECTION, SOLUTION INTRAVENOUS PRN
Status: DISCONTINUED | OUTPATIENT
Start: 2023-06-01 | End: 2023-06-01 | Stop reason: HOSPADM

## 2023-06-01 RX ORDER — SODIUM CHLORIDE 0.9 % (FLUSH) 0.9 %
5-40 SYRINGE (ML) INJECTION PRN
Status: DISCONTINUED | OUTPATIENT
Start: 2023-06-01 | End: 2023-06-01 | Stop reason: HOSPADM

## 2023-06-01 RX ORDER — PROPOFOL 10 MG/ML
INJECTION, EMULSION INTRAVENOUS CONTINUOUS PRN
Status: DISCONTINUED | OUTPATIENT
Start: 2023-06-01 | End: 2023-06-01 | Stop reason: SDUPTHER

## 2023-06-01 RX ORDER — SODIUM CHLORIDE, SODIUM LACTATE, POTASSIUM CHLORIDE, CALCIUM CHLORIDE 600; 310; 30; 20 MG/100ML; MG/100ML; MG/100ML; MG/100ML
INJECTION, SOLUTION INTRAVENOUS CONTINUOUS
Status: DISCONTINUED | OUTPATIENT
Start: 2023-06-01 | End: 2023-06-01 | Stop reason: HOSPADM

## 2023-06-01 RX ORDER — SODIUM CHLORIDE 0.9 % (FLUSH) 0.9 %
5-40 SYRINGE (ML) INJECTION EVERY 12 HOURS SCHEDULED
Status: DISCONTINUED | OUTPATIENT
Start: 2023-06-01 | End: 2023-06-01 | Stop reason: HOSPADM

## 2023-06-01 RX ADMIN — PROPOFOL 50 MG: 10 INJECTION, EMULSION INTRAVENOUS at 10:05

## 2023-06-01 RX ADMIN — PROPOFOL 100 MCG/KG/MIN: 10 INJECTION, EMULSION INTRAVENOUS at 10:06

## 2023-06-01 RX ADMIN — MIDAZOLAM 2 MG: 1 INJECTION INTRAMUSCULAR; INTRAVENOUS at 10:04

## 2023-06-01 RX ADMIN — SODIUM CHLORIDE, POTASSIUM CHLORIDE, SODIUM LACTATE AND CALCIUM CHLORIDE: 600; 310; 30; 20 INJECTION, SOLUTION INTRAVENOUS at 09:30

## 2023-06-01 ASSESSMENT — PAIN - FUNCTIONAL ASSESSMENT: PAIN_FUNCTIONAL_ASSESSMENT: 0-10

## 2023-06-01 ASSESSMENT — PAIN SCALES - GENERAL
PAINLEVEL_OUTOF10: 0
PAINLEVEL_OUTOF10: 0

## 2023-06-01 NOTE — INTERVAL H&P NOTE
Update History & Physical    The patient's History and Physical of May  31 , 2023 was reviewed with the patient and I examined the patient. There was no change. The surgical site was confirmed by the patient and me. Plan: The risks, benefits, expected outcome, and alternative to the recommended procedure have been discussed with the patient. Patient understands and wants to proceed with the procedure.      Electronically signed by Sachin Cerrato MD on 6/1/2023 at 9:56 AM

## 2023-06-01 NOTE — BRIEF OP NOTE
Brief Postoperative Note      Patient: Easton Au  YOB: 1955  MRN: [id number]    Date of Procedure: 6/1/2023    Pre-Op Diagnosis Codes:     * Colon cancer screening [Z12.11]    Post-Op Diagnosis: Same       Procedure(s):  COLONOSCOPY WITH COLD FORCEP POLYPECTOMY    Surgeon(s):  Britney Luis MD    Assistant:  * No surgical staff found *    Anesthesia: Monitor Anesthesia Care    Estimated Blood Loss (mL): Minimal    Complications: None    Specimens:   ID Type Source Tests Collected by Time Destination   A : POLYP AT 65CM Tissue Colon SURGICAL PATHOLOGY Britney Luis MD 6/1/2023 1030        Implants:  * No implants in log *      Drains: * No LDAs found *    Findings:   Polyp at 65 cm  2.  Sub optimal prep limiting visualization          Electronically signed by Black Ellison MD on 6/1/2023 at 10:34 AM

## 2023-06-01 NOTE — OP NOTE
[facility]  OPERATIVE REPORT    Name:  Jayesh Sanon  MR#:  [id number]  :  1955  ACCOUNT #:  [de-identified]  DATE OF SERVICE:  2023    PREOPERATIVE DIAGNOSIS:  Screening colonoscopy. POSTOPERATIVE DIAGNOSIS:  Screening colonoscopy. PROCEDURE PERFORMED:  Colonoscopy with cold forceps polypectomy. SURGEON:  Tomas Shen MD    ASSISTANT:  ***    ANESTHESIA:  MAC.    COMPLICATIONS:  None. SPECIMENS REMOVED:  Polyp at 65 cm. IMPLANTS:  None. DRAINS:  None. ESTIMATED BLOOD LOSS:  Minimal.    FINDINGS:  1.  Polyp at 65 cm. 2.  Suboptimal prep limiting visualization. DISPOSITION:  Stable. PROCEDURE:  The patient was brought to the operative theater. Monitoring devices and nasal cannula O2 were placed per Anesthesia and the patient was placed in left side down decubitus position. IV sedation was administered and a time-out was performed. Digital rectal exam was performed which was essentially normal.    The patient had admitted to having soup with at least some noodles last night, but felt that her bowel prep was adequate. A well-lubricated Olympus colonoscope was introduced in the patient's rectum and advanced to the cecum. There was a moderate-to-large amount of liquid viscous stool throughout the colon, but in particular in the transverse colon and descending colon. This limited our visualization considerably. The scope was able to be advanced with changing of positioning of the patient to the cecum. The cecum was identified by the ileocecal valve. Again the prep markedly impacted our visualization. Within these limitations, no obstructing or large polyps or masses were identified. The scope was pulled back. A small polypoid lesion was identified at 65 cm, removed by cold forceps polypectomy. No other abnormalities were noted to the rectum. The scope was withdrawn.   Recommendations will be for repeat colonoscopy in 1 year based on the poor prep

## 2023-06-01 NOTE — ANESTHESIA PRE PROCEDURE
Department of Anesthesiology  Preprocedure Note       Name:  Robson Valdes   Age:  80 y.o.  :  1955                                          MRN:  [id number]         Date:  2023      Surgeon: Xochilt Griffith):  Mary Dior MD    Procedure: Procedure(s):  COLONOSCOPY    Medications prior to admission:   Prior to Admission medications    Medication Sig Start Date End Date Taking?  Authorizing Provider   DULoxetine (CYMBALTA) 30 MG extended release capsule Take 1 capsule by mouth 2 times daily 5/10/23   Calderon Egan MD   [facility] 5 MG tablet Take 3 tablets by mouth nightly 5/10/23   Calderon Egan MD   allopurinol (ZYLOPRIM) 100 MG tablet Take 1 tablet by mouth daily 3/6/23   Ar Automatic Reconciliation   amitriptyline (ELAVIL) 50 MG tablet Take 2 tablets by mouth nightly 23   Ar Automatic Reconciliation   ascorbic acid (VITAMIN C) 500 MG tablet Take by mouth    Ar Automatic Reconciliation   cetirizine (ZYRTEC) 10 MG tablet Take 1 tablet by mouth daily  Patient not taking: Reported on 2023   Ar Automatic Reconciliation   colchicine (COLCRYS) 0.6 MG tablet Take 1 tablet by mouth daily 10/20/22   Ar Automatic Reconciliation   fenofibrate (TRIGLIDE) 160 MG tablet Take 1 tablet by mouth daily 23   Ar Automatic Reconciliation   insulin aspart protamine-insulin aspart (NOVOLOG 70/30) (70-30) 100 UNIT/ML injection Inject 16 Units into the skin 3/6/23   Ar Automatic Reconciliation   losartan (COZAAR) 25 MG tablet Take 1 tablet by mouth daily 23   Ar Automatic Reconciliation   metFORMIN (GLUCOPHAGE) 500 MG tablet Take 1 tablet by mouth 2 times daily (with meals) 23   Ar Automatic Reconciliation   omeprazole (PRILOSEC) 20 MG delayed release capsule Take 1 capsule by mouth daily    Ar Automatic Reconciliation   ondansetron (ZOFRAN-ODT) 4 MG disintegrating tablet Take 1 tablet by mouth 3 times daily as needed 10/20/22   Ar Automatic Reconciliation   rosuvastatin (CRESTOR) 40 MG

## 2023-06-01 NOTE — ANESTHESIA POSTPROCEDURE EVALUATION
Department of Anesthesiology  Postprocedure Note    Patient: Porsche Phillip  MRN: [id number]  YOB: 1955  Date of evaluation: 6/1/2023      Procedure Summary     Date: 06/01/23 Room / Location: [address] [place] [address] 43 MAIN OR 1 / [facility]    Anesthesia Start: 1002 Anesthesia Stop: 1036    Procedure: COLONOSCOPY WITH COLD FORCEP POLYPECTOMY (Lower GI Region) Diagnosis:       Colon cancer screening      (Colon cancer screening [Z12.11])    Surgeons: Trinh Nino MD Responsible Provider: All Muñoz MD    Anesthesia Type: MAC ASA Status: 3          Anesthesia Type: No value filed.     Cole Phase I: Cole Score: 9    Cole Phase II:        Anesthesia Post Evaluation    Patient location during evaluation: bedside  Patient participation: complete - patient participated  Level of consciousness: awake and alert  Pain score: 0  Airway patency: patent  Nausea & Vomiting: no nausea  Complications: no  Cardiovascular status: hemodynamically stable  Respiratory status: acceptable  Hydration status: stable

## 2023-06-20 ENCOUNTER — CLINICAL DOCUMENTATION (OUTPATIENT)
Facility: CLINIC | Age: 68
End: 2023-06-20

## 2023-06-26 ENCOUNTER — OFFICE VISIT (OUTPATIENT)
Facility: CLINIC | Age: 68
End: 2023-06-26
Payer: COMMERCIAL

## 2023-06-26 VITALS
HEART RATE: 106 BPM | SYSTOLIC BLOOD PRESSURE: 133 MMHG | RESPIRATION RATE: 16 BRPM | BODY MASS INDEX: 32.78 KG/M2 | DIASTOLIC BLOOD PRESSURE: 83 MMHG | TEMPERATURE: 98.6 F | OXYGEN SATURATION: 97 % | HEIGHT: 63 IN | WEIGHT: 185 LBS

## 2023-06-26 DIAGNOSIS — Z91.81 AT HIGH RISK FOR FALLS: ICD-10-CM

## 2023-06-26 DIAGNOSIS — Z01.818 PRE-OP EVALUATION: Primary | ICD-10-CM

## 2023-06-26 DIAGNOSIS — S78.112D TRAUMATIC ABOVE-KNEE AMPUTATION OF LEFT LOWER EXTREMITY, SUBSEQUENT ENCOUNTER (HCC): ICD-10-CM

## 2023-06-26 DIAGNOSIS — Z79.4 TYPE 2 DIABETES MELLITUS WITH HYPERGLYCEMIA, WITH LONG-TERM CURRENT USE OF INSULIN (HCC): ICD-10-CM

## 2023-06-26 DIAGNOSIS — E11.29 DIABETES MELLITUS WITH PROTEINURIA (HCC): ICD-10-CM

## 2023-06-26 DIAGNOSIS — I10 ESSENTIAL HYPERTENSION: ICD-10-CM

## 2023-06-26 DIAGNOSIS — G54.6 PHANTOM LIMB PAIN (HCC): ICD-10-CM

## 2023-06-26 DIAGNOSIS — R80.9 DIABETES MELLITUS WITH PROTEINURIA (HCC): ICD-10-CM

## 2023-06-26 DIAGNOSIS — E11.65 TYPE 2 DIABETES MELLITUS WITH HYPERGLYCEMIA, WITH LONG-TERM CURRENT USE OF INSULIN (HCC): ICD-10-CM

## 2023-06-26 DIAGNOSIS — E78.1 PURE HYPERGLYCERIDEMIA: ICD-10-CM

## 2023-06-26 PROCEDURE — 1123F ACP DISCUSS/DSCN MKR DOCD: CPT | Performed by: FAMILY MEDICINE

## 2023-06-26 PROCEDURE — 3078F DIAST BP <80 MM HG: CPT | Performed by: FAMILY MEDICINE

## 2023-06-26 PROCEDURE — 3074F SYST BP LT 130 MM HG: CPT | Performed by: FAMILY MEDICINE

## 2023-06-26 PROCEDURE — 3052F HG A1C>EQUAL 8.0%<EQUAL 9.0%: CPT | Performed by: FAMILY MEDICINE

## 2023-06-26 PROCEDURE — 99214 OFFICE O/P EST MOD 30 MIN: CPT | Performed by: FAMILY MEDICINE

## 2023-06-26 RX ORDER — OMEPRAZOLE 20 MG/1
20 CAPSULE, DELAYED RELEASE ORAL DAILY
Qty: 90 CAPSULE | Refills: 3 | Status: SHIPPED | OUTPATIENT
Start: 2023-06-26

## 2023-06-26 RX ORDER — CETIRIZINE HYDROCHLORIDE 10 MG/1
10 TABLET ORAL DAILY
Qty: 90 TABLET | Refills: 3 | Status: SHIPPED | OUTPATIENT
Start: 2023-06-26

## 2023-06-26 ASSESSMENT — PATIENT HEALTH QUESTIONNAIRE - PHQ9
SUM OF ALL RESPONSES TO PHQ QUESTIONS 1-9: 2
1. LITTLE INTEREST OR PLEASURE IN DOING THINGS: 1
SUM OF ALL RESPONSES TO PHQ9 QUESTIONS 1 & 2: 2
2. FEELING DOWN, DEPRESSED OR HOPELESS: 1
SUM OF ALL RESPONSES TO PHQ QUESTIONS 1-9: 2

## 2023-06-29 ENCOUNTER — CLINICAL DOCUMENTATION (OUTPATIENT)
Facility: CLINIC | Age: 68
End: 2023-06-29

## 2023-07-01 PROBLEM — Z12.11 COLON CANCER SCREENING: Status: RESOLVED | Noted: 2023-06-01 | Resolved: 2023-07-01

## 2023-07-06 RX ORDER — BLOOD SUGAR DIAGNOSTIC
STRIP MISCELLANEOUS
Qty: 100 EACH | Refills: 3 | Status: SHIPPED | OUTPATIENT
Start: 2023-07-06

## 2023-07-26 ENCOUNTER — TELEPHONE (OUTPATIENT)
Age: 68
End: 2023-07-26

## 2023-07-26 DIAGNOSIS — E11.65 TYPE 2 DIABETES MELLITUS WITH HYPERGLYCEMIA, WITH LONG-TERM CURRENT USE OF INSULIN (HCC): Primary | ICD-10-CM

## 2023-07-26 DIAGNOSIS — Z79.4 TYPE 2 DIABETES MELLITUS WITH HYPERGLYCEMIA, WITH LONG-TERM CURRENT USE OF INSULIN (HCC): Primary | ICD-10-CM

## 2023-07-26 NOTE — TELEPHONE ENCOUNTER
Key: [id number] - PA Case ID: [id number] - Rx #: [id number] Outcome  Denied today. Plan called @ [phone] A,stated claim was denied due to no documentation received of two preferred GLP- receptors,trial or intolerance. If available please fax to plan to avoid break in therapy. See denial letter for details.    Drug  Mounjaro 5MG/0.5ML pen-injectors  Form  [place] [employer] [place] Form (6241 [address]CP[address])  Original Claim Info  70,MR

## 2023-09-06 LAB
BUN SERPL-MCNC: 18 MG/DL (ref 8–27)
BUN/CREAT SERPL: 14 (ref 12–28)
CALCIUM SERPL-MCNC: 9.5 MG/DL (ref 8.7–10.3)
CHLORIDE SERPL-SCNC: 99 MMOL/L (ref 96–106)
CHOLEST SERPL-MCNC: 101 MG/DL (ref 100–199)
CO2 SERPL-SCNC: 22 MMOL/L (ref 20–29)
CREAT SERPL-MCNC: 1.32 MG/DL (ref 0.57–1)
EGFRCR SERPLBLD CKD-EPI 2021: 44 ML/MIN/1.73
GLUCOSE SERPL-MCNC: 389 MG/DL (ref 70–99)
HBA1C MFR BLD: 11.4 % (ref 4.8–5.6)
HDLC SERPL-MCNC: 18 MG/DL
IMP & REVIEW OF LAB RESULTS: NORMAL
LDLC SERPL CALC-MCNC: 7 MG/DL (ref 0–99)
POTASSIUM SERPL-SCNC: 4.6 MMOL/L (ref 3.5–5.2)
REPORT: NORMAL
SODIUM SERPL-SCNC: 138 MMOL/L (ref 134–144)
TRIGL SERPL-MCNC: 611 MG/DL (ref 0–149)
VLDLC SERPL CALC-MCNC: 76 MG/DL (ref 5–40)

## 2023-09-09 NOTE — PROGRESS NOTES
Subjective:     Smitha Gates is a 80 y.o. female seen for follow-up of diabetes. She has had hypoglycemic attacks. .no  Blood sugar control has been poor    Hemoglobin A1C   Date Value Ref Range Status   09/05/2023 11.4 (H) 4.8 - 5.6 % Final     Comment:              Prediabetes: 5.7 - 6.4           Diabetes: >6.4           Glycemic control for adults with diabetes: <7.0       Lab Results   Component Value Date/Time     09/05/2023 02:16 PM    K 4.6 09/05/2023 02:16 PM    CL 99 09/05/2023 02:16 PM    CO2 22 09/05/2023 02:16 PM    BUN 18 09/05/2023 02:16 PM    CREATININE 1.32 09/05/2023 02:16 PM    GLUCOSE 389 09/05/2023 02:16 PM    CALCIUM 9.5 09/05/2023 02:16 PM    LABGLOM 44 09/05/2023 02:16 PM          She has diabetes, hypertension, and hyperlipidemia. Smitha Gates has the additional concern of hungry  7/10 pains phantom    Reports taking insulin  medications without side affects. Ex inc hunger so she eats. Diet and Lifestyle: not attempting to follow a low fat, low cholesterol diet, not following diet .     Patient Active Problem List    Diagnosis Date Noted    Chronic renal disease, stage III ([address]) 06/29/2022    Major depressive disorder, recurrent, mild ([address]) 06/29/2022    Major depressive disorder, recurrent, unspecified ([address]) 06/29/2022    FH: breast cancer in first degree relative when <48years old 05/03/2022    Mass of lower inner quadrant of left breast 05/03/2022    Cervical radiculopathy due to degenerative joint disease of spine 03/30/2022    Bilateral carpal tunnel syndrome 03/29/2022    Tachycardia 03/29/2022    CRI (chronic renal insufficiency), stage 3 (moderate) ([address]) 03/10/2021    Asthma 09/02/2020    Gout 05/01/2019    Severe obesity ([address]) 12/17/2018    Diabetic peripheral neuropathy associated with type 2 diabetes mellitus ([address]) 03/05/2018    Post-menopausal bleeding 11/01/2017    Diabetes mellitus with proteinuria ([address]) 05/04/2017    Essential hypertension 05/04/2017

## 2023-09-11 ENCOUNTER — OFFICE VISIT (OUTPATIENT)
Facility: CLINIC | Age: 68
End: 2023-09-11

## 2023-09-11 VITALS
HEIGHT: 63 IN | BODY MASS INDEX: 30.3 KG/M2 | WEIGHT: 171 LBS | RESPIRATION RATE: 18 BRPM | TEMPERATURE: 98.6 F | OXYGEN SATURATION: 98 % | HEART RATE: 105 BPM | SYSTOLIC BLOOD PRESSURE: 137 MMHG | DIASTOLIC BLOOD PRESSURE: 84 MMHG

## 2023-09-11 DIAGNOSIS — S78.112S TRAUMATIC ABOVE-KNEE AMPUTATION OF LEFT LOWER EXTREMITY, SEQUELA (HCC): ICD-10-CM

## 2023-09-11 DIAGNOSIS — G54.6 PHANTOM LIMB PAIN (HCC): ICD-10-CM

## 2023-09-11 DIAGNOSIS — Z79.4 TYPE 2 DIABETES MELLITUS WITH HYPERGLYCEMIA, WITH LONG-TERM CURRENT USE OF INSULIN (HCC): Primary | ICD-10-CM

## 2023-09-11 DIAGNOSIS — R80.9 DIABETES MELLITUS WITH PROTEINURIA (HCC): ICD-10-CM

## 2023-09-11 DIAGNOSIS — E11.65 TYPE 2 DIABETES MELLITUS WITH HYPERGLYCEMIA, WITH LONG-TERM CURRENT USE OF INSULIN (HCC): Primary | ICD-10-CM

## 2023-09-11 DIAGNOSIS — E11.29 DIABETES MELLITUS WITH PROTEINURIA (HCC): ICD-10-CM

## 2023-09-11 RX ORDER — SEMAGLUTIDE 1.34 MG/ML
1 INJECTION, SOLUTION SUBCUTANEOUS
Qty: 4 ADJUSTABLE DOSE PRE-FILLED PEN SYRINGE | Refills: 2 | Status: SHIPPED | OUTPATIENT
Start: 2023-09-11

## 2023-09-11 RX ORDER — SEMAGLUTIDE 1.34 MG/ML
0.5 INJECTION, SOLUTION SUBCUTANEOUS
Qty: 4 ADJUSTABLE DOSE PRE-FILLED PEN SYRINGE | Refills: 1 | Status: SHIPPED | OUTPATIENT
Start: 2023-09-11 | End: 2023-10-09

## 2023-09-11 ASSESSMENT — PATIENT HEALTH QUESTIONNAIRE - PHQ9
SUM OF ALL RESPONSES TO PHQ QUESTIONS 1-9: 2
SUM OF ALL RESPONSES TO PHQ QUESTIONS 1-9: 2
1. LITTLE INTEREST OR PLEASURE IN DOING THINGS: 1
2. FEELING DOWN, DEPRESSED OR HOPELESS: 1
SUM OF ALL RESPONSES TO PHQ9 QUESTIONS 1 & 2: 2
SUM OF ALL RESPONSES TO PHQ QUESTIONS 1-9: 2
SUM OF ALL RESPONSES TO PHQ QUESTIONS 1-9: 2

## 2023-09-11 NOTE — PROGRESS NOTES
Chief Complaint   Patient presents with    Diabetes     Patient has not been out of the country in (14 months), NO diarrhea, NO cough, NO chest conjestion, NO temp. Pt has not been around anyone with these symptoms. Health Maintenance reviewed. I have reviewed the patient's medical history in detail and updated the computerized patient record. 1. \"Have you been to the ER, urgent care clinic since your last visit? Yes Hospitalized since your last visit? \" no    2. \"Have you seen or consulted any other health care providers outside of the [address] [place] [address] since your last visit? \" no     3. For patients aged 43-73: Has the patient had a colonoscopy / FIT/ Cologuard?  no

## 2023-09-26 ENCOUNTER — TRANSCRIBE ORDERS (OUTPATIENT)
Facility: HOSPITAL | Age: 68
End: 2023-09-26

## 2023-09-26 DIAGNOSIS — Z12.31 SCREENING MAMMOGRAM FOR BREAST CANCER: Primary | ICD-10-CM

## 2023-10-06 RX ORDER — ISOPROPYL ALCOHOL 0.75 G/1
1 SWAB TOPICAL 3 TIMES DAILY
Qty: 300 EACH | Refills: 3 | Status: SHIPPED | OUTPATIENT
Start: 2023-10-06

## 2023-10-06 RX ORDER — LOSARTAN POTASSIUM 25 MG/1
25 TABLET ORAL DAILY
Qty: 90 TABLET | Refills: 1 | Status: SHIPPED | OUTPATIENT
Start: 2023-10-06

## 2023-10-06 RX ORDER — ROSUVASTATIN CALCIUM 40 MG/1
40 TABLET, COATED ORAL NIGHTLY
Qty: 90 TABLET | Refills: 1 | Status: SHIPPED | OUTPATIENT
Start: 2023-10-06

## 2023-10-10 ENCOUNTER — HOSPITAL ENCOUNTER (OUTPATIENT)
Facility: HOSPITAL | Age: 68
Discharge: HOME | End: 2023-10-13
Payer: MEDICARE

## 2023-10-10 DIAGNOSIS — Z12.31 SCREENING MAMMOGRAM FOR BREAST CANCER: ICD-10-CM

## 2023-10-10 PROCEDURE — 77063 BREAST TOMOSYNTHESIS BI: CPT

## 2023-10-25 DIAGNOSIS — G54.6 PHANTOM LIMB PAIN (HCC): ICD-10-CM

## 2023-10-30 DIAGNOSIS — G54.6 PHANTOM LIMB PAIN (HCC): ICD-10-CM

## 2023-11-02 ENCOUNTER — TELEPHONE (OUTPATIENT)
Facility: CLINIC | Age: 68
End: 2023-11-02

## 2023-11-02 NOTE — TELEPHONE ENCOUNTER
Reuben adams [employer] is calling to verify and advise some information for patient to be able to get some orders taken care of!       [phone]  Ref#  [id number]

## 2023-11-08 ENCOUNTER — HOSPITAL ENCOUNTER (OUTPATIENT)
Facility: HOSPITAL | Age: 68
Discharge: HOME | End: 2023-11-11

## 2023-11-09 DIAGNOSIS — Z79.4 TYPE 2 DIABETES MELLITUS WITH HYPERGLYCEMIA, WITH LONG-TERM CURRENT USE OF INSULIN (HCC): ICD-10-CM

## 2023-11-09 DIAGNOSIS — E11.65 TYPE 2 DIABETES MELLITUS WITH HYPERGLYCEMIA, WITH LONG-TERM CURRENT USE OF INSULIN (HCC): ICD-10-CM

## 2023-11-09 LAB
ANION GAP SERPL CALC-SCNC: 8 MMOL/L (ref 5–15)
BUN SERPL-MCNC: 16 MG/DL (ref 6–20)
BUN/CREAT SERPL: 16 (ref 12–20)
CALCIUM SERPL-MCNC: 9.7 MG/DL (ref 8.5–10.1)
CHLORIDE SERPL-SCNC: 106 MMOL/L (ref 97–108)
CO2 SERPL-SCNC: 24 MMOL/L (ref 21–32)
CREAT SERPL-MCNC: 1.02 MG/DL (ref 0.55–1.02)
EST. AVERAGE GLUCOSE BLD GHB EST-MCNC: 283 MG/DL
GLUCOSE SERPL-MCNC: 250 MG/DL (ref 65–100)
HBA1C MFR BLD: 11.5 % (ref 4–5.6)
POTASSIUM SERPL-SCNC: 4.2 MMOL/L (ref 3.5–5.1)
SODIUM SERPL-SCNC: 138 MMOL/L (ref 136–145)

## 2023-11-11 ENCOUNTER — TELEPHONE (OUTPATIENT)
Facility: CLINIC | Age: 68
End: 2023-11-11

## 2023-11-11 DIAGNOSIS — E11.65 TYPE 2 DIABETES MELLITUS WITH HYPERGLYCEMIA, WITH LONG-TERM CURRENT USE OF INSULIN (HCC): ICD-10-CM

## 2023-11-11 DIAGNOSIS — Z79.4 TYPE 2 DIABETES MELLITUS WITH HYPERGLYCEMIA, WITH LONG-TERM CURRENT USE OF INSULIN (HCC): ICD-10-CM

## 2023-11-11 RX ORDER — SEMAGLUTIDE 1.34 MG/ML
1 INJECTION, SOLUTION SUBCUTANEOUS
Qty: 4 ADJUSTABLE DOSE PRE-FILLED PEN SYRINGE | Refills: 5 | Status: SHIPPED | OUTPATIENT
Start: 2023-11-11

## 2023-11-16 ENCOUNTER — TELEPHONE (OUTPATIENT)
Facility: CLINIC | Age: 68
End: 2023-11-16

## 2023-11-20 NOTE — TELEPHONE ENCOUNTER
Tried called patient but mailbox is full, wanting to know if she has given [employer] permission or access to pt's chart.

## 2023-11-22 DIAGNOSIS — E11.65 TYPE 2 DIABETES MELLITUS WITH HYPERGLYCEMIA, WITH LONG-TERM CURRENT USE OF INSULIN (HCC): ICD-10-CM

## 2023-11-22 DIAGNOSIS — Z79.4 TYPE 2 DIABETES MELLITUS WITH HYPERGLYCEMIA, WITH LONG-TERM CURRENT USE OF INSULIN (HCC): ICD-10-CM

## 2023-11-22 RX ORDER — SEMAGLUTIDE 0.68 MG/ML
INJECTION, SOLUTION SUBCUTANEOUS
Qty: 3 ML | Refills: 0 | OUTPATIENT
Start: 2023-11-22

## 2023-11-24 DIAGNOSIS — G54.7 PHANTOM LIMB (HCC): ICD-10-CM

## 2023-11-25 RX ORDER — MEMANTINE HYDROCHLORIDE 5 MG/1
15 TABLET ORAL DAILY
Qty: 90 TABLET | Refills: 5 | Status: SHIPPED | OUTPATIENT
Start: 2023-11-25

## 2023-12-21 ENCOUNTER — CLINICAL DOCUMENTATION (OUTPATIENT)
Age: 68
End: 2023-12-21

## 2023-12-21 PROBLEM — N18.30 CRI (CHRONIC RENAL INSUFFICIENCY), STAGE 3 (MODERATE) (HCC): Status: RESOLVED | Noted: 2021-03-10 | Resolved: 2023-12-21

## 2023-12-21 PROBLEM — N18.30 CHRONIC RENAL DISEASE, STAGE III (HCC): Status: RESOLVED | Noted: 2022-06-29 | Resolved: 2023-12-21

## 2024-01-04 ENCOUNTER — NURSE ONLY (OUTPATIENT)
Age: 69
End: 2024-01-04

## 2024-01-04 DIAGNOSIS — E11.29 TYPE 2 DIABETES MELLITUS WITH OTHER DIABETIC KIDNEY COMPLICATION (HCC): Primary | ICD-10-CM

## 2024-01-04 NOTE — PROGRESS NOTES
[facility]  Diabetes Self-Management Education & Support Program  Encounter Note      SUMMARY    Diabetes self-care management training was completed related to reducing risks. The participant will return in 1 week to continue DSMES related to healthy eating and monitoring. The participant did not identify SMART Goal(s) and will practice knowledge and skills related to reducing risks to improve diabetes self-management.      EVALUATION:  Torie Jarquin actively participated in group class discussions.     RECOMMENDATIONS:  Communicate with provider regarding recommendations for immunizations/vaccines. Torie Jarquin is willing to establish a personal health care record to track their diabetes management.    TOPICS DISCUSSED TODAY:  WHAT IS DIABETES? Minutes: 60  HOW DO I STAY HEALTHY? 60    Next provider visit is scheduled for   Future Appointments   Date Time Provider Department Center   1/11/2024  8:30 AM DIABETES GROUP CLASS [facility] AM[facility]   1/17/2024 10:15 AM Milan Slater MD [facility] AM[facility]   1/18/2024  8:30 AM DIABETES GROUP CLASS [facility] AMB   1/25/2024  8:30 AM DIABETES GROUP CLASS [facility] AMB   3/27/2024 10:00 AM Ranjan Donato MD [facility] AMB              DATE DSMES TOPIC EVALUATION     1/4/2024 WHAT IS DIABETES?   Role of the normal pancreas in energy balance and blood glucose control   The defect seen in diabetes   Signs & symptoms of diabetes   Diagnosis of diabetes   Types of diabetes   Blood glucose targets in non-pregnant & non-pregnant adults       The participant knows  Their type of diabetes: Yes   The basic physiologic defect: Yes  Blood glucose targets: Yes     DATE DSMES TOPIC EVALUATION     1/4/2024 HOW DO I STAY HEALTHY?   Prevention   Vaccinations   Preconception care (if applicable)  Examinations   Eye    Foot   Diabetic complications' prevention   Dental health   Heart health   Kidney Health   Nerve health   Sleep health

## 2024-01-11 ENCOUNTER — NURSE ONLY (OUTPATIENT)
Age: 69
End: 2024-01-11

## 2024-01-11 DIAGNOSIS — E11.29 TYPE 2 DIABETES MELLITUS WITH OTHER DIABETIC KIDNEY COMPLICATION (HCC): Primary | ICD-10-CM

## 2024-01-11 NOTE — PROGRESS NOTES
[facility]  Diabetes Self-Management Education & Support Program  Encounter Note      SUMMARY  Diabetes self-care management training was completed related to healthy eating and monitoring. The participant will return on January 18 to continue DSMES related to taking medications and physical activity. The participant did identify SMART Goal(s) and will practice knowledge and skills related to healthy eating and monitoring and being active to improve diabetes self-management.      EVALUATION:  Participant can recognize carbohydrates, proteins, and fats and can verbalize reading a food label. Participant re-demonstrated healthy plate method utilizing food models. Participant understands and can verbalize basic carbohydrate counting. Participant reported monitoring and recording BG results without difficulty. Reported BG range: 240 - 265 mg/dL.      RECOMMENDATIONS:  Work on SMART goal this week.  Practice healthy plate method, reading food labels, and measuring food portions this week.     TOPICS DISCUSSED TODAY:  WHAT CAN I EAT? 60  HOW CAN BLOOD GLUCOSE MONITORING HELP ME? 60       SMART GOAL(S)  Increase physical activity by exercising with resistance bands  for 5 minutes daily, 2  times over the next week.  ACHIEVEMENT OF GOAL(S) : 0-24%       DATE DSMES TOPIC EVALUATION     1/11/2024 WHAT CAN I EAT?   General principles   Determining a healthy weight   Nutritional terms & tools   Healthy Plate method   Carbohydrate Counting   Reading food labels   Free apps   Pregnancy recommendations      The participant   Uses Healthy Plate principles in constructing meals: Yes  Reads food labels in choosing acceptable foods: Yes         DATE DSMES TOPIC EVALUATION     1/11/2024 HOW CAN BLOOD GLUCOSE MONITORING HELP ME?   Value of blood glucose monitoring   Realistic expectations   Blood glucose monitoring targets   Target adjustments   Setting a1c & blood glucose targets with provider   Meter

## 2024-01-12 ENCOUNTER — TELEPHONE (OUTPATIENT)
Facility: CLINIC | Age: 69
End: 2024-01-12

## 2024-01-12 RX ORDER — BLOOD SUGAR DIAGNOSTIC
1 STRIP MISCELLANEOUS 3 TIMES DAILY
Qty: 100 EACH | Refills: 5 | Status: SHIPPED | OUTPATIENT
Start: 2024-01-12

## 2024-01-12 RX ORDER — BLOOD SUGAR DIAGNOSTIC
STRIP MISCELLANEOUS
Qty: 100 EACH | Refills: 3 | Status: CANCELLED | OUTPATIENT
Start: 2024-01-12

## 2024-01-12 NOTE — TELEPHONE ENCOUNTER
Pt needs test strips called in to [facility] pharmacy. If needed pt can be reached at [phone]

## 2024-01-18 ENCOUNTER — NURSE ONLY (OUTPATIENT)
Age: 69
End: 2024-01-18

## 2024-01-18 DIAGNOSIS — E11.29 TYPE 2 DIABETES MELLITUS WITH OTHER DIABETIC KIDNEY COMPLICATION (HCC): Primary | ICD-10-CM

## 2024-01-18 NOTE — PROGRESS NOTES
[facility]  Diabetes Self-Management Education & Support Program  Encounter Note      SUMMARY  Diabetes self-care management training was completed related to taking medications and physical activity. The participant will return in 1 week to continue DSMES related to healthy coping and problem solving. The participant did identify SMART Goal(s) and will practice knowledge and skills related to being active and medications to improve diabetes self-management.      EVALUATION:     Torie Jarquin actively participated in group class.  Demonstrated chair exercises that incorporated balance, pilates, breathing, and core strength  Discussed easy ways to get exercise into their lifestyle.      RECOMMENDATIONS:  Participant to work on SMART goal listed below. Call [facility] if education questions or concerns arise. Contact provider with any medical concerns.      TOPICS DISCUSSED TODAY:  HOW DO MY DIABETES MEDICATIONS WORK? 60  HOW DOES PHYSICAL ACTIVITY AFFECT MY DIABETES? 60      Next provider visit is scheduled for   Future Appointments   Date Time Provider Department Center   1/25/2024  8:30 AM DIABETES GROUP CLASS [facility] AMB   3/27/2024 10:00 AM Ranjan Donato MD [facility] AMB          SMART GOAL(S)  Increase physical activity by exercising with resistance bands  for 5 minutes daily, 2  times over the next week.  ACHIEVEMENT OF GOAL(S) : 0-24%           DATE DSMES TOPIC EVALUATION     1/18/2024 HOW DO MY DIABETES MEDICATIONS WORK?   Type 1 medications & methods   Insulin injections   Injection sites   Type 2 medications   Oral agents   GLP-1 agonists   Hypoglycemia symptoms & treatment   Glucagon emergency kits   General guidance regarding insulin use whether Type 1, 2 or gestational diabetes   Storage of insulin   Disposal    Traveling with medications   Barriers to medication adherence      The participant   Can describe the expected action & side effects of prescribed

## 2024-01-25 ENCOUNTER — NURSE ONLY (OUTPATIENT)
Age: 69
End: 2024-01-25

## 2024-01-25 DIAGNOSIS — E11.29 TYPE 2 DIABETES MELLITUS WITH OTHER DIABETIC KIDNEY COMPLICATION (HCC): Primary | ICD-10-CM

## 2024-01-25 NOTE — PROGRESS NOTES
[facility]  Diabetes Self-Management Education & Support Program  Encounter Note      SUMMARY  Diabetes self-care management training was completed related to healthy coping and problem solving. he participant will return on March 07 to complete DSMES education series. The participant did not identify SMART Goal(s) and will practice knowledge and skills related to healthy coping and problem solving to improve diabetes self-management.      EVALUATION:  Torie Jarquin reported feeling scared when first diagnosed with diabetes but stated they are feeling  hopeful  now. Torie Jarquin handles stress by calling her friends to talk.  Torie Jarquin does not have an emergency plan/kit.     RECOMMENDATIONS:  Torie Jarquin is willing to continue to work on their SMART goals.  May benefit from exploring various stress management techniques, creating a diabetes emergency kit/plan, contacting their provider with any diabetes concerns, and contacting [facility] with any education concerns    TOPICS DISCUSSED TODAY:  HOW DO I FIND SUPPORT TO TACKLE THIS CONDITION? 60  HOW DO I FIGURE OUT WHAT'S INFLUENCING MY BLOOD GLUCOSES? 60      Next provider visit is scheduled for 6 weeks       DATE DSMES TOPIC EVALUATION     1/25/2024 HOW DO I FIND SUPPORT TO TACKLE THIS CONDITION?   Normal responses to diabetes diagnosis or complication   Shock   Anger & resentment   Guilt/self-blame   Sadness & worry   Depression    Anxiety   Pregnancy   Constructive strategies to normal responses    Exploring feelings & attitudes   Motivation: Cost versus benefits analysis   Problem-solving: Chain analysis   Obtaining support: Communicating   Family & friends   Health team   iii. Community   Stress   Symptoms   Managing stress   Fill your tool kit        The participant can identify people that support them in caring for their diabetes health: friends      The participant would like to pursue the following in keeping

## 2024-01-26 ENCOUNTER — HOSPITAL ENCOUNTER (EMERGENCY)
Facility: HOSPITAL | Age: 69
Discharge: HOME | End: 2024-01-26
Attending: EMERGENCY MEDICINE
Payer: MEDICARE

## 2024-01-26 VITALS
RESPIRATION RATE: 17 BRPM | WEIGHT: 176 LBS | OXYGEN SATURATION: 100 % | HEIGHT: 63 IN | SYSTOLIC BLOOD PRESSURE: 145 MMHG | HEART RATE: 90 BPM | TEMPERATURE: 98.2 F | BODY MASS INDEX: 31.18 KG/M2 | DIASTOLIC BLOOD PRESSURE: 85 MMHG

## 2024-01-26 DIAGNOSIS — U07.1 COVID-19: Primary | ICD-10-CM

## 2024-01-26 DIAGNOSIS — M54.32 SCIATICA OF LEFT SIDE: ICD-10-CM

## 2024-01-26 LAB
FLUAV RNA SPEC QL NAA+PROBE: NOT DETECTED
FLUBV RNA SPEC QL NAA+PROBE: NOT DETECTED
SARS-COV-2 RNA RESP QL NAA+PROBE: DETECTED

## 2024-01-26 PROCEDURE — 6370000000 HC RX 637 (ALT 250 FOR IP): Performed by: EMERGENCY MEDICINE

## 2024-01-26 PROCEDURE — 99283 EMERGENCY DEPT VISIT LOW MDM: CPT

## 2024-01-26 PROCEDURE — 87636 SARSCOV2 & INF A&B AMP PRB: CPT

## 2024-01-26 RX ORDER — CYCLOBENZAPRINE HCL 10 MG
10 TABLET ORAL 3 TIMES DAILY PRN
Qty: 21 TABLET | Refills: 0 | Status: SHIPPED | OUTPATIENT
Start: 2024-01-26 | End: 2024-02-05

## 2024-01-26 RX ORDER — CYCLOBENZAPRINE HCL 10 MG
10 TABLET ORAL
Status: COMPLETED | OUTPATIENT
Start: 2024-01-26 | End: 2024-01-26

## 2024-01-26 RX ORDER — IBUPROFEN 600 MG/1
600 TABLET ORAL EVERY 6 HOURS PRN
Qty: 30 TABLET | Refills: 0 | Status: SHIPPED | OUTPATIENT
Start: 2024-01-26

## 2024-01-26 RX ADMIN — CYCLOBENZAPRINE 10 MG: 10 TABLET, FILM COATED ORAL at 08:25

## 2024-01-26 ASSESSMENT — PAIN DESCRIPTION - DESCRIPTORS: DESCRIPTORS: ACHING

## 2024-01-26 ASSESSMENT — PAIN SCALES - GENERAL
PAINLEVEL_OUTOF10: 10
PAINLEVEL_OUTOF10: 10

## 2024-01-26 ASSESSMENT — PAIN DESCRIPTION - LOCATION: LOCATION: BUTTOCKS

## 2024-01-26 ASSESSMENT — PAIN DESCRIPTION - ORIENTATION: ORIENTATION: LEFT

## 2024-01-26 ASSESSMENT — PAIN - FUNCTIONAL ASSESSMENT
PAIN_FUNCTIONAL_ASSESSMENT: 0-10
PAIN_FUNCTIONAL_ASSESSMENT: ACTIVITIES ARE NOT PREVENTED
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
## 2024-01-28 NOTE — PROGRESS NOTES
Torie Jarquin, was evaluated through a synchronous (real-time) audio-video encounter. The patient (or guardian if applicable) is aware that this is a billable service, which includes applicable co-pays. This Virtual Visit was conducted with patient's (and/or legal guardian's) consent. Patient identification was verified, and a caregiver was present when appropriate.   The patient was located at Home: [address] [place] [id number]  Provider was located at Facility (Appt Dept): [address] [place] [address] [address] [place] [id number]         Total time spent for this encounter: Not billed by time    --Milan Slater MD on 1/28/2024 at 5:20 PM    An electronic signature was used to authenticate this note.      Subjective:     Torie Jarquin is a 68 y.o. female seen for follow-up of diabetes.     She has had hypoglycemic attacks. .no  Blood sugar control has been 200's a little better took Dm class but hungry all the time  Caught COVID taking paxlovid feeling better    Hemoglobin A1C   Date Value Ref Range Status   11/08/2023 11.5 (H) 4.0 - 5.6 % Final     Comment:     (NOTE)  HbA1C Interpretive Ranges  <5.7              Normal  5.7 - 6.4         Consider Prediabetes  >6.5              Consider Diabetes       Lab Results   Component Value Date/Time     11/08/2023 08:46 AM    K 4.2 11/08/2023 08:46 AM     11/08/2023 08:46 AM    CO2 24 11/08/2023 08:46 AM    BUN 16 11/08/2023 08:46 AM    CREATININE 1.02 11/08/2023 08:46 AM    GLUCOSE 250 11/08/2023 08:46 AM    GLUCOSE 389 09/05/2023 02:16 PM    CALCIUM 9.7 11/08/2023 08:46 AM    LABGLOM >60 11/08/2023 08:46 AM    LABGLOM 44 09/05/2023 02:16 PM          She has diabetes, hypertension, and hyperlipidemia.    Torie Jarquin has the additional concern of doing better with her diabetes diet but says she is hungry all the time.  Wants something to help cut her appetite.    Reports taking blood pressure medications without side affects. No complaints of

## 2024-01-29 ENCOUNTER — TELEMEDICINE (OUTPATIENT)
Facility: CLINIC | Age: 69
End: 2024-01-29
Payer: MEDICARE

## 2024-01-29 ENCOUNTER — TELEPHONE (OUTPATIENT)
Facility: CLINIC | Age: 69
End: 2024-01-29

## 2024-01-29 DIAGNOSIS — E11.42 DIABETIC PERIPHERAL NEUROPATHY ASSOCIATED WITH TYPE 2 DIABETES MELLITUS (HCC): Primary | ICD-10-CM

## 2024-01-29 DIAGNOSIS — S78.112S TRAUMATIC ABOVE-KNEE AMPUTATION OF LEFT LOWER EXTREMITY, SEQUELA (HCC): ICD-10-CM

## 2024-01-29 DIAGNOSIS — G54.6 PHANTOM LIMB PAIN (HCC): ICD-10-CM

## 2024-01-29 DIAGNOSIS — R80.9 DIABETES MELLITUS WITH PROTEINURIA (HCC): ICD-10-CM

## 2024-01-29 DIAGNOSIS — F33.0 MAJOR DEPRESSIVE DISORDER, RECURRENT, MILD (HCC): ICD-10-CM

## 2024-01-29 DIAGNOSIS — E11.29 DIABETES MELLITUS WITH PROTEINURIA (HCC): ICD-10-CM

## 2024-01-29 PROCEDURE — 1036F TOBACCO NON-USER: CPT | Performed by: FAMILY MEDICINE

## 2024-01-29 PROCEDURE — 2022F DILAT RTA XM EVC RTNOPTHY: CPT | Performed by: FAMILY MEDICINE

## 2024-01-29 PROCEDURE — G8399 PT W/DXA RESULTS DOCUMENT: HCPCS | Performed by: FAMILY MEDICINE

## 2024-01-29 PROCEDURE — 1123F ACP DISCUSS/DSCN MKR DOCD: CPT | Performed by: FAMILY MEDICINE

## 2024-01-29 PROCEDURE — 3046F HEMOGLOBIN A1C LEVEL >9.0%: CPT | Performed by: FAMILY MEDICINE

## 2024-01-29 PROCEDURE — G8417 CALC BMI ABV UP PARAM F/U: HCPCS | Performed by: FAMILY MEDICINE

## 2024-01-29 PROCEDURE — G8484 FLU IMMUNIZE NO ADMIN: HCPCS | Performed by: FAMILY MEDICINE

## 2024-01-29 PROCEDURE — G8427 DOCREV CUR MEDS BY ELIG CLIN: HCPCS | Performed by: FAMILY MEDICINE

## 2024-01-29 PROCEDURE — 99214 OFFICE O/P EST MOD 30 MIN: CPT | Performed by: FAMILY MEDICINE

## 2024-01-29 PROCEDURE — 1090F PRES/ABSN URINE INCON ASSESS: CPT | Performed by: FAMILY MEDICINE

## 2024-01-29 PROCEDURE — 3017F COLORECTAL CA SCREEN DOC REV: CPT | Performed by: FAMILY MEDICINE

## 2024-01-29 ASSESSMENT — PATIENT HEALTH QUESTIONNAIRE - PHQ9
SUM OF ALL RESPONSES TO PHQ QUESTIONS 1-9: 2
SUM OF ALL RESPONSES TO PHQ9 QUESTIONS 1 & 2: 2
SUM OF ALL RESPONSES TO PHQ QUESTIONS 1-9: 2
2. FEELING DOWN, DEPRESSED OR HOPELESS: 1
1. LITTLE INTEREST OR PLEASURE IN DOING THINGS: 1

## 2024-01-29 NOTE — TELEPHONE ENCOUNTER
Pt calling to let Dr Slater know that contrave was $600 and she said Dr Slater would \"send a link to a mail order pharmacy\". Please call pt at [phone]

## 2024-01-29 NOTE — PROGRESS NOTES
Chief Complaint   Patient presents with    Medication Refill     Patient is aware that this is a Virtual Visit or Phone Call Only doctor's visit.    Patient has not been out of the country in (14 months), NO diarrhea, NO cough, NO chest conjestion, NO temp.  Pt has not been around anyone with these symptoms.     Health Maintenance reviewed.    I have reviewed the patient's medical history in detail and updated the computerized patient record.    Have you been to the ER, urgent care clinic since your last visit? No  Hospitalized since your last visit?  No     2.  Have you seen or consulted any other health care providers outside of the [facility] since your last visit?  No Include any pap smears or colon screening.     Encouraged pt to discuss pt's wishes with spouse/partner/family and bring them in the next appt to follow thru with the Advanced Directive

## 2024-02-05 NOTE — TELEPHONE ENCOUNTER
Called the [facility] and they stated to fax to [phone] with confirmation, this was done.  RX or Contrave

## 2024-02-08 NOTE — PROGRESS NOTES
Left message for patient to return call to [phone].     Subjective:     Brenda Elliott is a 59 y.o. female seen for follow-up of diabetes. She has had hypoglycemic attacks. .no  Blood sugar control has been up and down last A1C was 7.8  She has diabetes, hypertension and hyperlipidemia. Brenda Elliott has the additional concern of taking gabapentin for phantomlimb pain but min improvement and makes her drowsy  Seeing rheum for hand arthritis, labs done. Diabetic Review of Systems: no polyuria or polydipsia, no chest pain, dyspnea or TIA's, has dysesthesias in the feet. Allergies   Allergen Reactions    Keflex [Cephalexin] Itching    Lortab [Hydrocodone-Acetaminophen] Hives and Itching     Patient states she can take tylenol #3 without problem. Diet and Lifestyle: nonsmoker. 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 (Patient taking differently: Take 40 mg by mouth nightly.) 30 Tab 5    raNITIdine (ZANTAC) 150 mg tablet Take 1 Tab by mouth two (2) times a day. 180 Tab 3    insulin aspart protamine/insulin aspart (NOVOLOG MIX 70-30FLEXPEN U-100) 100 unit/mL (70-30) inpn 14 Units by SubCUTAneous route Before breakfast and dinner.  5 Pen 5    COLCRYS 0.6 mg tablet TAKE 1 TABLET BY MOUTH ONCE DAILY 30 Tab 5    ibuprofen (MOTRIN) 200 mg tablet Take  by mouth every six (6) hours as needed for Pain.  lisinopril-hydroCHLOROthiazide (PRINZIDE, ZESTORETIC) 20-25 mg per tablet Take  by mouth daily.  DERMACINRX LEXITRAL 1.5-0.025 % cslc       fenoprofen 200 mg cap       lidocaine-tetracaine 7-7 % crea       LIVIXIL AVA cream       gabapentin (NEURONTIN) 300 mg capsule Take 1 Cap by mouth three (3) times daily. Start 1 tablet daily, usually in the evening, every few days increase as tolerated, pain. 90 Cap 5    naproxen sodium (ALEVE) 220 mg tablet Take 220 mg by mouth two (2) times daily (with meals).  metFORMIN (GLUCOPHAGE) 500 mg tablet Take 1 Tab by mouth two (2) times daily (with meals). 180 Tab 1    acetaminophen (TYLENOL) 500 mg tablet Take 1 Tab by mouth every four (4) hours (while awake). Indications: Pain (Patient taking differently: Take 650 mg by mouth every four (4) hours (while awake). Indications: Pain) 100 Tab 0    simvastatin (ZOCOR) 40 mg tablet Take 80 mg by mouth nightly. Allergies   Allergen Reactions    Keflex [Cephalexin] Itching    Lortab [Hydrocodone-Acetaminophen] Hives and Itching     Patient states she can take tylenol #3 without problem.      Social History     Tobacco Use    Smoking status: Former Smoker     Packs/day: 0.50     Years: 15.00     Pack years: 7.50     Last attempt to quit: 3/30/2009     Years since quittin.8    Smokeless tobacco: Never Used   Substance Use Topics    Alcohol use: No     Alcohol/week: 0.0 oz        Lab Results   Component Value Date/Time    WBC 4.2 2018 11:56 AM    HGB 13.1 2018 11:56 AM    HCT 40.9 2018 11:56 AM    PLATELET 941  11:56 AM    MCV 79 2018 11:56 AM     Lab Results   Component Value Date/Time    Hemoglobin A1c 7.8 (H) 10/17/2018 09:50 AM    Hemoglobin A1c 7.5 (H) 2018 08:27 AM    Hemoglobin A1c 8.3 (H) 2018 03:58 AM    Glucose 97 2018 11:56 AM    Glucose (POC) 146 (H) 02/18/2018 11:22 AM    Microalb/Creat ratio (ug/mg creat.) 102.6 (H) 07/11/2018 08:51 AM    LDL, calculated Comment 10/17/2018 09:50 AM    Creatinine 1.39 (H) 12/17/2018 11:56 AM      Lab Results   Component Value Date/Time    Cholesterol, total 111 10/17/2018 09:50 AM    HDL Cholesterol 19 (L) 10/17/2018 09:50 AM    LDL, calculated Comment 10/17/2018 09:50 AM    Triglyceride 708 (HH) 10/17/2018 09:50 AM     Lab Results   Component Value Date/Time    ALT (SGPT) 18 12/17/2018 11:56 AM    AST (SGOT) 19 12/17/2018 11:56 AM    Alk. phosphatase 73 12/17/2018 11:56 AM    Bilirubin, total 0.3 12/17/2018 11:56 AM    Albumin 4.7 12/17/2018 11:56 AM    Protein, total 6.9 12/17/2018 11:56 AM    INR 1.7 (H) 02/18/2018 02:46 AM    INR POC 1.3 03/05/2018 12:09 PM    Prothrombin time 17.0 (H) 02/18/2018 02:46 AM    PLATELET 871 68/37/1628 11:56 AM     Lab Results   Component Value Date/Time    GFR est non-AA 41 (L) 12/17/2018 11:56 AM    GFR est AA 47 (L) 12/17/2018 11:56 AM    Creatinine 1.39 (H) 12/17/2018 11:56 AM    BUN 24 12/17/2018 11:56 AM    Sodium 142 12/17/2018 11:56 AM    Potassium 4.2 12/17/2018 11:56 AM    Chloride 103 12/17/2018 11:56 AM    CO2 22 12/17/2018 11:56 AM     Lab Results   Component Value Date/Time    Glucose 97 12/17/2018 11:56 AM    Glucose (POC) 146 (H) 02/18/2018 11:22 AM         Review of Systems  Pertinent items are noted in HPI. Objective:     Significant for the following:     Visit Vitals  /85 (BP 1 Location: Left arm, BP Patient Position: Sitting)   Pulse 91   Temp 98.1 °F (36.7 °C) (Oral)   Resp 16   Ht 5' 3\" (1.6 m)   Wt 190 lb (86.2 kg)   LMP 08/23/2003   SpO2 98%   BMI 33.66 kg/m²     Appearance: alert, well appearing, and in no distress.   Exam: heart sounds normal rate, regular rhythm, normal S1, S2, no murmurs, rubs, clicks or gallops, chest clear  Foot exam: deferred    Lab review: labs reviewed, I note that glycosylated hemoglobin mildly abnormal but acceptable. Assessment/Plan:     Follow-up diabetes stable. Diabetic issues reviewed with her: diabetic diet discussed in detail, written exchange diet given and all medications, side effects and compliance discussed carefully. Chronic Conditions Addressed Today     1. Diabetes mellitus due to underlying condition with diabetic nephropathy, with long-term current use of insulin ([address]    2. Phantom limb pain ([address] - Primary     Relevant Orders     AMB SUPPLY ORDER    3. 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 Phantom limb pain ([address] - Primary        Orders Placed This Encounter    AMB SUPPLY ORDER     TENS unit apply to amputated strump daily     Restart gabapetin use TENS. Dm stable HTN stable. Follow-up Disposition:  Return in about 3 months (around 4/17/2019) for routine follow up.

## 2024-03-06 NOTE — PROGRESS NOTES
[facility]  Diabetes Self-Management Education & Support Program    Reason for Referral: dsmes  Referral Source: No ref. provider found  Services requested: DSMES       ASSESSMENT    From my perspective, the participant would benefit from DSMES specifically related to reducing risks, healthy eating, monitoring, taking medications, physical activity, healthy coping, and problem solving. Will adapt DSMES program to build on participant's skills score, confidence score, and preparedness score as noted in the Diabetes Skills, Confidence, and Preparedness Index.    During the program, we will focus on providing DSMES that specifically addresses participant's interest in reducing risks, healthy eating, monitoring, taking medications, physical activity, healthy coping, and problem solving, as shown by their reported readiness to change.    The participant would be best served by attending weekly group class series.    Diabetes Self-Management Education Follow-up Visit: 1/4/2024       Clinical Presentation  Torie Jarquin is a 68 y.o.  female referred for diabetes self-management education. Participant has Type 2 DM on insulin for 11-20 years. Family history positive for diabetes. Patient reports receiving DSMES services in the past. Most recent A1c value:   Lab Results   Component Value Date/Time    LABA1C 11.5 11/08/2023 08:46 AM     Diabetes-related medications:  Current dosing: insulin aspart prot & aspart - 100 UNIT/ML  metFORMIN - 500 MG    Blood Pressure Management  losartan - 50 MG      Lipid Management  fenofibrate - 160 MG  rosuvastatin - 40 MG      Clot Prevention  This patient does not have an active medication from one of the medication groupers.    Learning Assessment  Learning objectives Educator assessment (12/21/23)   Diabetes Disease Process  The participant can   A) describe diabetes in basic terms;   B) state the type of diabetes they have; &   C) state

## 2024-03-15 ENCOUNTER — TELEPHONE (OUTPATIENT)
Age: 69
End: 2024-03-15

## 2024-03-19 NOTE — TELEPHONE ENCOUNTER
Returned call and pt has a cough x 2 weeks after spell of Covid, appt for April 1st    Also asked for ozempic be sent to [facility]

## 2024-03-20 RX ORDER — OMEPRAZOLE 20 MG/1
20 CAPSULE, DELAYED RELEASE ORAL DAILY
Qty: 90 CAPSULE | Refills: 3 | Status: CANCELLED | OUTPATIENT
Start: 2024-03-20

## 2024-03-24 DIAGNOSIS — G54.7 PHANTOM LIMB (HCC): ICD-10-CM

## 2024-03-25 ENCOUNTER — HOSPITAL ENCOUNTER (OUTPATIENT)
Facility: HOSPITAL | Age: 69
Discharge: HOME | End: 2024-03-28
Payer: MEDICARE

## 2024-03-25 LAB
ANION GAP SERPL CALC-SCNC: 10 MMOL/L (ref 5–15)
BUN SERPL-MCNC: 18 MG/DL (ref 6–20)
BUN/CREAT SERPL: 15 (ref 12–20)
CALCIUM SERPL-MCNC: 9.5 MG/DL (ref 8.5–10.1)
CHLORIDE SERPL-SCNC: 98 MMOL/L (ref 97–108)
CO2 SERPL-SCNC: 27 MMOL/L (ref 21–32)
CREAT SERPL-MCNC: 1.2 MG/DL (ref 0.55–1.02)
EST. AVERAGE GLUCOSE BLD GHB EST-MCNC: 321 MG/DL
GLUCOSE SERPL-MCNC: 404 MG/DL (ref 65–100)
HBA1C MFR BLD: 12.8 % (ref 4–5.6)
POTASSIUM SERPL-SCNC: 4.4 MMOL/L (ref 3.5–5.1)
SODIUM SERPL-SCNC: 135 MMOL/L (ref 136–145)

## 2024-03-25 PROCEDURE — 36415 COLL VENOUS BLD VENIPUNCTURE: CPT

## 2024-03-25 PROCEDURE — 83036 HEMOGLOBIN GLYCOSYLATED A1C: CPT

## 2024-03-25 PROCEDURE — 80048 BASIC METABOLIC PNL TOTAL CA: CPT

## 2024-03-25 RX ORDER — MEMANTINE HYDROCHLORIDE 5 MG/1
TABLET ORAL
Qty: 234 TABLET | Refills: 3 | Status: SHIPPED | OUTPATIENT
Start: 2024-03-25 | End: 2024-03-28 | Stop reason: SDUPTHER

## 2024-03-27 ENCOUNTER — HOSPITAL ENCOUNTER (OUTPATIENT)
Facility: HOSPITAL | Age: 69
Discharge: HOME | End: 2024-03-30
Payer: MEDICARE

## 2024-03-27 ENCOUNTER — OFFICE VISIT (OUTPATIENT)
Age: 69
End: 2024-03-27
Payer: MEDICARE

## 2024-03-27 VITALS
HEART RATE: 99 BPM | BODY MASS INDEX: 33.59 KG/M2 | OXYGEN SATURATION: 96 % | DIASTOLIC BLOOD PRESSURE: 78 MMHG | TEMPERATURE: 97.4 F | WEIGHT: 189.6 LBS | RESPIRATION RATE: 19 BRPM | HEIGHT: 63 IN | SYSTOLIC BLOOD PRESSURE: 132 MMHG

## 2024-03-27 DIAGNOSIS — S78.112S TRAUMATIC ABOVE-KNEE AMPUTATION OF LEFT LOWER EXTREMITY, SEQUELA (HCC): ICD-10-CM

## 2024-03-27 DIAGNOSIS — M54.16 LUMBAR BACK PAIN WITH RADICULOPATHY AFFECTING LEFT LOWER EXTREMITY: ICD-10-CM

## 2024-03-27 DIAGNOSIS — E11.42 DIABETIC PERIPHERAL NEUROPATHY ASSOCIATED WITH TYPE 2 DIABETES MELLITUS (HCC): Primary | ICD-10-CM

## 2024-03-27 DIAGNOSIS — Z98.890 HISTORY OF CARPAL TUNNEL RELEASE OF BOTH WRISTS: ICD-10-CM

## 2024-03-27 DIAGNOSIS — G54.6 PHANTOM LIMB SYNDROME WITH PAIN (HCC): ICD-10-CM

## 2024-03-27 DIAGNOSIS — G54.6 PHANTOM LIMB PAIN (HCC): ICD-10-CM

## 2024-03-27 PROCEDURE — 3078F DIAST BP <80 MM HG: CPT | Performed by: PSYCHIATRY & NEUROLOGY

## 2024-03-27 PROCEDURE — 1090F PRES/ABSN URINE INCON ASSESS: CPT | Performed by: PSYCHIATRY & NEUROLOGY

## 2024-03-27 PROCEDURE — G8427 DOCREV CUR MEDS BY ELIG CLIN: HCPCS | Performed by: PSYCHIATRY & NEUROLOGY

## 2024-03-27 PROCEDURE — 2022F DILAT RTA XM EVC RTNOPTHY: CPT | Performed by: PSYCHIATRY & NEUROLOGY

## 2024-03-27 PROCEDURE — 3075F SYST BP GE 130 - 139MM HG: CPT | Performed by: PSYCHIATRY & NEUROLOGY

## 2024-03-27 PROCEDURE — 1036F TOBACCO NON-USER: CPT | Performed by: PSYCHIATRY & NEUROLOGY

## 2024-03-27 PROCEDURE — 3017F COLORECTAL CA SCREEN DOC REV: CPT | Performed by: PSYCHIATRY & NEUROLOGY

## 2024-03-27 PROCEDURE — 72110 X-RAY EXAM L-2 SPINE 4/>VWS: CPT

## 2024-03-27 PROCEDURE — 1123F ACP DISCUSS/DSCN MKR DOCD: CPT | Performed by: PSYCHIATRY & NEUROLOGY

## 2024-03-27 PROCEDURE — 99214 OFFICE O/P EST MOD 30 MIN: CPT | Performed by: PSYCHIATRY & NEUROLOGY

## 2024-03-27 PROCEDURE — G8484 FLU IMMUNIZE NO ADMIN: HCPCS | Performed by: PSYCHIATRY & NEUROLOGY

## 2024-03-27 PROCEDURE — 3046F HEMOGLOBIN A1C LEVEL >9.0%: CPT | Performed by: PSYCHIATRY & NEUROLOGY

## 2024-03-27 PROCEDURE — G8417 CALC BMI ABV UP PARAM F/U: HCPCS | Performed by: PSYCHIATRY & NEUROLOGY

## 2024-03-27 PROCEDURE — G8399 PT W/DXA RESULTS DOCUMENT: HCPCS | Performed by: PSYCHIATRY & NEUROLOGY

## 2024-03-27 RX ORDER — AMITRIPTYLINE HYDROCHLORIDE 50 MG/1
100 TABLET, FILM COATED ORAL NIGHTLY
Qty: 180 TABLET | Refills: 3 | Status: SHIPPED | OUTPATIENT
Start: 2024-03-27

## 2024-03-27 RX ORDER — VITAMIN B COMPLEX
1 CAPSULE ORAL DAILY
COMMUNITY

## 2024-03-27 RX ORDER — GABAPENTIN 300 MG/1
300 CAPSULE ORAL 3 TIMES DAILY PRN
Qty: 90 CAPSULE | Refills: 5 | Status: SHIPPED | OUTPATIENT
Start: 2024-03-27 | End: 2024-09-27

## 2024-03-27 ASSESSMENT — PATIENT HEALTH QUESTIONNAIRE - PHQ9
SUM OF ALL RESPONSES TO PHQ QUESTIONS 1-9: 0
SUM OF ALL RESPONSES TO PHQ9 QUESTIONS 1 & 2: 0
1. LITTLE INTEREST OR PLEASURE IN DOING THINGS: NOT AT ALL
SUM OF ALL RESPONSES TO PHQ QUESTIONS 1-9: 0
SUM OF ALL RESPONSES TO PHQ QUESTIONS 1-9: 0
2. FEELING DOWN, DEPRESSED OR HOPELESS: NOT AT ALL
SUM OF ALL RESPONSES TO PHQ QUESTIONS 1-9: 0

## 2024-03-27 NOTE — PROGRESS NOTES
Consult  REFERRED BY:  Milan Slater MD    CHIEF COMPLAINT: We are seeing patient as a new patient to me, for evaluation of pain in her left leg that she says is her sciatic nerve being aggravated and causing more phantom limb pain in the left leg, and for follow-up after her carpal tunnel syndromes..      Subjective:     Torie aJrquin is a 68 y.o. right-handed -American female we are seeing as a new patient, at the request of Dr. Slater of new problem of patient feeling she is having more sciatic pain radiating down from her hip into her left leg, and aggravating her phantom limb pain from her above-the-knee amputation.  She had an above-the-knee amputation after traumatic accident, about 30 years ago, and apparently started having phantom limb pain about 10 years ago, for which she was previously taking Elavil 50 mg at night and Neurontin 100 mg 3 times a day and was getting only partial relief, and is off the medication now and wants to get something for her pain.  Right now she is not on any medication for the pain.  She describes the pain as radiating up and down the leg to about where the knee used to be.  She does not have any real numbness or weakness in the leg but because of the amputation is hard to be sure.  She has a prosthesis all the way up to her hip.  She does not have that much back pain.  Her bowel and bladder function are stable.  She has no right leg pain.  She is diabetic probably has a little bit of diabetic neuropathy.  She has high blood pressure, high cholesterol, GERD, BRCA gene, right knee replacement, and sleep apnea.  Patient seen 2 years ago for bilateral carpal tunnel syndrome and had bilateral decompression done by orthopedics, and is doing much better.  Patient is diabetic with mild diabetic neuropathy we advised her that the single best treatment for that is good control of her blood sugars and her diabetes.  She is also encouraged to stay physically mentally active, eat a

## 2024-03-28 ENCOUNTER — TELEPHONE (OUTPATIENT)
Age: 69
End: 2024-03-28

## 2024-03-28 DIAGNOSIS — G54.7 PHANTOM LIMB (HCC): ICD-10-CM

## 2024-03-28 RX ORDER — MEMANTINE HYDROCHLORIDE 10 MG/1
10 TABLET ORAL 2 TIMES DAILY
Qty: 180 TABLET | Refills: 1 | Status: SHIPPED | OUTPATIENT
Start: 2024-03-28

## 2024-03-28 NOTE — TELEPHONE ENCOUNTER
PA was done on Memantine(Namenda) 5 mg tablets.Plan question ,was can patient change to higher strength.Script was changed by provider.[facility] pharmacy called [place], state,\"script went thru with no co pay.Awaiting pickup\".

## 2024-04-01 ENCOUNTER — OFFICE VISIT (OUTPATIENT)
Facility: CLINIC | Age: 69
End: 2024-04-01
Payer: MEDICARE

## 2024-04-01 VITALS
BODY MASS INDEX: 31.54 KG/M2 | RESPIRATION RATE: 18 BRPM | HEIGHT: 63 IN | WEIGHT: 178 LBS | DIASTOLIC BLOOD PRESSURE: 91 MMHG | HEART RATE: 95 BPM | TEMPERATURE: 97.8 F | OXYGEN SATURATION: 96 % | SYSTOLIC BLOOD PRESSURE: 151 MMHG

## 2024-04-01 DIAGNOSIS — I10 ESSENTIAL HYPERTENSION: ICD-10-CM

## 2024-04-01 DIAGNOSIS — S78.112D TRAUMATIC ABOVE-KNEE AMPUTATION OF LEFT LOWER EXTREMITY, SUBSEQUENT ENCOUNTER (HCC): ICD-10-CM

## 2024-04-01 DIAGNOSIS — M54.32 SCIATICA OF LEFT SIDE: ICD-10-CM

## 2024-04-01 DIAGNOSIS — E11.29 DIABETES MELLITUS WITH PROTEINURIA (HCC): ICD-10-CM

## 2024-04-01 DIAGNOSIS — G54.6 PHANTOM LIMB PAIN (HCC): ICD-10-CM

## 2024-04-01 DIAGNOSIS — R80.9 DIABETES MELLITUS WITH PROTEINURIA (HCC): ICD-10-CM

## 2024-04-01 DIAGNOSIS — E11.65 TYPE 2 DIABETES MELLITUS WITH HYPERGLYCEMIA, WITH LONG-TERM CURRENT USE OF INSULIN (HCC): Primary | ICD-10-CM

## 2024-04-01 DIAGNOSIS — E11.42 DIABETIC PERIPHERAL NEUROPATHY ASSOCIATED WITH TYPE 2 DIABETES MELLITUS (HCC): ICD-10-CM

## 2024-04-01 DIAGNOSIS — Z79.4 TYPE 2 DIABETES MELLITUS WITH HYPERGLYCEMIA, WITH LONG-TERM CURRENT USE OF INSULIN (HCC): Primary | ICD-10-CM

## 2024-04-01 PROCEDURE — 3046F HEMOGLOBIN A1C LEVEL >9.0%: CPT | Performed by: FAMILY MEDICINE

## 2024-04-01 PROCEDURE — G8427 DOCREV CUR MEDS BY ELIG CLIN: HCPCS | Performed by: FAMILY MEDICINE

## 2024-04-01 PROCEDURE — 3077F SYST BP >= 140 MM HG: CPT | Performed by: FAMILY MEDICINE

## 2024-04-01 PROCEDURE — 1036F TOBACCO NON-USER: CPT | Performed by: FAMILY MEDICINE

## 2024-04-01 PROCEDURE — G8417 CALC BMI ABV UP PARAM F/U: HCPCS | Performed by: FAMILY MEDICINE

## 2024-04-01 PROCEDURE — G2211 COMPLEX E/M VISIT ADD ON: HCPCS | Performed by: FAMILY MEDICINE

## 2024-04-01 PROCEDURE — G8399 PT W/DXA RESULTS DOCUMENT: HCPCS | Performed by: FAMILY MEDICINE

## 2024-04-01 PROCEDURE — 1123F ACP DISCUSS/DSCN MKR DOCD: CPT | Performed by: FAMILY MEDICINE

## 2024-04-01 PROCEDURE — 3079F DIAST BP 80-89 MM HG: CPT | Performed by: FAMILY MEDICINE

## 2024-04-01 PROCEDURE — 2022F DILAT RTA XM EVC RTNOPTHY: CPT | Performed by: FAMILY MEDICINE

## 2024-04-01 PROCEDURE — 1090F PRES/ABSN URINE INCON ASSESS: CPT | Performed by: FAMILY MEDICINE

## 2024-04-01 PROCEDURE — 99214 OFFICE O/P EST MOD 30 MIN: CPT | Performed by: FAMILY MEDICINE

## 2024-04-01 PROCEDURE — 3017F COLORECTAL CA SCREEN DOC REV: CPT | Performed by: FAMILY MEDICINE

## 2024-04-01 RX ORDER — CYCLOBENZAPRINE HCL 10 MG
10 TABLET ORAL 3 TIMES DAILY PRN
COMMUNITY

## 2024-04-01 RX ORDER — IBUPROFEN 600 MG/1
600 TABLET ORAL 3 TIMES DAILY PRN
Qty: 270 TABLET | Refills: 1 | Status: SHIPPED | OUTPATIENT
Start: 2024-04-01

## 2024-04-01 RX ORDER — LOSARTAN POTASSIUM 25 MG/1
25 TABLET ORAL DAILY
Qty: 90 TABLET | Refills: 2 | Status: SHIPPED | OUTPATIENT
Start: 2024-04-01 | End: 2024-04-01 | Stop reason: DRUGHIGH

## 2024-04-01 RX ORDER — LOSARTAN POTASSIUM 100 MG/1
100 TABLET ORAL DAILY
Qty: 90 TABLET | Refills: 1 | Status: SHIPPED | OUTPATIENT
Start: 2024-04-01

## 2024-04-01 RX ORDER — SEMAGLUTIDE 1.34 MG/ML
0.25 INJECTION, SOLUTION SUBCUTANEOUS WEEKLY
Qty: 4 ADJUSTABLE DOSE PRE-FILLED PEN SYRINGE | Refills: 5 | Status: SHIPPED | OUTPATIENT
Start: 2024-04-01

## 2024-04-01 RX ORDER — ROSUVASTATIN CALCIUM 40 MG/1
40 TABLET, COATED ORAL NIGHTLY
Qty: 90 TABLET | Refills: 2 | Status: SHIPPED | OUTPATIENT
Start: 2024-04-01

## 2024-04-01 NOTE — PROGRESS NOTES
Chief Complaint   Patient presents with    Discuss Labs     Patient has not been out of the country in (14 months), NO diarrhea, NO cough, NO chest conjestion, NO temp.  Pt has not been around anyone with these symptoms.     Health Maintenance reviewed.    I have reviewed the patient's medical history in detail and updated the computerized patient record.    \"Have you been to the ER, urgent care clinic since your last visit? No  Hospitalized since your last visit?\"    no    “Have you seen or consulted any other health care providers outside of [facility] since your last visit?”    no                                         
Chief Complaint   Patient presents with    Discuss Labs     Patient has not been out of the country in (14 months), NO diarrhea, NO cough, NO chest conjestion, NO temp.  Pt has not been around anyone with these symptoms.     Health Maintenance reviewed.    I have reviewed the patient's medical history in detail and updated the computerized patient record.    \"Have you been to the ER, urgent care clinic since your last visit? No  Hospitalized since your last visit?\"    no    “Have you seen or consulted any other health care providers outside of [facility] since your last visit?”    no                                         
into the skin once a week     Dispense:  4 Adjustable Dose Pre-filled Pen Syringe     Refill:  5    insulin aspart prot & aspart (NOVOLOG 70/30) injection pen     Sig: Inject 30 Units into the skin 2 times daily (with meals)     Dispense:  5 Adjustable Dose Pre-filled Pen Syringe     Refill:  5    ibuprofen (ADVIL;MOTRIN) 600 MG tablet     Sig: Take 1 tablet by mouth 3 times daily as needed for Pain     Dispense:  270 tablet     Refill:  1    DISCONTD: losartan (COZAAR) 25 MG tablet     Sig: Take 1 tablet by mouth daily     Dispense:  90 tablet     Refill:  2    losartan (COZAAR) 100 MG tablet     Sig: Take 1 tablet by mouth daily     Dispense:  90 tablet     Refill:  1    rosuvastatin (CRESTOR) 40 MG tablet     Sig: Take 1 tablet by mouth nightly     Dispense:  90 tablet     Refill:  2     Will get a try and get her Ozempic but that may take a while so in the meantime I have increased her insulin.    Current Outpatient Medications   Medication Sig Dispense Refill    cyclobenzaprine (FLEXERIL) 10 MG tablet Take 1 tablet by mouth 3 times daily as needed for Muscle spasms      Semaglutide,0.25 or 0.5MG/DOS, (OZEMPIC, 0.25 OR 0.5 MG/DOSE,) 2 MG/1.5ML SOPN Inject 0.25 mg into the skin once a week 4 Adjustable Dose Pre-filled Pen Syringe 5    insulin aspart prot & aspart (NOVOLOG 70/30) injection pen Inject 30 Units into the skin 2 times daily (with meals) 5 Adjustable Dose Pre-filled Pen Syringe 5    ibuprofen (ADVIL;MOTRIN) 600 MG tablet Take 1 tablet by mouth 3 times daily as needed for Pain 270 tablet 1    losartan (COZAAR) 100 MG tablet Take 1 tablet by mouth daily 90 tablet 1    rosuvastatin (CRESTOR) 40 MG tablet Take 1 tablet by mouth nightly 90 tablet 2    b complex vitamins capsule Take 1 capsule by mouth daily      amitriptyline (ELAVIL) 50 MG tablet Take 2 tablets by mouth nightly 180 tablet 3    gabapentin (NEURONTIN) 300 MG capsule Take 1 capsule by mouth 3 times daily as needed (pain) for up to 184 days.

## 2024-04-18 DIAGNOSIS — E11.65 TYPE 2 DIABETES MELLITUS WITH HYPERGLYCEMIA, WITH LONG-TERM CURRENT USE OF INSULIN (HCC): ICD-10-CM

## 2024-04-18 DIAGNOSIS — Z79.4 TYPE 2 DIABETES MELLITUS WITH HYPERGLYCEMIA, WITH LONG-TERM CURRENT USE OF INSULIN (HCC): ICD-10-CM

## 2024-04-19 RX ORDER — INSULIN ASPART 100 [IU]/ML
INJECTION, SUSPENSION SUBCUTANEOUS
Qty: 45 ML | Refills: 3 | Status: SHIPPED | OUTPATIENT
Start: 2024-04-19

## 2024-05-02 ENCOUNTER — HOSPITAL ENCOUNTER (OUTPATIENT)
Facility: HOSPITAL | Age: 69
Discharge: HOME | End: 2024-05-02
Payer: MEDICARE

## 2024-05-02 LAB
ANION GAP SERPL CALC-SCNC: 10 MMOL/L (ref 5–15)
BUN SERPL-MCNC: 13 MG/DL (ref 6–20)
BUN/CREAT SERPL: 12 (ref 12–20)
CALCIUM SERPL-MCNC: 9.5 MG/DL (ref 8.5–10.1)
CHLORIDE SERPL-SCNC: 102 MMOL/L (ref 97–108)
CO2 SERPL-SCNC: 27 MMOL/L (ref 21–32)
CREAT SERPL-MCNC: 1.07 MG/DL (ref 0.55–1.02)
GLUCOSE SERPL-MCNC: 119 MG/DL (ref 65–100)
POTASSIUM SERPL-SCNC: 4.2 MMOL/L (ref 3.5–5.1)
SODIUM SERPL-SCNC: 139 MMOL/L (ref 136–145)

## 2024-05-02 PROCEDURE — 36415 COLL VENOUS BLD VENIPUNCTURE: CPT

## 2024-05-02 PROCEDURE — 80048 BASIC METABOLIC PNL TOTAL CA: CPT

## 2024-05-06 NOTE — PROGRESS NOTES
Medicare Annual Wellness Visit    Torie Jarquin is here for Medicare AWV    Assessment & Plan   Diabetes mellitus with proteinuria (HCC)  -     Semaglutide,0.25 or 0.5MG/DOS, (OZEMPIC, 0.25 OR 0.5 MG/DOSE,) 2 MG/1.5ML SOPN; Inject 0.5 mg into the skin every 7 days, Disp-4 Adjustable Dose Pre-filled Pen Syringe, R-2Normal  -     Microalbumin / Creatinine Urine Ratio  -     Amb External Referral To Optometry  -     Basic Metabolic Panel; Future  -     Hemoglobin A1C; Future  Diabetic peripheral neuropathy associated with type 2 diabetes mellitus (HCC)  Phantom limb pain (HCC)  Major depressive disorder, recurrent, mild (HCC)  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, sequela (HCC)  Essential hypertension  Gastroesophageal reflux disease, unspecified whether esophagitis present  -     omeprazole (PRILOSEC) 20 MG delayed release capsule; Take 1 capsule by mouth Daily, Disp-90 capsule, R-3Normal  Type 2 diabetes mellitus with hyperglycemia, with long-term current use of insulin (HCC)  -     insulin aspart prot & aspart (NOVOLOG MIX 70/30 FLEXPEN) injection pen; Inject 30 Units into the skin 2 times daily (with meals), Disp-10 Adjustable Dose Pre-filled Pen Syringe, R-5Normal  CRI (chronic renal insufficiency), stage 3 (moderate) (HCC)  -     allopurinol (ZYLOPRIM) 100 MG tablet; Take 1 tablet by mouth daily, Disp-90 tablet, R-3Normal  Need for vaccination  -     Pneumococcal, PCV20, PREVNAR 20, (age 6w+), IM, PF     Recommendations for Preventive Services Due: see orders and patient instructions/AVS.  Recommended screening schedule for the next 5-10 years is provided to the patient in written form: see Patient Instructions/AVS.     Return in about 3 months (around 8/7/2024).     Subjective   The following acute and/or chronic problems were also addressed today:  Diabetes        Patient's complete Health Risk Assessment and screening values have been reviewed and are found in Flowsheets. The following

## 2024-05-07 ENCOUNTER — OFFICE VISIT (OUTPATIENT)
Facility: CLINIC | Age: 69
End: 2024-05-07
Payer: MEDICARE

## 2024-05-07 VITALS
BODY MASS INDEX: 31.18 KG/M2 | DIASTOLIC BLOOD PRESSURE: 77 MMHG | RESPIRATION RATE: 16 BRPM | TEMPERATURE: 97.7 F | OXYGEN SATURATION: 95 % | HEART RATE: 101 BPM | SYSTOLIC BLOOD PRESSURE: 126 MMHG | WEIGHT: 176 LBS | HEIGHT: 63 IN

## 2024-05-07 DIAGNOSIS — E11.42 DIABETIC PERIPHERAL NEUROPATHY ASSOCIATED WITH TYPE 2 DIABETES MELLITUS (HCC): ICD-10-CM

## 2024-05-07 DIAGNOSIS — Z00.00 MEDICARE ANNUAL WELLNESS VISIT, SUBSEQUENT: Primary | ICD-10-CM

## 2024-05-07 DIAGNOSIS — R80.9 DIABETES MELLITUS WITH PROTEINURIA (HCC): ICD-10-CM

## 2024-05-07 DIAGNOSIS — F33.0 MAJOR DEPRESSIVE DISORDER, RECURRENT, MILD (HCC): ICD-10-CM

## 2024-05-07 DIAGNOSIS — E11.65 TYPE 2 DIABETES MELLITUS WITH HYPERGLYCEMIA, WITH LONG-TERM CURRENT USE OF INSULIN (HCC): ICD-10-CM

## 2024-05-07 DIAGNOSIS — I10 ESSENTIAL HYPERTENSION: ICD-10-CM

## 2024-05-07 DIAGNOSIS — Z23 NEED FOR VACCINATION: ICD-10-CM

## 2024-05-07 DIAGNOSIS — Z79.4 TYPE 2 DIABETES MELLITUS WITH HYPERGLYCEMIA, WITH LONG-TERM CURRENT USE OF INSULIN (HCC): ICD-10-CM

## 2024-05-07 DIAGNOSIS — N18.30 CRI (CHRONIC RENAL INSUFFICIENCY), STAGE 3 (MODERATE) (HCC): ICD-10-CM

## 2024-05-07 DIAGNOSIS — K21.9 GASTROESOPHAGEAL REFLUX DISEASE, UNSPECIFIED WHETHER ESOPHAGITIS PRESENT: ICD-10-CM

## 2024-05-07 DIAGNOSIS — E11.29 DIABETES MELLITUS WITH PROTEINURIA (HCC): ICD-10-CM

## 2024-05-07 DIAGNOSIS — S78.112S TRAUMATIC ABOVE-KNEE AMPUTATION OF LEFT LOWER EXTREMITY, SEQUELA (HCC): ICD-10-CM

## 2024-05-07 DIAGNOSIS — G54.6 PHANTOM LIMB PAIN (HCC): ICD-10-CM

## 2024-05-07 PROCEDURE — 3078F DIAST BP <80 MM HG: CPT | Performed by: FAMILY MEDICINE

## 2024-05-07 PROCEDURE — 1090F PRES/ABSN URINE INCON ASSESS: CPT | Performed by: FAMILY MEDICINE

## 2024-05-07 PROCEDURE — G8417 CALC BMI ABV UP PARAM F/U: HCPCS | Performed by: FAMILY MEDICINE

## 2024-05-07 PROCEDURE — 3017F COLORECTAL CA SCREEN DOC REV: CPT | Performed by: FAMILY MEDICINE

## 2024-05-07 PROCEDURE — G8399 PT W/DXA RESULTS DOCUMENT: HCPCS | Performed by: FAMILY MEDICINE

## 2024-05-07 PROCEDURE — 99214 OFFICE O/P EST MOD 30 MIN: CPT | Performed by: FAMILY MEDICINE

## 2024-05-07 PROCEDURE — 3074F SYST BP LT 130 MM HG: CPT | Performed by: FAMILY MEDICINE

## 2024-05-07 PROCEDURE — 1036F TOBACCO NON-USER: CPT | Performed by: FAMILY MEDICINE

## 2024-05-07 PROCEDURE — 1123F ACP DISCUSS/DSCN MKR DOCD: CPT | Performed by: FAMILY MEDICINE

## 2024-05-07 PROCEDURE — G0009 ADMIN PNEUMOCOCCAL VACCINE: HCPCS | Performed by: FAMILY MEDICINE

## 2024-05-07 PROCEDURE — 90677 PCV20 VACCINE IM: CPT | Performed by: FAMILY MEDICINE

## 2024-05-07 PROCEDURE — G0439 PPPS, SUBSEQ VISIT: HCPCS | Performed by: FAMILY MEDICINE

## 2024-05-07 PROCEDURE — 3046F HEMOGLOBIN A1C LEVEL >9.0%: CPT | Performed by: FAMILY MEDICINE

## 2024-05-07 PROCEDURE — 2022F DILAT RTA XM EVC RTNOPTHY: CPT | Performed by: FAMILY MEDICINE

## 2024-05-07 PROCEDURE — G8427 DOCREV CUR MEDS BY ELIG CLIN: HCPCS | Performed by: FAMILY MEDICINE

## 2024-05-07 RX ORDER — ROSUVASTATIN CALCIUM 40 MG/1
40 TABLET, COATED ORAL NIGHTLY
Qty: 90 TABLET | Refills: 3 | Status: SHIPPED | OUTPATIENT
Start: 2024-05-07

## 2024-05-07 RX ORDER — MEMANTINE HYDROCHLORIDE 10 MG/1
10 TABLET ORAL 2 TIMES DAILY
COMMUNITY
End: 2024-05-07 | Stop reason: ALTCHOICE

## 2024-05-07 RX ORDER — ALLOPURINOL 100 MG/1
100 TABLET ORAL DAILY
Qty: 90 TABLET | Refills: 3 | Status: SHIPPED | OUTPATIENT
Start: 2024-05-07

## 2024-05-07 RX ORDER — OMEPRAZOLE 20 MG/1
20 CAPSULE, DELAYED RELEASE ORAL DAILY
Qty: 90 CAPSULE | Refills: 3 | Status: SHIPPED | OUTPATIENT
Start: 2024-05-07

## 2024-05-07 RX ORDER — OMEPRAZOLE 20 MG/1
CAPSULE, DELAYED RELEASE ORAL
COMMUNITY
Start: 2024-04-18 | End: 2024-05-07 | Stop reason: SDUPTHER

## 2024-05-07 RX ORDER — INSULIN ASPART 100 [IU]/ML
30 INJECTION, SUSPENSION SUBCUTANEOUS 2 TIMES DAILY WITH MEALS
Qty: 10 ADJUSTABLE DOSE PRE-FILLED PEN SYRINGE | Refills: 5 | Status: SHIPPED | OUTPATIENT
Start: 2024-05-07

## 2024-05-07 RX ORDER — SEMAGLUTIDE 1.34 MG/ML
0.5 INJECTION, SOLUTION SUBCUTANEOUS
Qty: 4 ADJUSTABLE DOSE PRE-FILLED PEN SYRINGE | Refills: 2 | Status: SHIPPED | OUTPATIENT
Start: 2024-05-07

## 2024-05-07 SDOH — ECONOMIC STABILITY: FOOD INSECURITY: WITHIN THE PAST 12 MONTHS, THE FOOD YOU BOUGHT JUST DIDN'T LAST AND YOU DIDN'T HAVE MONEY TO GET MORE.: NEVER TRUE

## 2024-05-07 SDOH — ECONOMIC STABILITY: FOOD INSECURITY: WITHIN THE PAST 12 MONTHS, YOU WORRIED THAT YOUR FOOD WOULD RUN OUT BEFORE YOU GOT MONEY TO BUY MORE.: NEVER TRUE

## 2024-05-07 SDOH — ECONOMIC STABILITY: HOUSING INSECURITY
IN THE LAST 12 MONTHS, WAS THERE A TIME WHEN YOU DID NOT HAVE A STEADY PLACE TO SLEEP OR SLEPT IN A SHELTER (INCLUDING NOW)?: NO

## 2024-05-07 SDOH — ECONOMIC STABILITY: INCOME INSECURITY: HOW HARD IS IT FOR YOU TO PAY FOR THE VERY BASICS LIKE FOOD, HOUSING, MEDICAL CARE, AND HEATING?: NOT HARD AT ALL

## 2024-05-07 ASSESSMENT — ANXIETY QUESTIONNAIRES
1. FEELING NERVOUS, ANXIOUS, OR ON EDGE: SEVERAL DAYS
2. NOT BEING ABLE TO STOP OR CONTROL WORRYING: SEVERAL DAYS
IF YOU CHECKED OFF ANY PROBLEMS ON THIS QUESTIONNAIRE, HOW DIFFICULT HAVE THESE PROBLEMS MADE IT FOR YOU TO DO YOUR WORK, TAKE CARE OF THINGS AT HOME, OR GET ALONG WITH OTHER PEOPLE: SOMEWHAT DIFFICULT

## 2024-05-07 ASSESSMENT — PATIENT HEALTH QUESTIONNAIRE - PHQ9
2. FEELING DOWN, DEPRESSED OR HOPELESS: SEVERAL DAYS
1. LITTLE INTEREST OR PLEASURE IN DOING THINGS: SEVERAL DAYS
SUM OF ALL RESPONSES TO PHQ QUESTIONS 1-9: 2
SUM OF ALL RESPONSES TO PHQ9 QUESTIONS 1 & 2: 2

## 2024-05-07 NOTE — PROGRESS NOTES
Chief Complaint   Patient presents with    Medicare AWV     Clock Test was done     Patient has not been out of the country in (14 months), NO diarrhea, NO cough, NO chest conjestion, NO temp.  Pt has not been around anyone with these symptoms.     Health Maintenance reviewed.    I have reviewed the patient's medical history in detail and updated the computerized patient record.    \"Have you been to the ER, urgent care clinic since your last visit?  No  Hospitalized since your last visit?\"    no    “Have you seen or consulted any other health care providers outside of [facility] since your last visit?”    no

## 2024-06-01 LAB
BUN SERPL-MCNC: 19 MG/DL (ref 8–27)
BUN/CREAT SERPL: 17 (ref 12–28)
CALCIUM SERPL-MCNC: 10.8 MG/DL (ref 8.7–10.3)
CHLORIDE SERPL-SCNC: 98 MMOL/L (ref 96–106)
CO2 SERPL-SCNC: 24 MMOL/L (ref 20–29)
CREAT SERPL-MCNC: 1.15 MG/DL (ref 0.57–1)
EGFRCR SERPLBLD CKD-EPI 2021: 52 ML/MIN/1.73
GLUCOSE SERPL-MCNC: 210 MG/DL (ref 70–99)
HBA1C MFR BLD: 11 % (ref 4.8–5.6)
Lab: NORMAL
POTASSIUM SERPL-SCNC: 4.7 MMOL/L (ref 3.5–5.2)
REPORT: NORMAL
SODIUM SERPL-SCNC: 138 MMOL/L (ref 134–144)

## 2024-06-07 ENCOUNTER — TELEPHONE (OUTPATIENT)
Facility: CLINIC | Age: 69
End: 2024-06-07

## 2024-06-07 DIAGNOSIS — Z79.4 TYPE 2 DIABETES MELLITUS WITH HYPERGLYCEMIA, WITH LONG-TERM CURRENT USE OF INSULIN (HCC): ICD-10-CM

## 2024-06-07 DIAGNOSIS — E11.65 TYPE 2 DIABETES MELLITUS WITH HYPERGLYCEMIA, WITH LONG-TERM CURRENT USE OF INSULIN (HCC): ICD-10-CM

## 2024-06-07 RX ORDER — INSULIN ASPART 100 [IU]/ML
35 INJECTION, SUSPENSION SUBCUTANEOUS 2 TIMES DAILY WITH MEALS
Qty: 10 ADJUSTABLE DOSE PRE-FILLED PEN SYRINGE | Refills: 5 | Status: SHIPPED | OUTPATIENT
Start: 2024-06-07

## 2024-06-07 NOTE — TELEPHONE ENCOUNTER
Hemoglobin A1c a little better but still too high at 11, patient notified to increase insulin 35 units twice a day with meals.  Keep follow-up

## 2024-06-13 DIAGNOSIS — G54.6 PHANTOM LIMB SYNDROME WITH PAIN (HCC): ICD-10-CM

## 2024-06-13 DIAGNOSIS — S78.112S TRAUMATIC ABOVE-KNEE AMPUTATION OF LEFT LOWER EXTREMITY, SEQUELA (HCC): ICD-10-CM

## 2024-06-13 DIAGNOSIS — M54.16 LUMBAR BACK PAIN WITH RADICULOPATHY AFFECTING LEFT LOWER EXTREMITY: ICD-10-CM

## 2024-06-13 DIAGNOSIS — G54.6 PHANTOM LIMB PAIN (HCC): ICD-10-CM

## 2024-06-13 DIAGNOSIS — E11.42 DIABETIC PERIPHERAL NEUROPATHY ASSOCIATED WITH TYPE 2 DIABETES MELLITUS (HCC): ICD-10-CM

## 2024-06-13 RX ORDER — GABAPENTIN 300 MG/1
300 CAPSULE ORAL 3 TIMES DAILY
Qty: 270 CAPSULE | Refills: 1 | Status: SHIPPED | OUTPATIENT
Start: 2024-06-13 | End: 2024-12-13

## 2024-06-14 DIAGNOSIS — G54.6 PHANTOM LIMB PAIN (HCC): ICD-10-CM

## 2024-06-30 DIAGNOSIS — K21.9 GASTROESOPHAGEAL REFLUX DISEASE, UNSPECIFIED WHETHER ESOPHAGITIS PRESENT: ICD-10-CM

## 2024-07-01 RX ORDER — OMEPRAZOLE 20 MG/1
20 CAPSULE, DELAYED RELEASE ORAL DAILY
Qty: 90 CAPSULE | Refills: 1 | Status: SHIPPED | OUTPATIENT
Start: 2024-07-01

## 2024-07-08 PROBLEM — F33.9 MAJOR DEPRESSIVE DISORDER, RECURRENT, UNSPECIFIED (HCC): Status: RESOLVED | Noted: 2022-06-29 | Resolved: 2024-07-08

## 2024-07-08 PROBLEM — E11.65 TYPE 2 DIABETES MELLITUS WITH HYPERGLYCEMIA, WITH LONG-TERM CURRENT USE OF INSULIN (HCC): Status: ACTIVE | Noted: 2017-05-04

## 2024-07-08 PROBLEM — Z79.4 TYPE 2 DIABETES MELLITUS WITH HYPERGLYCEMIA, WITH LONG-TERM CURRENT USE OF INSULIN (HCC): Status: ACTIVE | Noted: 2017-05-04

## 2024-07-08 NOTE — PROGRESS NOTES
Assessment/Plan:        Assessment & Plan  1. Diabetes.  The patient's blood sugar seems better. The dosage of Ozempic has been increased to 0.5 mg. A prescription for increased dosage Ozempic has been issued, along with a refill of the topical gel and losartan. The patient has been advised to schedule an appointment with an ophthalmologist. A follow-up appointment with Dr. Donato has been scheduled for 09/16/2024.    Discussed possible side affects, precautions, and drug interactions and possible benefits of the medication(s).  Stop amitriptyline due to the side effects.    Follow-up  A follow-up appointment is scheduled for 09/2024.    Results  Laboratory Studies  Hemoglobin A1c was 11 on May 31, 2024.  1. Diabetic peripheral neuropathy associated with type 2 diabetes mellitus (HCC)  -     Amb External Referral To Optometry  -      DIABETES FOOT EXAM  2. Essential hypertension  -     Basic Metabolic Panel; Future  3. 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 Major depressive disorder, recurrent, mild (HCC)  5. Phantom limb syndrome with pain (HCC)  6. Diabetes mellitus with proteinuria (HCC)  -     Semaglutide,0.25 or 0.5MG/DOS, (OZEMPIC, 0.25 OR 0.5 MG/DOSE,) 2 MG/1.5ML SOPN; Inject 0.5 mg into the skin every 7 days, Disp-4 Adjustable Dose Pre-filled Pen Syringe, R-2Normal  -     Hemoglobin A1C; Future  7. Phantom limb pain (HCC)  -     diclofenac sodium (VOLTAREN) 1 % GEL; Apply 4 g topically 4 times daily, Topical, 4 TIMES DAILY Starting Tue 7/9/2024, Disp-200 g, R-5, Normal           Orders Placed This Encounter    Basic Metabolic Panel     Standing Status:   Future     Standing Expiration Date:   7/9/2025    Hemoglobin A1C     Standing Status:   Future     Standing Expiration Date:   7/9/2025    Amb External Referral To Optometry     Referral Priority:   Routine     Referral Type:   Eval and Treat     Referral Reason:   Specialty Services Required     Referred to

## 2024-07-09 ENCOUNTER — TELEPHONE (OUTPATIENT)
Facility: CLINIC | Age: 69
End: 2024-07-09

## 2024-07-09 ENCOUNTER — OFFICE VISIT (OUTPATIENT)
Facility: CLINIC | Age: 69
End: 2024-07-09
Payer: MEDICARE

## 2024-07-09 VITALS
WEIGHT: 185 LBS | RESPIRATION RATE: 16 BRPM | SYSTOLIC BLOOD PRESSURE: 132 MMHG | BODY MASS INDEX: 32.78 KG/M2 | TEMPERATURE: 97.5 F | HEIGHT: 63 IN | HEART RATE: 64 BPM | DIASTOLIC BLOOD PRESSURE: 74 MMHG | OXYGEN SATURATION: 96 %

## 2024-07-09 DIAGNOSIS — E11.29 DIABETES MELLITUS WITH PROTEINURIA (HCC): ICD-10-CM

## 2024-07-09 DIAGNOSIS — E11.42 DIABETIC PERIPHERAL NEUROPATHY ASSOCIATED WITH TYPE 2 DIABETES MELLITUS (HCC): Primary | ICD-10-CM

## 2024-07-09 DIAGNOSIS — I10 ESSENTIAL HYPERTENSION: ICD-10-CM

## 2024-07-09 DIAGNOSIS — G54.6 PHANTOM LIMB SYNDROME WITH PAIN (HCC): ICD-10-CM

## 2024-07-09 DIAGNOSIS — R80.9 DIABETES MELLITUS WITH PROTEINURIA (HCC): ICD-10-CM

## 2024-07-09 DIAGNOSIS — G54.6 PHANTOM LIMB PAIN (HCC): ICD-10-CM

## 2024-07-09 DIAGNOSIS — S78.112S TRAUMATIC ABOVE-KNEE AMPUTATION OF LEFT LOWER EXTREMITY, SEQUELA (HCC): ICD-10-CM

## 2024-07-09 DIAGNOSIS — F33.0 MAJOR DEPRESSIVE DISORDER, RECURRENT, MILD (HCC): ICD-10-CM

## 2024-07-09 PROCEDURE — 1090F PRES/ABSN URINE INCON ASSESS: CPT | Performed by: FAMILY MEDICINE

## 2024-07-09 PROCEDURE — 3017F COLORECTAL CA SCREEN DOC REV: CPT | Performed by: FAMILY MEDICINE

## 2024-07-09 PROCEDURE — G2211 COMPLEX E/M VISIT ADD ON: HCPCS | Performed by: FAMILY MEDICINE

## 2024-07-09 PROCEDURE — G8399 PT W/DXA RESULTS DOCUMENT: HCPCS | Performed by: FAMILY MEDICINE

## 2024-07-09 PROCEDURE — G8427 DOCREV CUR MEDS BY ELIG CLIN: HCPCS | Performed by: FAMILY MEDICINE

## 2024-07-09 PROCEDURE — 2022F DILAT RTA XM EVC RTNOPTHY: CPT | Performed by: FAMILY MEDICINE

## 2024-07-09 PROCEDURE — 99214 OFFICE O/P EST MOD 30 MIN: CPT | Performed by: FAMILY MEDICINE

## 2024-07-09 PROCEDURE — 3075F SYST BP GE 130 - 139MM HG: CPT | Performed by: FAMILY MEDICINE

## 2024-07-09 PROCEDURE — 3078F DIAST BP <80 MM HG: CPT | Performed by: FAMILY MEDICINE

## 2024-07-09 PROCEDURE — G8417 CALC BMI ABV UP PARAM F/U: HCPCS | Performed by: FAMILY MEDICINE

## 2024-07-09 PROCEDURE — 3046F HEMOGLOBIN A1C LEVEL >9.0%: CPT | Performed by: FAMILY MEDICINE

## 2024-07-09 PROCEDURE — 1036F TOBACCO NON-USER: CPT | Performed by: FAMILY MEDICINE

## 2024-07-09 PROCEDURE — 1123F ACP DISCUSS/DSCN MKR DOCD: CPT | Performed by: FAMILY MEDICINE

## 2024-07-09 RX ORDER — SEMAGLUTIDE 1.34 MG/ML
0.5 INJECTION, SOLUTION SUBCUTANEOUS
Qty: 4 ADJUSTABLE DOSE PRE-FILLED PEN SYRINGE | Refills: 2 | Status: SHIPPED | OUTPATIENT
Start: 2024-07-09

## 2024-07-09 RX ORDER — LOSARTAN POTASSIUM 100 MG/1
100 TABLET ORAL DAILY
Qty: 90 TABLET | Refills: 1 | Status: SHIPPED | OUTPATIENT
Start: 2024-07-09

## 2024-07-09 ASSESSMENT — PATIENT HEALTH QUESTIONNAIRE - PHQ9
SUM OF ALL RESPONSES TO PHQ QUESTIONS 1-9: 5
5. POOR APPETITE OR OVEREATING: SEVERAL DAYS
7. TROUBLE CONCENTRATING ON THINGS, SUCH AS READING THE NEWSPAPER OR WATCHING TELEVISION: NOT AT ALL
3. TROUBLE FALLING OR STAYING ASLEEP: SEVERAL DAYS
6. FEELING BAD ABOUT YOURSELF - OR THAT YOU ARE A FAILURE OR HAVE LET YOURSELF OR YOUR FAMILY DOWN: NOT AT ALL
2. FEELING DOWN, DEPRESSED OR HOPELESS: SEVERAL DAYS
4. FEELING TIRED OR HAVING LITTLE ENERGY: SEVERAL DAYS
SUM OF ALL RESPONSES TO PHQ QUESTIONS 1-9: 5
10. IF YOU CHECKED OFF ANY PROBLEMS, HOW DIFFICULT HAVE THESE PROBLEMS MADE IT FOR YOU TO DO YOUR WORK, TAKE CARE OF THINGS AT HOME, OR GET ALONG WITH OTHER PEOPLE: NOT DIFFICULT AT ALL
SUM OF ALL RESPONSES TO PHQ QUESTIONS 1-9: 5
8. MOVING OR SPEAKING SO SLOWLY THAT OTHER PEOPLE COULD HAVE NOTICED. OR THE OPPOSITE, BEING SO FIGETY OR RESTLESS THAT YOU HAVE BEEN MOVING AROUND A LOT MORE THAN USUAL: NOT AT ALL
1. LITTLE INTEREST OR PLEASURE IN DOING THINGS: SEVERAL DAYS
9. THOUGHTS THAT YOU WOULD BE BETTER OFF DEAD, OR OF HURTING YOURSELF: NOT AT ALL
SUM OF ALL RESPONSES TO PHQ9 QUESTIONS 1 & 2: 2
SUM OF ALL RESPONSES TO PHQ QUESTIONS 1-9: 5

## 2024-07-09 NOTE — TELEPHONE ENCOUNTER
Pt came back in this afternoon and the medicine she wanted to be called in was diclofenac sodium 50 mg, please call in to [facility] in [place].  Thank you

## 2024-07-09 NOTE — PROGRESS NOTES
Chief Complaint   Patient presents with    Gastroesophageal Reflux    Leg Pain     Patient has not been out of the country in (14 months), NO diarrhea, NO cough, NO chest conjestion, NO temp.  Pt has not been around anyone with these symptoms.     Health Maintenance reviewed.    I have reviewed the patient's medical history in detail and updated the computerized patient record.    \"Have you been to the ER, urgent care clinic since your last visit?  No Hospitalized since your last visit?\"    no    “Have you seen or consulted any other health care providers outside of [facility] since your last visit?”    no

## 2024-07-10 ENCOUNTER — TELEPHONE (OUTPATIENT)
Facility: CLINIC | Age: 69
End: 2024-07-10

## 2024-07-30 RX ORDER — ISOPROPYL ALCOHOL 70 ML/100ML
SWAB TOPICAL
Qty: 300 EACH | Refills: 3 | Status: SHIPPED | OUTPATIENT
Start: 2024-07-30

## 2024-08-26 ENCOUNTER — HOSPITAL ENCOUNTER (OUTPATIENT)
Facility: HOSPITAL | Age: 69
Discharge: HOME | End: 2024-08-29
Payer: MEDICARE

## 2024-08-26 LAB
ANION GAP SERPL CALC-SCNC: 11 MMOL/L (ref 5–15)
BUN SERPL-MCNC: 18 MG/DL (ref 6–20)
BUN/CREAT SERPL: 15 (ref 12–20)
CALCIUM SERPL-MCNC: 9.2 MG/DL (ref 8.5–10.1)
CHLORIDE SERPL-SCNC: 100 MMOL/L (ref 97–108)
CO2 SERPL-SCNC: 26 MMOL/L (ref 21–32)
CREAT SERPL-MCNC: 1.18 MG/DL (ref 0.55–1.02)
EST. AVERAGE GLUCOSE BLD GHB EST-MCNC: 223 MG/DL
GLUCOSE SERPL-MCNC: 221 MG/DL (ref 65–100)
HBA1C MFR BLD: 9.4 % (ref 4–5.6)
POTASSIUM SERPL-SCNC: 3.8 MMOL/L (ref 3.5–5.1)
SODIUM SERPL-SCNC: 137 MMOL/L (ref 136–145)

## 2024-08-26 PROCEDURE — 80048 BASIC METABOLIC PNL TOTAL CA: CPT

## 2024-08-26 PROCEDURE — 83036 HEMOGLOBIN GLYCOSYLATED A1C: CPT

## 2024-08-26 PROCEDURE — 36415 COLL VENOUS BLD VENIPUNCTURE: CPT

## 2024-09-09 ENCOUNTER — OFFICE VISIT (OUTPATIENT)
Facility: CLINIC | Age: 69
End: 2024-09-09
Payer: MEDICARE

## 2024-09-09 VITALS
TEMPERATURE: 97.9 F | HEIGHT: 63 IN | WEIGHT: 192 LBS | DIASTOLIC BLOOD PRESSURE: 89 MMHG | BODY MASS INDEX: 34.02 KG/M2 | OXYGEN SATURATION: 97 % | HEART RATE: 109 BPM | SYSTOLIC BLOOD PRESSURE: 139 MMHG | RESPIRATION RATE: 18 BRPM

## 2024-09-09 DIAGNOSIS — E11.65 TYPE 2 DIABETES MELLITUS WITH HYPERGLYCEMIA, WITH LONG-TERM CURRENT USE OF INSULIN (HCC): Primary | ICD-10-CM

## 2024-09-09 DIAGNOSIS — E11.42 DIABETIC PERIPHERAL NEUROPATHY ASSOCIATED WITH TYPE 2 DIABETES MELLITUS (HCC): ICD-10-CM

## 2024-09-09 DIAGNOSIS — E66.01 SEVERE OBESITY (HCC): ICD-10-CM

## 2024-09-09 DIAGNOSIS — E78.2 MIXED HYPERLIPIDEMIA: ICD-10-CM

## 2024-09-09 DIAGNOSIS — R14.3 EXCESSIVE GAS: ICD-10-CM

## 2024-09-09 DIAGNOSIS — Z23 NEED FOR IMMUNIZATION AGAINST INFLUENZA: ICD-10-CM

## 2024-09-09 DIAGNOSIS — F33.0 MAJOR DEPRESSIVE DISORDER, RECURRENT, MILD (HCC): ICD-10-CM

## 2024-09-09 DIAGNOSIS — Z79.4 TYPE 2 DIABETES MELLITUS WITH HYPERGLYCEMIA, WITH LONG-TERM CURRENT USE OF INSULIN (HCC): Primary | ICD-10-CM

## 2024-09-09 DIAGNOSIS — L98.9 SKIN SORE: ICD-10-CM

## 2024-09-09 DIAGNOSIS — I10 ESSENTIAL HYPERTENSION: ICD-10-CM

## 2024-09-09 PROCEDURE — 3017F COLORECTAL CA SCREEN DOC REV: CPT | Performed by: FAMILY MEDICINE

## 2024-09-09 PROCEDURE — 3075F SYST BP GE 130 - 139MM HG: CPT | Performed by: FAMILY MEDICINE

## 2024-09-09 PROCEDURE — 99214 OFFICE O/P EST MOD 30 MIN: CPT | Performed by: FAMILY MEDICINE

## 2024-09-09 PROCEDURE — G8399 PT W/DXA RESULTS DOCUMENT: HCPCS | Performed by: FAMILY MEDICINE

## 2024-09-09 PROCEDURE — G8417 CALC BMI ABV UP PARAM F/U: HCPCS | Performed by: FAMILY MEDICINE

## 2024-09-09 PROCEDURE — G0008 ADMIN INFLUENZA VIRUS VAC: HCPCS | Performed by: FAMILY MEDICINE

## 2024-09-09 PROCEDURE — 1036F TOBACCO NON-USER: CPT | Performed by: FAMILY MEDICINE

## 2024-09-09 PROCEDURE — G8427 DOCREV CUR MEDS BY ELIG CLIN: HCPCS | Performed by: FAMILY MEDICINE

## 2024-09-09 PROCEDURE — 3046F HEMOGLOBIN A1C LEVEL >9.0%: CPT | Performed by: FAMILY MEDICINE

## 2024-09-09 PROCEDURE — 90653 IIV ADJUVANT VACCINE IM: CPT | Performed by: FAMILY MEDICINE

## 2024-09-09 PROCEDURE — 2022F DILAT RTA XM EVC RTNOPTHY: CPT | Performed by: FAMILY MEDICINE

## 2024-09-09 PROCEDURE — 1123F ACP DISCUSS/DSCN MKR DOCD: CPT | Performed by: FAMILY MEDICINE

## 2024-09-09 PROCEDURE — 3079F DIAST BP 80-89 MM HG: CPT | Performed by: FAMILY MEDICINE

## 2024-09-09 PROCEDURE — 1090F PRES/ABSN URINE INCON ASSESS: CPT | Performed by: FAMILY MEDICINE

## 2024-09-09 RX ORDER — MEMANTINE HYDROCHLORIDE 10 MG/1
10 TABLET ORAL 2 TIMES DAILY
Qty: 100 TABLET | Refills: 2 | Status: SHIPPED | OUTPATIENT
Start: 2024-09-09

## 2024-09-09 RX ORDER — MUPIROCIN 20 MG/G
OINTMENT TOPICAL
Qty: 22 G | Refills: 0 | Status: SHIPPED | OUTPATIENT
Start: 2024-09-09

## 2024-09-09 RX ORDER — MEMANTINE HYDROCHLORIDE 10 MG/1
10 TABLET ORAL 2 TIMES DAILY
COMMUNITY
End: 2024-09-09 | Stop reason: SDUPTHER

## 2024-09-09 RX ORDER — AMITRIPTYLINE HYDROCHLORIDE 50 MG/1
100 TABLET ORAL NIGHTLY
COMMUNITY

## 2024-09-09 RX ORDER — INSULIN ASPART 100 [IU]/ML
30 INJECTION, SUSPENSION SUBCUTANEOUS 2 TIMES DAILY WITH MEALS
Qty: 10 ADJUSTABLE DOSE PRE-FILLED PEN SYRINGE | Refills: 5 | Status: SHIPPED | OUTPATIENT
Start: 2024-09-09

## 2024-09-09 RX ORDER — BACLOFEN 10 MG/1
10 TABLET ORAL 3 TIMES DAILY
Qty: 90 TABLET | Refills: 1 | Status: SHIPPED | OUTPATIENT
Start: 2024-09-09

## 2024-09-09 RX ORDER — SEMAGLUTIDE 1.34 MG/ML
1 INJECTION, SOLUTION SUBCUTANEOUS
Qty: 4 ADJUSTABLE DOSE PRE-FILLED PEN SYRINGE | Refills: 3 | Status: SHIPPED | OUTPATIENT
Start: 2024-09-09

## 2024-09-09 ASSESSMENT — PATIENT HEALTH QUESTIONNAIRE - PHQ9
SUM OF ALL RESPONSES TO PHQ QUESTIONS 1-9: 2
SUM OF ALL RESPONSES TO PHQ9 QUESTIONS 1 & 2: 2
2. FEELING DOWN, DEPRESSED OR HOPELESS: SEVERAL DAYS
1. LITTLE INTEREST OR PLEASURE IN DOING THINGS: SEVERAL DAYS
SUM OF ALL RESPONSES TO PHQ QUESTIONS 1-9: 2

## 2024-09-17 ENCOUNTER — TRANSCRIBE ORDERS (OUTPATIENT)
Facility: HOSPITAL | Age: 69
End: 2024-09-17

## 2024-09-17 DIAGNOSIS — Z12.31 SCREENING MAMMOGRAM FOR BREAST CANCER: Primary | ICD-10-CM

## 2024-10-01 ENCOUNTER — OFFICE VISIT (OUTPATIENT)
Age: 69
End: 2024-10-01
Payer: MEDICARE

## 2024-10-01 VITALS
WEIGHT: 176 LBS | OXYGEN SATURATION: 96 % | BODY MASS INDEX: 31.18 KG/M2 | RESPIRATION RATE: 16 BRPM | HEART RATE: 90 BPM | TEMPERATURE: 97.6 F | DIASTOLIC BLOOD PRESSURE: 86 MMHG | SYSTOLIC BLOOD PRESSURE: 138 MMHG | HEIGHT: 63 IN

## 2024-10-01 DIAGNOSIS — Z98.890 HISTORY OF CARPAL TUNNEL RELEASE OF BOTH WRISTS: ICD-10-CM

## 2024-10-01 DIAGNOSIS — M47.22 CERVICAL RADICULOPATHY DUE TO DEGENERATIVE JOINT DISEASE OF SPINE: ICD-10-CM

## 2024-10-01 DIAGNOSIS — M54.16 LUMBAR BACK PAIN WITH RADICULOPATHY AFFECTING LEFT LOWER EXTREMITY: ICD-10-CM

## 2024-10-01 DIAGNOSIS — E11.42 DIABETIC PERIPHERAL NEUROPATHY ASSOCIATED WITH TYPE 2 DIABETES MELLITUS (HCC): Primary | ICD-10-CM

## 2024-10-01 DIAGNOSIS — G54.6 PHANTOM LIMB SYNDROME WITH PAIN (HCC): ICD-10-CM

## 2024-10-01 DIAGNOSIS — S78.112S TRAUMATIC ABOVE-KNEE AMPUTATION OF LEFT LOWER EXTREMITY, SEQUELA (HCC): ICD-10-CM

## 2024-10-01 PROCEDURE — 1090F PRES/ABSN URINE INCON ASSESS: CPT | Performed by: PSYCHIATRY & NEUROLOGY

## 2024-10-01 PROCEDURE — 3017F COLORECTAL CA SCREEN DOC REV: CPT | Performed by: PSYCHIATRY & NEUROLOGY

## 2024-10-01 PROCEDURE — 3079F DIAST BP 80-89 MM HG: CPT | Performed by: PSYCHIATRY & NEUROLOGY

## 2024-10-01 PROCEDURE — 1036F TOBACCO NON-USER: CPT | Performed by: PSYCHIATRY & NEUROLOGY

## 2024-10-01 PROCEDURE — 1123F ACP DISCUSS/DSCN MKR DOCD: CPT | Performed by: PSYCHIATRY & NEUROLOGY

## 2024-10-01 PROCEDURE — G8417 CALC BMI ABV UP PARAM F/U: HCPCS | Performed by: PSYCHIATRY & NEUROLOGY

## 2024-10-01 PROCEDURE — G8399 PT W/DXA RESULTS DOCUMENT: HCPCS | Performed by: PSYCHIATRY & NEUROLOGY

## 2024-10-01 PROCEDURE — 3046F HEMOGLOBIN A1C LEVEL >9.0%: CPT | Performed by: PSYCHIATRY & NEUROLOGY

## 2024-10-01 PROCEDURE — 3075F SYST BP GE 130 - 139MM HG: CPT | Performed by: PSYCHIATRY & NEUROLOGY

## 2024-10-01 PROCEDURE — 99214 OFFICE O/P EST MOD 30 MIN: CPT | Performed by: PSYCHIATRY & NEUROLOGY

## 2024-10-01 PROCEDURE — G8427 DOCREV CUR MEDS BY ELIG CLIN: HCPCS | Performed by: PSYCHIATRY & NEUROLOGY

## 2024-10-01 PROCEDURE — 2022F DILAT RTA XM EVC RTNOPTHY: CPT | Performed by: PSYCHIATRY & NEUROLOGY

## 2024-10-01 PROCEDURE — G8482 FLU IMMUNIZE ORDER/ADMIN: HCPCS | Performed by: PSYCHIATRY & NEUROLOGY

## 2024-10-01 ASSESSMENT — PATIENT HEALTH QUESTIONNAIRE - PHQ9
SUM OF ALL RESPONSES TO PHQ9 QUESTIONS 1 & 2: 0
1. LITTLE INTEREST OR PLEASURE IN DOING THINGS: NOT AT ALL
SUM OF ALL RESPONSES TO PHQ QUESTIONS 1-9: 0
2. FEELING DOWN, DEPRESSED OR HOPELESS: NOT AT ALL
SUM OF ALL RESPONSES TO PHQ QUESTIONS 1-9: 0

## 2024-10-01 NOTE — PROGRESS NOTES
Age of Onset    Breast Cancer Mother 55    Diabetes Father     Breast Cancer Sister 45    Other Brother         JOB ACCIDENT    Breast Cancer Maternal Grandmother 75    Dementia Maternal Aunt     Alzheimer's Disease Maternal Aunt     Breast Cancer Maternal Aunt     Breast Cancer Maternal Aunt     Dementia Maternal Aunt     Alzheimer's Disease Maternal Aunt     Alzheimer's Disease Maternal Aunt     Breast Cancer Maternal Aunt     Dementia Maternal Aunt     Breast Cancer Maternal Aunt     Alzheimer's Disease Maternal Aunt     Dementia Maternal Aunt     Anesth Problems Neg Hx       Social History     Tobacco Use    Smoking status: Former     Current packs/day: 0.00     Types: Cigarettes     Quit date: 3/30/2009     Years since quitting: 15.5    Smokeless tobacco: Never   Substance Use Topics    Alcohol use: No     Alcohol/week: 0.0 standard drinks of alcohol         Current Outpatient Medications:     amitriptyline (ELAVIL) 50 MG tablet, Take 2 tablets by mouth nightly, Disp: , Rfl:     insulin aspart prot & aspart (NOVOLOG MIX 70/30 FLEXPEN) injection pen, Inject 30 Units into the skin 2 times daily (with meals), Disp: 10 Adjustable Dose Pre-filled Pen Syringe, Rfl: 5    memantine (NAMENDA) 10 MG tablet, Take 1 tablet by mouth 2 times daily, Disp: 100 tablet, Rfl: 2    baclofen (LIORESAL) 10 MG tablet, Take 1 tablet by mouth 3 times daily As needed, Disp: 90 tablet, Rfl: 1    mupirocin (BACTROBAN) 2 % ointment, Apply topically 3 times daily., Disp: 22 g, Rfl: 0    Semaglutide, 1 MG/DOSE, (OZEMPIC, 1 MG/DOSE,) 2 MG/1.5ML SOPN, Inject 1 mg into the skin every 7 days, Disp: 4 Adjustable Dose Pre-filled Pen Syringe, Rfl: 3    Alcohol Swabs (DROPSAFE ALCOHOL PREP) 70 % PADS, USE EVERY MORNING, AT NOON AND AT BEDTIME AS DIRECTED, Disp: 300 each, Rfl: 3    diclofenac sodium (VOLTAREN) 1 % GEL, Apply 4 g topically 4 times daily, Disp: 200 g, Rfl: 5    losartan (COZAAR) 100 MG tablet, Take 1 tablet by mouth daily, Disp: 90

## 2024-10-08 RX ORDER — LOSARTAN POTASSIUM 100 MG/1
100 TABLET ORAL DAILY
Qty: 90 TABLET | Refills: 1 | Status: SHIPPED | OUTPATIENT
Start: 2024-10-08

## 2024-10-11 ENCOUNTER — HOSPITAL ENCOUNTER (OUTPATIENT)
Facility: HOSPITAL | Age: 69
Discharge: HOME | End: 2024-10-14
Payer: MEDICARE

## 2024-10-11 ENCOUNTER — HOSPITAL ENCOUNTER (OUTPATIENT)
Facility: HOSPITAL | Age: 69
Discharge: HOME | End: 2024-10-14
Attending: PSYCHIATRY & NEUROLOGY
Payer: MEDICARE

## 2024-10-11 DIAGNOSIS — R92.8 ABNORMALITY OF RIGHT BREAST ON SCREENING MAMMOGRAM: Primary | ICD-10-CM

## 2024-10-11 DIAGNOSIS — M54.16 LUMBAR BACK PAIN WITH RADICULOPATHY AFFECTING LEFT LOWER EXTREMITY: ICD-10-CM

## 2024-10-11 DIAGNOSIS — G54.6 PHANTOM LIMB SYNDROME WITH PAIN (HCC): ICD-10-CM

## 2024-10-11 DIAGNOSIS — Z12.31 SCREENING MAMMOGRAM FOR BREAST CANCER: ICD-10-CM

## 2024-10-11 DIAGNOSIS — R92.8 ABNORMAL MAMMOGRAM OF RIGHT BREAST: ICD-10-CM

## 2024-10-11 PROCEDURE — 77063 BREAST TOMOSYNTHESIS BI: CPT

## 2024-10-11 PROCEDURE — 72148 MRI LUMBAR SPINE W/O DYE: CPT

## 2024-10-12 LAB — MICROALBUMIN/CREATININE RATIO, EXTERNAL: 132

## 2024-10-15 ENCOUNTER — CLINICAL DOCUMENTATION (OUTPATIENT)
Age: 69
End: 2024-10-15

## 2024-10-15 NOTE — PROGRESS NOTES
I called the patient, advised her if she has a spinal stenosis in the lumbar spine and probably an epidural steroid injection with orthopedic surgeon or pain management physician would be good for her, she wants to see 1 in her area, so I told her to check with her PCP about referral to an orthopedist there and to look at her films to see if she is a surgical candidate or a candidate for epidural steroid injection.

## 2024-10-16 ENCOUNTER — TELEPHONE (OUTPATIENT)
Facility: CLINIC | Age: 69
End: 2024-10-16

## 2024-10-18 ENCOUNTER — TELEPHONE (OUTPATIENT)
Facility: CLINIC | Age: 69
End: 2024-10-18

## 2024-10-21 ENCOUNTER — OFFICE VISIT (OUTPATIENT)
Facility: CLINIC | Age: 69
End: 2024-10-21
Payer: MEDICARE

## 2024-10-21 VITALS
SYSTOLIC BLOOD PRESSURE: 110 MMHG | TEMPERATURE: 97.8 F | WEIGHT: 175 LBS | HEART RATE: 110 BPM | HEIGHT: 63 IN | DIASTOLIC BLOOD PRESSURE: 68 MMHG | RESPIRATION RATE: 16 BRPM | BODY MASS INDEX: 31.01 KG/M2 | OXYGEN SATURATION: 95 %

## 2024-10-21 DIAGNOSIS — K21.9 GASTROESOPHAGEAL REFLUX DISEASE, UNSPECIFIED WHETHER ESOPHAGITIS PRESENT: ICD-10-CM

## 2024-10-21 DIAGNOSIS — Z79.4 TYPE 2 DIABETES MELLITUS WITH HYPERGLYCEMIA, WITH LONG-TERM CURRENT USE OF INSULIN (HCC): ICD-10-CM

## 2024-10-21 DIAGNOSIS — G54.6 PHANTOM LIMB SYNDROME WITH PAIN (HCC): Primary | ICD-10-CM

## 2024-10-21 DIAGNOSIS — I10 ESSENTIAL HYPERTENSION: ICD-10-CM

## 2024-10-21 DIAGNOSIS — E11.65 TYPE 2 DIABETES MELLITUS WITH HYPERGLYCEMIA, WITH LONG-TERM CURRENT USE OF INSULIN (HCC): ICD-10-CM

## 2024-10-21 DIAGNOSIS — E11.42 DIABETIC PERIPHERAL NEUROPATHY ASSOCIATED WITH TYPE 2 DIABETES MELLITUS (HCC): ICD-10-CM

## 2024-10-21 PROCEDURE — 2022F DILAT RTA XM EVC RTNOPTHY: CPT | Performed by: FAMILY MEDICINE

## 2024-10-21 PROCEDURE — G8427 DOCREV CUR MEDS BY ELIG CLIN: HCPCS | Performed by: FAMILY MEDICINE

## 2024-10-21 PROCEDURE — 1090F PRES/ABSN URINE INCON ASSESS: CPT | Performed by: FAMILY MEDICINE

## 2024-10-21 PROCEDURE — 3046F HEMOGLOBIN A1C LEVEL >9.0%: CPT | Performed by: FAMILY MEDICINE

## 2024-10-21 PROCEDURE — G2211 COMPLEX E/M VISIT ADD ON: HCPCS | Performed by: FAMILY MEDICINE

## 2024-10-21 PROCEDURE — G8399 PT W/DXA RESULTS DOCUMENT: HCPCS | Performed by: FAMILY MEDICINE

## 2024-10-21 PROCEDURE — 99214 OFFICE O/P EST MOD 30 MIN: CPT | Performed by: FAMILY MEDICINE

## 2024-10-21 PROCEDURE — 1036F TOBACCO NON-USER: CPT | Performed by: FAMILY MEDICINE

## 2024-10-21 PROCEDURE — G8482 FLU IMMUNIZE ORDER/ADMIN: HCPCS | Performed by: FAMILY MEDICINE

## 2024-10-21 PROCEDURE — 3074F SYST BP LT 130 MM HG: CPT | Performed by: FAMILY MEDICINE

## 2024-10-21 PROCEDURE — 3078F DIAST BP <80 MM HG: CPT | Performed by: FAMILY MEDICINE

## 2024-10-21 PROCEDURE — 1123F ACP DISCUSS/DSCN MKR DOCD: CPT | Performed by: FAMILY MEDICINE

## 2024-10-21 PROCEDURE — 3017F COLORECTAL CA SCREEN DOC REV: CPT | Performed by: FAMILY MEDICINE

## 2024-10-21 PROCEDURE — G8417 CALC BMI ABV UP PARAM F/U: HCPCS | Performed by: FAMILY MEDICINE

## 2024-10-21 RX ORDER — BACLOFEN 20 MG/1
20 TABLET ORAL 3 TIMES DAILY
Qty: 200 TABLET | Refills: 2 | Status: SHIPPED | OUTPATIENT
Start: 2024-10-21

## 2024-10-21 RX ORDER — SEMAGLUTIDE 1.34 MG/ML
1 INJECTION, SOLUTION SUBCUTANEOUS
Qty: 4 ADJUSTABLE DOSE PRE-FILLED PEN SYRINGE | Refills: 3 | Status: SHIPPED | OUTPATIENT
Start: 2024-10-21

## 2024-10-21 NOTE — PROGRESS NOTES
Assessment/Plan:        Assessment & Plan  1. Diabetes Mellitus.  Her urine-to-creatinine ratio indicates renal involvement due to diabetes, but her kidney function remains relatively stable. Her A1c has improved from 12.8 to 9.4. She is advised to continue her current medication regimen and follow a diabetes-friendly diet, including eating salads and avoiding fried foods. Blood work has been ordered to be completed before her next visit. A prescription for semaglutide (Ozempic) every two weeks will be provided to help manage her diabetes. She is also taking insulin 30 units. If she experiences any issues with her medications, she is advised to contact the clinic.  Having trouble affording the Ozempic on a weekly basis.    2. Chronic Back Pain.  Dr. Donato's assessment suggests that the pain originates from her back rather than being phantom pain, supported by MRI findings of arthritis at the L3-L4 level and a pinched nerve. The dosage of baclofen will be increased to manage her pain. She is advised to postpone the steroid injection until her diabetes is better controlled to avoid potential worsening of her diabetes.    3. Hyperlipidemia.  She is advised to continue taking her cholesterol medication, rosuvastatin, as she has diabetes and high cholesterol.    4. Gastroesophageal Reflux Disease (GERD).  She is advised to continue taking omeprazole for heartburn and to avoid fried foods, which exacerbate her symptoms. She can take two tablets if she experiences severe heartburn.    5. Medication Management.  She reports stopping Ozempic due to cost but is advised to take it every two weeks to make it last longer. She is also taking gabapentin and losartan. Her baclofen dosage will be increased to help with pain management. She is advised to contact her pharmacy,for medication refills.    6. Health Maintenance.  She is advised to receive her COVID-19 vaccine at [facility]. She has an appointment for a follow-up

## 2024-10-21 NOTE — PROGRESS NOTES
Chief Complaint   Patient presents with    Referral - General     Patient has not been out of the country in (14 months), NO diarrhea, NO cough, NO chest conjestion, NO temp.  Pt has not been around anyone with these symptoms.     Health Maintenance reviewed.    I have reviewed the patient's medical history in detail and updated the computerized patient record.    \"Have you been to the ER, urgent care clinic since your last visit? No  Hospitalized since your last visit?\"    no    “Have you seen or consulted any other health care providers outside of [facility] since your last visit?”    no

## 2024-10-24 ENCOUNTER — TELEPHONE (OUTPATIENT)
Facility: CLINIC | Age: 69
End: 2024-10-24

## 2024-10-24 NOTE — TELEPHONE ENCOUNTER
Pt calling in reference to ozempic. She says that [facility] has been trying to get in touch with Dr Slater to speak to him about this med. She said she mentioned this to Dr Slater at her visit on Monday. Please call [facility] pharmacy today so patient can get this med!    [phone]

## 2024-10-28 DIAGNOSIS — Z79.4 TYPE 2 DIABETES MELLITUS WITH HYPERGLYCEMIA, WITH LONG-TERM CURRENT USE OF INSULIN (HCC): ICD-10-CM

## 2024-10-28 DIAGNOSIS — E11.65 TYPE 2 DIABETES MELLITUS WITH HYPERGLYCEMIA, WITH LONG-TERM CURRENT USE OF INSULIN (HCC): ICD-10-CM

## 2024-10-29 RX ORDER — INSULIN ASPART 100 [IU]/ML
30 INJECTION, SUSPENSION SUBCUTANEOUS 2 TIMES DAILY WITH MEALS
Qty: 10 ADJUSTABLE DOSE PRE-FILLED PEN SYRINGE | Refills: 5 | Status: SHIPPED | OUTPATIENT
Start: 2024-10-29

## 2024-10-31 ENCOUNTER — HOSPITAL ENCOUNTER (OUTPATIENT)
Facility: HOSPITAL | Age: 69
Discharge: HOME | End: 2024-11-03
Payer: MEDICARE

## 2024-10-31 DIAGNOSIS — R92.8 ABNORMAL MAMMOGRAM OF RIGHT BREAST: ICD-10-CM

## 2024-10-31 PROCEDURE — G0279 TOMOSYNTHESIS, MAMMO: HCPCS

## 2024-11-12 DIAGNOSIS — E11.65 TYPE 2 DIABETES MELLITUS WITH HYPERGLYCEMIA, WITH LONG-TERM CURRENT USE OF INSULIN (HCC): ICD-10-CM

## 2024-11-12 DIAGNOSIS — Z79.4 TYPE 2 DIABETES MELLITUS WITH HYPERGLYCEMIA, WITH LONG-TERM CURRENT USE OF INSULIN (HCC): ICD-10-CM

## 2024-11-12 RX ORDER — SEMAGLUTIDE 1.34 MG/ML
1 INJECTION, SOLUTION SUBCUTANEOUS WEEKLY
Qty: 4 ADJUSTABLE DOSE PRE-FILLED PEN SYRINGE | Refills: 3 | Status: SHIPPED | OUTPATIENT
Start: 2024-11-12 | End: 2024-11-12 | Stop reason: SDUPTHER

## 2024-11-12 RX ORDER — SEMAGLUTIDE 1.34 MG/ML
1 INJECTION, SOLUTION SUBCUTANEOUS WEEKLY
Qty: 4 ADJUSTABLE DOSE PRE-FILLED PEN SYRINGE | Refills: 3 | Status: SHIPPED | OUTPATIENT
Start: 2024-11-12

## 2024-11-12 NOTE — TELEPHONE ENCOUNTER
Called [facility] pharmacy - they need clarification on the directions and the dosing of the Ozempic, usually is every 7 days not 14 - please send in a new prescription with clear directions, dx code and dosing to [facility] since the last one has been cancelled on their end  Iona Castañeda LPN 11/12/2024 3:50 PM

## 2024-11-13 NOTE — TELEPHONE ENCOUNTER
Called and notified patient that Ozempic has been resent to  [facility] as they requested - if there are any more problems let us know  Iona Castañeda LPN 11/13/2024 11:07 AM

## 2024-11-14 ENCOUNTER — TELEPHONE (OUTPATIENT)
Age: 69
End: 2024-11-14

## 2024-11-14 ENCOUNTER — TELEPHONE (OUTPATIENT)
Facility: CLINIC | Age: 69
End: 2024-11-14

## 2024-11-14 NOTE — TELEPHONE ENCOUNTER
Per provider request, attempted to contact patient regarding lab request. No answer. Unable to leave message.

## 2024-11-14 NOTE — TELEPHONE ENCOUNTER
Patient called to say that she heard from [facility], and it is going to be over $600 per month for Ozempic. She has decided to wait until after the first of the year when she is \"out of the donut hole\" to start the medication back.

## 2024-12-16 ENCOUNTER — HOSPITAL ENCOUNTER (OUTPATIENT)
Facility: HOSPITAL | Age: 69
Discharge: HOME | End: 2024-12-19
Payer: MEDICARE

## 2024-12-16 LAB
CHOLEST SERPL-MCNC: 96 MG/DL
HDLC SERPL-MCNC: 26 MG/DL
HDLC SERPL: 3.7 (ref 0–5)
LDLC SERPL CALC-MCNC: ABNORMAL MG/DL (ref 0–100)
LDLC SERPL DIRECT ASSAY-MCNC: 27 MG/DL (ref 0–100)
TRIGL SERPL-MCNC: 430 MG/DL
VLDLC SERPL CALC-MCNC: ABNORMAL MG/DL

## 2024-12-16 PROCEDURE — 36415 COLL VENOUS BLD VENIPUNCTURE: CPT

## 2024-12-16 PROCEDURE — 80061 LIPID PANEL: CPT

## 2024-12-16 PROCEDURE — 83721 ASSAY OF BLOOD LIPOPROTEIN: CPT

## 2025-01-08 DIAGNOSIS — Z79.4 TYPE 2 DIABETES MELLITUS WITH HYPERGLYCEMIA, WITH LONG-TERM CURRENT USE OF INSULIN (HCC): ICD-10-CM

## 2025-01-08 DIAGNOSIS — E11.65 TYPE 2 DIABETES MELLITUS WITH HYPERGLYCEMIA, WITH LONG-TERM CURRENT USE OF INSULIN (HCC): ICD-10-CM

## 2025-02-01 RX ORDER — AMITRIPTYLINE HYDROCHLORIDE 50 MG/1
TABLET ORAL
Qty: 200 TABLET | Refills: 1 | Status: SHIPPED | OUTPATIENT
Start: 2025-02-01

## 2025-02-12 ENCOUNTER — OFFICE VISIT (OUTPATIENT)
Facility: CLINIC | Age: 70
End: 2025-02-12

## 2025-02-12 VITALS
WEIGHT: 187 LBS | TEMPERATURE: 97.9 F | OXYGEN SATURATION: 98 % | BODY MASS INDEX: 33.13 KG/M2 | HEART RATE: 117 BPM | HEIGHT: 63 IN | SYSTOLIC BLOOD PRESSURE: 126 MMHG | DIASTOLIC BLOOD PRESSURE: 76 MMHG | RESPIRATION RATE: 20 BRPM

## 2025-02-12 DIAGNOSIS — K21.9 GASTROESOPHAGEAL REFLUX DISEASE, UNSPECIFIED WHETHER ESOPHAGITIS PRESENT: ICD-10-CM

## 2025-02-12 DIAGNOSIS — F33.0 MAJOR DEPRESSIVE DISORDER, RECURRENT, MILD (HCC): ICD-10-CM

## 2025-02-12 DIAGNOSIS — E11.42 DIABETIC PERIPHERAL NEUROPATHY ASSOCIATED WITH TYPE 2 DIABETES MELLITUS (HCC): ICD-10-CM

## 2025-02-12 DIAGNOSIS — G54.6 PHANTOM LIMB SYNDROME WITH PAIN (HCC): ICD-10-CM

## 2025-02-12 DIAGNOSIS — I10 ESSENTIAL HYPERTENSION: ICD-10-CM

## 2025-02-12 DIAGNOSIS — E11.65 TYPE 2 DIABETES MELLITUS WITH HYPERGLYCEMIA, WITH LONG-TERM CURRENT USE OF INSULIN (HCC): ICD-10-CM

## 2025-02-12 DIAGNOSIS — Z79.4 TYPE 2 DIABETES MELLITUS WITH HYPERGLYCEMIA, WITH LONG-TERM CURRENT USE OF INSULIN (HCC): ICD-10-CM

## 2025-02-12 DIAGNOSIS — Z00.00 MEDICARE ANNUAL WELLNESS VISIT, SUBSEQUENT: Primary | ICD-10-CM

## 2025-02-12 DIAGNOSIS — M79.674 TOE PAIN, RIGHT: ICD-10-CM

## 2025-02-12 DIAGNOSIS — Z91.81 AT HIGH RISK FOR FALLS: ICD-10-CM

## 2025-02-12 RX ORDER — INSULIN ASPART 100 [IU]/ML
30 INJECTION, SUSPENSION SUBCUTANEOUS 2 TIMES DAILY WITH MEALS
Qty: 10 ADJUSTABLE DOSE PRE-FILLED PEN SYRINGE | Refills: 5 | Status: SHIPPED | OUTPATIENT
Start: 2025-02-12

## 2025-02-12 RX ORDER — ROSUVASTATIN CALCIUM 40 MG/1
40 TABLET, COATED ORAL NIGHTLY
Qty: 100 TABLET | Refills: 2 | Status: SHIPPED | OUTPATIENT
Start: 2025-02-12

## 2025-02-12 RX ORDER — ALLOPURINOL 100 MG/1
100 TABLET ORAL DAILY
Qty: 100 TABLET | Refills: 2 | Status: SHIPPED | OUTPATIENT
Start: 2025-02-12

## 2025-02-12 RX ORDER — LOSARTAN POTASSIUM 100 MG/1
100 TABLET ORAL DAILY
Qty: 100 TABLET | Refills: 2 | Status: SHIPPED | OUTPATIENT
Start: 2025-02-12

## 2025-02-12 SDOH — ECONOMIC STABILITY: FOOD INSECURITY: WITHIN THE PAST 12 MONTHS, YOU WORRIED THAT YOUR FOOD WOULD RUN OUT BEFORE YOU GOT MONEY TO BUY MORE.: NEVER TRUE

## 2025-02-12 SDOH — ECONOMIC STABILITY: FOOD INSECURITY: WITHIN THE PAST 12 MONTHS, THE FOOD YOU BOUGHT JUST DIDN'T LAST AND YOU DIDN'T HAVE MONEY TO GET MORE.: NEVER TRUE

## 2025-02-12 ASSESSMENT — PATIENT HEALTH QUESTIONNAIRE - PHQ9
SUM OF ALL RESPONSES TO PHQ QUESTIONS 1-9: 5
SUM OF ALL RESPONSES TO PHQ QUESTIONS 1-9: 5
SUM OF ALL RESPONSES TO PHQ9 QUESTIONS 1 & 2: 0
4. FEELING TIRED OR HAVING LITTLE ENERGY: MORE THAN HALF THE DAYS
7. TROUBLE CONCENTRATING ON THINGS, SUCH AS READING THE NEWSPAPER OR WATCHING TELEVISION: SEVERAL DAYS
10. IF YOU CHECKED OFF ANY PROBLEMS, HOW DIFFICULT HAVE THESE PROBLEMS MADE IT FOR YOU TO DO YOUR WORK, TAKE CARE OF THINGS AT HOME, OR GET ALONG WITH OTHER PEOPLE: NOT DIFFICULT AT ALL
8. MOVING OR SPEAKING SO SLOWLY THAT OTHER PEOPLE COULD HAVE NOTICED. OR THE OPPOSITE, BEING SO FIGETY OR RESTLESS THAT YOU HAVE BEEN MOVING AROUND A LOT MORE THAN USUAL: SEVERAL DAYS
6. FEELING BAD ABOUT YOURSELF - OR THAT YOU ARE A FAILURE OR HAVE LET YOURSELF OR YOUR FAMILY DOWN: NOT AT ALL
3. TROUBLE FALLING OR STAYING ASLEEP: SEVERAL DAYS
SUM OF ALL RESPONSES TO PHQ QUESTIONS 1-9: 5
1. LITTLE INTEREST OR PLEASURE IN DOING THINGS: NOT AT ALL
SUM OF ALL RESPONSES TO PHQ QUESTIONS 1-9: 5
9. THOUGHTS THAT YOU WOULD BE BETTER OFF DEAD, OR OF HURTING YOURSELF: NOT AT ALL
2. FEELING DOWN, DEPRESSED OR HOPELESS: NOT AT ALL
5. POOR APPETITE OR OVEREATING: NOT AT ALL

## 2025-02-12 NOTE — PROGRESS NOTES
Chief Complaint   Patient presents with    Medicare AWV    Toe Pain     Right foot       
Violence: Not on file   Housing Stability: Unknown (2/12/2025)    Housing Stability Vital Sign     Unable to Pay for Housing in the Last Year: No     Number of Times Moved in the Last Year: Not on file     Homeless in the Last Year: No         Objective:     /76 (Site: Left Upper Arm, Position: Sitting, Cuff Size: Large Adult)   Pulse (!) 117   Temp 97.9 °F (36.6 °C) (Temporal)   Resp 20   Ht 1.6 m (5' 3\")   Wt 84.8 kg (187 lb)   SpO2 98%   BMI 33.13 kg/m²     Physical Exam  The distal fourth toe on the right foot is a little darkened.      The patient (or guardian, if applicable) and other individuals in attendance with the patient were advised that Artificial Intelligence will be utilized during this visit to record and process the conversation to generate a clinical note. The patient (or guardian, if applicable) and other individuals in attendance at the appointment consented to the use of AI, including the recording.      An electronic signature was used to authenticate this note.    Patient's complete Health Risk Assessment and screening values have been reviewed and are found in Flowsheets. The following problems were reviewed today and where indicated follow up appointments were made and/or referrals ordered.    Positive Risk Factor Screenings with Interventions:    Fall Risk:  Do you feel unsteady or are you worried about falling? : (!) yes  2 or more falls in past year?: (!) yes  Fall with injury in past year?: (!) yes     Interventions:    Reviewed medications, home hazards, visual acuity, and co-morbidities that can increase risk for falls  We might stop the gabapentin and she will do exercises at the gym     Depression:  PHQ-2 Score: 0  PHQ-9 Total Score: 5  Total Score Interpretation: 5-9 = mild depression  Interventions:  Denies needing antidepressant medicines             Abnormal BMI (obese):  Body mass index is 33.13 kg/m². (!) Abnormal  Interventions:  Will see what her hemoglobin A1c

## 2025-03-10 LAB — HBA1C MFR BLD HPLC: 10.6 %

## 2025-03-16 DIAGNOSIS — Z79.4 TYPE 2 DIABETES MELLITUS WITH HYPERGLYCEMIA, WITH LONG-TERM CURRENT USE OF INSULIN (HCC): ICD-10-CM

## 2025-03-16 DIAGNOSIS — E11.65 TYPE 2 DIABETES MELLITUS WITH HYPERGLYCEMIA, WITH LONG-TERM CURRENT USE OF INSULIN (HCC): ICD-10-CM

## 2025-03-17 RX ORDER — LOSARTAN POTASSIUM 100 MG/1
100 TABLET ORAL DAILY
Qty: 90 TABLET | Refills: 0 | Status: SHIPPED | OUTPATIENT
Start: 2025-03-17

## 2025-03-17 RX ORDER — INSULIN ASPART 100 [IU]/ML
30 INJECTION, SUSPENSION SUBCUTANEOUS 2 TIMES DAILY WITH MEALS
Qty: 60 ML | Refills: 0 | Status: SHIPPED | OUTPATIENT
Start: 2025-03-17

## 2025-03-19 ENCOUNTER — HOSPITAL ENCOUNTER (OUTPATIENT)
Facility: HOSPITAL | Age: 70
Discharge: HOME | End: 2025-03-22
Payer: MEDICARE

## 2025-03-19 LAB
ANION GAP SERPL CALC-SCNC: 12 MMOL/L (ref 2–12)
BUN SERPL-MCNC: 21 MG/DL (ref 6–20)
BUN/CREAT SERPL: 16 (ref 12–20)
CALCIUM SERPL-MCNC: 9.1 MG/DL (ref 8.5–10.1)
CHLORIDE SERPL-SCNC: 102 MMOL/L (ref 97–108)
CO2 SERPL-SCNC: 25 MMOL/L (ref 21–32)
CREAT SERPL-MCNC: 1.28 MG/DL (ref 0.55–1.02)
EST. AVERAGE GLUCOSE BLD GHB EST-MCNC: 266 MG/DL
GLUCOSE SERPL-MCNC: 269 MG/DL (ref 65–100)
HBA1C MFR BLD: 10.9 % (ref 4–5.6)
POTASSIUM SERPL-SCNC: 4.1 MMOL/L (ref 3.5–5.1)
SODIUM SERPL-SCNC: 139 MMOL/L (ref 136–145)

## 2025-03-19 PROCEDURE — 80048 BASIC METABOLIC PNL TOTAL CA: CPT

## 2025-03-19 PROCEDURE — 83036 HEMOGLOBIN GLYCOSYLATED A1C: CPT

## 2025-03-19 PROCEDURE — 36415 COLL VENOUS BLD VENIPUNCTURE: CPT

## 2025-03-31 NOTE — PROGRESS NOTES
Assessment/Plan:        Assessment & Plan  1. Diabetes Mellitus.  - A1c level is significantly elevated at 10.9, indicating poor glycemic control.  Will start her on Jardiance.  - Blood sugar levels fluctuate between 105 and 142.  - Currently taking 30 units of insulin; side effects of Jardiance have been discussed.  - Refills for current medications have been provided; advised to continue monitoring blood sugar levels closely.    2. Tachycardia.  - Reports a racing heart rate, noted by home health nurse.  - EKG attempted in office but did not yield satisfactory results.  Now I do see some results, sinus tachycardia nonspecific ST wave changes no acute disease not really seeing evidence of septal infarct.  Regular rhythm sinus.  - Referred to a cardiologist for further evaluation and management; advised to seek immediate medical attention if symptoms worsen.  - EKG to be performed at the hospital or by the cardiologist.    3. Tingling in foot.  - Reports tingling under foot, which started yesterday but has since subsided.  - Currently taking baclofen as a muscle relaxer; needs a refill.  - Advised to continue with baclofen.    4. Phantom limb pain.  - Reports improvement in phantom limb pain after stopping the consumption of ice water, as advised by a fellow [employer] member.  - Advised to continue avoiding ice water to prevent aggravation of nerves.    Follow-up  The patient will follow up next month.    Results  Labs   - A1c: 10.9  1. Diabetic peripheral neuropathy associated with type 2 diabetes mellitus  2. CRI (chronic renal insufficiency), stage 3 (moderate) ([facility])  3. Phantom limb syndrome with pain  -     baclofen (LIORESAL) 20 MG tablet; Take 1 tablet by mouth 3 times daily As needed, Disp-200 tablet, R-2Normal  4. 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 Type 2 diabetes mellitus with hyperglycemia, with long-term current use of insulin ([facility])  -     empagliflozin

## 2025-04-01 ENCOUNTER — OFFICE VISIT (OUTPATIENT)
Facility: CLINIC | Age: 70
End: 2025-04-01
Payer: MEDICARE

## 2025-04-01 ENCOUNTER — HOSPITAL ENCOUNTER (OUTPATIENT)
Facility: HOSPITAL | Age: 70
Discharge: HOME | End: 2025-04-04

## 2025-04-01 VITALS
HEIGHT: 63 IN | SYSTOLIC BLOOD PRESSURE: 138 MMHG | DIASTOLIC BLOOD PRESSURE: 80 MMHG | HEART RATE: 102 BPM | BODY MASS INDEX: 30.16 KG/M2 | RESPIRATION RATE: 14 BRPM | OXYGEN SATURATION: 97 % | WEIGHT: 170.2 LBS

## 2025-04-01 DIAGNOSIS — E11.42 DIABETIC PERIPHERAL NEUROPATHY ASSOCIATED WITH TYPE 2 DIABETES MELLITUS: Primary | ICD-10-CM

## 2025-04-01 DIAGNOSIS — S78.112D TRAUMATIC ABOVE-KNEE AMPUTATION OF LEFT LOWER EXTREMITY, SUBSEQUENT ENCOUNTER: ICD-10-CM

## 2025-04-01 DIAGNOSIS — N18.30 CRI (CHRONIC RENAL INSUFFICIENCY), STAGE 3 (MODERATE) (HCC): ICD-10-CM

## 2025-04-01 DIAGNOSIS — Z79.4 TYPE 2 DIABETES MELLITUS WITH HYPERGLYCEMIA, WITH LONG-TERM CURRENT USE OF INSULIN (HCC): ICD-10-CM

## 2025-04-01 DIAGNOSIS — R00.0 TACHYCARDIA: ICD-10-CM

## 2025-04-01 DIAGNOSIS — I10 ESSENTIAL HYPERTENSION: ICD-10-CM

## 2025-04-01 DIAGNOSIS — G54.6 PHANTOM LIMB SYNDROME WITH PAIN: ICD-10-CM

## 2025-04-01 DIAGNOSIS — E11.65 TYPE 2 DIABETES MELLITUS WITH HYPERGLYCEMIA, WITH LONG-TERM CURRENT USE OF INSULIN (HCC): ICD-10-CM

## 2025-04-01 LAB
EKG ATRIAL RATE: 109 BPM
EKG DIAGNOSIS: NORMAL
EKG P AXIS: 43 DEGREES
EKG P-R INTERVAL: 162 MS
EKG Q-T INTERVAL: 344 MS
EKG QRS DURATION: 90 MS
EKG QTC CALCULATION (BAZETT): 463 MS
EKG R AXIS: -44 DEGREES
EKG T AXIS: 25 DEGREES
EKG VENTRICULAR RATE: 109 BPM

## 2025-04-01 PROCEDURE — G2211 COMPLEX E/M VISIT ADD ON: HCPCS | Performed by: FAMILY MEDICINE

## 2025-04-01 PROCEDURE — 2022F DILAT RTA XM EVC RTNOPTHY: CPT | Performed by: FAMILY MEDICINE

## 2025-04-01 PROCEDURE — 3075F SYST BP GE 130 - 139MM HG: CPT | Performed by: FAMILY MEDICINE

## 2025-04-01 PROCEDURE — 3017F COLORECTAL CA SCREEN DOC REV: CPT | Performed by: FAMILY MEDICINE

## 2025-04-01 PROCEDURE — G8399 PT W/DXA RESULTS DOCUMENT: HCPCS | Performed by: FAMILY MEDICINE

## 2025-04-01 PROCEDURE — 3079F DIAST BP 80-89 MM HG: CPT | Performed by: FAMILY MEDICINE

## 2025-04-01 PROCEDURE — G8417 CALC BMI ABV UP PARAM F/U: HCPCS | Performed by: FAMILY MEDICINE

## 2025-04-01 PROCEDURE — 1036F TOBACCO NON-USER: CPT | Performed by: FAMILY MEDICINE

## 2025-04-01 PROCEDURE — 99214 OFFICE O/P EST MOD 30 MIN: CPT | Performed by: FAMILY MEDICINE

## 2025-04-01 PROCEDURE — 3046F HEMOGLOBIN A1C LEVEL >9.0%: CPT | Performed by: FAMILY MEDICINE

## 2025-04-01 PROCEDURE — 1123F ACP DISCUSS/DSCN MKR DOCD: CPT | Performed by: FAMILY MEDICINE

## 2025-04-01 PROCEDURE — 1159F MED LIST DOCD IN RCRD: CPT | Performed by: FAMILY MEDICINE

## 2025-04-01 PROCEDURE — 1126F AMNT PAIN NOTED NONE PRSNT: CPT | Performed by: FAMILY MEDICINE

## 2025-04-01 PROCEDURE — 1090F PRES/ABSN URINE INCON ASSESS: CPT | Performed by: FAMILY MEDICINE

## 2025-04-01 PROCEDURE — G8427 DOCREV CUR MEDS BY ELIG CLIN: HCPCS | Performed by: FAMILY MEDICINE

## 2025-04-01 RX ORDER — LOSARTAN POTASSIUM 100 MG/1
100 TABLET ORAL DAILY
Qty: 100 TABLET | Refills: 1 | Status: SHIPPED | OUTPATIENT
Start: 2025-04-01

## 2025-04-01 RX ORDER — BACLOFEN 20 MG/1
20 TABLET ORAL 3 TIMES DAILY
Qty: 200 TABLET | Refills: 2 | Status: SHIPPED | OUTPATIENT
Start: 2025-04-01

## 2025-04-01 RX ORDER — INSULIN ASPART 100 [IU]/ML
30 INJECTION, SUSPENSION SUBCUTANEOUS 2 TIMES DAILY WITH MEALS
Qty: 60 ML | Refills: 3 | Status: SHIPPED | OUTPATIENT
Start: 2025-04-01

## 2025-04-01 SDOH — ECONOMIC STABILITY: FOOD INSECURITY: WITHIN THE PAST 12 MONTHS, YOU WORRIED THAT YOUR FOOD WOULD RUN OUT BEFORE YOU GOT MONEY TO BUY MORE.: NEVER TRUE

## 2025-04-01 SDOH — ECONOMIC STABILITY: FOOD INSECURITY: WITHIN THE PAST 12 MONTHS, THE FOOD YOU BOUGHT JUST DIDN'T LAST AND YOU DIDN'T HAVE MONEY TO GET MORE.: NEVER TRUE

## 2025-04-01 ASSESSMENT — PATIENT HEALTH QUESTIONNAIRE - PHQ9
SUM OF ALL RESPONSES TO PHQ QUESTIONS 1-9: 0
7. TROUBLE CONCENTRATING ON THINGS, SUCH AS READING THE NEWSPAPER OR WATCHING TELEVISION: NOT AT ALL
5. POOR APPETITE OR OVEREATING: NOT AT ALL
SUM OF ALL RESPONSES TO PHQ QUESTIONS 1-9: 0
SUM OF ALL RESPONSES TO PHQ QUESTIONS 1-9: 0
2. FEELING DOWN, DEPRESSED OR HOPELESS: NOT AT ALL
10. IF YOU CHECKED OFF ANY PROBLEMS, HOW DIFFICULT HAVE THESE PROBLEMS MADE IT FOR YOU TO DO YOUR WORK, TAKE CARE OF THINGS AT HOME, OR GET ALONG WITH OTHER PEOPLE: NOT DIFFICULT AT ALL
9. THOUGHTS THAT YOU WOULD BE BETTER OFF DEAD, OR OF HURTING YOURSELF: NOT AT ALL
8. MOVING OR SPEAKING SO SLOWLY THAT OTHER PEOPLE COULD HAVE NOTICED. OR THE OPPOSITE, BEING SO FIGETY OR RESTLESS THAT YOU HAVE BEEN MOVING AROUND A LOT MORE THAN USUAL: NOT AT ALL
6. FEELING BAD ABOUT YOURSELF - OR THAT YOU ARE A FAILURE OR HAVE LET YOURSELF OR YOUR FAMILY DOWN: NOT AT ALL
3. TROUBLE FALLING OR STAYING ASLEEP: NOT AT ALL
SUM OF ALL RESPONSES TO PHQ QUESTIONS 1-9: 0
1. LITTLE INTEREST OR PLEASURE IN DOING THINGS: NOT AT ALL
4. FEELING TIRED OR HAVING LITTLE ENERGY: NOT AT ALL

## 2025-04-01 NOTE — PROGRESS NOTES
\"Have you been to the ER, urgent care clinic since your last visit?  Hospitalized since your last visit?\"    NO    “Have you seen or consulted any other health care providers outside our system since your last visit?”    NO      “Have you had a diabetic eye exam?”    NO     Date of last diabetic eye exam: 3/3/2021              Chief Complaint   Patient presents with    Diabetes

## 2025-04-08 ENCOUNTER — OFFICE VISIT (OUTPATIENT)
Age: 70
End: 2025-04-08

## 2025-04-08 VITALS
RESPIRATION RATE: 18 BRPM | WEIGHT: 170 LBS | DIASTOLIC BLOOD PRESSURE: 84 MMHG | HEIGHT: 63 IN | OXYGEN SATURATION: 99 % | SYSTOLIC BLOOD PRESSURE: 132 MMHG | BODY MASS INDEX: 30.12 KG/M2 | HEART RATE: 99 BPM

## 2025-04-08 DIAGNOSIS — S78.112S: ICD-10-CM

## 2025-04-08 DIAGNOSIS — M48.062 LUMBAR STENOSIS WITH NEUROGENIC CLAUDICATION: Primary | ICD-10-CM

## 2025-04-08 DIAGNOSIS — R00.0 TACHYCARDIA: ICD-10-CM

## 2025-04-08 ASSESSMENT — PATIENT HEALTH QUESTIONNAIRE - PHQ9
1. LITTLE INTEREST OR PLEASURE IN DOING THINGS: NOT AT ALL
2. FEELING DOWN, DEPRESSED OR HOPELESS: NOT AT ALL
SUM OF ALL RESPONSES TO PHQ QUESTIONS 1-9: 0

## 2025-04-08 ASSESSMENT — ENCOUNTER SYMPTOMS: BACK PAIN: 1

## 2025-04-08 NOTE — PROGRESS NOTES
[facility]  [address]  [address]  [place] [address]  Tel: [phone]  Fax: [phone]      Date:  25     Name:  KOBE ANDERSON  :  1955  MRN:  [id number]     PCP:  Milan Slater MD    Chief Complaint   Patient presents with    Neurologic Problem     Follow up - neuropathy - when blood sugars stay btw 115-130 she does well - when it gets higher she gets the tingling in he right foot        HISTORY OF PRESENT ILLNESS:  History of Present Illness  The patient is a 70-year-old female who presents today for a regular follow-up. She was last seen by Dr. Donato in 10/2024. At that time, she was experiencing sciatica, and a lumbar MRI was suggested. She was advised to continue Elavil and to try gabapentin. Intermittent numbness in the left buttock is reported,Back pain fluctuates in severity, with some days being more tolerable than others. Pain is localized at the base of the back. Consultation with an orthopedic surgeon or interventional pain specialist has not been sought. Gabapentin was ineffective in managing symptoms, leading to its discontinuation. The last physical therapy session was approximately 2 to 3 years ago, and no self-directed exercises are performed. No recent falls are reported, and there are no issues with numbness, tingling, or bladder function. Sleep disturbances are not reported. She has been dealing with this issue (phantom limb pain, s/p AKA)  since the age of 32. Muscle damage in the buttocks and constipation are also reported, for which a weekly regimen is taken to maintain soft bowel movements.    An EKG was done at the local hospital last week because the home health nurse noticed her heart was beating fast. There is a history of heart issues, including tachycardia. She plans to schedule an appt with Cardiology    INTERVAL: Since last visit, she retired from work, bought a food cart, and resumed working during the summer months.

## 2025-04-08 NOTE — PROGRESS NOTES
Chief Complaint   Patient presents with    Neurologic Problem     Follow up - neuropathy - when blood sugars stay btw 115-130 she does well - when it gets higher she gets the tingling in he right foot      1. Have you been to the ER, urgent care clinic since your last visit?  Hospitalized since your last visit? No     2. Have you seen or consulted any other health care providers outside of the [facility] since your last visit?  Include any pap smears or colon screening. Yes [employer] for wellness exam

## 2025-04-13 ENCOUNTER — HOSPITAL ENCOUNTER (EMERGENCY)
Facility: HOSPITAL | Age: 70
Discharge: HOME | End: 2025-04-13
Attending: EMERGENCY MEDICINE
Payer: MEDICARE

## 2025-04-13 VITALS
TEMPERATURE: 98.3 F | OXYGEN SATURATION: 96 % | WEIGHT: 170 LBS | HEART RATE: 95 BPM | RESPIRATION RATE: 18 BRPM | DIASTOLIC BLOOD PRESSURE: 97 MMHG | BODY MASS INDEX: 30.11 KG/M2 | SYSTOLIC BLOOD PRESSURE: 147 MMHG

## 2025-04-13 DIAGNOSIS — R11.2 NAUSEA AND VOMITING, UNSPECIFIED VOMITING TYPE: Primary | ICD-10-CM

## 2025-04-13 DIAGNOSIS — R73.9 HYPERGLYCEMIA: ICD-10-CM

## 2025-04-13 LAB
ALBUMIN SERPL-MCNC: 4.3 G/DL (ref 3.5–5)
ALBUMIN/GLOB SERPL: 1.4 (ref 1.1–2.2)
ALP SERPL-CCNC: 139 U/L (ref 45–117)
ALT SERPL-CCNC: 20 U/L (ref 12–78)
ANION GAP SERPL CALC-SCNC: 11 MMOL/L (ref 2–12)
AST SERPL-CCNC: 8 U/L (ref 15–37)
BASOPHILS # BLD: 0.06 K/UL (ref 0–0.1)
BASOPHILS NFR BLD: 0.9 % (ref 0–1)
BILIRUB SERPL-MCNC: 0.4 MG/DL (ref 0.2–1)
BUN SERPL-MCNC: 20 MG/DL (ref 6–20)
BUN/CREAT SERPL: 18 (ref 12–20)
CALCIUM SERPL-MCNC: 9.5 MG/DL (ref 8.5–10.1)
CHLORIDE SERPL-SCNC: 102 MMOL/L (ref 97–108)
CO2 SERPL-SCNC: 26 MMOL/L (ref 21–32)
CREAT SERPL-MCNC: 1.09 MG/DL (ref 0.55–1.02)
DIFFERENTIAL METHOD BLD: ABNORMAL
EKG ATRIAL RATE: 92 BPM
EKG DIAGNOSIS: NORMAL
EKG P AXIS: 61 DEGREES
EKG P-R INTERVAL: 182 MS
EKG Q-T INTERVAL: 358 MS
EKG QRS DURATION: 92 MS
EKG QTC CALCULATION (BAZETT): 442 MS
EKG R AXIS: -41 DEGREES
EKG T AXIS: 18 DEGREES
EKG VENTRICULAR RATE: 92 BPM
EOSINOPHIL # BLD: 0.03 K/UL (ref 0–0.4)
EOSINOPHIL NFR BLD: 0.4 % (ref 0–0.7)
ERYTHROCYTE [DISTWIDTH] IN BLOOD BY AUTOMATED COUNT: 16 % (ref 11.5–14.5)
GLOBULIN SER CALC-MCNC: 3.1 G/DL (ref 2–4)
GLUCOSE BLD STRIP.AUTO-MCNC: 302 MG/DL (ref 65–117)
GLUCOSE SERPL-MCNC: 370 MG/DL (ref 65–100)
HCT VFR BLD AUTO: 45.3 % (ref 35–47)
HGB BLD-MCNC: 14.3 G/DL (ref 11.5–16)
IMM GRANULOCYTES # BLD AUTO: 0.06 K/UL (ref 0–0.04)
IMM GRANULOCYTES NFR BLD AUTO: 0.9 % (ref 0–0.5)
LIPASE SERPL-CCNC: 59 U/L (ref 13–75)
LYMPHOCYTES # BLD: 1.27 K/UL (ref 0.8–3.5)
LYMPHOCYTES NFR BLD: 18 % (ref 12–49)
MCH RBC QN AUTO: 24.4 PG (ref 26–34)
MCHC RBC AUTO-ENTMCNC: 31.6 G/DL (ref 30–36.5)
MCV RBC AUTO: 77.3 FL (ref 80–99)
MONOCYTES # BLD: 0.36 K/UL (ref 0–1)
MONOCYTES NFR BLD: 5.1 % (ref 5–13)
NEUTS SEG # BLD: 5.27 K/UL (ref 1.8–8)
NEUTS SEG NFR BLD: 74.7 % (ref 32–75)
NRBC # BLD: 0 K/UL (ref 0–0.01)
NRBC BLD-RTO: 0 PER 100 WBC
PLATELET # BLD AUTO: 212 K/UL (ref 150–400)
PMV BLD AUTO: 11.1 FL (ref 8.9–12.9)
POTASSIUM SERPL-SCNC: 4.5 MMOL/L (ref 3.5–5.1)
PROT SERPL-MCNC: 7.4 G/DL (ref 6.4–8.2)
RBC # BLD AUTO: 5.86 M/UL (ref 3.8–5.2)
SERVICE CMNT-IMP: ABNORMAL
SODIUM SERPL-SCNC: 139 MMOL/L (ref 136–145)
TROPONIN I SERPL HS-MCNC: 8 NG/L (ref 0–51)
WBC # BLD AUTO: 7.1 K/UL (ref 3.6–11)

## 2025-04-13 PROCEDURE — 80053 COMPREHEN METABOLIC PANEL: CPT

## 2025-04-13 PROCEDURE — 83690 ASSAY OF LIPASE: CPT

## 2025-04-13 PROCEDURE — 96374 THER/PROPH/DIAG INJ IV PUSH: CPT

## 2025-04-13 PROCEDURE — 85025 COMPLETE CBC W/AUTO DIFF WBC: CPT

## 2025-04-13 PROCEDURE — 99284 EMERGENCY DEPT VISIT MOD MDM: CPT

## 2025-04-13 PROCEDURE — 84484 ASSAY OF TROPONIN QUANT: CPT

## 2025-04-13 PROCEDURE — 93005 ELECTROCARDIOGRAM TRACING: CPT | Performed by: EMERGENCY MEDICINE

## 2025-04-13 PROCEDURE — 82962 GLUCOSE BLOOD TEST: CPT

## 2025-04-13 PROCEDURE — 2580000003 HC RX 258: Performed by: EMERGENCY MEDICINE

## 2025-04-13 PROCEDURE — 6360000002 HC RX W HCPCS: Performed by: EMERGENCY MEDICINE

## 2025-04-13 RX ORDER — ONDANSETRON 8 MG/1
8 TABLET, ORALLY DISINTEGRATING ORAL 3 TIMES DAILY PRN
Qty: 15 TABLET | Refills: 0 | Status: SHIPPED | OUTPATIENT
Start: 2025-04-13

## 2025-04-13 RX ORDER — ONDANSETRON 2 MG/ML
4 INJECTION INTRAMUSCULAR; INTRAVENOUS ONCE
Status: COMPLETED | OUTPATIENT
Start: 2025-04-13 | End: 2025-04-13

## 2025-04-13 RX ORDER — 0.9 % SODIUM CHLORIDE 0.9 %
1000 INTRAVENOUS SOLUTION INTRAVENOUS ONCE
Status: COMPLETED | OUTPATIENT
Start: 2025-04-13 | End: 2025-04-13

## 2025-04-13 RX ADMIN — SODIUM CHLORIDE 1000 ML: 0.9 INJECTION, SOLUTION INTRAVENOUS at 13:24

## 2025-04-13 RX ADMIN — ONDANSETRON 4 MG: 2 INJECTION, SOLUTION INTRAMUSCULAR; INTRAVENOUS at 13:23

## 2025-04-13 ASSESSMENT — LIFESTYLE VARIABLES
HOW MANY STANDARD DRINKS CONTAINING ALCOHOL DO YOU HAVE ON A TYPICAL DAY: PATIENT DOES NOT DRINK
HOW OFTEN DO YOU HAVE A DRINK CONTAINING ALCOHOL: NEVER

## 2025-04-13 ASSESSMENT — PAIN SCALES - GENERAL: PAINLEVEL_OUTOF10: 0

## 2025-04-13 ASSESSMENT — PAIN - FUNCTIONAL ASSESSMENT: PAIN_FUNCTIONAL_ASSESSMENT: 0-10

## 2025-04-13 NOTE — ED PROVIDER NOTES
12 mmol/L    Glucose 370 (H) 65 - 100 mg/dL    BUN 20 6 - 20 MG/DL    Creatinine 1.09 (H) 0.55 - 1.02 MG/DL    BUN/Creatinine Ratio 18 12 - 20      Est, Glom Filt Rate 55 (L) >60 ml/min/1.73m2    Calcium 9.5 8.5 - 10.1 MG/DL    Total Bilirubin 0.4 0.2 - 1.0 MG/DL    ALT 20 12 - 78 U/L    AST 8 (L) 15 - 37 U/L    Alk Phosphatase 139 (H) 45 - 117 U/L    Total Protein 7.4 6.4 - 8.2 g/dL    Albumin 4.3 3.5 - 5.0 g/dL    Globulin 3.1 2.0 - 4.0 g/dL    Albumin/Globulin Ratio 1.4 1.1 - 2.2     Lipase    Collection Time: 04/13/25 12:26 PM   Result Value Ref Range    Lipase 59 13 - 75 U/L   Troponin    Collection Time: 04/13/25 12:26 PM   Result Value Ref Range    Troponin, High Sensitivity 8 0 - 51 ng/L   EKG 12 Lead    Collection Time: 04/13/25  1:30 PM   Result Value Ref Range    Ventricular Rate 92 BPM    Atrial Rate 92 BPM    P-R Interval 182 ms    QRS Duration 92 ms    Q-T Interval 358 ms    QTc Calculation (Bazett) 442 ms    P Axis 61 degrees    R Axis -41 degrees    T Axis 18 degrees    Diagnosis       Normal sinus rhythm  Left axis deviation  Septal infarct (cited on or before 01-APR-2025)  Abnormal ECG  When compared with ECG of 01-APR-2025 14:23,  No significant change was found  Confirmed by MD Shamir, Ebenezer HEATH ([id number]) on 4/13/2025 3:38:54 PM     POCT Glucose    Collection Time: 04/13/25  2:02 PM   Result Value Ref Range    POC Glucose 302 (H) 65 - 117 mg/dL    Performed by: Richard Mancilla RN        EKG: If performed, independent interpretation documented below in the MDM section     RADIOLOGY:  Non-plain film images such as CT, Ultrasound and MRI are read by the radiologist. Plain radiographic images are visualized and preliminarily interpreted by the ED Provider with the findings documented in the MDM section.     Interpretation per the Radiologist below, if available at the time of this note:     No orders to display        PROCEDURES   Unless otherwise noted below, none  Procedures     CRITICAL CARE TIME

## 2025-04-13 NOTE — ED TRIAGE NOTES
Pt arrived with c/o vomiting since 2am, she states she woke up vomiting and has not stopped. She does not have any diarrhea or pain at this time

## 2025-04-14 ENCOUNTER — RESULTS FOLLOW-UP (OUTPATIENT)
Facility: CLINIC | Age: 70
End: 2025-04-14

## 2025-04-16 ENCOUNTER — OFFICE VISIT (OUTPATIENT)
Facility: CLINIC | Age: 70
End: 2025-04-16
Payer: MEDICARE

## 2025-04-16 VITALS
WEIGHT: 188.7 LBS | BODY MASS INDEX: 33.43 KG/M2 | DIASTOLIC BLOOD PRESSURE: 89 MMHG | TEMPERATURE: 98.2 F | SYSTOLIC BLOOD PRESSURE: 130 MMHG | RESPIRATION RATE: 14 BRPM | OXYGEN SATURATION: 97 % | HEART RATE: 94 BPM | HEIGHT: 63 IN

## 2025-04-16 DIAGNOSIS — G54.6 PHANTOM LIMB SYNDROME WITH PAIN: ICD-10-CM

## 2025-04-16 DIAGNOSIS — Z79.4 TYPE 2 DIABETES MELLITUS WITH HYPERGLYCEMIA, WITH LONG-TERM CURRENT USE OF INSULIN (HCC): ICD-10-CM

## 2025-04-16 DIAGNOSIS — E11.42 DIABETIC PERIPHERAL NEUROPATHY ASSOCIATED WITH TYPE 2 DIABETES MELLITUS: ICD-10-CM

## 2025-04-16 DIAGNOSIS — S78.112S: ICD-10-CM

## 2025-04-16 DIAGNOSIS — E78.2 MIXED HYPERLIPIDEMIA: ICD-10-CM

## 2025-04-16 DIAGNOSIS — R11.2 NAUSEA AND VOMITING, UNSPECIFIED VOMITING TYPE: Primary | ICD-10-CM

## 2025-04-16 DIAGNOSIS — E11.65 TYPE 2 DIABETES MELLITUS WITH HYPERGLYCEMIA, WITH LONG-TERM CURRENT USE OF INSULIN (HCC): ICD-10-CM

## 2025-04-16 DIAGNOSIS — I10 ESSENTIAL HYPERTENSION: ICD-10-CM

## 2025-04-16 PROCEDURE — 99214 OFFICE O/P EST MOD 30 MIN: CPT | Performed by: FAMILY MEDICINE

## 2025-04-16 PROCEDURE — 1159F MED LIST DOCD IN RCRD: CPT | Performed by: FAMILY MEDICINE

## 2025-04-16 PROCEDURE — 1160F RVW MEDS BY RX/DR IN RCRD: CPT | Performed by: FAMILY MEDICINE

## 2025-04-16 PROCEDURE — 1123F ACP DISCUSS/DSCN MKR DOCD: CPT | Performed by: FAMILY MEDICINE

## 2025-04-16 PROCEDURE — 1126F AMNT PAIN NOTED NONE PRSNT: CPT | Performed by: FAMILY MEDICINE

## 2025-04-16 PROCEDURE — 3079F DIAST BP 80-89 MM HG: CPT | Performed by: FAMILY MEDICINE

## 2025-04-16 PROCEDURE — 1090F PRES/ABSN URINE INCON ASSESS: CPT | Performed by: FAMILY MEDICINE

## 2025-04-16 PROCEDURE — G8417 CALC BMI ABV UP PARAM F/U: HCPCS | Performed by: FAMILY MEDICINE

## 2025-04-16 PROCEDURE — G8399 PT W/DXA RESULTS DOCUMENT: HCPCS | Performed by: FAMILY MEDICINE

## 2025-04-16 PROCEDURE — G8427 DOCREV CUR MEDS BY ELIG CLIN: HCPCS | Performed by: FAMILY MEDICINE

## 2025-04-16 PROCEDURE — 3075F SYST BP GE 130 - 139MM HG: CPT | Performed by: FAMILY MEDICINE

## 2025-04-16 PROCEDURE — 2022F DILAT RTA XM EVC RTNOPTHY: CPT | Performed by: FAMILY MEDICINE

## 2025-04-16 PROCEDURE — G2211 COMPLEX E/M VISIT ADD ON: HCPCS | Performed by: FAMILY MEDICINE

## 2025-04-16 PROCEDURE — 1036F TOBACCO NON-USER: CPT | Performed by: FAMILY MEDICINE

## 2025-04-16 PROCEDURE — 3046F HEMOGLOBIN A1C LEVEL >9.0%: CPT | Performed by: FAMILY MEDICINE

## 2025-04-16 PROCEDURE — 3017F COLORECTAL CA SCREEN DOC REV: CPT | Performed by: FAMILY MEDICINE

## 2025-04-16 ASSESSMENT — PATIENT HEALTH QUESTIONNAIRE - PHQ9
2. FEELING DOWN, DEPRESSED OR HOPELESS: NOT AT ALL
5. POOR APPETITE OR OVEREATING: NOT AT ALL
7. TROUBLE CONCENTRATING ON THINGS, SUCH AS READING THE NEWSPAPER OR WATCHING TELEVISION: NOT AT ALL
SUM OF ALL RESPONSES TO PHQ QUESTIONS 1-9: 0
4. FEELING TIRED OR HAVING LITTLE ENERGY: NOT AT ALL
9. THOUGHTS THAT YOU WOULD BE BETTER OFF DEAD, OR OF HURTING YOURSELF: NOT AT ALL
1. LITTLE INTEREST OR PLEASURE IN DOING THINGS: NOT AT ALL
10. IF YOU CHECKED OFF ANY PROBLEMS, HOW DIFFICULT HAVE THESE PROBLEMS MADE IT FOR YOU TO DO YOUR WORK, TAKE CARE OF THINGS AT HOME, OR GET ALONG WITH OTHER PEOPLE: NOT DIFFICULT AT ALL
SUM OF ALL RESPONSES TO PHQ QUESTIONS 1-9: 0
6. FEELING BAD ABOUT YOURSELF - OR THAT YOU ARE A FAILURE OR HAVE LET YOURSELF OR YOUR FAMILY DOWN: NOT AT ALL
SUM OF ALL RESPONSES TO PHQ QUESTIONS 1-9: 0
8. MOVING OR SPEAKING SO SLOWLY THAT OTHER PEOPLE COULD HAVE NOTICED. OR THE OPPOSITE, BEING SO FIGETY OR RESTLESS THAT YOU HAVE BEEN MOVING AROUND A LOT MORE THAN USUAL: NOT AT ALL
SUM OF ALL RESPONSES TO PHQ QUESTIONS 1-9: 0
3. TROUBLE FALLING OR STAYING ASLEEP: NOT AT ALL

## 2025-04-16 NOTE — PROGRESS NOTES
\"Have you been to the ER, urgent care clinic since your last visit?  Hospitalized since your last visit?\"    NO    “Have you seen or consulted any other health care providers outside our system since your last visit?”    NO      “Have you had a diabetic eye exam?”    NO     Date of last diabetic eye exam: 3/3/2021            Chief Complaint   Patient presents with    Follow-up     ER Fu / Hyperglycemia - Patient has been taking the baclofen and feels light headed, feeling nauseous.         
370 04/13/2025 12:26 PM    GLUCOSE 210 05/31/2024 01:10 PM    CALCIUM 9.5 04/13/2025 12:26 PM    LABGLOM 55 04/13/2025 12:26 PM    LABGLOM 50 03/25/2024 08:30 AM    LABGLOM 44 09/05/2023 02:16 PM        She has the following problems:  Patient Active Problem List   Diagnosis    Traumatic amputation of left leg above knee (HCC)    Type 2 diabetes mellitus with hyperglycemia, with long-term current use of insulin (HCC)    Severe obesity    Gout    Post-menopausal bleeding    Essential hypertension    Asthma    Incomplete uterovaginal prolapse    Stress incontinence    Bilateral carpal tunnel syndrome    Tachycardia    Diabetic peripheral neuropathy associated with type 2 diabetes mellitus    Cervical radiculopathy due to degenerative joint disease of spine    FH: breast cancer in first degree relative when <50 years old    Mass of lower inner quadrant of left breast    Major depressive disorder, recurrent, mild    History of carpal tunnel release of both wrists    Lumbar back pain with radiculopathy affecting left lower extremity    Phantom limb syndrome with pain      History of Present Illness  The patient is a 70-year-old female who presents for evaluation of nausea, right shoulder pain, and diabetes.    An episode of nausea occurred on Sunday morning around 3:00 AM, leading to an emergency room visit. Intravenous fluids were administered, and her blood glucose level was recorded at 370. Tachycardia and hypertension were also noted. The cause of her symptoms remains unclear, but she suspects a muscle relaxant may have contributed, as she felt disoriented after taking it. The medication has since been discontinued. On Saturday at 3:00 PM, she consumed mashed potatoes, onions, and gravy at a restaurant. She initially feared a stroke due to dropping a mug of coffee, but a stroke was ruled out following a negative workup. Mild nausea persists, but no further vomiting episodes have occurred. She has been taking

## 2025-04-25 DIAGNOSIS — G54.6 PHANTOM LIMB PAIN (HCC): ICD-10-CM

## 2025-04-25 NOTE — TELEPHONE ENCOUNTER
PCP: Milan Slater MD    LAST APPT:4/16/2025    Future Appointments   Date Time Provider Department Center   5/1/2025 10:00 AM Milan Slater MD [facility]   10/2/2025 10:20 AM Ranjan Donato MD [id number]       Requested Prescriptions     Pending Prescriptions Disp Refills    diclofenac sodium (VOLTAREN) 1 % GEL [Pharmacy Med Name: Diclofenac Sodium External Gel 1 %]       Sig: APPLY 4 GRAMS TOPICALLY FOUR TIMES DAILY

## 2025-04-29 NOTE — TELEPHONE ENCOUNTER
"Subjective:      Patient ID: Haider Lopes is a 37 y.o. male.    Vitals:  height is 5' 11" (1.803 m) and weight is 99.8 kg (220 lb). His oral temperature is 97.1 °F (36.2 °C). His blood pressure is 135/89 and his pulse is 89. His respiration is 18 and oxygen saturation is 98%.     Chief Complaint: Rash    A 37 y o male is her about a rash in the head of his penis that he notice on 04/25/2025. He says he went to [facility] and had a STD panel that he said everything came back negative. He says its red but it does not itch and is not painful.      Pt is a 36 yo M who presents with rash on the head of his penis.  Patient reports that it started 5 days ago.  Patient was tested at [facility] for GC/Chlamydia, trichomonas, HIV and syphilis which were all negative.  Rash is non painful and does not itch. Patient is uncircumcised.    Rash  This is a new problem. The current episode started in the past 7 days. The problem is unchanged. The affected locations include the groin. The rash is characterized by redness. It is unknown if there was an exposure to a precipitant. Pertinent negatives include no congestion, cough, diarrhea, eye pain, fever, rhinorrhea or sore throat. Past treatments include topical steroids. The treatment provided no relief.       Constitution: Negative for fever.   HENT:  Negative for congestion and sore throat.    Eyes:  Negative for eye pain.   Respiratory:  Negative for cough.    Gastrointestinal:  Negative for diarrhea.   Skin:  Positive for rash. Negative for erythema.      Objective:     Physical Exam   Constitutional: He is oriented to person, place, and time. He appears well-developed.   HENT:   Head: Normocephalic and atraumatic. Head is without abrasion, without contusion and without laceration.   Ears:   Right Ear: External ear normal.   Left Ear: External ear normal.   Nose: Nose normal.   Mouth/Throat: Oropharynx is clear and moist and mucous membranes are normal. " Pt calling to get replacement for ozempic since it is on backorder at pharmacy.  Pt can be reached at [phone]   Eyes: Conjunctivae, EOM and lids are normal. Pupils are equal, round, and reactive to light.   Neck: Trachea normal and phonation normal. Neck supple.   Cardiovascular: Normal rate, regular rhythm and normal heart sounds.   Pulmonary/Chest: Effort normal and breath sounds normal. No stridor. No respiratory distress.   Genitourinary: Uncircumcised. Penis exhibits lesions (pustuels on head).   Musculoskeletal: Normal range of motion.         General: Normal range of motion.   Neurological: He is alert and oriented to person, place, and time.   Skin: Skin is warm, dry, intact, rash and pustular (over head of the penis). Capillary refill takes less than 2 seconds. No abrasion, No burn, No bruising, No erythema and No ecchymosis   Psychiatric: His speech is normal and behavior is normal. Judgment and thought content normal.   Nursing note and vitals reviewed.chaperone present         Assessment:     1. Balanitis due to bacteria        Plan:       Balanitis due to bacteria  -     mupirocin (BACTROBAN) 2 % ointment; Apply to affected area 3 times daily  Dispense: 22 g; Refill: 0  -     amoxicillin-clavulanate 875-125mg (AUGMENTIN) 875-125 mg per tablet; Take 1 tablet by mouth every 12 (twelve) hours. for 10 days  Dispense: 20 tablet; Refill: 0  -     Ambulatory referral/consult to Dermatology      Patient Instructions   Please take antibiotics to completion  Follow-up with PCP if no improvement  See Dermatologist if not improved  You must understand that you have received treatment at an Urgent Care facility only, and that you may be  released before all of your medical problems are known or treated. Urgent Care facilities are not equipped to  handle life threatening emergencies. It is recommended that you seek care at an Emergency Department for  further evaluation of worsening or concerning symptoms, or possibly life threatening conditions as  discussed.      If you develop chest pain, shortness of breath, throat  swelling, tongue swelling, lightheadedness or any other causes for concern, proceed to ER.

## 2025-05-04 NOTE — PROGRESS NOTES
Assessment/Plan:        Assessment & Plan  1. Diabetes mellitus.  - Her blood glucose levels have been fluctuating, with recent readings ranging from 79 to 397.  - She is currently taking metformin and insulin.  - A prescription for Jardiance has been provided, and she is advised to continue her current insulin regimen. The potential side effects of Jardiance, including bladder infection and yeast infection, were discussed. She is instructed to contact the office if Jardiance proves to be too costly.  - A list of newer antidiabetic medications has been given for her to discuss with her pharmacist regarding their costs. She is advised to undergo blood work at the end of 07/2025. If she can obtain the pill, she may reduce her insulin dosage to 24 units twice daily.    Follow-up  The patient will follow up in 08/2025.    Results  Labs   - Blood sugar: 134   - Blood sugar: 209   - Blood sugar: 79   - Blood sugar: 397  1. Type 2 diabetes mellitus with hyperglycemia, with long-term current use of insulin (HCC)  -     empagliflozin (JARDIANCE) 10 MG tablet; Take 1 tablet by mouth daily, Disp-30 tablet, R-5Normal  -     metFORMIN (GLUCOPHAGE) 500 MG tablet; Take 1 tablet by mouth 2 times daily (with meals), Disp-200 tablet, R-2Normal  -     Hemoglobin A1C; Future  2. Diabetic peripheral neuropathy associated with type 2 diabetes mellitus ([facility])  3. Phantom limb syndrome with pain ([facility])  4. Severe obesity ([facility])  5. 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 Skin sore  -     mupirocin (BACTROBAN) 2 % ointment; Apply topically 3 times daily., Disp-22 g, R-2, Normal  7. Essential hypertension  -     Basic Metabolic Panel; Future           Orders Placed This Encounter    Basic Metabolic Panel     Standing Status:   Future     Expected Date:   5/5/2025     Expiration Date:   5/5/2026    Hemoglobin A1C     Standing Status:   Future     Expected Date:   5/5/2025     Expiration Date:

## 2025-05-05 ENCOUNTER — OFFICE VISIT (OUTPATIENT)
Facility: CLINIC | Age: 70
End: 2025-05-05
Payer: MEDICARE

## 2025-05-05 VITALS
HEART RATE: 110 BPM | OXYGEN SATURATION: 96 % | WEIGHT: 188 LBS | SYSTOLIC BLOOD PRESSURE: 135 MMHG | BODY MASS INDEX: 33.31 KG/M2 | HEIGHT: 63 IN | TEMPERATURE: 98.1 F | DIASTOLIC BLOOD PRESSURE: 84 MMHG | RESPIRATION RATE: 14 BRPM

## 2025-05-05 DIAGNOSIS — E11.42 DIABETIC PERIPHERAL NEUROPATHY ASSOCIATED WITH TYPE 2 DIABETES MELLITUS (HCC): ICD-10-CM

## 2025-05-05 DIAGNOSIS — E66.01 SEVERE OBESITY (HCC): ICD-10-CM

## 2025-05-05 DIAGNOSIS — S78.112D TRAUMATIC ABOVE-KNEE AMPUTATION OF LEFT LOWER EXTREMITY, SUBSEQUENT ENCOUNTER: ICD-10-CM

## 2025-05-05 DIAGNOSIS — I10 ESSENTIAL HYPERTENSION: ICD-10-CM

## 2025-05-05 DIAGNOSIS — Z79.4 TYPE 2 DIABETES MELLITUS WITH HYPERGLYCEMIA, WITH LONG-TERM CURRENT USE OF INSULIN (HCC): Primary | ICD-10-CM

## 2025-05-05 DIAGNOSIS — G54.6 PHANTOM LIMB SYNDROME WITH PAIN (HCC): ICD-10-CM

## 2025-05-05 DIAGNOSIS — E11.65 TYPE 2 DIABETES MELLITUS WITH HYPERGLYCEMIA, WITH LONG-TERM CURRENT USE OF INSULIN (HCC): Primary | ICD-10-CM

## 2025-05-05 DIAGNOSIS — L98.9 SKIN SORE: ICD-10-CM

## 2025-05-05 PROCEDURE — 1123F ACP DISCUSS/DSCN MKR DOCD: CPT | Performed by: FAMILY MEDICINE

## 2025-05-05 PROCEDURE — 1036F TOBACCO NON-USER: CPT | Performed by: FAMILY MEDICINE

## 2025-05-05 PROCEDURE — G2211 COMPLEX E/M VISIT ADD ON: HCPCS | Performed by: FAMILY MEDICINE

## 2025-05-05 PROCEDURE — 3075F SYST BP GE 130 - 139MM HG: CPT | Performed by: FAMILY MEDICINE

## 2025-05-05 PROCEDURE — G8417 CALC BMI ABV UP PARAM F/U: HCPCS | Performed by: FAMILY MEDICINE

## 2025-05-05 PROCEDURE — 1090F PRES/ABSN URINE INCON ASSESS: CPT | Performed by: FAMILY MEDICINE

## 2025-05-05 PROCEDURE — 3017F COLORECTAL CA SCREEN DOC REV: CPT | Performed by: FAMILY MEDICINE

## 2025-05-05 PROCEDURE — G8399 PT W/DXA RESULTS DOCUMENT: HCPCS | Performed by: FAMILY MEDICINE

## 2025-05-05 PROCEDURE — 99214 OFFICE O/P EST MOD 30 MIN: CPT | Performed by: FAMILY MEDICINE

## 2025-05-05 PROCEDURE — 3079F DIAST BP 80-89 MM HG: CPT | Performed by: FAMILY MEDICINE

## 2025-05-05 PROCEDURE — 3046F HEMOGLOBIN A1C LEVEL >9.0%: CPT | Performed by: FAMILY MEDICINE

## 2025-05-05 PROCEDURE — 2022F DILAT RTA XM EVC RTNOPTHY: CPT | Performed by: FAMILY MEDICINE

## 2025-05-05 PROCEDURE — 1125F AMNT PAIN NOTED PAIN PRSNT: CPT | Performed by: FAMILY MEDICINE

## 2025-05-05 PROCEDURE — G8427 DOCREV CUR MEDS BY ELIG CLIN: HCPCS | Performed by: FAMILY MEDICINE

## 2025-05-05 RX ORDER — MUPIROCIN 20 MG/G
OINTMENT TOPICAL
Qty: 22 G | Refills: 2 | Status: SHIPPED | OUTPATIENT
Start: 2025-05-05

## 2025-05-05 RX ORDER — AMITRIPTYLINE HYDROCHLORIDE 50 MG/1
50 TABLET ORAL NIGHTLY
Qty: 200 TABLET | Refills: 1 | Status: SHIPPED | OUTPATIENT
Start: 2025-05-05

## 2025-05-05 NOTE — PROGRESS NOTES
Have you been to the ER, urgent care clinic since your last visit?  Hospitalized since your last visit?   NO    Have you seen or consulted any other health care providers outside our system since your last visit?   NO      “Have you had a diabetic eye exam?”    NO     Date of last diabetic eye exam: 3/3/2021              Chief Complaint   Patient presents with    Diabetes    Fall     Patient fell about 2 weeks ago / hurt R shoulder.

## 2025-05-05 NOTE — PATIENT INSTRUCTIONS
Newer diabetes medication have good cardiovascular profiles and reduce heart attacks and strokes.  They however can be very expensive and have their own set of side effects.    SGLT2 Inhibitors: All oral, small amounts of weight loss.  Jardiance, Farxiga, Invokana, Qtern, Riosglatro    GLP agonist: Injectable, have weight loss properties much greater weight loss.    Victoza, Ozempic, Byetta, Tanzeum, Mounjaro    GLP-1 agonists: Oral  Rybelsus

## 2025-05-07 DIAGNOSIS — E11.65 TYPE 2 DIABETES MELLITUS WITH HYPERGLYCEMIA, WITH LONG-TERM CURRENT USE OF INSULIN (HCC): ICD-10-CM

## 2025-05-07 DIAGNOSIS — Z79.4 TYPE 2 DIABETES MELLITUS WITH HYPERGLYCEMIA, WITH LONG-TERM CURRENT USE OF INSULIN (HCC): ICD-10-CM

## 2025-05-16 RX ORDER — ISOPROPYL ALCOHOL 70 ML/100ML
SWAB TOPICAL
Qty: 100 EACH | Refills: 3 | Status: SHIPPED | OUTPATIENT
Start: 2025-05-16

## 2025-05-19 DIAGNOSIS — E11.65 TYPE 2 DIABETES MELLITUS WITH HYPERGLYCEMIA, WITH LONG-TERM CURRENT USE OF INSULIN (HCC): Primary | ICD-10-CM

## 2025-05-19 DIAGNOSIS — Z79.4 TYPE 2 DIABETES MELLITUS WITH HYPERGLYCEMIA, WITH LONG-TERM CURRENT USE OF INSULIN (HCC): Primary | ICD-10-CM

## 2025-05-28 RX ORDER — BLOOD-GLUCOSE METER
1 EACH MISCELLANEOUS DAILY
Qty: 1 KIT | Refills: 0 | Status: SHIPPED | OUTPATIENT
Start: 2025-05-28

## 2025-05-28 RX ORDER — LANCETS
1 EACH MISCELLANEOUS DAILY
Qty: 200 EACH | Refills: 5 | Status: SHIPPED | OUTPATIENT
Start: 2025-05-28

## 2025-05-28 RX ORDER — BLOOD SUGAR DIAGNOSTIC
1 STRIP MISCELLANEOUS DAILY
Qty: 200 STRIP | Refills: 5 | Status: SHIPPED | OUTPATIENT
Start: 2025-05-28

## 2025-06-03 ENCOUNTER — TELEMEDICINE (OUTPATIENT)
Age: 70
End: 2025-06-03
Payer: MEDICARE

## 2025-06-03 DIAGNOSIS — E11.29 TYPE 2 DIABETES MELLITUS WITH OTHER DIABETIC KIDNEY COMPLICATION (HCC): Primary | ICD-10-CM

## 2025-06-03 PROCEDURE — G0108 DIAB MANAGE TRN  PER INDIV: HCPCS

## 2025-06-03 NOTE — PROGRESS NOTES
[facility]  Diabetes Self-Management Education & Support Program  Encounter Note      SUMMARY  Diabetes self-care management follow- up training was completed. The participant will return on Alee 10 to continue DSMES related to monitoring. The participant  will practice knowledge and skills related to healthy eating, being active, and medications to improve diabetes self-management.      EVALUATION:  Ms. Jarquin completed DSMES 1/2024. A1c 10.9 on 3/19/2025. She shared that she \"has retired, has a lot going on, and is not exercising, but is considering joining [employer]\" physical activity program. She reported taking the following diabetes medications: Jardiance 10 mg, 1 tab daily, metformin 500 mg 1 tab twice daily with meals, and 70/30 insulin 24 units twice daily. She shared that she has been drinking regular Pepsi. Encouraged participant to avoid sugary beverages and try diet/zero beverages. She stated she is adding non-starchy veggies to meals. She stated her BG meter is broken, she cannot get it from the pharmacy, and is not not monitoring glucose at this time. She reported one hypoglycemia event and shared that she \"didn't know what her BG was but felt funny, had orange juice and toast and felt better\". Encouraged her to communicate with provider/insurance/pharmacy to obtain BG meter/supplies. Reviewed hypoglycemia protocols/Rule of 15.     RECOMMENDATIONS:  Communicate with provider/insurance/pharmacy to obtain BG meter/supplies  May benefit from CGM technology r/t multiple daily insulin dosages and reported hypoglycemia   Communicate with provider before beginning exercise program/physical activity     Next provider visit is scheduled for   Future Appointments         Provider Specialty Dept Phone    6/10/2025 3:00 PM Jossie Shankra RN Diabetes Services [phone]    8/4/2025 8:15 AM Milan Slater MD Internal Medicine [phone]    10/2/2025 10:20 AM Ranjan Donato,

## 2025-06-04 DIAGNOSIS — Z79.4 TYPE 2 DIABETES MELLITUS WITH HYPERGLYCEMIA, WITH LONG-TERM CURRENT USE OF INSULIN (HCC): ICD-10-CM

## 2025-06-04 DIAGNOSIS — E11.65 TYPE 2 DIABETES MELLITUS WITH HYPERGLYCEMIA, WITH LONG-TERM CURRENT USE OF INSULIN (HCC): ICD-10-CM

## 2025-06-10 ENCOUNTER — OFFICE VISIT (OUTPATIENT)
Age: 70
End: 2025-06-10
Payer: MEDICARE

## 2025-06-10 DIAGNOSIS — E11.29 TYPE 2 DIABETES MELLITUS WITH OTHER DIABETIC KIDNEY COMPLICATION (HCC): Primary | ICD-10-CM

## 2025-06-10 PROCEDURE — G0108 DIAB MANAGE TRN  PER INDIV: HCPCS

## 2025-06-10 NOTE — PROGRESS NOTES
[facility]  Diabetes Self-Management Education & Support Program  Encounter note      SUMMARY  Continuous Glucose Monitor (CGM) Training Note    Diabetes self-care management training was completed related to continuous glucose monitoring. The participant presents for training on Lauryn 3 with Registered Nurse, Certified Diabetes Care and , and Certified Pump Trainer .    The following areas were addressed:  Purpose of CGM  Linking of CGM to smart devices, as applicable  Appropriate locations for CGM wear  Site selection and skin preparation  Application of sensor  CGM ‘warm-up’ phase  Scanning Tips, Understanding Sensor Readings/Data  When to utilize BG meter  Customize Alarms  Discrepancies between blood glucose meter and CGM data  Charging of devices, as applicable  Troubleshooting for site failure, loss of connectivity/signal  Calibrations, as applicable  CGM ‘lifespan’ and when to change site/sensor removal and replacement  Proper disposal of CGM and     EVALUATION:  The Participant demonstrated ability to place CGM sensor and verbalized understanding on training information. Participant is aware to contact our office, or the CGM company with any issues related to their device. Educational materials and contact information including phone numbers provided to participant.     RECOMMENDATIONS:  The Participant is willing to wear CGM sensor as prescribed to monitor glucose daily and will share data with provider.  Encouraged participant to establish [employer] online account.        ELLEN Gibbs, RN, [facility], CPT on 6/10/2025 at 4:17 PM    I have personally reviewed the health record, including provider notes, laboratory data and current medications before making these care and education recommendations. The time spent in this effort is included in the total time.    Total minutes: 60 minutes

## 2025-06-24 ENCOUNTER — OFFICE VISIT (OUTPATIENT)
Facility: CLINIC | Age: 70
End: 2025-06-24
Payer: MEDICARE

## 2025-06-24 ENCOUNTER — RESULTS FOLLOW-UP (OUTPATIENT)
Facility: CLINIC | Age: 70
End: 2025-06-24

## 2025-06-24 VITALS
OXYGEN SATURATION: 97 % | TEMPERATURE: 97.8 F | RESPIRATION RATE: 12 BRPM | SYSTOLIC BLOOD PRESSURE: 134 MMHG | BODY MASS INDEX: 30.18 KG/M2 | HEART RATE: 91 BPM | DIASTOLIC BLOOD PRESSURE: 86 MMHG | HEIGHT: 63 IN | WEIGHT: 170.3 LBS

## 2025-06-24 DIAGNOSIS — Z79.4 TYPE 2 DIABETES MELLITUS WITH HYPERGLYCEMIA, WITH LONG-TERM CURRENT USE OF INSULIN (HCC): ICD-10-CM

## 2025-06-24 DIAGNOSIS — E11.65 TYPE 2 DIABETES MELLITUS WITH HYPERGLYCEMIA, WITH LONG-TERM CURRENT USE OF INSULIN (HCC): ICD-10-CM

## 2025-06-24 DIAGNOSIS — I10 ESSENTIAL HYPERTENSION: ICD-10-CM

## 2025-06-24 DIAGNOSIS — L98.491 SKIN ULCER, LIMITED TO BREAKDOWN OF SKIN (HCC): Primary | ICD-10-CM

## 2025-06-24 DIAGNOSIS — G54.6 PHANTOM LIMB SYNDROME WITH PAIN (HCC): ICD-10-CM

## 2025-06-24 DIAGNOSIS — S78.112S: ICD-10-CM

## 2025-06-24 LAB — GLUCOSE, POC: 130 MG/DL

## 2025-06-24 PROCEDURE — G8399 PT W/DXA RESULTS DOCUMENT: HCPCS | Performed by: FAMILY MEDICINE

## 2025-06-24 PROCEDURE — G2211 COMPLEX E/M VISIT ADD ON: HCPCS | Performed by: FAMILY MEDICINE

## 2025-06-24 PROCEDURE — 99214 OFFICE O/P EST MOD 30 MIN: CPT | Performed by: FAMILY MEDICINE

## 2025-06-24 PROCEDURE — 1036F TOBACCO NON-USER: CPT | Performed by: FAMILY MEDICINE

## 2025-06-24 PROCEDURE — 3079F DIAST BP 80-89 MM HG: CPT | Performed by: FAMILY MEDICINE

## 2025-06-24 PROCEDURE — G8417 CALC BMI ABV UP PARAM F/U: HCPCS | Performed by: FAMILY MEDICINE

## 2025-06-24 PROCEDURE — 1126F AMNT PAIN NOTED NONE PRSNT: CPT | Performed by: FAMILY MEDICINE

## 2025-06-24 PROCEDURE — G8427 DOCREV CUR MEDS BY ELIG CLIN: HCPCS | Performed by: FAMILY MEDICINE

## 2025-06-24 PROCEDURE — 2022F DILAT RTA XM EVC RTNOPTHY: CPT | Performed by: FAMILY MEDICINE

## 2025-06-24 PROCEDURE — 1123F ACP DISCUSS/DSCN MKR DOCD: CPT | Performed by: FAMILY MEDICINE

## 2025-06-24 PROCEDURE — 82962 GLUCOSE BLOOD TEST: CPT | Performed by: FAMILY MEDICINE

## 2025-06-24 PROCEDURE — 3075F SYST BP GE 130 - 139MM HG: CPT | Performed by: FAMILY MEDICINE

## 2025-06-24 PROCEDURE — 3046F HEMOGLOBIN A1C LEVEL >9.0%: CPT | Performed by: FAMILY MEDICINE

## 2025-06-24 PROCEDURE — 3017F COLORECTAL CA SCREEN DOC REV: CPT | Performed by: FAMILY MEDICINE

## 2025-06-24 PROCEDURE — 1090F PRES/ABSN URINE INCON ASSESS: CPT | Performed by: FAMILY MEDICINE

## 2025-06-24 RX ORDER — AMITRIPTYLINE HYDROCHLORIDE 50 MG/1
100 TABLET ORAL NIGHTLY
Qty: 200 TABLET | Refills: 1 | Status: SHIPPED | OUTPATIENT
Start: 2025-06-24

## 2025-06-24 RX ORDER — SEMAGLUTIDE 1.34 MG/ML
0.25 INJECTION, SOLUTION SUBCUTANEOUS WEEKLY
Qty: 1.5 ML | Refills: 2 | Status: SHIPPED | OUTPATIENT
Start: 2025-06-24

## 2025-06-24 SDOH — ECONOMIC STABILITY: FOOD INSECURITY: WITHIN THE PAST 12 MONTHS, THE FOOD YOU BOUGHT JUST DIDN'T LAST AND YOU DIDN'T HAVE MONEY TO GET MORE.: NEVER TRUE

## 2025-06-24 SDOH — ECONOMIC STABILITY: FOOD INSECURITY: WITHIN THE PAST 12 MONTHS, YOU WORRIED THAT YOUR FOOD WOULD RUN OUT BEFORE YOU GOT MONEY TO BUY MORE.: NEVER TRUE

## 2025-06-24 ASSESSMENT — PATIENT HEALTH QUESTIONNAIRE - PHQ9
3. TROUBLE FALLING OR STAYING ASLEEP: NOT AT ALL
SUM OF ALL RESPONSES TO PHQ QUESTIONS 1-9: 0
SUM OF ALL RESPONSES TO PHQ QUESTIONS 1-9: 0
6. FEELING BAD ABOUT YOURSELF - OR THAT YOU ARE A FAILURE OR HAVE LET YOURSELF OR YOUR FAMILY DOWN: NOT AT ALL
8. MOVING OR SPEAKING SO SLOWLY THAT OTHER PEOPLE COULD HAVE NOTICED. OR THE OPPOSITE, BEING SO FIGETY OR RESTLESS THAT YOU HAVE BEEN MOVING AROUND A LOT MORE THAN USUAL: NOT AT ALL
9. THOUGHTS THAT YOU WOULD BE BETTER OFF DEAD, OR OF HURTING YOURSELF: NOT AT ALL
4. FEELING TIRED OR HAVING LITTLE ENERGY: NOT AT ALL
5. POOR APPETITE OR OVEREATING: NOT AT ALL
SUM OF ALL RESPONSES TO PHQ QUESTIONS 1-9: 0
10. IF YOU CHECKED OFF ANY PROBLEMS, HOW DIFFICULT HAVE THESE PROBLEMS MADE IT FOR YOU TO DO YOUR WORK, TAKE CARE OF THINGS AT HOME, OR GET ALONG WITH OTHER PEOPLE: NOT DIFFICULT AT ALL
7. TROUBLE CONCENTRATING ON THINGS, SUCH AS READING THE NEWSPAPER OR WATCHING TELEVISION: NOT AT ALL
2. FEELING DOWN, DEPRESSED OR HOPELESS: NOT AT ALL
SUM OF ALL RESPONSES TO PHQ QUESTIONS 1-9: 0
1. LITTLE INTEREST OR PLEASURE IN DOING THINGS: NOT AT ALL

## 2025-06-24 NOTE — PROGRESS NOTES
Have you been to the ER, urgent care clinic since your last visit?  Hospitalized since your last visit?   NO    Have you seen or consulted any other health care providers outside our system since your last visit?   NO      “Have you had a diabetic eye exam?”    NO     Date of last diabetic eye exam: 3/3/2021              Chief Complaint   Patient presents with    Diabetes     Open wound/hole on L leg

## 2025-06-26 NOTE — PROGRESS NOTES
Assessment/Plan:        Assessment & Plan  1. Diabetes mellitus.  - Blood glucose level today is 130.  - Potential benefits and risks associated with newer antidiabetic agents were discussed, including appetite suppression and gastrointestinal side effects such as nausea, vomiting, stomach upset, and rare cases of pancreatitis.  - Expressed interest in trying Ozempic again, despite previous cost concerns. A prescription for Ozempic will be provided, and advised to obtain a 1-month supply from pharmacy.  - Advised to undergo an A1c test within the next 1 to 2 months. Currently taking Jardiance and NovoLog 24 units twice a day.  Hemoglobin A1C   Date Value Ref Range Status   03/19/2025 10.9 (H) 4.0 - 5.6 % Final     Comment:     (NOTE)  HbA1C Interpretive Ranges  <5.7              Normal  5.7 - 6.4         Consider Prediabetes  >6.5              Consider Diabetes         2. Skin ulceration.  - Oval 1 x 2.5 cm skin ulceration on the nose where the prosthesis is rubbing against her.  - Prescription for Bactroban ointment will be sent to pharmacy.  - Instructed to apply the ointment for the next 10 days.  - If the ulceration does not heal, advised to return for further evaluation.    3. Phantom limb pain.  - Reports phantom limb pain still comes and goes, particularly at night, and can trigger sciatica.  - Gabapentin was previously ineffective, and stopped taking it.  - Currently taking amitriptyline but unsure of its effectiveness as it makes her fall asleep but does not prevent awakenings at 11:00 or 11:30 PM.  - Advised to increase amitriptyline dosage to 3 tablets at night to help manage the pain.    Follow-up  - Follow up in early August 2025.    Results  Labs   - Blood Sugar: 130   - A1c: 03/2025, 10.9  1. Skin ulcer, limited to breakdown of skin (HCC)  2. Type 2 diabetes mellitus with hyperglycemia, with long-term current use of insulin (HCC)  -     AMB POC GLUCOSE BLOOD, BY GLUCOSE MONITORING DEVICE  -

## 2025-07-14 NOTE — PROGRESS NOTES
Subjective:   Subjective   History of Present Illness  The patient is a 70-year-old female who presents for evaluation of diabetes and cough.    She reports that her blood sugar levels have been decreasing, with a recent reading in the two-digit range. She is currently on Ozempic 2.5 mg, which she believes is helping to control her appetite. She no longer feels the need to eat during the night. She is also taking insulin, 24 units twice daily. Additionally, she mentions that her cousin, who is a nurse, suggested that one of her medications might be causing her cough, but she is unsure which one.     She has been experiencing a persistent cough for the past week, which is causing her bladder to leak during coughing episodes. This has resulted in frequent clothing changes and laundry. She is unsure if any of her medications could be contributing to the cough. She has brought all her medications with her today for review.    Sleep: She reports improved sleep habits, no longer waking up in the middle of the night to eat.    Torie Jarquin is a 70 y.o. female who complains of productive cough for 7 days, stable since that time.          Patient Active Problem List    Diagnosis Date Noted    History of carpal tunnel release of both wrists 03/27/2024    Lumbar back pain with radiculopathy affecting left lower extremity 03/27/2024    Phantom limb syndrome with pain (HCC) 03/27/2024    Major depressive disorder, recurrent, mild 06/29/2022    FH: breast cancer in first degree relative when <50 years old 05/03/2022    Mass of lower inner quadrant of left breast 05/03/2022    Cervical radiculopathy due to degenerative joint disease of spine 03/30/2022    Bilateral carpal tunnel syndrome 03/29/2022    Tachycardia 03/29/2022    Asthma 09/02/2020    Gout 05/01/2019    Severe obesity (HCC) 12/17/2018    Diabetic peripheral neuropathy associated with type 2 diabetes mellitus (HCC) 03/05/2018    Post-menopausal bleeding

## 2025-07-15 ENCOUNTER — OFFICE VISIT (OUTPATIENT)
Facility: CLINIC | Age: 70
End: 2025-07-15
Payer: MEDICARE

## 2025-07-15 VITALS
TEMPERATURE: 97.9 F | SYSTOLIC BLOOD PRESSURE: 134 MMHG | BODY MASS INDEX: 29.98 KG/M2 | DIASTOLIC BLOOD PRESSURE: 88 MMHG | OXYGEN SATURATION: 96 % | HEIGHT: 63 IN | WEIGHT: 169.2 LBS | RESPIRATION RATE: 14 BRPM | HEART RATE: 101 BPM

## 2025-07-15 DIAGNOSIS — I10 ESSENTIAL HYPERTENSION: ICD-10-CM

## 2025-07-15 DIAGNOSIS — Z79.4 TYPE 2 DIABETES MELLITUS WITH HYPERGLYCEMIA, WITH LONG-TERM CURRENT USE OF INSULIN (HCC): ICD-10-CM

## 2025-07-15 DIAGNOSIS — E11.65 TYPE 2 DIABETES MELLITUS WITH HYPERGLYCEMIA, WITH LONG-TERM CURRENT USE OF INSULIN (HCC): ICD-10-CM

## 2025-07-15 DIAGNOSIS — R05.1 ACUTE COUGH: Primary | ICD-10-CM

## 2025-07-15 DIAGNOSIS — K21.9 GASTROESOPHAGEAL REFLUX DISEASE, UNSPECIFIED WHETHER ESOPHAGITIS PRESENT: ICD-10-CM

## 2025-07-15 DIAGNOSIS — E78.2 MIXED HYPERLIPIDEMIA: ICD-10-CM

## 2025-07-15 DIAGNOSIS — M10.00 IDIOPATHIC GOUT, UNSPECIFIED CHRONICITY, UNSPECIFIED SITE: ICD-10-CM

## 2025-07-15 PROCEDURE — 2022F DILAT RTA XM EVC RTNOPTHY: CPT | Performed by: FAMILY MEDICINE

## 2025-07-15 PROCEDURE — 1123F ACP DISCUSS/DSCN MKR DOCD: CPT | Performed by: FAMILY MEDICINE

## 2025-07-15 PROCEDURE — 1036F TOBACCO NON-USER: CPT | Performed by: FAMILY MEDICINE

## 2025-07-15 PROCEDURE — 1159F MED LIST DOCD IN RCRD: CPT | Performed by: FAMILY MEDICINE

## 2025-07-15 PROCEDURE — 3046F HEMOGLOBIN A1C LEVEL >9.0%: CPT | Performed by: FAMILY MEDICINE

## 2025-07-15 PROCEDURE — 1125F AMNT PAIN NOTED PAIN PRSNT: CPT | Performed by: FAMILY MEDICINE

## 2025-07-15 PROCEDURE — G8427 DOCREV CUR MEDS BY ELIG CLIN: HCPCS | Performed by: FAMILY MEDICINE

## 2025-07-15 PROCEDURE — 3017F COLORECTAL CA SCREEN DOC REV: CPT | Performed by: FAMILY MEDICINE

## 2025-07-15 PROCEDURE — 1090F PRES/ABSN URINE INCON ASSESS: CPT | Performed by: FAMILY MEDICINE

## 2025-07-15 PROCEDURE — 3079F DIAST BP 80-89 MM HG: CPT | Performed by: FAMILY MEDICINE

## 2025-07-15 PROCEDURE — G8399 PT W/DXA RESULTS DOCUMENT: HCPCS | Performed by: FAMILY MEDICINE

## 2025-07-15 PROCEDURE — G8417 CALC BMI ABV UP PARAM F/U: HCPCS | Performed by: FAMILY MEDICINE

## 2025-07-15 PROCEDURE — 99214 OFFICE O/P EST MOD 30 MIN: CPT | Performed by: FAMILY MEDICINE

## 2025-07-15 PROCEDURE — 3075F SYST BP GE 130 - 139MM HG: CPT | Performed by: FAMILY MEDICINE

## 2025-07-15 PROCEDURE — G2211 COMPLEX E/M VISIT ADD ON: HCPCS | Performed by: FAMILY MEDICINE

## 2025-07-15 RX ORDER — DOXYCYCLINE HYCLATE 100 MG
100 TABLET ORAL 2 TIMES DAILY
Qty: 20 TABLET | Refills: 0 | Status: SHIPPED | OUTPATIENT
Start: 2025-07-15 | End: 2025-07-25

## 2025-07-15 RX ORDER — INSULIN ASPART 100 [IU]/ML
20 INJECTION, SUSPENSION SUBCUTANEOUS 2 TIMES DAILY WITH MEALS
Qty: 60 ML | Refills: 3 | Status: SHIPPED | OUTPATIENT
Start: 2025-07-15

## 2025-07-15 RX ORDER — LOSARTAN POTASSIUM 100 MG/1
100 TABLET ORAL DAILY
Qty: 100 TABLET | Refills: 1 | Status: SHIPPED | OUTPATIENT
Start: 2025-07-15

## 2025-07-15 RX ORDER — OMEPRAZOLE 20 MG/1
20 CAPSULE, DELAYED RELEASE ORAL DAILY
Qty: 100 CAPSULE | Refills: 2 | Status: SHIPPED | OUTPATIENT
Start: 2025-07-15

## 2025-07-15 RX ORDER — SEMAGLUTIDE 1.34 MG/ML
0.5 INJECTION, SOLUTION SUBCUTANEOUS WEEKLY
Qty: 1.5 ML | Refills: 2 | Status: SHIPPED | OUTPATIENT
Start: 2025-07-15

## 2025-07-15 RX ORDER — ALLOPURINOL 100 MG/1
100 TABLET ORAL DAILY
Qty: 100 TABLET | Refills: 2 | Status: SHIPPED | OUTPATIENT
Start: 2025-07-15

## 2025-07-15 RX ORDER — ROSUVASTATIN CALCIUM 40 MG/1
40 TABLET, COATED ORAL NIGHTLY
Qty: 100 TABLET | Refills: 2 | Status: SHIPPED | OUTPATIENT
Start: 2025-07-15

## 2025-07-15 NOTE — PROGRESS NOTES
Have you been to the ER, urgent care clinic since your last visit?  Hospitalized since your last visit?   NO    Have you seen or consulted any other health care providers outside our system since your last visit?   NO      “Have you had a diabetic eye exam?”    NO     Date of last diabetic eye exam: 3/3/2021              Chief Complaint   Patient presents with    Cough     Onset for over a week

## 2025-07-28 DIAGNOSIS — G54.6 PHANTOM LIMB SYNDROME WITH PAIN (HCC): ICD-10-CM

## 2025-07-29 RX ORDER — AMITRIPTYLINE HYDROCHLORIDE 50 MG/1
100 TABLET ORAL NIGHTLY
Qty: 200 TABLET | Refills: 1 | Status: SHIPPED | OUTPATIENT
Start: 2025-07-29

## 2025-07-29 RX ORDER — ISOPROPYL ALCOHOL 70 ML/100ML
SWAB TOPICAL
Qty: 300 EACH | Refills: 3 | Status: SHIPPED | OUTPATIENT
Start: 2025-07-29

## 2025-07-29 RX ORDER — AMITRIPTYLINE HYDROCHLORIDE 50 MG/1
TABLET ORAL
Qty: 270 TABLET | Refills: 0 | OUTPATIENT
Start: 2025-07-29

## 2025-08-01 ENCOUNTER — HOSPITAL ENCOUNTER (OUTPATIENT)
Facility: HOSPITAL | Age: 70
Discharge: HOME | End: 2025-08-01
Payer: MEDICARE

## 2025-08-01 LAB
ANION GAP SERPL CALC-SCNC: 12 MMOL/L (ref 2–12)
BUN SERPL-MCNC: 24 MG/DL (ref 6–20)
BUN/CREAT SERPL: 15 (ref 12–20)
CALCIUM SERPL-MCNC: 9.7 MG/DL (ref 8.5–10.1)
CHLORIDE SERPL-SCNC: 102 MMOL/L (ref 97–108)
CO2 SERPL-SCNC: 25 MMOL/L (ref 21–32)
CREAT SERPL-MCNC: 1.59 MG/DL (ref 0.55–1.02)
EST. AVERAGE GLUCOSE BLD GHB EST-MCNC: 192 MG/DL
GLUCOSE SERPL-MCNC: 126 MG/DL (ref 65–100)
HBA1C MFR BLD: 8.3 % (ref 4–5.6)
POTASSIUM SERPL-SCNC: 4.1 MMOL/L (ref 3.5–5.1)
SODIUM SERPL-SCNC: 139 MMOL/L (ref 136–145)

## 2025-08-01 PROCEDURE — 36415 COLL VENOUS BLD VENIPUNCTURE: CPT

## 2025-08-01 PROCEDURE — 80048 BASIC METABOLIC PNL TOTAL CA: CPT

## 2025-08-01 PROCEDURE — 83036 HEMOGLOBIN GLYCOSYLATED A1C: CPT

## 2025-08-05 ENCOUNTER — OFFICE VISIT (OUTPATIENT)
Facility: CLINIC | Age: 70
End: 2025-08-05
Payer: MEDICARE

## 2025-08-05 VITALS
BODY MASS INDEX: 29.95 KG/M2 | TEMPERATURE: 98.3 F | RESPIRATION RATE: 14 BRPM | DIASTOLIC BLOOD PRESSURE: 87 MMHG | WEIGHT: 169 LBS | HEIGHT: 63 IN | OXYGEN SATURATION: 98 % | HEART RATE: 102 BPM | SYSTOLIC BLOOD PRESSURE: 136 MMHG

## 2025-08-05 DIAGNOSIS — M10.071 ACUTE IDIOPATHIC GOUT OF RIGHT FOOT: ICD-10-CM

## 2025-08-05 DIAGNOSIS — I10 ESSENTIAL HYPERTENSION: ICD-10-CM

## 2025-08-05 DIAGNOSIS — Z79.4 TYPE 2 DIABETES MELLITUS WITH HYPERGLYCEMIA, WITH LONG-TERM CURRENT USE OF INSULIN (HCC): Primary | ICD-10-CM

## 2025-08-05 DIAGNOSIS — G54.6 PHANTOM LIMB SYNDROME WITH PAIN (HCC): ICD-10-CM

## 2025-08-05 DIAGNOSIS — E11.65 TYPE 2 DIABETES MELLITUS WITH HYPERGLYCEMIA, WITH LONG-TERM CURRENT USE OF INSULIN (HCC): Primary | ICD-10-CM

## 2025-08-05 PROCEDURE — 1126F AMNT PAIN NOTED NONE PRSNT: CPT | Performed by: FAMILY MEDICINE

## 2025-08-05 PROCEDURE — 1160F RVW MEDS BY RX/DR IN RCRD: CPT | Performed by: FAMILY MEDICINE

## 2025-08-05 PROCEDURE — 1123F ACP DISCUSS/DSCN MKR DOCD: CPT | Performed by: FAMILY MEDICINE

## 2025-08-05 PROCEDURE — 1036F TOBACCO NON-USER: CPT | Performed by: FAMILY MEDICINE

## 2025-08-05 PROCEDURE — G8417 CALC BMI ABV UP PARAM F/U: HCPCS | Performed by: FAMILY MEDICINE

## 2025-08-05 PROCEDURE — 3075F SYST BP GE 130 - 139MM HG: CPT | Performed by: FAMILY MEDICINE

## 2025-08-05 PROCEDURE — 2022F DILAT RTA XM EVC RTNOPTHY: CPT | Performed by: FAMILY MEDICINE

## 2025-08-05 PROCEDURE — 1159F MED LIST DOCD IN RCRD: CPT | Performed by: FAMILY MEDICINE

## 2025-08-05 PROCEDURE — G2211 COMPLEX E/M VISIT ADD ON: HCPCS | Performed by: FAMILY MEDICINE

## 2025-08-05 PROCEDURE — 3079F DIAST BP 80-89 MM HG: CPT | Performed by: FAMILY MEDICINE

## 2025-08-05 PROCEDURE — 3017F COLORECTAL CA SCREEN DOC REV: CPT | Performed by: FAMILY MEDICINE

## 2025-08-05 PROCEDURE — 1090F PRES/ABSN URINE INCON ASSESS: CPT | Performed by: FAMILY MEDICINE

## 2025-08-05 PROCEDURE — G8399 PT W/DXA RESULTS DOCUMENT: HCPCS | Performed by: FAMILY MEDICINE

## 2025-08-05 PROCEDURE — 99214 OFFICE O/P EST MOD 30 MIN: CPT | Performed by: FAMILY MEDICINE

## 2025-08-05 PROCEDURE — G8427 DOCREV CUR MEDS BY ELIG CLIN: HCPCS | Performed by: FAMILY MEDICINE

## 2025-08-05 PROCEDURE — 3052F HG A1C>EQUAL 8.0%<EQUAL 9.0%: CPT | Performed by: FAMILY MEDICINE

## 2025-08-05 RX ORDER — INSULIN ASPART 100 [IU]/ML
16 INJECTION, SUSPENSION SUBCUTANEOUS 2 TIMES DAILY WITH MEALS
Qty: 60 ML | Refills: 3 | Status: SHIPPED | OUTPATIENT
Start: 2025-08-05

## 2025-08-05 RX ORDER — AMITRIPTYLINE HYDROCHLORIDE 50 MG/1
150 TABLET ORAL NIGHTLY
Qty: 300 TABLET | Refills: 1 | Status: SHIPPED | OUTPATIENT
Start: 2025-08-05

## 2025-08-05 ASSESSMENT — PATIENT HEALTH QUESTIONNAIRE - PHQ9
10. IF YOU CHECKED OFF ANY PROBLEMS, HOW DIFFICULT HAVE THESE PROBLEMS MADE IT FOR YOU TO DO YOUR WORK, TAKE CARE OF THINGS AT HOME, OR GET ALONG WITH OTHER PEOPLE: NOT DIFFICULT AT ALL
1. LITTLE INTEREST OR PLEASURE IN DOING THINGS: NOT AT ALL
7. TROUBLE CONCENTRATING ON THINGS, SUCH AS READING THE NEWSPAPER OR WATCHING TELEVISION: NOT AT ALL
SUM OF ALL RESPONSES TO PHQ QUESTIONS 1-9: 0
8. MOVING OR SPEAKING SO SLOWLY THAT OTHER PEOPLE COULD HAVE NOTICED. OR THE OPPOSITE, BEING SO FIGETY OR RESTLESS THAT YOU HAVE BEEN MOVING AROUND A LOT MORE THAN USUAL: NOT AT ALL
9. THOUGHTS THAT YOU WOULD BE BETTER OFF DEAD, OR OF HURTING YOURSELF: NOT AT ALL
4. FEELING TIRED OR HAVING LITTLE ENERGY: NOT AT ALL
2. FEELING DOWN, DEPRESSED OR HOPELESS: NOT AT ALL
SUM OF ALL RESPONSES TO PHQ QUESTIONS 1-9: 0
SUM OF ALL RESPONSES TO PHQ QUESTIONS 1-9: 0
5. POOR APPETITE OR OVEREATING: NOT AT ALL
6. FEELING BAD ABOUT YOURSELF - OR THAT YOU ARE A FAILURE OR HAVE LET YOURSELF OR YOUR FAMILY DOWN: NOT AT ALL
SUM OF ALL RESPONSES TO PHQ QUESTIONS 1-9: 0
3. TROUBLE FALLING OR STAYING ASLEEP: NOT AT ALL

## (undated) DEVICE — PREP SKN PREVAIL 40ML APPL --

## (undated) DEVICE — HOOK LOCK LATEX FREE ELASTIC BANDAGE D/L 6INX10YD

## (undated) DEVICE — SUTURE STRATAFIX SPRL SZ 1 L14IN ABSRB VLT L48CM CTX 1/2 SXPD2B405

## (undated) DEVICE — CUSTOM CAST PD STR

## (undated) DEVICE — SOLUTION IRRIG 3000ML 0.9% SOD CHL FLX CONT 0797208] ICU MEDICAL INC]

## (undated) DEVICE — STERILE POLYISOPRENE POWDER-FREE SURGICAL GLOVES WITH EMOLLIENT COATING: Brand: PROTEXIS

## (undated) DEVICE — STERILE POLYISOPRENE POWDER-FREE SURGICAL GLOVES: Brand: PROTEXIS

## (undated) DEVICE — GOWN,PREVENTION PLUS,XLN/2XL,ST,22/CS: Brand: MEDLINE

## (undated) DEVICE — REM POLYHESIVE ADULT PATIENT RETURN ELECTRODE: Brand: VALLEYLAB

## (undated) DEVICE — SYRINGE 20ML LL S/C 50

## (undated) DEVICE — BLUNT CANNULA: Brand: MONOJECT

## (undated) DEVICE — SUTURE VCRL SZ 2-0 L36IN ABSRB UD L40MM CT 1/2 CIR J957H

## (undated) DEVICE — SUTURE MCRYL SZ 3-0 L27IN ABSRB UD L24MM PS-1 3/8 CIR PRIM Y936H

## (undated) DEVICE — FORCEPS BX L240CM JAW DIA2.2MM RAD JAW 4 HOT DISP

## (undated) DEVICE — ZIMMER® STERILE DISPOSABLE TOURNIQUET CUFF WITH PLC, DUAL PORT, SINGLE BLADDER, 34 IN. (86 CM)

## (undated) DEVICE — 4-PORT MANIFOLD: Brand: NEPTUNE 2

## (undated) DEVICE — DEVON™ KNEE AND BODY STRAP 60" X 3" (1.5 M X 7.6 CM): Brand: DEVON

## (undated) DEVICE — 3M™ IOBAN™ 2 ANTIMICROBIAL INCISE DRAPE 6651EZ: Brand: IOBAN™ 2

## (undated) DEVICE — TUBING IRRIG L10IN DISP PMP ENDOGATOR E

## (undated) DEVICE — CONTAINER,SPECIMEN,3OZ,OR STRL: Brand: MEDLINE

## (undated) DEVICE — HANDPIECE SET WITH BONE CLEANING TIP AND SUCTION TUBE: Brand: INTERPULSE

## (undated) DEVICE — TRAY CATH W/ 16FR CATH URIN M CTRL FIT OUTLT TB F STATLOK

## (undated) DEVICE — DRAPE,EXTREMITY,89X128,STERILE: Brand: MEDLINE

## (undated) DEVICE — LINER,SEMI-RIGID,3000CC,50EA/CS: Brand: MEDLINE

## (undated) DEVICE — SYR 10ML LUER LOK 1/5ML GRAD --

## (undated) DEVICE — Device

## (undated) DEVICE — SOLUTION IRRIG 1000ML STRL H2O USP PLAS POUR BTL

## (undated) DEVICE — LIGHT HANDLE: Brand: DEVON

## (undated) DEVICE — STRYKER PERFORMANCE SERIES SAGITTAL BLADE: Brand: STRYKER PERFORMANCE SERIES

## (undated) DEVICE — GAUZE SPONGES,12 PLY: Brand: CURITY

## (undated) DEVICE — INFECTION CONTROL KIT SYS

## (undated) DEVICE — X-RAY SPONGES,16 PLY: Brand: DERMACEA

## (undated) DEVICE — SYRINGE MED 20ML STD CLR PLAS LUERLOCK TIP N CTRL DISP

## (undated) DEVICE — SUTURE VCRL 1 L27IN ABSRB CT BRAID COAT UD J281H

## (undated) DEVICE — Z DISCONTINUED USE 2744636  DRESSING AQUACEL 14 IN ALG W3.5XL14IN POLYUR FLM CVR W/ HYDRCOLL

## (undated) DEVICE — CARTRIDGE BNE CEM MIX UNIV TWR VAC ROTOR BRK OFF NOZ W/O

## (undated) DEVICE — GOWN,ISO,KNITCUFF, PREMIUM BLUE, LV3,XL: Brand: MEDLINE INDUSTRIES, INC.

## (undated) DEVICE — (D)PREP SKN CHLRAPRP APPL 26ML -- CONVERT TO ITEM 371833

## (undated) DEVICE — SOLUTION IRRIG 1000ML H2O STRL BLT

## (undated) DEVICE — SUTURE VCRL SZ 0 L27IN ABSRB UD L36MM CT-1 1/2 CIR J260H

## (undated) DEVICE — SOLUTION IV 50ML 0.9% SOD CHL

## (undated) DEVICE — DERMABOND SKIN ADH 0.7ML -- DERMABOND ADVANCED 12/BX

## (undated) DEVICE — SCRUB DRY SURG EZ SCRUB BRUSH PREOPERATIVE GRN

## (undated) DEVICE — KIT ENDO OP4 CA 1.1+ BX20

## (undated) DEVICE — T5 HOOD WITH PEEL AWAY FACE SHIELD